# Patient Record
Sex: FEMALE | Race: WHITE | NOT HISPANIC OR LATINO | Employment: OTHER | URBAN - METROPOLITAN AREA
[De-identification: names, ages, dates, MRNs, and addresses within clinical notes are randomized per-mention and may not be internally consistent; named-entity substitution may affect disease eponyms.]

---

## 2017-03-07 DIAGNOSIS — IMO0002 UNCONTROLLED TYPE 2 DIABETES MELLITUS WITH COMPLICATION, WITH LONG-TERM CURRENT USE OF INSULIN: ICD-10-CM

## 2017-03-15 ENCOUNTER — OFFICE VISIT (OUTPATIENT)
Dept: ENDOCRINOLOGY | Age: 42
End: 2017-03-15

## 2017-03-15 VITALS
HEIGHT: 69 IN | DIASTOLIC BLOOD PRESSURE: 78 MMHG | SYSTOLIC BLOOD PRESSURE: 134 MMHG | BODY MASS INDEX: 33.18 KG/M2 | WEIGHT: 224 LBS

## 2017-03-15 DIAGNOSIS — R73.9 HYPERGLYCEMIA: ICD-10-CM

## 2017-03-15 DIAGNOSIS — IMO0002 UNCONTROLLED TYPE 2 DIABETES MELLITUS WITH COMPLICATION, WITH LONG-TERM CURRENT USE OF INSULIN: Primary | ICD-10-CM

## 2017-03-15 DIAGNOSIS — K86.9 PANCREAS DISORDER: ICD-10-CM

## 2017-03-15 DIAGNOSIS — K31.84 GASTROPARESIS: ICD-10-CM

## 2017-03-15 DIAGNOSIS — K86.1 CHRONIC PANCREATITIS, UNSPECIFIED PANCREATITIS TYPE (HCC): ICD-10-CM

## 2017-03-15 DIAGNOSIS — E16.2 HYPOGLYCEMIA: ICD-10-CM

## 2017-03-15 DIAGNOSIS — E78.2 MIXED HYPERLIPIDEMIA: ICD-10-CM

## 2017-03-15 PROCEDURE — 99214 OFFICE O/P EST MOD 30 MIN: CPT | Performed by: NURSE PRACTITIONER

## 2017-03-15 NOTE — PROGRESS NOTES
Danielle Dudley  presents to the office  for the follow-up appointment for Type 2 diabetes mellitus.     The diabete's condition location is throughout the system with the  clinical course has fluctuated, the severity is Moderate, and the modifying/allievating factors are insulin.  Medications and  Dosages were  reviewed with Danielle Dudley and suggested that compliance most of the time.    Associated symptoms of hyperglycemia have been excessive thirst and polyuria.  Associated symptoms of hypoglycemia have been confusion, dizziness, jitteriness and nausea and headache. Patient reports  hypoglycemia threshold for symptoms is 80 mg/dl .     The patient is currently on insulin.    Compliance with blood glucose monitoring: good.     Meal panning: The patient is using avoidance of concentrated sweets.    The patient is currently taking home blood tests - Blood glucose testin times daily, that are:  fasting- 1st thing in morning before eating or drinking  before each meal  before each meal and 1 or 2 hours after meal  bedtime  Novolog U100  per VGO 20    Last instructions given were:   Change Vgo 30 to Vgo20  Instructions for corrections 2 hours after meals-  140mg/dl - 1 click  160mg/dl - 2 clicks  180mg/dl - 3 clicks  200mg/dl - 4 clicks    Home blood glucose testing daily: 6  FB to 260 mg/dl  after breakfast 180 to 260 mg/dl  before lunch 180 to 260 mg/dl  after lunch 180 to 260 mg/dl  before dinner/supper 180 to 260 mg/dl   after dinner/supper 180 to 260 mg/dl    Last reported blood glucose readings are:   Home blood glucose testing daily: 4-5  FB to 93 mg/dl  after lunch 50 to 95 mg/dl  after dinner/supper 100 to 175 mg/dl  bedtime 90 to 160 mg/dl    Patterns reported per patient are that her B/S are constantly running high, but she is having extreme lows at least 2-3 times week for the last 2 weeks. Blood sugars in the 30mg/dl          The following portions of the patient's history were reviewed and  updated as appropriate: current medications, past family history, past medical history, past social history, past surgical history and problem list.      Current Outpatient Prescriptions:   •  atorvastatin (LIPITOR) 10 MG tablet, , Disp: , Rfl:   •  butalbital-acetaminophen  MG tablet tablet, Take  by mouth every 4 (four) hours as needed., Disp: , Rfl:   •  CREON 64204 UNITS capsule delayed-release particles capsule, , Disp: , Rfl:   •  cyclobenzaprine (FLEXERIL) 10 MG tablet, , Disp: , Rfl:   •  gabapentin (NEURONTIN) 400 MG capsule, , Disp: , Rfl:   •  glucose blood (FREESTYLE LITE) test strip, Check blood glucose 4-5 times daily, Disp: 60 each, Rfl: 5  •  Insulin Disposable Pump (V-GO 20) kit, Use 1 pod daily, Disp: 30 kit, Rfl: 11  •  Lancets (FREESTYLE) lancets, Check blood glucose 4-5 times daily, Disp: 120 each, Rfl: 5  •  LANTUS 100 UNIT/ML injection, , Disp: , Rfl:   •  lisinopril (PRINIVIL,ZESTRIL) 40 MG tablet, , Disp: , Rfl:   •  metFORMIN (GLUCOPHAGE) 1000 MG tablet, , Disp: , Rfl:   •  metoclopramide (REGLAN) 5 MG tablet, Take 5 mg by mouth 3 (Three) Times a Day., Disp: , Rfl:   •  NOVOLOG 100 UNIT/ML injection, INJECT 66 UNITS DAILY VIA V-GO DAILY, Disp: 20 mL, Rfl: 0  •  pantoprazole (PROTONIX) 40 MG EC tablet, , Disp: , Rfl:   •  promethazine (PHENERGAN) 25 MG tablet, , Disp: , Rfl:     There are no active problems to display for this patient.      Review of Systems   A comprehensive review of the 14 systems was negative except of listed below:  Constitutional: fatigue and weight gain 10* lb  in 2 months**  Gastrointestinal: positive for dyspepsia, dysphagia, nausea, reflux symptoms and vomiting  Neurological: positive for headaches, memory problems, tremors and weakness  Behavioral/Psych: positive for decreased appetite  Endocrine: diabetic symptoms including increased fatigue  temperature  cold intolerance  temperature heat intolerance  hyperglycemia  stomach pain      Objective:     Wt  "Readings from Last 3 Encounters:   03/15/17 224 lb (102 kg)   12/20/16 215 lb (97.5 kg)   11/09/16 216 lb (98 kg)     Temp Readings from Last 3 Encounters:   No data found for Temp     BP Readings from Last 3 Encounters:   03/15/17 134/78   12/20/16 132/86   11/09/16 142/90     Pulse Readings from Last 3 Encounters:   No data found for Pulse          Visit Vitals   • /78   • Ht 69\" (175.3 cm)   • Wt 224 lb (102 kg)   • BMI 33.08 kg/m2       General appearance:  alert, cooperative, delirious, fatigued, nourished\" \"appears stated age and cooperative\" \"NAD   Neck: no carotid bruit, supple, symmetrical, trachea midline and thyroid not enlarged, symmetric, no tenderness/mass/nodules   Thyroid:  no palpable nodule, no enlargement, no tenderness   Lung:  \"clear to auscultation bilaterally\" \"no abnormal breath sounds  \" effort was normal, no labored breath, no use of accessory muscles.   Heart: regular rate and rhythm, S1, S2 normal, no murmur, click, rub or gallop      Abdomen:  normal bowel sounds- 4 quads, soft non-tender, contour - rounded and contour - obese      Extremities: extremities normal, atraumatic, no cyanosis or edema extremities normal, atraumatic, no cyanosis or edema\" WNL - gait and station, strength and stability\"       Skin:   Pulses:  warm and dry, no hyperpigmentation, normal skin coloring, or suspicious lesions   2+ and symmetric   Neuro: Alert and oriented x3. Gait normal. Reflexes and motor strength normal and symmetric. Cranial nerves 2-12 and sensation grossly intact.      Psych: behavior - normal, judgement - normal, mood - normal, affect - normal                 Lab Review  Results for orders placed or performed in visit on 12/20/16   Comprehensive Metabolic Panel   Result Value Ref Range    Glucose 164 (H) 65 - 99 mg/dL    BUN 10 6 - 20 mg/dL    Creatinine 0.67 0.57 - 1.00 mg/dL    eGFR Non African Am 96 >60 mL/min/1.73    eGFR African Am 116 >60 mL/min/1.73    BUN/Creatinine Ratio 14.9 " 7.0 - 25.0    Sodium 140 136 - 145 mmol/L    Potassium 4.1 3.5 - 5.2 mmol/L    Chloride 100 98 - 107 mmol/L    Total CO2 22.9 22.0 - 29.0 mmol/L    Calcium 9.4 8.6 - 10.5 mg/dL    Total Protein 7.4 6.0 - 8.5 g/dL    Albumin 4.20 3.50 - 5.20 g/dL    Globulin 3.2 gm/dL    A/G Ratio 1.3 g/dL    Total Bilirubin 0.4 0.1 - 1.2 mg/dL    Alkaline Phosphatase 65 39 - 117 U/L    AST (SGOT) 13 1 - 32 U/L    ALT (SGPT) 9 1 - 33 U/L   Hemoglobin A1c   Result Value Ref Range    Hemoglobin A1C 7.08 (H) 4.80 - 5.60 %   Lipid Panel   Result Value Ref Range    Total Cholesterol 215 (H) 0 - 200 mg/dL    Triglycerides 113 0 - 150 mg/dL    HDL Cholesterol 36 (L) 40 - 60 mg/dL    VLDL Cholesterol 22.6 5 - 40 mg/dL    LDL Cholesterol  156 (H) 0 - 100 mg/dL   Vitamin D 25 Hydroxy   Result Value Ref Range    25 Hydroxy, Vitamin D 35.4 30.0 - 100.0 ng/mL   Microalbumin / Creatinine Urine Ratio   Result Value Ref Range    Creatinine, Urine 121.6 Not Estab. mg/dL    Microalbumin, Urine 53.2 Not Estab. ug/mL    Microalbumin/Creatinine Ratio Urine 43.8 (H) 0.0 - 30.0 mg/g creat           Assessment:   Danielle was seen today for follow-up.    Diagnoses and all orders for this visit:    Uncontrolled type 2 diabetes mellitus with complication, with long-term current use of insulin  -     Lipase  -     Amylase  -     CBC & Differential  -     Comprehensive Metabolic Panel  -     C-Peptide  -     Hemoglobin A1c  -     Insulin, Total  -     Microalbumin / Creatinine Urine Ratio  -     TSH  -     T4, Free  -     Thyroid Antibodies  -     T4  -     T3, Free  -     T3    Hypoglycemia  -     CBC & Differential  -     Comprehensive Metabolic Panel    Hyperglycemia  -     CBC & Differential  -     Comprehensive Metabolic Panel    Chronic pancreatitis, unspecified pancreatitis type  -     CBC & Differential  -     Comprehensive Metabolic Panel    Mixed hyperlipidemia  -     CBC & Differential  -     Comprehensive Metabolic Panel  -     Lipid  Panel    Gastroparesis  -     Lipase  -     Amylase  -     CBC & Differential  -     Comprehensive Metabolic Panel    Pancreas disorder  -     Lipase  -     Amylase        Plan:    Education:  interpretation of lab results, blood sugar goals, complications of diabetes mellitus, hypoglycemia prevention and treatment, exercise, illness management, self-monitoring of blood glucose skills, nutrition and carbohydrate counting    home blood tests -  Blood glucose testin-5 times daily, that are:  fasting- 1st thing in morning before eating or drinking  before each meal and 1 or 2 hours after meal  bedtime  anytime you feel symptoms of hyperglycemia or hypoglycemia (high or low blood sugars)     Plan: Samples of V-Go 30 we'll fill out paperwork for the Medtronic pump.  C-peptide is low has chronic pancreatitis with gastroparesis glycemia during night and periodically throughout the day unawareness.    Office visit 25 minutes with additional education given 13 minutes - SMBG with goals, medication changes with purposes and s/e profiles. Ipro needed, insulin pump therapy and gastroparesis and chronic pancreatitis.      Return in about 4 weeks (around 2017).      Dragon transcription disclaimer     Much of this encounter note is an electronic transcription/translation of spoken language to printed text. The electronic translation of spoken language may permit erroneous, or at times, nonsensical words or phrases to be inadvertently transcribed. Although I have reviewed the note for such errors, some may still exist.

## 2017-03-16 LAB
ALBUMIN SERPL-MCNC: 4.2 G/DL (ref 3.5–5.2)
ALBUMIN/GLOB SERPL: 1.2 G/DL
ALP SERPL-CCNC: 67 U/L (ref 39–117)
ALT SERPL-CCNC: 8 U/L (ref 1–33)
AMYLASE SERPL-CCNC: 41 U/L (ref 28–100)
AST SERPL-CCNC: 13 U/L (ref 1–32)
BASOPHILS # BLD AUTO: 0.04 10*3/MM3 (ref 0–0.2)
BASOPHILS NFR BLD AUTO: 0.3 % (ref 0–1.5)
BILIRUB SERPL-MCNC: 0.3 MG/DL (ref 0.1–1.2)
BUN SERPL-MCNC: 11 MG/DL (ref 6–20)
BUN/CREAT SERPL: 16.7 (ref 7–25)
C PEPTIDE SERPL-MCNC: 1.8 NG/ML (ref 1.1–4.4)
CALCIUM SERPL-MCNC: 9.6 MG/DL (ref 8.6–10.5)
CHLORIDE SERPL-SCNC: 99 MMOL/L (ref 98–107)
CHOLEST SERPL-MCNC: 232 MG/DL (ref 0–200)
CO2 SERPL-SCNC: 24 MMOL/L (ref 22–29)
CREAT SERPL-MCNC: 0.66 MG/DL (ref 0.57–1)
EOSINOPHIL # BLD AUTO: 0.2 10*3/MM3 (ref 0–0.7)
EOSINOPHIL NFR BLD AUTO: 1.6 % (ref 0.3–6.2)
ERYTHROCYTE [DISTWIDTH] IN BLOOD BY AUTOMATED COUNT: 13.2 % (ref 11.7–13)
GLOBULIN SER CALC-MCNC: 3.4 GM/DL
GLUCOSE SERPL-MCNC: 154 MG/DL (ref 65–99)
HBA1C MFR BLD: 8.3 % (ref 4.8–5.6)
HCT VFR BLD AUTO: 47.7 % (ref 35.6–45.5)
HDLC SERPL-MCNC: 34 MG/DL (ref 40–60)
HGB BLD-MCNC: 16.1 G/DL (ref 11.9–15.5)
IMM GRANULOCYTES # BLD: 0.02 10*3/MM3 (ref 0–0.03)
IMM GRANULOCYTES NFR BLD: 0.2 % (ref 0–0.5)
INSULIN SERPL-ACNC: 6.3 UIU/ML (ref 2.6–24.9)
LDLC SERPL CALC-MCNC: 135 MG/DL (ref 0–100)
LIPASE SERPL-CCNC: 16 U/L (ref 13–60)
LYMPHOCYTES # BLD AUTO: 4.14 10*3/MM3 (ref 0.9–4.8)
LYMPHOCYTES NFR BLD AUTO: 33.6 % (ref 19.6–45.3)
MCH RBC QN AUTO: 33.4 PG (ref 26.9–32)
MCHC RBC AUTO-ENTMCNC: 33.8 G/DL (ref 32.4–36.3)
MCV RBC AUTO: 99 FL (ref 80.5–98.2)
MONOCYTES # BLD AUTO: 0.51 10*3/MM3 (ref 0.2–1.2)
MONOCYTES NFR BLD AUTO: 4.1 % (ref 5–12)
NEUTROPHILS # BLD AUTO: 7.41 10*3/MM3 (ref 1.9–8.1)
NEUTROPHILS NFR BLD AUTO: 60.2 % (ref 42.7–76)
PLATELET # BLD AUTO: 268 10*3/MM3 (ref 140–500)
POTASSIUM SERPL-SCNC: 4.2 MMOL/L (ref 3.5–5.2)
PROT SERPL-MCNC: 7.6 G/DL (ref 6–8.5)
RBC # BLD AUTO: 4.82 10*6/MM3 (ref 3.9–5.2)
SODIUM SERPL-SCNC: 141 MMOL/L (ref 136–145)
T3 SERPL-MCNC: 145.8 NG/DL (ref 80–200)
T3FREE SERPL-MCNC: 2.9 PG/ML (ref 2–4.4)
T4 FREE SERPL-MCNC: 1.06 NG/DL (ref 0.93–1.7)
T4 SERPL-MCNC: 7.25 MCG/DL (ref 4.5–11.7)
THYROGLOB AB SERPL-ACNC: <1 IU/ML (ref 0–0.9)
THYROPEROXIDASE AB SERPL-ACNC: 16 IU/ML (ref 0–34)
TRIGL SERPL-MCNC: 313 MG/DL (ref 0–150)
TSH SERPL DL<=0.005 MIU/L-ACNC: 0.98 MIU/ML (ref 0.27–4.2)
VLDLC SERPL CALC-MCNC: 62.6 MG/DL (ref 5–40)
WBC # BLD AUTO: 12.32 10*3/MM3 (ref 4.5–10.7)

## 2017-03-30 DIAGNOSIS — IMO0002 UNCONTROLLED TYPE 2 DIABETES MELLITUS WITH COMPLICATION, WITH LONG-TERM CURRENT USE OF INSULIN: ICD-10-CM

## 2017-04-26 ENCOUNTER — OFFICE VISIT (OUTPATIENT)
Dept: ENDOCRINOLOGY | Age: 42
End: 2017-04-26

## 2017-04-26 VITALS
BODY MASS INDEX: 33.03 KG/M2 | HEIGHT: 69 IN | SYSTOLIC BLOOD PRESSURE: 132 MMHG | DIASTOLIC BLOOD PRESSURE: 86 MMHG | WEIGHT: 223 LBS

## 2017-04-26 DIAGNOSIS — E16.2 HYPOGLYCEMIA: ICD-10-CM

## 2017-04-26 DIAGNOSIS — K86.9 PANCREAS DISORDER: ICD-10-CM

## 2017-04-26 DIAGNOSIS — K31.84 GASTROPARESIS: ICD-10-CM

## 2017-04-26 DIAGNOSIS — IMO0002 UNCONTROLLED TYPE 2 DIABETES MELLITUS WITH COMPLICATION, WITH LONG-TERM CURRENT USE OF INSULIN: Primary | ICD-10-CM

## 2017-04-26 DIAGNOSIS — K86.1 CHRONIC PANCREATITIS, UNSPECIFIED PANCREATITIS TYPE (HCC): ICD-10-CM

## 2017-04-26 DIAGNOSIS — R73.9 HYPERGLYCEMIA: ICD-10-CM

## 2017-04-26 DIAGNOSIS — E78.2 MIXED HYPERLIPIDEMIA: ICD-10-CM

## 2017-04-26 PROCEDURE — 99214 OFFICE O/P EST MOD 30 MIN: CPT | Performed by: NURSE PRACTITIONER

## 2017-04-26 RX ORDER — ATORVASTATIN CALCIUM 20 MG/1
20 TABLET, FILM COATED ORAL DAILY
Qty: 30 TABLET | Refills: 4 | Status: SHIPPED | OUTPATIENT
Start: 2017-04-26 | End: 2017-10-31 | Stop reason: SDUPTHER

## 2017-04-26 RX ORDER — ICOSAPENT ETHYL 1000 MG/1
2 CAPSULE ORAL 2 TIMES DAILY WITH MEALS
Qty: 120 CAPSULE | Refills: 3 | Status: SHIPPED | OUTPATIENT
Start: 2017-04-26 | End: 2017-08-19 | Stop reason: SDUPTHER

## 2017-04-26 NOTE — PROGRESS NOTES
Danielle Dudley  presents to the office  for the follow-up appointment for Type 2 diabetes mellitus.     The diabete's condition location is throughout the system with the  clinical course has fluctuated, the severity is Mild, and the modifying/allievating factors are insulin.  Medications and  Dosages were  reviewed with Danielle Dudley and suggested that compliance most of the time.    Associated symptoms of hyperglycemia have been excessive thirst and polyuria.  Associated symptoms of hypoglycemia have been confusion, dizziness, jitteriness and nausea and headache. Patient reports  hypoglycemia threshold for symptoms is 80 mg/dl .     The patient is currently on insulin.    Compliance with blood glucose monitoring: good.     Meal panning: The patient is using avoidance of concentrated sweets.    The patient is currently taking home blood tests - Blood glucose testin times daily, that are: fasting- 1st thing in morning before eating or drinking, before each meal, before each meal and 1 or 2 hours after meal, and bedtime, anytime you feel symptoms of hyperglycemia or hypoglycemia (high or low blood sugars)  The Novolog U100 per VGO 20    Last instructions given were:   Plan: Samples of V-Go 30 we'll fill out paperwork for the Medtronic pump. C-peptide is low has chronic pancreatitis with gastroparesis glycemia during night and periodically throughout the day unawareness.    Home blood glucose testing daily: 8  FB to 135 mg/dl  after breakfast 160 to 180 mg/dl  before lunch 160 to 200 mg/dl  after lunch 160 to 200 mg/dl  before dinner/supper 220 to 260 mg/dl   after dinner/supper 220 to 260 mg/dl  bedtime 120 to 160 mg/dl    Last reported blood glucose readings are:   Home blood glucose testing daily: 6  FB to 260 mg/dl  after breakfast 180 to 260 mg/dl  before lunch 180 to 260 mg/dl  after lunch 180 to 260 mg/dl  before dinner/supper 180 to 260 mg/dl  after dinner/supper 180 to 260 mg/dl    Patterns  reported per patient are that she has been having some lows.          The following portions of the patient's history were reviewed and updated as appropriate: current medications, past family history, past medical history, past social history, past surgical history and problem list.      Current Outpatient Prescriptions:   •  butalbital-acetaminophen  MG tablet tablet, Take  by mouth every 4 (four) hours as needed., Disp: , Rfl:   •  CREON 05021 UNITS capsule delayed-release particles capsule, , Disp: , Rfl:   •  cyclobenzaprine (FLEXERIL) 10 MG tablet, , Disp: , Rfl:   •  gabapentin (NEURONTIN) 400 MG capsule, , Disp: , Rfl:   •  glucose blood (FREESTYLE LITE) test strip, Check blood glucose 4-5 times daily, Disp: 60 each, Rfl: 5  •  Insulin Disposable Pump (V-GO 20) kit, Use 1 pod daily, Disp: 30 kit, Rfl: 11  •  Lancets (FREESTYLE) lancets, Check blood glucose 4-5 times daily, Disp: 120 each, Rfl: 5  •  LANTUS 100 UNIT/ML injection, , Disp: , Rfl:   •  lisinopril (PRINIVIL,ZESTRIL) 40 MG tablet, , Disp: , Rfl:   •  metFORMIN (GLUCOPHAGE) 1000 MG tablet, , Disp: , Rfl:   •  metoclopramide (REGLAN) 5 MG tablet, Take 5 mg by mouth 3 (Three) Times a Day., Disp: , Rfl:   •  NOVOLOG 100 UNIT/ML injection, INJECT 66 UNITS DAILY VIA V-GO DAILY, Disp: 20 mL, Rfl: 0  •  pantoprazole (PROTONIX) 40 MG EC tablet, , Disp: , Rfl:   •  promethazine (PHENERGAN) 25 MG tablet, , Disp: , Rfl:   •  atorvastatin (LIPITOR) 20 MG tablet, Take 1 tablet by mouth Daily., Disp: 30 tablet, Rfl: 4  •  icosapent ethyl (VASCEPA) 1 G capsule capsule, Take 2 g by mouth 2 (Two) Times a Day With Meals., Disp: 120 capsule, Rfl: 3    There are no active problems to display for this patient.      Review of Systems   A comprehensive review of the 14 systems was negative except of listed below:  Constitutional: fatigue  Gastrointestinal: positive for abdominal pain, change in bowel habits, dyspepsia, dysphagia, nausea and reflux  "symptoms  Musculoskeletal:positive for muscle cramping  Neurological: positive for weakness and numbness and tingling in feet & hands  Behavioral/Psych: positive for sleep disturbance  Endocrine: diabetic symptoms including increased fatigue  hypoglycemia  hyperglycemia     Objective:     Wt Readings from Last 3 Encounters:   04/26/17 223 lb (101 kg)   03/15/17 224 lb (102 kg)   12/20/16 215 lb (97.5 kg)     Temp Readings from Last 3 Encounters:   No data found for Temp     BP Readings from Last 3 Encounters:   04/26/17 132/86   03/15/17 134/78   12/20/16 132/86     Pulse Readings from Last 3 Encounters:   No data found for Pulse        /86  Ht 69\" (175.3 cm)  Wt 223 lb (101 kg)  BMI 32.93 kg/m2    General appearance:  alert, cooperative, delirious, fatigued, nourished\" \"appears stated age and cooperative\" \"NAD   Neck: no carotid bruit, supple, symmetrical, trachea midline and thyroid not enlarged, symmetric, no tenderness/mass/nodules   Thyroid:  no palpable nodule, no enlargement, no tenderness   Lung:  \"clear to auscultation bilaterally\" \"no abnormal breath sounds  \" effort was normal, no labored breath, no use of accessory muscles.   Heart: regular rate and rhythm, S1, S2 normal, no murmur, click, rub or gallop      Abdomen:  normal bowel sounds- 4 quads, soft non-tender and contour - slt rounded      Extremities: extremities normal, atraumatic, no cyanosis or edema extremities normal, atraumatic, no cyanosis or edema\" WNL - gait and station, strength and stability\"       Skin:   Pulses:  warm and dry, no hyperpigmentation, normal skin coloring, or suspicious lesions   2+ and symmetric   Neuro: Alert and oriented x3. Gait normal. Reflexes and motor strength normal and symmetric. Cranial nerves 2-12 and sensation grossly intact.      Psych: behavior - normal, judgement - normal, mood - normal, affect - normal                 Lab Review  Results for orders placed or performed in visit on 03/15/17   Lipase "   Result Value Ref Range    Lipase 16 13 - 60 U/L   Amylase   Result Value Ref Range    Amylase 41 28 - 100 U/L   Comprehensive Metabolic Panel   Result Value Ref Range    Glucose 154 (H) 65 - 99 mg/dL    BUN 11 6 - 20 mg/dL    Creatinine 0.66 0.57 - 1.00 mg/dL    eGFR Non African Am 98 >60 mL/min/1.73    eGFR African Am 118 >60 mL/min/1.73    BUN/Creatinine Ratio 16.7 7.0 - 25.0    Sodium 141 136 - 145 mmol/L    Potassium 4.2 3.5 - 5.2 mmol/L    Chloride 99 98 - 107 mmol/L    Total CO2 24.0 22.0 - 29.0 mmol/L    Calcium 9.6 8.6 - 10.5 mg/dL    Total Protein 7.6 6.0 - 8.5 g/dL    Albumin 4.20 3.50 - 5.20 g/dL    Globulin 3.4 gm/dL    A/G Ratio 1.2 g/dL    Total Bilirubin 0.3 0.1 - 1.2 mg/dL    Alkaline Phosphatase 67 39 - 117 U/L    AST (SGOT) 13 1 - 32 U/L    ALT (SGPT) 8 1 - 33 U/L   C-Peptide   Result Value Ref Range    C-Peptide 1.8 1.1 - 4.4 ng/mL   Hemoglobin A1c   Result Value Ref Range    Hemoglobin A1C 8.30 (H) 4.80 - 5.60 %   Insulin, Total   Result Value Ref Range    Insulin 6.3 2.6 - 24.9 uIU/mL   Lipid Panel   Result Value Ref Range    Total Cholesterol 232 (H) 0 - 200 mg/dL    Triglycerides 313 (H) 0 - 150 mg/dL    HDL Cholesterol 34 (L) 40 - 60 mg/dL    VLDL Cholesterol 62.6 (H) 5 - 40 mg/dL    LDL Cholesterol  135 (H) 0 - 100 mg/dL   TSH   Result Value Ref Range    TSH 0.981 0.270 - 4.200 mIU/mL   T4, Free   Result Value Ref Range    Free T4 1.06 0.93 - 1.70 ng/dL   Thyroid Antibodies   Result Value Ref Range    Thyroid Peroxidase Antibody 16 0 - 34 IU/mL    Thyroglobulin Ab <1.0 0.0 - 0.9 IU/mL   T4   Result Value Ref Range    T4, Total 7.25 4.50 - 11.70 mcg/dL   T3, Free   Result Value Ref Range    T3, Free 2.9 2.0 - 4.4 pg/mL   T3   Result Value Ref Range    T3, Total 145.8 80.0 - 200.0 ng/dL   CBC & Differential   Result Value Ref Range    WBC 12.32 (H) 4.50 - 10.70 10*3/mm3    RBC 4.82 3.90 - 5.20 10*6/mm3    Hemoglobin 16.1 (H) 11.9 - 15.5 g/dL    Hematocrit 47.7 (H) 35.6 - 45.5 %    MCV 99.0  (H) 80.5 - 98.2 fL    MCH 33.4 (H) 26.9 - 32.0 pg    MCHC 33.8 32.4 - 36.3 g/dL    RDW 13.2 (H) 11.7 - 13.0 %    Platelets 268 140 - 500 10*3/mm3    Neutrophil Rel % 60.2 42.7 - 76.0 %    Lymphocyte Rel % 33.6 19.6 - 45.3 %    Monocyte Rel % 4.1 (L) 5.0 - 12.0 %    Eosinophil Rel % 1.6 0.3 - 6.2 %    Basophil Rel % 0.3 0.0 - 1.5 %    Neutrophils Absolute 7.41 1.90 - 8.10 10*3/mm3    Lymphocytes Absolute 4.14 0.90 - 4.80 10*3/mm3    Monocytes Absolute 0.51 0.20 - 1.20 10*3/mm3    Eosinophils Absolute 0.20 0.00 - 0.70 10*3/mm3    Basophils Absolute 0.04 0.00 - 0.20 10*3/mm3    Immature Granulocyte Rel % 0.2 0.0 - 0.5 %    Immature Grans Absolute 0.02 0.00 - 0.03 10*3/mm3           Assessment:   Danielle was seen today for follow-up.    Diagnoses and all orders for this visit:    Uncontrolled type 2 diabetes mellitus with complication, with long-term current use of insulin  -     NM Gastric Emptying; Future    Hypoglycemia  -     NM Gastric Emptying; Future    Hyperglycemia  -     NM Gastric Emptying; Future    Chronic pancreatitis, unspecified pancreatitis type  -     NM Gastric Emptying; Future    Gastroparesis  -     NM Gastric Emptying; Future    Pancreas disorder  -     NM Gastric Emptying; Future    Mixed hyperlipidemia  -     atorvastatin (LIPITOR) 20 MG tablet; Take 1 tablet by mouth Daily.  -     icosapent ethyl (VASCEPA) 1 G capsule capsule; Take 2 g by mouth 2 (Two) Times a Day With Meals.          Plan:    Education:  interpretation of lab results, blood sugar goals, complications of diabetes mellitus, hypoglycemia prevention and treatment, exercise, illness management, self-monitoring of blood glucose skills, nutrition and carbohydrate counting    home blood tests -  Blood glucose testin times daily, that are:  fasting- 1st thing in morning before eating or drinking  before each meal and 1 or 2 hours after meal  bedtime  anytime you feel symptoms of hyperglycemia or hypoglycemia (high or low blood  sugars)    Office visit 25 minutes with additional education given 13 minutes - SMBG with goals, medication changes with purposes and s/e profiles. The need for further testing and referral for gastroparesis and treatment - need to adjust the insulin pump      Return in about 6 weeks (around 6/7/2017).      Dragon transcription disclaimer     Much of this encounter note is an electronic transcription/translation of spoken language to printed text. The electronic translation of spoken language may permit erroneous, or at times, nonsensical words or phrases to be inadvertently transcribed. Although I have reviewed the note for such errors, some may still exist.

## 2017-04-27 DIAGNOSIS — IMO0002 UNCONTROLLED TYPE 2 DIABETES MELLITUS WITH COMPLICATION, WITH LONG-TERM CURRENT USE OF INSULIN: ICD-10-CM

## 2017-05-05 ENCOUNTER — HOSPITAL ENCOUNTER (OUTPATIENT)
Dept: NUCLEAR MEDICINE | Facility: HOSPITAL | Age: 42
Discharge: HOME | End: 2017-05-05

## 2017-05-05 DIAGNOSIS — K86.9 PANCREAS DISORDER: ICD-10-CM

## 2017-05-05 DIAGNOSIS — K86.1 CHRONIC PANCREATITIS, UNSPECIFIED PANCREATITIS TYPE (HCC): ICD-10-CM

## 2017-05-05 DIAGNOSIS — K31.84 GASTROPARESIS: ICD-10-CM

## 2017-05-05 DIAGNOSIS — IMO0002 UNCONTROLLED TYPE 2 DIABETES MELLITUS WITH COMPLICATION, WITH LONG-TERM CURRENT USE OF INSULIN: ICD-10-CM

## 2017-05-05 DIAGNOSIS — E16.2 HYPOGLYCEMIA: ICD-10-CM

## 2017-05-05 DIAGNOSIS — R73.9 HYPERGLYCEMIA: ICD-10-CM

## 2017-05-05 PROCEDURE — A9541 TC99M SULFUR COLLOID: HCPCS | Performed by: NURSE PRACTITIONER

## 2017-05-05 PROCEDURE — 0 TECHNETIUM SULFUR COLLOID: Performed by: NURSE PRACTITIONER

## 2017-05-05 PROCEDURE — 78264 GASTRIC EMPTYING IMG STUDY: CPT

## 2017-05-05 RX ADMIN — Medication 1 DOSE: at 06:57

## 2017-05-12 ENCOUNTER — TREATMENT (OUTPATIENT)
Dept: ENDOCRINOLOGY | Age: 42
End: 2017-05-12

## 2017-05-12 DIAGNOSIS — IMO0002 UNCONTROLLED TYPE 2 DIABETES MELLITUS WITH COMPLICATION, WITH LONG-TERM CURRENT USE OF INSULIN: Primary | ICD-10-CM

## 2017-05-12 DIAGNOSIS — E16.2 HYPOGLYCEMIA: ICD-10-CM

## 2017-05-12 DIAGNOSIS — R73.9 HYPERGLYCEMIA: ICD-10-CM

## 2017-05-12 DIAGNOSIS — K31.84 GASTROPARESIS: ICD-10-CM

## 2017-05-12 DIAGNOSIS — K86.1 CHRONIC PANCREATITIS, UNSPECIFIED PANCREATITIS TYPE (HCC): ICD-10-CM

## 2017-05-12 DIAGNOSIS — K86.9 PANCREAS DISORDER: ICD-10-CM

## 2017-05-12 DIAGNOSIS — Z79.4 LONG-TERM INSULIN USE (HCC): ICD-10-CM

## 2017-05-12 PROCEDURE — 95250 CONT GLUC MNTR PHYS/QHP EQP: CPT | Performed by: NURSE PRACTITIONER

## 2017-05-18 DIAGNOSIS — IMO0002 UNCONTROLLED TYPE 2 DIABETES MELLITUS WITH COMPLICATION, WITH LONG-TERM CURRENT USE OF INSULIN: ICD-10-CM

## 2017-05-23 ENCOUNTER — TREATMENT (OUTPATIENT)
Dept: ENDOCRINOLOGY | Age: 42
End: 2017-05-23

## 2017-05-23 DIAGNOSIS — E16.2 HYPOGLYCEMIA: ICD-10-CM

## 2017-05-23 DIAGNOSIS — R73.9 HYPERGLYCEMIA: ICD-10-CM

## 2017-05-23 DIAGNOSIS — Z79.4 LONG-TERM INSULIN USE (HCC): ICD-10-CM

## 2017-05-23 DIAGNOSIS — K31.84 GASTROPARESIS: ICD-10-CM

## 2017-05-23 DIAGNOSIS — K86.1 CHRONIC PANCREATITIS, UNSPECIFIED PANCREATITIS TYPE (HCC): ICD-10-CM

## 2017-05-23 DIAGNOSIS — K86.9 PANCREAS DISORDER: ICD-10-CM

## 2017-05-23 DIAGNOSIS — IMO0002 UNCONTROLLED TYPE 2 DIABETES MELLITUS WITH COMPLICATION, WITH LONG-TERM CURRENT USE OF INSULIN: Primary | ICD-10-CM

## 2017-05-23 PROCEDURE — 95251 CONT GLUC MNTR ANALYSIS I&R: CPT | Performed by: NURSE PRACTITIONER

## 2017-05-24 DIAGNOSIS — IMO0002 DIABETES MELLITUS TYPE 2, UNCONTROLLED, WITH COMPLICATIONS: ICD-10-CM

## 2017-05-24 RX ORDER — BLOOD-GLUCOSE METER
KIT MISCELLANEOUS
Refills: 0 | OUTPATIENT
Start: 2017-05-24

## 2017-06-07 ENCOUNTER — OFFICE VISIT (OUTPATIENT)
Dept: ENDOCRINOLOGY | Age: 42
End: 2017-06-07

## 2017-06-07 VITALS
WEIGHT: 223.8 LBS | SYSTOLIC BLOOD PRESSURE: 148 MMHG | DIASTOLIC BLOOD PRESSURE: 88 MMHG | BODY MASS INDEX: 33.15 KG/M2 | HEIGHT: 69 IN

## 2017-06-07 DIAGNOSIS — E78.2 MIXED HYPERLIPIDEMIA: ICD-10-CM

## 2017-06-07 DIAGNOSIS — IMO0002 UNCONTROLLED TYPE 2 DIABETES MELLITUS WITH COMPLICATION, WITH LONG-TERM CURRENT USE OF INSULIN: Primary | ICD-10-CM

## 2017-06-07 DIAGNOSIS — E67.3 HYPERVITAMINOSIS D: ICD-10-CM

## 2017-06-07 DIAGNOSIS — K86.1 CHRONIC PANCREATITIS, UNSPECIFIED PANCREATITIS TYPE (HCC): ICD-10-CM

## 2017-06-07 DIAGNOSIS — Z79.4 LONG-TERM INSULIN USE (HCC): ICD-10-CM

## 2017-06-07 DIAGNOSIS — E16.2 HYPOGLYCEMIA: ICD-10-CM

## 2017-06-07 DIAGNOSIS — R73.9 HYPERGLYCEMIA: ICD-10-CM

## 2017-06-07 PROCEDURE — 99214 OFFICE O/P EST MOD 30 MIN: CPT | Performed by: NURSE PRACTITIONER

## 2017-06-07 RX ORDER — SENNA AND DOCUSATE SODIUM 50; 8.6 MG/1; MG/1
1 TABLET, FILM COATED ORAL DAILY
COMMUNITY
End: 2017-12-06

## 2017-06-07 RX ORDER — DICYCLOMINE HYDROCHLORIDE 10 MG/1
10 CAPSULE ORAL
COMMUNITY
End: 2018-03-14 | Stop reason: SDUPTHER

## 2017-06-07 RX ORDER — LOSARTAN POTASSIUM 100 MG/1
100 TABLET ORAL DAILY
COMMUNITY
End: 2018-03-14 | Stop reason: SDUPTHER

## 2017-06-07 RX ORDER — POLYETHYLENE GLYCOL 3350 17 G/17G
17 POWDER, FOR SOLUTION ORAL DAILY
COMMUNITY
End: 2018-07-16 | Stop reason: ALTCHOICE

## 2017-06-07 NOTE — PROGRESS NOTES
Danielle Dudley  presents to the office  for the follow-up appointment for Type 2 diabetes mellitus.     The diabete's condition location is throughout the system with the  clinical course has fluctuated, the severity is Mild, and the modifying/allievating factors are insulin pump.  Medications and  Dosages were  reviewed with Danielle Dudley and suggested that compliance most of the time.    Associated symptoms of hyperglycemia have been excessive thirst and polyuria.  Associated symptoms of hypoglycemia have been confusion, dizziness, jitteriness and nausea nd headache. Patient reports  hypoglycemia threshold for symptoms is 80 mg/dl .     The patient is currently on oral medications .     Compliance with blood glucose monitoring: good.     Meal panning: The patient is using carbohydrate counting, but is not on a specified limit, being a pump user.    The patient is currently taking home blood tests - Blood glucose testin-8 times daily, that are:  fasting- 1st thing in morning before eating or drinking  before each meal  before each meal and 1 or 2 hours after meal  fasting and bedtime  bedtime  anytime you feel symptoms of hyperglycemia or hypoglycemia (high or low blood sugars)  Novolog U100  per insulin pump     Last instructions given were:  No change in therapy.     Home blood glucose testing daily: 4-8  insulin pump upload  -Yes -sensor and overlay report shows very good inputting carbohydrates balancing off blood glucose.  Logbook shows fasting blood glucose ranging from 138-260 mg/Annie lunch averaging 170 through 197 down to 1 18 mg/Annie didn't dinner time shows 141 through 1 61 mg/Annie.  Average glucose is 159+ or -64.  Checks 4.4 times daily with 60% B and above target and 0 below target.  Average carb's 77 g EN plus or -22.  Total daily in was 30.86 units plus or -3.5 units 63% BM basal 37% BM bolusing adherence report shows no overriding feeling cannula and rewinding correctly.    Last reported blood  glucose readings are:   Home blood glucose testing daily: 8  FB to 135 mg/dl  after breakfast 160 to 180 mg/dl  before lunch 160 to 200 mg/dl  after lunch 160 to 200 mg/dl  before dinner/supper 220 to 260 mg/dl  after dinner/supper 220 to 260 mg/dl  bedtime 120 to 160 mg/dl    Patterns reported per patient are none.         The following portions of the patient's history were reviewed and updated as appropriate: current medications, past family history, past medical history, past social history, past surgical history and problem list.      Current Outpatient Prescriptions:   •  atorvastatin (LIPITOR) 20 MG tablet, Take 1 tablet by mouth Daily., Disp: 30 tablet, Rfl: 4  •  butalbital-acetaminophen  MG tablet tablet, Take  by mouth every 4 (four) hours as needed., Disp: , Rfl:   •  CREON 29132 UNITS capsule delayed-release particles capsule, , Disp: , Rfl:   •  cyclobenzaprine (FLEXERIL) 10 MG tablet, , Disp: , Rfl:   •  dicyclomine (BENTYL) 10 MG capsule, Take 10 mg by mouth 4 (Four) Times a Day Before Meals & at Bedtime., Disp: , Rfl:   •  gabapentin (NEURONTIN) 400 MG capsule, , Disp: , Rfl:   •  glucagon (GLUCAGON EMERGENCY) 1 MG injection, Inject 1 mg under the skin 1 (One) Time As Needed for Low Blood Sugar for up to 1 dose., Disp: 1 kit, Rfl: 0  •  glucose blood (JALEEL CONTOUR NEXT TEST) test strip, Test blood glucose 8 times daily, Disp: 300 each, Rfl: 6  •  icosapent ethyl (VASCEPA) 1 G capsule capsule, Take 2 g by mouth 2 (Two) Times a Day With Meals., Disp: 120 capsule, Rfl: 3  •  insulin aspart (NOVOLOG) 100 UNIT/ML injection, Inject up to 200 units daily via medtronic 630G insulin pump SN: [id number] Started pump on 17 DX:e11.65, Disp: 60 mL, Rfl: 3  •  Insulin Disposable Pump (V-GO 20) kit, Use 1 pod daily, Disp: 30 kit, Rfl: 11  •  Lancets (FREESTYLE) lancets, Check blood glucose 4-5 times daily, Disp: 120 each, Rfl: 5  •  LANTUS 100 UNIT/ML injection, , Disp: , Rfl:   •  lisinopril  "(PRINIVIL,ZESTRIL) 40 MG tablet, , Disp: , Rfl:   •  losartan (COZAAR) 100 MG tablet, Take 100 mg by mouth Daily., Disp: , Rfl:   •  metFORMIN (GLUCOPHAGE) 1000 MG tablet, , Disp: , Rfl:   •  metoclopramide (REGLAN) 5 MG tablet, Take 5 mg by mouth 3 (Three) Times a Day., Disp: , Rfl:   •  pantoprazole (PROTONIX) 40 MG EC tablet, , Disp: , Rfl:   •  polyethylene glycol (MIRALAX) packet, Take 17 g by mouth Daily., Disp: , Rfl:   •  promethazine (PHENERGAN) 25 MG tablet, , Disp: , Rfl:   •  sennosides-docusate sodium (SENOKOT-S) 8.6-50 MG tablet, Take 1 tablet by mouth Daily., Disp: , Rfl:   •  Urine Glucose-Ketones Test strip, Use to test urine if blood glucose is over 400mg/dl, Disp: 100 each, Rfl: 6    Patient Active Problem List    Diagnosis   • Uncontrolled type 2 diabetes mellitus with complication, with long-term current use of insulin [E11.8, E11.65, Z79.4]   • Hypoglycemia [E16.2]   • Hyperglycemia [R73.9]   • Chronic pancreatitis [K86.1]   • Gastroparesis [K31.84]   • Pancreas disorder [K86.9]   • Long-term insulin use [Z79.4]       Review of Systems   A comprehensive review of the 14 systems was negative except of listed below:  Endocrine: hyperglycemia     Objective:     Wt Readings from Last 3 Encounters:   06/07/17 223 lb 12.8 oz (102 kg)   04/26/17 223 lb (101 kg)   03/15/17 224 lb (102 kg)     Temp Readings from Last 3 Encounters:   No data found for Temp     BP Readings from Last 3 Encounters:   06/07/17 148/88   04/26/17 132/86   03/15/17 134/78     Pulse Readings from Last 3 Encounters:   No data found for Pulse        /88  Ht 69\" (175.3 cm)  Wt 223 lb 12.8 oz (102 kg)  BMI 33.05 kg/m2    General appearance:  alert, cooperative, delirious, fatigued, nourished\" \"appears stated age and cooperative\" \"NAD   Neck: no carotid bruit, supple, symmetrical, trachea midline and thyroid not enlarged, symmetric, no tenderness/mass/nodules   Thyroid:  no palpable nodule, no enlargement, no tenderness " "  Lung:  \"clear to auscultation bilaterally\" \"no abnormal breath sounds  \" effort was normal, no labored breath, no use of accessory muscles.   Heart: regular rate and rhythm, S1, S2 normal, no murmur, click, rub or gallop      Abdomen:  normal bowel sounds- 4 quads, soft non-tender, contour - rounded and contour - obese      Extremities: extremities normal, atraumatic, no cyanosis or edema extremities normal, atraumatic, no cyanosis or edema\" WNL - gait and station, strength and stability\"       Skin:   Pulses:  warm and dry, no hyperpigmentation, normal skin coloring, or suspicious lesions   2+ and symmetric   Neuro: Alert and oriented x3. Gait normal. Reflexes and motor strength normal and symmetric. Cranial nerves 2-12 and sensation grossly intact.      Psych: behavior - normal, judgement - normal, mood - normal, affect - normal                 Lab Review  Results for orders placed or performed in visit on 03/15/17   Lipase   Result Value Ref Range    Lipase 16 13 - 60 U/L   Amylase   Result Value Ref Range    Amylase 41 28 - 100 U/L   Comprehensive Metabolic Panel   Result Value Ref Range    Glucose 154 (H) 65 - 99 mg/dL    BUN 11 6 - 20 mg/dL    Creatinine 0.66 0.57 - 1.00 mg/dL    eGFR Non African Am 98 >60 mL/min/1.73    eGFR African Am 118 >60 mL/min/1.73    BUN/Creatinine Ratio 16.7 7.0 - 25.0    Sodium 141 136 - 145 mmol/L    Potassium 4.2 3.5 - 5.2 mmol/L    Chloride 99 98 - 107 mmol/L    Total CO2 24.0 22.0 - 29.0 mmol/L    Calcium 9.6 8.6 - 10.5 mg/dL    Total Protein 7.6 6.0 - 8.5 g/dL    Albumin 4.20 3.50 - 5.20 g/dL    Globulin 3.4 gm/dL    A/G Ratio 1.2 g/dL    Total Bilirubin 0.3 0.1 - 1.2 mg/dL    Alkaline Phosphatase 67 39 - 117 U/L    AST (SGOT) 13 1 - 32 U/L    ALT (SGPT) 8 1 - 33 U/L   C-Peptide   Result Value Ref Range    C-Peptide 1.8 1.1 - 4.4 ng/mL   Hemoglobin A1c   Result Value Ref Range    Hemoglobin A1C 8.30 (H) 4.80 - 5.60 %   Insulin, Total   Result Value Ref Range    Insulin 6.3 " 2.6 - 24.9 uIU/mL   Lipid Panel   Result Value Ref Range    Total Cholesterol 232 (H) 0 - 200 mg/dL    Triglycerides 313 (H) 0 - 150 mg/dL    HDL Cholesterol 34 (L) 40 - 60 mg/dL    VLDL Cholesterol 62.6 (H) 5 - 40 mg/dL    LDL Cholesterol  135 (H) 0 - 100 mg/dL   TSH   Result Value Ref Range    TSH 0.981 0.270 - 4.200 mIU/mL   T4, Free   Result Value Ref Range    Free T4 1.06 0.93 - 1.70 ng/dL   Thyroid Antibodies   Result Value Ref Range    Thyroid Peroxidase Antibody 16 0 - 34 IU/mL    Thyroglobulin Ab <1.0 0.0 - 0.9 IU/mL   T4   Result Value Ref Range    T4, Total 7.25 4.50 - 11.70 mcg/dL   T3, Free   Result Value Ref Range    T3, Free 2.9 2.0 - 4.4 pg/mL   T3   Result Value Ref Range    T3, Total 145.8 80.0 - 200.0 ng/dL   CBC & Differential   Result Value Ref Range    WBC 12.32 (H) 4.50 - 10.70 10*3/mm3    RBC 4.82 3.90 - 5.20 10*6/mm3    Hemoglobin 16.1 (H) 11.9 - 15.5 g/dL    Hematocrit 47.7 (H) 35.6 - 45.5 %    MCV 99.0 (H) 80.5 - 98.2 fL    MCH 33.4 (H) 26.9 - 32.0 pg    MCHC 33.8 32.4 - 36.3 g/dL    RDW 13.2 (H) 11.7 - 13.0 %    Platelets 268 140 - 500 10*3/mm3    Neutrophil Rel % 60.2 42.7 - 76.0 %    Lymphocyte Rel % 33.6 19.6 - 45.3 %    Monocyte Rel % 4.1 (L) 5.0 - 12.0 %    Eosinophil Rel % 1.6 0.3 - 6.2 %    Basophil Rel % 0.3 0.0 - 1.5 %    Neutrophils Absolute 7.41 1.90 - 8.10 10*3/mm3    Lymphocytes Absolute 4.14 0.90 - 4.80 10*3/mm3    Monocytes Absolute 0.51 0.20 - 1.20 10*3/mm3    Eosinophils Absolute 0.20 0.00 - 0.70 10*3/mm3    Basophils Absolute 0.04 0.00 - 0.20 10*3/mm3    Immature Granulocyte Rel % 0.2 0.0 - 0.5 %    Immature Grans Absolute 0.02 0.00 - 0.03 10*3/mm3           Assessment:   Danielle was seen today for follow-up.    Diagnoses and all orders for this visit:    Uncontrolled type 2 diabetes mellitus with complication, with long-term current use of insulin  -     Comprehensive Metabolic Panel  -     C-Peptide  -     Hemoglobin A1c  -     Insulin, Total  -     Microalbumin /  Creatinine Urine Ratio  -     TSH  -     T4, Free  -     T4  -     T3, Free  -     T3  -     Vitamin D 25 Hydroxy    Hypoglycemia  -     Comprehensive Metabolic Panel    Hyperglycemia  -     Comprehensive Metabolic Panel    Chronic pancreatitis, unspecified pancreatitis type  -     Comprehensive Metabolic Panel    Long-term insulin use  -     Comprehensive Metabolic Panel    Mixed hyperlipidemia  -     Comprehensive Metabolic Panel  -     Lipid Panel    Hypervitaminosis D   -     Vitamin D 25 Hydroxy          Plan:    Education:  interpretation of lab results, blood sugar goals, complications of diabetes mellitus, hypoglycemia prevention and treatment, exercise, illness management, self-monitoring of blood glucose skills, nutrition and carbohydrate counting    home blood tests -  Blood glucose testin times daily, that are:  fasting- 1st thing in morning before eating or drinking  before each meal and 1 or 2 hours after meal  bedtime  anytime you feel symptoms of hyperglycemia or hypoglycemia (high or low blood sugars)    Insulin pump changes:  Basal changes 12 AM-0.9 units per hour, 5 PM-0.925 units per hour, 9 PM-0.825 units per hour  Carb ratio one unit for every 8 g  Insulin sensitivity 1 unit for every 35  Blood glucose targets 100 through 120 mg/Annie.  Active insulin time 3 hours  Maximum bolus 25 units  Dual wave square setting is on  Bolus stay standard need  Dual wave with the meals 20% now 80% over 30 minutes        Office visit 25 minutes with additional education given 13 minutes - SMBG with goals, medication changes with purposes and s/e profiles.       Return in about 3 months (around 2017). 3 months with Lilian-1 week prior for labs 6 months with Dr. Andrade-one week prior for labs        Dragon transcription disclaimer     Much of this encounter note is an electronic transcription/translation of spoken language to printed text. The electronic translation of spoken language may permit erroneous, or  at times, nonsensical words or phrases to be inadvertently transcribed. Although I have reviewed the note for such errors, some may still exist.

## 2017-06-08 LAB
ALBUMIN SERPL-MCNC: 3.8 G/DL (ref 3.5–5.2)
ALBUMIN/CREAT UR: 20.5 MG/G CREAT (ref 0–30)
ALBUMIN/GLOB SERPL: 1.1 G/DL
ALP SERPL-CCNC: 59 U/L (ref 39–117)
ALT SERPL-CCNC: 11 U/L (ref 1–33)
AST SERPL-CCNC: 13 U/L (ref 1–32)
BILIRUB SERPL-MCNC: 0.3 MG/DL (ref 0.1–1.2)
BUN SERPL-MCNC: 10 MG/DL (ref 6–20)
BUN/CREAT SERPL: 12.8 (ref 7–25)
C PEPTIDE SERPL-MCNC: 0.9 NG/ML (ref 1.1–4.4)
CALCIUM SERPL-MCNC: 9.4 MG/DL (ref 8.6–10.5)
CHLORIDE SERPL-SCNC: 102 MMOL/L (ref 98–107)
CHOLEST SERPL-MCNC: 205 MG/DL (ref 0–200)
CO2 SERPL-SCNC: 22.6 MMOL/L (ref 22–29)
CREAT SERPL-MCNC: 0.78 MG/DL (ref 0.57–1)
CREAT UR-MCNC: 129.7 MG/DL
GLOBULIN SER CALC-MCNC: 3.6 GM/DL
GLUCOSE SERPL-MCNC: 92 MG/DL (ref 65–99)
HBA1C MFR BLD: 6.85 % (ref 4.8–5.6)
HDLC SERPL-MCNC: 36 MG/DL (ref 40–60)
INSULIN SERPL-ACNC: 1.1 UIU/ML (ref 2.6–24.9)
LDLC SERPL CALC-MCNC: 144 MG/DL (ref 0–100)
MICROALBUMIN UR-MCNC: 26.6 UG/ML
POTASSIUM SERPL-SCNC: 3.7 MMOL/L (ref 3.5–5.2)
PROT SERPL-MCNC: 7.4 G/DL (ref 6–8.5)
SODIUM SERPL-SCNC: 143 MMOL/L (ref 136–145)
T3 SERPL-MCNC: 133.6 NG/DL (ref 80–200)
T3FREE SERPL-MCNC: 2.8 PG/ML (ref 2–4.4)
T4 FREE SERPL-MCNC: 1.08 NG/DL (ref 0.93–1.7)
T4 SERPL-MCNC: 7.72 MCG/DL (ref 4.5–11.7)
TRIGL SERPL-MCNC: 124 MG/DL (ref 0–150)
TSH SERPL DL<=0.005 MIU/L-ACNC: 1.32 MIU/ML (ref 0.27–4.2)
VLDLC SERPL CALC-MCNC: 24.8 MG/DL (ref 5–40)

## 2017-06-09 DIAGNOSIS — E55.9 VITAMIN D DEFICIENCY: Primary | ICD-10-CM

## 2017-06-09 LAB — 25(OH)D3+25(OH)D2 SERPL-MCNC: 24.8 NG/ML (ref 30–100)

## 2017-06-09 RX ORDER — ERGOCALCIFEROL 1.25 MG/1
50000 CAPSULE ORAL WEEKLY
Qty: 12 CAPSULE | Refills: 3 | Status: SHIPPED | OUTPATIENT
Start: 2017-06-09 | End: 2018-06-27 | Stop reason: SDUPTHER

## 2017-06-16 ENCOUNTER — TELEPHONE (OUTPATIENT)
Dept: ENDOCRINOLOGY | Age: 42
End: 2017-06-16

## 2017-06-16 NOTE — TELEPHONE ENCOUNTER
----- Message from RICKI Kraus sent at 6/9/2017 11:06 AM EDT -----  Janeth caldera patient vitamin D was low on 50,000 once a week was sent to her pharmacy.

## 2017-08-10 DIAGNOSIS — E55.9 VITAMIN D DEFICIENCY: ICD-10-CM

## 2017-08-10 DIAGNOSIS — IMO0002 UNCONTROLLED TYPE 2 DIABETES MELLITUS WITH COMPLICATION, WITH LONG-TERM CURRENT USE OF INSULIN: Primary | ICD-10-CM

## 2017-08-19 DIAGNOSIS — E78.2 MIXED HYPERLIPIDEMIA: ICD-10-CM

## 2017-08-21 RX ORDER — ICOSAPENT ETHYL 1000 MG/1
CAPSULE ORAL
Qty: 120 CAPSULE | Refills: 0 | Status: SHIPPED | OUTPATIENT
Start: 2017-08-21 | End: 2017-09-06 | Stop reason: SDUPTHER

## 2017-08-23 ENCOUNTER — LAB (OUTPATIENT)
Dept: ENDOCRINOLOGY | Age: 42
End: 2017-08-23

## 2017-08-23 DIAGNOSIS — E55.9 VITAMIN D DEFICIENCY: ICD-10-CM

## 2017-08-23 DIAGNOSIS — IMO0002 UNCONTROLLED TYPE 2 DIABETES MELLITUS WITH COMPLICATION, WITH LONG-TERM CURRENT USE OF INSULIN: ICD-10-CM

## 2017-08-24 LAB
25(OH)D3+25(OH)D2 SERPL-MCNC: 34.1 NG/ML (ref 30–100)
ALBUMIN SERPL-MCNC: 4 G/DL (ref 3.5–5.2)
ALBUMIN/GLOB SERPL: 1.4 G/DL
ALP SERPL-CCNC: 53 U/L (ref 39–117)
ALT SERPL-CCNC: 11 U/L (ref 1–33)
AST SERPL-CCNC: 11 U/L (ref 1–32)
BILIRUB SERPL-MCNC: 0.2 MG/DL (ref 0.1–1.2)
BUN SERPL-MCNC: 12 MG/DL (ref 6–20)
BUN/CREAT SERPL: 15.8 (ref 7–25)
C PEPTIDE SERPL-MCNC: 1.8 NG/ML (ref 1.1–4.4)
CALCIUM SERPL-MCNC: 8.9 MG/DL (ref 8.6–10.5)
CHLORIDE SERPL-SCNC: 100 MMOL/L (ref 98–107)
CHOLEST SERPL-MCNC: 177 MG/DL (ref 0–200)
CO2 SERPL-SCNC: 25.4 MMOL/L (ref 22–29)
CREAT SERPL-MCNC: 0.76 MG/DL (ref 0.57–1)
GLOBULIN SER CALC-MCNC: 2.8 GM/DL
GLUCOSE SERPL-MCNC: 145 MG/DL (ref 65–99)
HBA1C MFR BLD: 6.66 % (ref 4.8–5.6)
HDLC SERPL-MCNC: 30 MG/DL (ref 40–60)
LDLC SERPL CALC-MCNC: 108 MG/DL (ref 0–100)
MICROALBUMIN UR-MCNC: 46.9 UG/ML
POTASSIUM SERPL-SCNC: 4 MMOL/L (ref 3.5–5.2)
PROT SERPL-MCNC: 6.8 G/DL (ref 6–8.5)
SODIUM SERPL-SCNC: 139 MMOL/L (ref 136–145)
T4 SERPL-MCNC: 6.16 MCG/DL (ref 4.5–11.7)
TRIGL SERPL-MCNC: 196 MG/DL (ref 0–150)
TSH SERPL DL<=0.005 MIU/L-ACNC: 1.63 MIU/ML (ref 0.27–4.2)
URATE SERPL-MCNC: 4.4 MG/DL (ref 2.4–5.7)
VLDLC SERPL CALC-MCNC: 39.2 MG/DL (ref 5–40)

## 2017-09-06 ENCOUNTER — OFFICE VISIT (OUTPATIENT)
Dept: ENDOCRINOLOGY | Age: 42
End: 2017-09-06

## 2017-09-06 VITALS
WEIGHT: 230.2 LBS | SYSTOLIC BLOOD PRESSURE: 132 MMHG | DIASTOLIC BLOOD PRESSURE: 74 MMHG | BODY MASS INDEX: 34.1 KG/M2 | HEIGHT: 69 IN | RESPIRATION RATE: 16 BRPM

## 2017-09-06 DIAGNOSIS — I10 ESSENTIAL HYPERTENSION: ICD-10-CM

## 2017-09-06 DIAGNOSIS — IMO0002 UNCONTROLLED TYPE 2 DIABETES MELLITUS WITH COMPLICATION, WITH LONG-TERM CURRENT USE OF INSULIN: Primary | ICD-10-CM

## 2017-09-06 DIAGNOSIS — E78.2 MIXED HYPERLIPIDEMIA: ICD-10-CM

## 2017-09-06 DIAGNOSIS — E28.319 PREMATURE MENOPAUSE: ICD-10-CM

## 2017-09-06 DIAGNOSIS — B07.9 VIRAL WARTS, UNSPECIFIED TYPE: ICD-10-CM

## 2017-09-06 DIAGNOSIS — Z71.6 TOBACCO ABUSE COUNSELING: ICD-10-CM

## 2017-09-06 DIAGNOSIS — E78.5 DYSLIPIDEMIA: ICD-10-CM

## 2017-09-06 DIAGNOSIS — E55.9 VITAMIN D DEFICIENCY: ICD-10-CM

## 2017-09-06 DIAGNOSIS — K86.1 CHRONIC PANCREATITIS, UNSPECIFIED PANCREATITIS TYPE (HCC): ICD-10-CM

## 2017-09-06 DIAGNOSIS — Z79.4 LONG-TERM INSULIN USE (HCC): ICD-10-CM

## 2017-09-06 PROCEDURE — 99215 OFFICE O/P EST HI 40 MIN: CPT | Performed by: INTERNAL MEDICINE

## 2017-09-06 RX ORDER — ESTRADIOL 0.1 MG/G
2 CREAM VAGINAL DAILY
Qty: 42.5 G | Refills: 12 | Status: SHIPPED | OUTPATIENT
Start: 2017-09-06 | End: 2018-03-14

## 2017-09-06 RX ORDER — VARENICLINE TARTRATE 1 MG/1
1 TABLET, FILM COATED ORAL 2 TIMES DAILY
Qty: 60 TABLET | Refills: 5 | Status: SHIPPED | OUTPATIENT
Start: 2017-09-06 | End: 2018-02-20 | Stop reason: SDUPTHER

## 2017-09-06 RX ORDER — LINACLOTIDE 145 UG/1
145 CAPSULE, GELATIN COATED ORAL
Qty: 30 CAPSULE | Refills: 5 | Status: SHIPPED | OUTPATIENT
Start: 2017-09-06 | End: 2017-12-06 | Stop reason: ALTCHOICE

## 2017-09-06 RX ORDER — ICOSAPENT ETHYL 1000 MG/1
2 CAPSULE ORAL 2 TIMES DAILY WITH MEALS
Qty: 120 CAPSULE | Refills: 5 | Status: SHIPPED | OUTPATIENT
Start: 2017-09-06 | End: 2018-03-28 | Stop reason: SDUPTHER

## 2017-09-06 NOTE — PROGRESS NOTES
"Subjective   Danielle Dudley is a 42 y.o. female seen for follow up for DM2, lab review. She is using a Medtronic insulin pump. She is checking BG 5-6 times a day. She states that the last couple of weeks she hasn't felt good. She is not eating right and has had weight gain.     History of Present Illness is a 31-year-old female known patient with secondary diabetes hypertension and dyslipidemia as well as pancreatic insufficiency and vitamin D deficiency.  Over the course of last 6 months she has had no significant health problems for which to go to the emergency room or hospital.  She also complains of being very tired and having been virtually no sex drive and significant degree of dyspareunia.  She also complained of severe constipation and over-the-counter medications have had no help.    /74  Resp 16  Ht 69\" (175.3 cm)  Wt 230 lb 3.2 oz (104 kg)  BMI 33.99 kg/m2     Allergies   Allergen Reactions   • Codeine    • Imitrex [Sumatriptan]    • Levemir [Insulin Detemir]    • Morphine And Related        Current Outpatient Prescriptions:   •  atorvastatin (LIPITOR) 20 MG tablet, Take 1 tablet by mouth Daily., Disp: 30 tablet, Rfl: 4  •  butalbital-acetaminophen  MG tablet tablet, Take  by mouth every 4 (four) hours as needed., Disp: , Rfl:   •  CREON 32858 UNITS capsule delayed-release particles capsule, , Disp: , Rfl:   •  cyclobenzaprine (FLEXERIL) 10 MG tablet, , Disp: , Rfl:   •  dicyclomine (BENTYL) 10 MG capsule, Take 10 mg by mouth 4 (Four) Times a Day Before Meals & at Bedtime., Disp: , Rfl:   •  gabapentin (NEURONTIN) 400 MG capsule, , Disp: , Rfl:   •  glucagon (GLUCAGON EMERGENCY) 1 MG injection, Inject 1 mg under the skin 1 (One) Time As Needed for Low Blood Sugar for up to 1 dose., Disp: 1 kit, Rfl: 0  •  glucose blood (JALEEL CONTOUR NEXT TEST) test strip, Test blood glucose 8 times daily, Disp: 300 each, Rfl: 6  •  insulin aspart (NOVOLOG) 100 UNIT/ML injection, Inject up to 200 units " daily via medtronic 630G insulin pump SN: [id number] Started pump on 5/16/17 DX:e11.65, Disp: 60 mL, Rfl: 3  •  Insulin Disposable Pump (V-GO 20) kit, Use 1 pod daily, Disp: 30 kit, Rfl: 11  •  Lancets (FREESTYLE) lancets, Check blood glucose 4-5 times daily, Disp: 120 each, Rfl: 5  •  LANTUS 100 UNIT/ML injection, , Disp: , Rfl:   •  lisinopril (PRINIVIL,ZESTRIL) 40 MG tablet, , Disp: , Rfl:   •  losartan (COZAAR) 100 MG tablet, Take 100 mg by mouth Daily., Disp: , Rfl:   •  metFORMIN (GLUCOPHAGE) 1000 MG tablet, , Disp: , Rfl:   •  metoclopramide (REGLAN) 5 MG tablet, Take 5 mg by mouth 3 (Three) Times a Day., Disp: , Rfl:   •  pantoprazole (PROTONIX) 40 MG EC tablet, , Disp: , Rfl:   •  polyethylene glycol (MIRALAX) packet, Take 17 g by mouth Daily., Disp: , Rfl:   •  promethazine (PHENERGAN) 25 MG tablet, , Disp: , Rfl:   •  sennosides-docusate sodium (SENOKOT-S) 8.6-50 MG tablet, Take 1 tablet by mouth Daily., Disp: , Rfl:   •  Urine Glucose-Ketones Test strip, Use to test urine if blood glucose is over 400mg/dl, Disp: 100 each, Rfl: 6  •  VASCEPA 1 g capsule capsule, TAKE 2 CAPSULES BY MOUTH TWICE DAILY WITH MEALS, Disp: 120 capsule, Rfl: 0  •  vitamin D (ERGOCALCIFEROL) 81172 UNITS capsule capsule, Take 1 capsule by mouth 1 (One) Time Per Week., Disp: 12 capsule, Rfl: 3      The following portions of the patient's history were reviewed and updated as appropriate: allergies, current medications, past family history, past medical history, past social history, past surgical history and problem list.    Review of Systems   Constitutional: Positive for appetite change and unexpected weight change.   Eyes: Negative.    Respiratory: Negative.    Cardiovascular: Negative.    Gastrointestinal: Negative.    Endocrine: Negative.    Genitourinary: Negative.    Musculoskeletal: Negative.    Skin: Negative.    Allergic/Immunologic: Negative.    Neurological: Positive for headaches.   Hematological: Negative.     Psychiatric/Behavioral: Positive for sleep disturbance.       Objective   Physical Exam   Constitutional: She is oriented to person, place, and time. She appears well-developed and well-nourished. No distress.   HENT:   Head: Normocephalic and atraumatic.   Right Ear: External ear normal.   Left Ear: External ear normal.   Nose: Nose normal.   Mouth/Throat: Oropharynx is clear and moist. No oropharyngeal exudate.   Eyes: Conjunctivae and EOM are normal. Pupils are equal, round, and reactive to light. Right eye exhibits no discharge. Left eye exhibits no discharge. No scleral icterus.   Neck: Normal range of motion. Neck supple. No JVD present. No tracheal deviation present. No thyromegaly present.   Cardiovascular: Normal rate, regular rhythm, normal heart sounds and intact distal pulses.  Exam reveals no gallop and no friction rub.    No murmur heard.  Pulmonary/Chest: Effort normal and breath sounds normal. No stridor. No respiratory distress. She has no wheezes. She has no rales. She exhibits no tenderness.   Abdominal: Soft. Bowel sounds are normal. She exhibits no distension and no mass. There is no tenderness. There is no rebound and no guarding. No hernia.   Musculoskeletal: Normal range of motion. She exhibits no edema, tenderness or deformity.   Lymphadenopathy:     She has no cervical adenopathy.   Neurological: She is alert and oriented to person, place, and time. She has normal reflexes. She displays normal reflexes. No cranial nerve deficit. She exhibits normal muscle tone. Coordination normal.   Skin: Skin is warm and dry. No rash noted. She is not diaphoretic. No erythema. No pallor.   Psychiatric: She has a normal mood and affect. Her behavior is normal. Judgment and thought content normal.   Nursing note and vitals reviewed.    Results for orders placed or performed in visit on 08/23/17   Comprehensive Metabolic Panel   Result Value Ref Range    Glucose 145 (H) 65 - 99 mg/dL    BUN 12 6 - 20 mg/dL     Creatinine 0.76 0.57 - 1.00 mg/dL    eGFR Non African Am 83 >60 mL/min/1.73    eGFR African Am 100 >60 mL/min/1.73    BUN/Creatinine Ratio 15.8 7.0 - 25.0    Sodium 139 136 - 145 mmol/L    Potassium 4.0 3.5 - 5.2 mmol/L    Chloride 100 98 - 107 mmol/L    Total CO2 25.4 22.0 - 29.0 mmol/L    Calcium 8.9 8.6 - 10.5 mg/dL    Total Protein 6.8 6.0 - 8.5 g/dL    Albumin 4.00 3.50 - 5.20 g/dL    Globulin 2.8 gm/dL    A/G Ratio 1.4 g/dL    Total Bilirubin 0.2 0.1 - 1.2 mg/dL    Alkaline Phosphatase 53 39 - 117 U/L    AST (SGOT) 11 1 - 32 U/L    ALT (SGPT) 11 1 - 33 U/L   C-Peptide   Result Value Ref Range    C-Peptide 1.8 1.1 - 4.4 ng/mL   Hemoglobin A1c   Result Value Ref Range    Hemoglobin A1C 6.66 (H) 4.80 - 5.60 %   Lipid Panel   Result Value Ref Range    Total Cholesterol 177 0 - 200 mg/dL    Triglycerides 196 (H) 0 - 150 mg/dL    HDL Cholesterol 30 (L) 40 - 60 mg/dL    VLDL Cholesterol 39.2 5 - 40 mg/dL    LDL Cholesterol  108 (H) 0 - 100 mg/dL   Uric Acid   Result Value Ref Range    Uric Acid 4.4 2.4 - 5.7 mg/dL   Vitamin D 25 Hydroxy   Result Value Ref Range    25 Hydroxy, Vitamin D 34.1 30.0 - 100.0 ng/mL   T4 & TSH ([facility])   Result Value Ref Range    TSH 1.630 0.270 - 4.200 mIU/mL    T4, Total 6.16 4.50 - 11.70 mcg/dL   MicroAlbumin, Urine, Random   Result Value Ref Range    Microalbumin, Urine 46.9 Not Estab. ug/mL         Assessment/Plan   Diagnoses and all orders for this visit:    Uncontrolled type 2 diabetes mellitus with complication, with long-term current use of insulin  -     T4 & TSH ([facility]); Future  -     Uric Acid; Future  -     Vitamin D 25 Hydroxy; Future  -     MicroAlbumin, Urine, Random; Future  -     Luteinizing Hormone; Future  -     Lipid Panel; Future  -     Hemoglobin A1c; Future  -     Follicle Stimulating Hormone; Future  -     C-Peptide; Future  -     Comprehensive Metabolic Panel; Future    Chronic pancreatitis, unspecified pancreatitis type  -     T4 & TSH ([facility]); Future  -      Uric Acid; Future  -     Vitamin D 25 Hydroxy; Future  -     MicroAlbumin, Urine, Random; Future  -     Luteinizing Hormone; Future  -     Lipid Panel; Future  -     Hemoglobin A1c; Future  -     Follicle Stimulating Hormone; Future  -     C-Peptide; Future  -     Comprehensive Metabolic Panel; Future    Long-term insulin use  -     T4 & TSH ([facility]); Future  -     Uric Acid; Future  -     Vitamin D 25 Hydroxy; Future  -     MicroAlbumin, Urine, Random; Future  -     Luteinizing Hormone; Future  -     Lipid Panel; Future  -     Hemoglobin A1c; Future  -     Follicle Stimulating Hormone; Future  -     C-Peptide; Future  -     Comprehensive Metabolic Panel; Future    Dyslipidemia  -     T4 & TSH ([facility]); Future  -     Uric Acid; Future  -     Vitamin D 25 Hydroxy; Future  -     MicroAlbumin, Urine, Random; Future  -     Luteinizing Hormone; Future  -     Lipid Panel; Future  -     Hemoglobin A1c; Future  -     Follicle Stimulating Hormone; Future  -     C-Peptide; Future  -     Comprehensive Metabolic Panel; Future    Vitamin D deficiency  -     T4 & TSH ([facility]); Future  -     Uric Acid; Future  -     Vitamin D 25 Hydroxy; Future  -     MicroAlbumin, Urine, Random; Future  -     Luteinizing Hormone; Future  -     Lipid Panel; Future  -     Hemoglobin A1c; Future  -     Follicle Stimulating Hormone; Future  -     C-Peptide; Future  -     Comprehensive Metabolic Panel; Future    Essential hypertension  -     T4 & TSH ([facility]); Future  -     Uric Acid; Future  -     Vitamin D 25 Hydroxy; Future  -     MicroAlbumin, Urine, Random; Future  -     Luteinizing Hormone; Future  -     Lipid Panel; Future  -     Hemoglobin A1c; Future  -     Follicle Stimulating Hormone; Future  -     C-Peptide; Future  -     Comprehensive Metabolic Panel; Future    Tobacco abuse counseling    Premature menopause    Mixed hyperlipidemia  -     VASCEPA 1 g capsule capsule; Take 2 g by mouth 2 (Two) Times a Day With Meals.    Viral warts,  unspecified type  -     Ambulatory Referral to Dermatology    Other orders  -     LINZESS 145 MCG capsule capsule; Take 1 capsule by mouth Every Morning Before Breakfast.  -     CHANTIX 1 MG tablet; Take 1 tablet by mouth 2 (Two) Times a Day.  -     estradiol (ESTRACE VAGINAL) 0.1 MG/GM vaginal cream; Insert 2 g into the vagina Daily.               In summary I saw and examined this 42-year-old female for above-mentioned problems.  I reviewed her laboratory evaluation of 08/23/2017 and provided her with a hard copy of it.  She is clinically and metabolically stable and therefore we will go ahead and continue all her current prescriptions.  As far as her smoking is concerned we discussed the adverse effects and dangerous consequences of continued smoking and she is determined to quit and for this we are going to go ahead and start her on Chantix.  Also for her constipation I started her on Linzess 145 mg daily and finally for her dyspareunia all we started her on Estrace vaginal cream.  Also for their wart on the dorsum of her right hand we will refer her to dermatology.  This office visit including patient counseling which occupied greater than 50% of the time lasted in excess of 40 minutes.  She will see MsEliazar Lilian Jones in 3 months or sooner with laboratory evaluation prior to each office visit.

## 2017-09-19 DIAGNOSIS — E78.2 MIXED HYPERLIPIDEMIA: ICD-10-CM

## 2017-09-19 RX ORDER — ICOSAPENT ETHYL 1000 MG/1
CAPSULE ORAL
Qty: 120 CAPSULE | Refills: 0 | Status: SHIPPED | OUTPATIENT
Start: 2017-09-19 | End: 2017-10-29 | Stop reason: SDUPTHER

## 2017-10-29 DIAGNOSIS — E78.2 MIXED HYPERLIPIDEMIA: ICD-10-CM

## 2017-10-30 RX ORDER — ICOSAPENT ETHYL 1000 MG/1
CAPSULE ORAL
Qty: 120 CAPSULE | Refills: 0 | Status: SHIPPED | OUTPATIENT
Start: 2017-10-30 | End: 2017-12-06 | Stop reason: SDUPTHER

## 2017-10-31 DIAGNOSIS — E78.2 MIXED HYPERLIPIDEMIA: ICD-10-CM

## 2017-10-31 RX ORDER — ATORVASTATIN CALCIUM 20 MG/1
TABLET, FILM COATED ORAL
Qty: 30 TABLET | Refills: 0 | Status: SHIPPED | OUTPATIENT
Start: 2017-10-31 | End: 2018-03-14 | Stop reason: SDUPTHER

## 2017-11-08 DIAGNOSIS — IMO0002 UNCONTROLLED TYPE 2 DIABETES MELLITUS WITH COMPLICATION, WITH LONG-TERM CURRENT USE OF INSULIN: ICD-10-CM

## 2017-12-01 ENCOUNTER — RESULTS ENCOUNTER (OUTPATIENT)
Dept: ENDOCRINOLOGY | Age: 42
End: 2017-12-01

## 2017-12-01 DIAGNOSIS — IMO0002 UNCONTROLLED TYPE 2 DIABETES MELLITUS WITH COMPLICATION, WITH LONG-TERM CURRENT USE OF INSULIN: ICD-10-CM

## 2017-12-01 DIAGNOSIS — Z79.4 LONG-TERM INSULIN USE (HCC): ICD-10-CM

## 2017-12-01 DIAGNOSIS — I10 ESSENTIAL HYPERTENSION: ICD-10-CM

## 2017-12-01 DIAGNOSIS — E78.5 DYSLIPIDEMIA: ICD-10-CM

## 2017-12-01 DIAGNOSIS — K86.1 CHRONIC PANCREATITIS, UNSPECIFIED PANCREATITIS TYPE (HCC): ICD-10-CM

## 2017-12-01 DIAGNOSIS — E55.9 VITAMIN D DEFICIENCY: ICD-10-CM

## 2017-12-06 ENCOUNTER — OFFICE VISIT (OUTPATIENT)
Dept: INTERNAL MEDICINE | Facility: CLINIC | Age: 42
End: 2017-12-06

## 2017-12-06 ENCOUNTER — OFFICE VISIT (OUTPATIENT)
Dept: ENDOCRINOLOGY | Age: 42
End: 2017-12-06

## 2017-12-06 VITALS
BODY MASS INDEX: 34.57 KG/M2 | HEART RATE: 84 BPM | DIASTOLIC BLOOD PRESSURE: 76 MMHG | HEIGHT: 69 IN | SYSTOLIC BLOOD PRESSURE: 176 MMHG | WEIGHT: 233.4 LBS | OXYGEN SATURATION: 99 % | TEMPERATURE: 98.3 F

## 2017-12-06 VITALS
WEIGHT: 233 LBS | DIASTOLIC BLOOD PRESSURE: 78 MMHG | HEIGHT: 69 IN | SYSTOLIC BLOOD PRESSURE: 146 MMHG | BODY MASS INDEX: 34.51 KG/M2

## 2017-12-06 DIAGNOSIS — E55.9 VITAMIN D DEFICIENCY: ICD-10-CM

## 2017-12-06 DIAGNOSIS — R10.13 ABDOMINAL PAIN, CHRONIC, EPIGASTRIC: ICD-10-CM

## 2017-12-06 DIAGNOSIS — K86.1 CHRONIC PANCREATITIS, UNSPECIFIED PANCREATITIS TYPE (HCC): ICD-10-CM

## 2017-12-06 DIAGNOSIS — K86.1 CHRONIC PANCREATITIS, UNSPECIFIED PANCREATITIS TYPE (HCC): Primary | ICD-10-CM

## 2017-12-06 DIAGNOSIS — G89.29 ABDOMINAL PAIN, CHRONIC, EPIGASTRIC: ICD-10-CM

## 2017-12-06 DIAGNOSIS — IMO0002 UNCONTROLLED TYPE 2 DIABETES MELLITUS WITH COMPLICATION, WITH LONG-TERM CURRENT USE OF INSULIN: Primary | ICD-10-CM

## 2017-12-06 DIAGNOSIS — I10 ESSENTIAL HYPERTENSION: ICD-10-CM

## 2017-12-06 DIAGNOSIS — K42.9 PARAUMBILICAL HERNIA: ICD-10-CM

## 2017-12-06 DIAGNOSIS — E78.2 MIXED HYPERLIPIDEMIA: ICD-10-CM

## 2017-12-06 DIAGNOSIS — E16.2 HYPOGLYCEMIA: ICD-10-CM

## 2017-12-06 DIAGNOSIS — K86.9 PANCREAS DISORDER: ICD-10-CM

## 2017-12-06 DIAGNOSIS — K31.84 GASTROPARESIS: ICD-10-CM

## 2017-12-06 DIAGNOSIS — IMO0002 UNCONTROLLED TYPE 2 DIABETES MELLITUS WITH COMPLICATION, WITH LONG-TERM CURRENT USE OF INSULIN: ICD-10-CM

## 2017-12-06 DIAGNOSIS — E78.5 DYSLIPIDEMIA: ICD-10-CM

## 2017-12-06 DIAGNOSIS — Z79.4 LONG-TERM INSULIN USE (HCC): ICD-10-CM

## 2017-12-06 DIAGNOSIS — R73.9 HYPERGLYCEMIA: ICD-10-CM

## 2017-12-06 PROCEDURE — 99214 OFFICE O/P EST MOD 30 MIN: CPT | Performed by: FAMILY MEDICINE

## 2017-12-06 PROCEDURE — 99214 OFFICE O/P EST MOD 30 MIN: CPT | Performed by: NURSE PRACTITIONER
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
## 2017-12-06 NOTE — PROGRESS NOTES
Danielle Dudley  presents to the office  for the follow-up appointment for Type 2 diabetes mellitus.     The diabete's condition location is throughout the system with the  clinical course has fluctuated, the severity is Mild, and the modifying/allievating factors are insulin pump.  Medications and  Dosages were  reviewed with Danielle Dudley and suggested that compliance most of the time.    Associated symptoms of hyperglycemia have been excessive thirst and polyuria.  Associated symptoms of hypoglycemia have been confusion, dizziness, jitteriness and nausea dn headache. Patient reports  hypoglycemia threshold for symptoms is 80 mg/dl .     The patient is currently on insulin.    Compliance with blood glucose monitoring: good.     Meal panning: The patient is using carbohydrate counting, but is not on a specified limit, being a pump user.    The patient is currently taking home blood tests - Blood glucose testin-8 times daily, that are:  fasting- 1st thing in morning before eating or drinking  before each meal  before each meal and 1 or 2 hours after meal  fasting and bedtime  bedtime  anytime you feel symptoms of hyperglycemia or hypoglycemia (high or low blood sugars)  Novolog U100 per insulin pump    Last instructions given were:   In summary I saw and examined this 42-year-old female for above-mentioned problems.  I reviewed her laboratory evaluation of 2017 and provided her with a hard copy of it.  She is clinically and metabolically stable and therefore we will go ahead and continue all her current prescriptions.  As far as her smoking is concerned we discussed the adverse effects and dangerous consequences of continued smoking and she is determined to quit and for this we are going to go ahead and start her on Chantix.  Also for her constipation I started her on Linzess 145 mg daily and finally for her dyspareunia all we started her on Estrace vaginal cream.  Also for their wart on the dorsum of her  right hand we will refer her to dermatology.  This office visit including patient counseling which occupied greater than 50% of the time lasted in excess of 40 minutes.  She will see Ms. Lilian Jones in 3 months or sooner with laboratory evaluation prior to each office visit.    Home blood glucose testing daily: 4-8  insulin pump upload   -Yes- pump upload. Dated 11- through 12-5-2017    Last reported blood glucose readings are:   She is checking BG 5-6 times a day. She states that the last couple of weeks she hasn't felt good.     Patterns reported per patient are none.         The following portions of the patient's history were reviewed and updated as appropriate: current medications, past family history, past medical history, past social history, past surgical history and problem list.      Current Outpatient Prescriptions:   •  atorvastatin (LIPITOR) 20 MG tablet, TAKE 1 TABLET BY MOUTH DAILY, Disp: 30 tablet, Rfl: 0  •  butalbital-acetaminophen  MG tablet tablet, Take  by mouth every 4 (four) hours as needed., Disp: , Rfl:   •  CHANTIX 1 MG tablet, Take 1 tablet by mouth 2 (Two) Times a Day., Disp: 60 tablet, Rfl: 5  •  CREON 39930 UNITS capsule delayed-release particles capsule, , Disp: , Rfl:   •  cyclobenzaprine (FLEXERIL) 10 MG tablet, , Disp: , Rfl:   •  dicyclomine (BENTYL) 10 MG capsule, Take 10 mg by mouth 4 (Four) Times a Day Before Meals & at Bedtime., Disp: , Rfl:   •  estradiol (ESTRACE VAGINAL) 0.1 MG/GM vaginal cream, Insert 2 g into the vagina Daily., Disp: 42.5 g, Rfl: 12  •  gabapentin (NEURONTIN) 400 MG capsule, , Disp: , Rfl:   •  glucagon (GLUCAGON EMERGENCY) 1 MG injection, Inject 1 mg under the skin 1 (One) Time As Needed for Low Blood Sugar for up to 1 dose., Disp: 1 kit, Rfl: 0  •  glucose blood (JALEEL CONTOUR NEXT TEST) test strip, Test blood glucose 8 times daily, Disp: 300 each, Rfl: 6  •  Lancets (FREESTYLE) lancets, Check blood glucose 4-5 times daily, Disp: 120  "each, Rfl: 5  •  LANTUS 100 UNIT/ML injection, , Disp: , Rfl:   •  losartan (COZAAR) 100 MG tablet, Take 100 mg by mouth Daily., Disp: , Rfl:   •  metFORMIN (GLUCOPHAGE) 1000 MG tablet, , Disp: , Rfl:   •  NOVOLOG 100 UNIT/ML injection, INJECT UP  UNITS DAILY VIA INSULIN PUMP, Disp: 60 mL, Rfl: 0  •  pantoprazole (PROTONIX) 40 MG EC tablet, , Disp: , Rfl:   •  polyethylene glycol (MIRALAX) packet, Take 17 g by mouth Daily., Disp: , Rfl:   •  promethazine (PHENERGAN) 25 MG tablet, , Disp: , Rfl:   •  Urine Glucose-Ketones Test strip, Use to test urine if blood glucose is over 400mg/dl, Disp: 100 each, Rfl: 6  •  VASCEPA 1 g capsule capsule, Take 2 g by mouth 2 (Two) Times a Day With Meals., Disp: 120 capsule, Rfl: 5  •  vitamin D (ERGOCALCIFEROL) 94077 UNITS capsule capsule, Take 1 capsule by mouth 1 (One) Time Per Week., Disp: 12 capsule, Rfl: 3    Patient Active Problem List    Diagnosis   • Essential hypertension [I10]   • Dyslipidemia [E78.5]   • Vitamin D deficiency [E55.9]   • Premature menopause [E28.319]   • Tobacco abuse counseling [Z71.6]   • Uncontrolled type 2 diabetes mellitus with complication, with long-term current use of insulin [E11.8, E11.65, Z79.4]   • Hypoglycemia [E16.2]   • Hyperglycemia [R73.9]   • Chronic pancreatitis [K86.1]   • Gastroparesis [K31.84]   • Pancreas disorder [K86.9]   • Long-term insulin use [Z79.4]       Review of Systems   A comprehensive review of the 14 systems was negative except of listed below:  Endocrine: hyperglycemia     Objective:     Wt Readings from Last 3 Encounters:   12/06/17 106 kg (233 lb)   12/06/17 106 kg (233 lb 6.4 oz)   09/06/17 104 kg (230 lb 3.2 oz)     Temp Readings from Last 3 Encounters:   12/06/17 98.3 °F (36.8 °C) (Oral)     BP Readings from Last 3 Encounters:   12/06/17 146/78   12/06/17 176/76   09/06/17 132/74     Pulse Readings from Last 3 Encounters:   12/06/17 84        /78  Ht 175.3 cm (69.02\")  Wt 106 kg (233 lb)  BMI 34.39 " "kg/m2    General appearance:  alert, cooperative, delirious, fatigued, nourished\" \"appears stated age and cooperative\" \"NAD   Neck: no carotid bruit, supple, symmetrical, trachea midline and thyroid not enlarged, symmetric, no tenderness/mass/nodules   Thyroid:  no palpable nodule, no enlargement, no tenderness   Lung:  \"clear to auscultation bilaterally\" \"no abnormal breath sounds  \" effort was normal, no labored breath, no use of accessory muscles.   Heart: regular rate and rhythm, S1, S2 normal, no murmur, click, rub or gallop      Abdomen:  normal bowel sounds- 4 quads, soft non-tender, contour - rounded and contour - obese      Extremities: extremities normal, atraumatic, no cyanosis or edema extremities normal, atraumatic, no cyanosis or edema\" WNL - gait and station, strength and stability\"       Skin:   Pulses:  warm and dry, no hyperpigmentation, normal skin coloring, or suspicious lesions   2+ and symmetric   Neuro: Alert and oriented x3. Gait normal. Reflexes and motor strength normal and symmetric. Cranial nerves 2-12 and sensation grossly intact.      Psych: behavior - normal, judgement - normal, mood - normal, affect - normal                 Lab Review  Results for orders placed or performed in visit on 08/23/17   Comprehensive Metabolic Panel   Result Value Ref Range    Glucose 145 (H) 65 - 99 mg/dL    BUN 12 6 - 20 mg/dL    Creatinine 0.76 0.57 - 1.00 mg/dL    eGFR Non African Am 83 >60 mL/min/1.73    eGFR African Am 100 >60 mL/min/1.73    BUN/Creatinine Ratio 15.8 7.0 - 25.0    Sodium 139 136 - 145 mmol/L    Potassium 4.0 3.5 - 5.2 mmol/L    Chloride 100 98 - 107 mmol/L    Total CO2 25.4 22.0 - 29.0 mmol/L    Calcium 8.9 8.6 - 10.5 mg/dL    Total Protein 6.8 6.0 - 8.5 g/dL    Albumin 4.00 3.50 - 5.20 g/dL    Globulin 2.8 gm/dL    A/G Ratio 1.4 g/dL    Total Bilirubin 0.2 0.1 - 1.2 mg/dL    Alkaline Phosphatase 53 39 - 117 U/L    AST (SGOT) 11 1 - 32 U/L    ALT (SGPT) 11 1 - 33 U/L   C-Peptide "   Result Value Ref Range    C-Peptide 1.8 1.1 - 4.4 ng/mL   Hemoglobin A1c   Result Value Ref Range    Hemoglobin A1C 6.66 (H) 4.80 - 5.60 %   Lipid Panel   Result Value Ref Range    Total Cholesterol 177 0 - 200 mg/dL    Triglycerides 196 (H) 0 - 150 mg/dL    HDL Cholesterol 30 (L) 40 - 60 mg/dL    VLDL Cholesterol 39.2 5 - 40 mg/dL    LDL Cholesterol  108 (H) 0 - 100 mg/dL   Uric Acid   Result Value Ref Range    Uric Acid 4.4 2.4 - 5.7 mg/dL   Vitamin D 25 Hydroxy   Result Value Ref Range    25 Hydroxy, Vitamin D 34.1 30.0 - 100.0 ng/mL   T4 & TSH ([facility])   Result Value Ref Range    TSH 1.630 0.270 - 4.200 mIU/mL    T4, Total 6.16 4.50 - 11.70 mcg/dL   MicroAlbumin, Urine, Random   Result Value Ref Range    Microalbumin, Urine 46.9 Not Estab. ug/mL           Assessment:   Danielle was seen today for follow-up.    Diagnoses and all orders for this visit:    Uncontrolled type 2 diabetes mellitus with complication, with long-term current use of insulin  -     Comprehensive Metabolic Panel  -     C-Peptide  -     Hemoglobin A1c  -     Insulin, Total  -     Microalbumin / Creatinine Urine Ratio - Urine, Clean Catch  -     Uric Acid  -     Aldosterone  -     ACTH  -     Cortisol  -     DHEA  -     Progesterone  -     TSH  -     T4, Free  -     T3, Free  -     Thyroid Antibodies    Chronic pancreatitis, unspecified pancreatitis type  -     Comprehensive Metabolic Panel    Long-term insulin use  -     Comprehensive Metabolic Panel    Vitamin D deficiency  -     Comprehensive Metabolic Panel  -     Vitamin D 25 Hydroxy    Essential hypertension  -     Comprehensive Metabolic Panel    Mixed hyperlipidemia  -     Comprehensive Metabolic Panel  -     Lipid Panel    Hypoglycemia  -     Comprehensive Metabolic Panel    Hyperglycemia  -     Comprehensive Metabolic Panel    Gastroparesis  -     Comprehensive Metabolic Panel    Pancreas disorder  -     Comprehensive Metabolic Panel          Plan:   In Summary: I met with patient  who is stable and doing well, in the process of stopping smoking. She is down to 6 each day on the 15th Dec. She is putting all of them down. Patient reports abnormal  side affects for linzess, she discontinued the medication. She also reports that metformin is causing abdominal dyspepsia. We will at this time discontinue the metformin. No other changes will be done.    Insulin pump changes  12 AM 1.0 units per hour  5 AM 1.15 units per hour  6 PM 1.2 units per hour with a total daily dose of 27.15 units in    Carb ratio 12 AM 1 unit for every 7 g  11 AM 1 unit for 6 g  5 PM 1 unit for 8 g    Insulin sensitivity 1 unit for every 28 g  Blood glucose targets 110 through 129 g/Annie  Maximum bolus 25 units  Active insulin time 3 hours      Education:  interpretation of lab results, blood sugar goals, complications of diabetes mellitus, hypoglycemia prevention and treatment, exercise, illness management, self-monitoring of blood glucose skills, nutrition, carbohydrate counting and site rotation    home blood tests -  Blood glucose testin times daily, that are:  fasting- 1st thing in morning before eating or drinking  before each meal and 1 or 2 hours after meal  bedtime  anytime you feel symptoms of hyperglycemia or hypoglycemia (high or low blood sugars)    Office visit 30  minutes with additional education given 15 minutes - SMBG with goals, medication changes with purposes and s/e profiles.       Return in about 3 months (around 3/6/2018), or if symptoms worsen or fail to improve, for Recheck. 3 months With Lilian-1 week prior for labs 6 months with Dr. Andrade-one week prior for labs      Dragon transcription disclaimer     Much of this encounter note is an electronic transcription/translation of spoken language to printed text. The electronic translation of spoken language may permit erroneous, or at times, nonsensical words or phrases to be inadvertently transcribed. Although I have reviewed the note for such  errors, some may still exist.

## 2017-12-07 LAB
ALBUMIN/CREAT UR: 35.7 MG/G CREAT (ref 0–30)
CREAT UR-MCNC: 117.2 MG/DL
Lab: NORMAL
MICROALBUMIN UR-MCNC: 41.8 UG/ML

## 2017-12-08 DIAGNOSIS — IMO0002 UNCONTROLLED TYPE 2 DIABETES MELLITUS WITH COMPLICATION, WITH LONG-TERM CURRENT USE OF INSULIN: ICD-10-CM

## 2017-12-10 LAB
25(OH)D3+25(OH)D2 SERPL-MCNC: 42.7 NG/ML (ref 30–100)
ACTH PLAS-MCNC: 9.5 PG/ML (ref 7.2–63.3)
ALBUMIN SERPL-MCNC: 4.2 G/DL (ref 3.5–5.2)
ALBUMIN/GLOB SERPL: 1.2 G/DL
ALDOST SERPL-MCNC: 3.4 NG/DL (ref 0–30)
ALP SERPL-CCNC: 62 U/L (ref 39–117)
ALT SERPL-CCNC: 15 U/L (ref 1–33)
AST SERPL-CCNC: 13 U/L (ref 1–32)
BILIRUB SERPL-MCNC: 0.2 MG/DL (ref 0.1–1.2)
BUN SERPL-MCNC: 11 MG/DL (ref 6–20)
BUN/CREAT SERPL: 14.3 (ref 7–25)
C PEPTIDE SERPL-MCNC: 1.6 NG/ML (ref 1.1–4.4)
CALCIUM SERPL-MCNC: 9.4 MG/DL (ref 8.6–10.5)
CHLORIDE SERPL-SCNC: 98 MMOL/L (ref 98–107)
CHOLEST SERPL-MCNC: 177 MG/DL (ref 0–200)
CO2 SERPL-SCNC: 23.4 MMOL/L (ref 22–29)
CORTIS SERPL-MCNC: 3.4 UG/DL
CREAT SERPL-MCNC: 0.77 MG/DL (ref 0.57–1)
DHEA SERPL-MCNC: 74 NG/DL (ref 31–701)
GFR SERPLBLD CREATININE-BSD FMLA CKD-EPI: 81 ML/MIN/1.73
GFR SERPLBLD CREATININE-BSD FMLA CKD-EPI: 99 ML/MIN/1.73
GLOBULIN SER CALC-MCNC: 3.4 GM/DL
GLUCOSE SERPL-MCNC: 119 MG/DL (ref 65–99)
HBA1C MFR BLD: 7.28 % (ref 4.8–5.6)
HDLC SERPL-MCNC: 37 MG/DL (ref 40–60)
INSULIN SERPL-ACNC: 5.6 UIU/ML (ref 2.6–24.9)
INTERPRETATION: NORMAL
LDLC SERPL CALC-MCNC: 106 MG/DL (ref 0–100)
Lab: NORMAL
POTASSIUM SERPL-SCNC: 4 MMOL/L (ref 3.5–5.2)
PROGEST SERPL-MCNC: 1.2 NG/ML
PROT SERPL-MCNC: 7.6 G/DL (ref 6–8.5)
SODIUM SERPL-SCNC: 138 MMOL/L (ref 136–145)
T3FREE SERPL-MCNC: 3 PG/ML (ref 2–4.4)
T4 FREE SERPL-MCNC: 1.03 NG/DL (ref 0.93–1.7)
THYROGLOB AB SERPL-ACNC: <1 IU/ML (ref 0–0.9)
THYROPEROXIDASE AB SERPL-ACNC: 11 IU/ML (ref 0–34)
TRIGL SERPL-MCNC: 172 MG/DL (ref 0–150)
TSH SERPL DL<=0.005 MIU/L-ACNC: 1.49 MIU/ML (ref 0.27–4.2)
URATE SERPL-MCNC: 4.1 MG/DL (ref 2.4–5.7)
VLDLC SERPL CALC-MCNC: 34.4 MG/DL (ref 5–40)

## 2018-01-11 DIAGNOSIS — IMO0002 UNCONTROLLED TYPE 2 DIABETES MELLITUS WITH COMPLICATION, WITH LONG-TERM CURRENT USE OF INSULIN: ICD-10-CM

## 2018-02-17 DIAGNOSIS — IMO0002 UNCONTROLLED TYPE 2 DIABETES MELLITUS WITH COMPLICATION, WITH LONG-TERM CURRENT USE OF INSULIN: ICD-10-CM

## 2018-02-19 ENCOUNTER — PRIOR AUTHORIZATION (OUTPATIENT)
Dept: ENDOCRINOLOGY | Age: 43
End: 2018-02-19

## 2018-02-19 NOTE — TELEPHONE ENCOUNTER
PT'S PA FOR CHANTIX HAS BEEN SUBMITTED TO [employer] ON 2/19/18 AND WAS APPROVED ON 2/20/18. PHARMACY WAS NOTIFIED.

## 2018-02-20 RX ORDER — VARENICLINE TARTRATE 1 MG/1
1 TABLET, FILM COATED ORAL DAILY
Qty: 60 TABLET | Refills: 0 | Status: SHIPPED | OUTPATIENT
Start: 2018-02-20 | End: 2018-07-16 | Stop reason: ALTCHOICE

## 2018-03-06 DIAGNOSIS — IMO0002 UNCONTROLLED TYPE 2 DIABETES MELLITUS WITH COMPLICATION, WITH LONG-TERM CURRENT USE OF INSULIN: ICD-10-CM

## 2018-03-06 DIAGNOSIS — E55.9 VITAMIN D DEFICIENCY: Primary | ICD-10-CM

## 2018-03-06 DIAGNOSIS — E78.5 DYSLIPIDEMIA: ICD-10-CM

## 2018-03-07 ENCOUNTER — LAB (OUTPATIENT)
Dept: ENDOCRINOLOGY | Age: 43
End: 2018-03-07

## 2018-03-07 DIAGNOSIS — Z79.4 LONG-TERM INSULIN USE (HCC): ICD-10-CM

## 2018-03-07 DIAGNOSIS — K86.1 CHRONIC PANCREATITIS, UNSPECIFIED PANCREATITIS TYPE (HCC): ICD-10-CM

## 2018-03-07 DIAGNOSIS — E55.9 VITAMIN D DEFICIENCY: ICD-10-CM

## 2018-03-07 DIAGNOSIS — I10 ESSENTIAL HYPERTENSION: ICD-10-CM

## 2018-03-07 DIAGNOSIS — IMO0002 UNCONTROLLED TYPE 2 DIABETES MELLITUS WITH COMPLICATION, WITH LONG-TERM CURRENT USE OF INSULIN: ICD-10-CM

## 2018-03-07 DIAGNOSIS — E78.5 DYSLIPIDEMIA: ICD-10-CM

## 2018-03-08 LAB
25(OH)D3+25(OH)D2 SERPL-MCNC: 42.4 NG/ML (ref 30–100)
ALBUMIN SERPL-MCNC: 3.5 G/DL (ref 3.5–5.2)
ALBUMIN/GLOB SERPL: 1 G/DL
ALP SERPL-CCNC: 72 U/L (ref 39–117)
ALT SERPL-CCNC: 35 U/L (ref 1–33)
AST SERPL-CCNC: 21 U/L (ref 1–32)
BILIRUB SERPL-MCNC: 0.5 MG/DL (ref 0.1–1.2)
BUN SERPL-MCNC: 19 MG/DL (ref 6–20)
BUN/CREAT SERPL: 23.5 (ref 7–25)
C PEPTIDE SERPL-MCNC: 2.2 NG/ML (ref 1.1–4.4)
CALCIUM SERPL-MCNC: 9.5 MG/DL (ref 8.6–10.5)
CHLORIDE SERPL-SCNC: 95 MMOL/L (ref 98–107)
CHOLEST SERPL-MCNC: 220 MG/DL (ref 0–200)
CO2 SERPL-SCNC: 24.5 MMOL/L (ref 22–29)
CREAT SERPL-MCNC: 0.81 MG/DL (ref 0.57–1)
GFR SERPLBLD CREATININE-BSD FMLA CKD-EPI: 77 ML/MIN/1.73
GFR SERPLBLD CREATININE-BSD FMLA CKD-EPI: 93 ML/MIN/1.73
GLOBULIN SER CALC-MCNC: 3.4 GM/DL
GLUCOSE SERPL-MCNC: 333 MG/DL (ref 65–99)
HBA1C MFR BLD: 9.95 % (ref 4.8–5.6)
HDLC SERPL-MCNC: 42 MG/DL (ref 40–60)
INTERPRETATION: NORMAL
LDLC SERPL CALC-MCNC: 140 MG/DL (ref 0–100)
Lab: NORMAL
MICROALBUMIN UR-MCNC: 38.5 UG/ML
POTASSIUM SERPL-SCNC: 4.7 MMOL/L (ref 3.5–5.2)
PROT SERPL-MCNC: 6.9 G/DL (ref 6–8.5)
SODIUM SERPL-SCNC: 133 MMOL/L (ref 136–145)
TRIGL SERPL-MCNC: 190 MG/DL (ref 0–150)
VLDLC SERPL CALC-MCNC: 38 MG/DL (ref 5–40)

## 2018-03-14 ENCOUNTER — OFFICE VISIT (OUTPATIENT)
Dept: INTERNAL MEDICINE | Facility: CLINIC | Age: 43
End: 2018-03-14

## 2018-03-14 VITALS
OXYGEN SATURATION: 98 % | WEIGHT: 246 LBS | TEMPERATURE: 98 F | DIASTOLIC BLOOD PRESSURE: 106 MMHG | BODY MASS INDEX: 36.31 KG/M2 | HEART RATE: 121 BPM | SYSTOLIC BLOOD PRESSURE: 172 MMHG

## 2018-03-14 DIAGNOSIS — K42.9 UMBILICAL HERNIA WITHOUT OBSTRUCTION AND WITHOUT GANGRENE: ICD-10-CM

## 2018-03-14 DIAGNOSIS — K31.84 GASTROPARESIS: Primary | ICD-10-CM

## 2018-03-14 DIAGNOSIS — E78.2 MIXED HYPERLIPIDEMIA: ICD-10-CM

## 2018-03-14 DIAGNOSIS — IMO0002 UNCONTROLLED TYPE 2 DIABETES MELLITUS WITH COMPLICATION, WITH LONG-TERM CURRENT USE OF INSULIN: ICD-10-CM

## 2018-03-14 DIAGNOSIS — I10 ESSENTIAL HYPERTENSION: ICD-10-CM

## 2018-03-14 DIAGNOSIS — E78.5 DYSLIPIDEMIA: ICD-10-CM

## 2018-03-14 PROCEDURE — 99214 OFFICE O/P EST MOD 30 MIN: CPT | Performed by: FAMILY MEDICINE

## 2018-03-14 RX ORDER — LOSARTAN POTASSIUM 100 MG/1
100 TABLET ORAL DAILY
Qty: 90 TABLET | Refills: 5 | Status: SHIPPED | OUTPATIENT
Start: 2018-03-14 | End: 2019-04-05 | Stop reason: SDUPTHER

## 2018-03-14 RX ORDER — LUBIPROSTONE 24 UG/1
CAPSULE, GELATIN COATED ORAL
Refills: 3 | COMMUNITY
Start: 2018-02-28 | End: 2019-01-18 | Stop reason: SDUPTHER

## 2018-03-14 RX ORDER — PROMETHAZINE HYDROCHLORIDE 25 MG/1
25 TABLET ORAL EVERY 6 HOURS PRN
Qty: 90 TABLET | Refills: 5 | Status: SHIPPED | OUTPATIENT
Start: 2018-03-14 | End: 2019-01-21 | Stop reason: SDUPTHER

## 2018-03-14 RX ORDER — PANTOPRAZOLE SODIUM 40 MG/1
40 TABLET, DELAYED RELEASE ORAL DAILY
Qty: 90 TABLET | Refills: 5 | Status: SHIPPED | OUTPATIENT
Start: 2018-03-14 | End: 2019-05-04 | Stop reason: SDUPTHER

## 2018-03-14 RX ORDER — GABAPENTIN 400 MG/1
400 CAPSULE ORAL 3 TIMES DAILY
Qty: 90 CAPSULE | Refills: 5 | Status: SHIPPED | OUTPATIENT
Start: 2018-03-14 | End: 2018-09-18 | Stop reason: SDUPTHER

## 2018-03-14 RX ORDER — CETIRIZINE HYDROCHLORIDE 10 MG/1
10 TABLET ORAL DAILY
Refills: 3 | COMMUNITY
Start: 2018-02-04 | End: 2018-07-16 | Stop reason: ALTCHOICE

## 2018-03-14 RX ORDER — CYCLOBENZAPRINE HCL 10 MG
10 TABLET ORAL 3 TIMES DAILY PRN
Qty: 90 TABLET | Refills: 5 | Status: SHIPPED | OUTPATIENT
Start: 2018-03-14 | End: 2019-01-21 | Stop reason: SDUPTHER

## 2018-03-14 RX ORDER — ATORVASTATIN CALCIUM 20 MG/1
20 TABLET, FILM COATED ORAL DAILY
Qty: 30 TABLET | Refills: 5 | Status: SHIPPED | OUTPATIENT
Start: 2018-03-14 | End: 2018-03-28

## 2018-03-14 RX ORDER — DICYCLOMINE HYDROCHLORIDE 10 MG/1
10 CAPSULE ORAL
Qty: 120 CAPSULE | Refills: 5 | Status: SHIPPED | OUTPATIENT
Start: 2018-03-14 | End: 2018-10-24

## 2018-03-14 NOTE — PROGRESS NOTES
Subjective   Danielle Dudley is a 42 y.o. female.     Chief Complaint   Patient presents with   • Hypertension   • Hyperlipidemia   • Diabetes   • GI Problem   • Leg Pain         History of Present Illness   Patient is had myriad problems mostly with type 2 diabetes insulin requiring and gastroparesis.  She is going to the motility clinic and is to get a gastric stimulator next week.  Reviewed her medications and she's been off a lot of them recently including her antihypertensive.  Rinne refill her medications today.  Otherwise she has a follow-up with diabetes/endocrinology and has elevated A1c of 9.2.  She has a lot of diabetic neuropathic pain refill her gabapentin.      The following portions of the patient's history were reviewed and updated as appropriate: allergies, current medications, past social history and problem list.    Review of Systems   Constitutional: Positive for fatigue.   HENT: Negative.    Eyes: Positive for visual disturbance.   Respiratory: Negative.    Cardiovascular: Negative.    Gastrointestinal: Positive for abdominal distention, abdominal pain, constipation and nausea.   Endocrine: Negative.    Genitourinary: Negative.    Musculoskeletal: Positive for arthralgias, gait problem and myalgias.   Skin: Negative.    Neurological: Positive for dizziness, weakness and numbness.   Psychiatric/Behavioral: The patient is nervous/anxious.        Objective   Vitals:    03/14/18 1329   BP: (!) 172/106   Pulse: (!) 121   Temp: 98 °F (36.7 °C)   SpO2: 98%     Physical Exam   Constitutional: She is oriented to person, place, and time. She appears well-nourished.   HENT:   Head: Normocephalic.   Right Ear: External ear normal.   Left Ear: External ear normal.   Mouth/Throat: Abnormal dentition.   Eyes: Pupils are equal, round, and reactive to light.   Neck: Normal range of motion. No thyromegaly present.   Cardiovascular: Normal rate, regular rhythm and normal heart sounds.    Pulmonary/Chest: Effort  normal and breath sounds normal.   Abdominal: There is tenderness. A hernia is present.   Musculoskeletal: Normal range of motion.   Neurological: She is alert and oriented to person, place, and time. A sensory deficit is present. She exhibits abnormal muscle tone. Coordination abnormal. Gait normal.   Skin: Skin is warm and dry.   Psychiatric: Her speech is normal and behavior is normal. Judgment and thought content normal. Her mood appears anxious.   Nursing note and vitals reviewed.      Assessment/Plan   Problem List Items Addressed This Visit        Cardiovascular and Mediastinum    Essential hypertension    Relevant Medications    losartan (COZAAR) 100 MG tablet       Digestive    Gastroparesis - Primary    Relevant Medications    pantoprazole (PROTONIX) 40 MG EC tablet    dicyclomine (BENTYL) 10 MG capsule       Endocrine    Uncontrolled type 2 diabetes mellitus with complication, with long-term current use of insulin       Other    Dyslipidemia      Other Visit Diagnoses     Umbilical hernia without obstruction and without gangrene        Mixed hyperlipidemia        Relevant Medications    atorvastatin (LIPITOR) 20 MG tablet      Plan: Meds refilled and labs are reviewed.  We'll see her back in 3 months sooner if needed.

## 2018-03-15 ENCOUNTER — TELEPHONE (OUTPATIENT)
Dept: ENDOCRINOLOGY | Age: 43
End: 2018-03-15

## 2018-03-15 NOTE — TELEPHONE ENCOUNTER
----- Message from RICKI Kraus sent at 3/14/2018  2:02 PM EDT -----  She is to leave the pump on basal only she should be fine.  It would be better if she can leave it on during surgery to assist lunch she does not go through any CAT scans nuclear med [place] of that sort but if the surgery once to take her off leave the pump at home  ----- Message -----  From: Isabelle Shell MA  Sent: 3/14/2018  11:26 AM  To: RICKI Kraus    Patient states that she is having surgery Monday to put a stimulator in her stomach to help with gastroparesis. She will be NPO starting the night before. Her surgeon asked her to contact our office to find out what she needs to do with the insulin pump during this time. Please advise.

## 2018-03-24 DIAGNOSIS — IMO0002 UNCONTROLLED TYPE 2 DIABETES MELLITUS WITH COMPLICATION, WITH LONG-TERM CURRENT USE OF INSULIN: ICD-10-CM

## 2018-03-28 ENCOUNTER — OFFICE VISIT (OUTPATIENT)
Dept: ENDOCRINOLOGY | Age: 43
End: 2018-03-28

## 2018-03-28 VITALS
SYSTOLIC BLOOD PRESSURE: 120 MMHG | BODY MASS INDEX: 38.06 KG/M2 | WEIGHT: 257 LBS | HEIGHT: 69 IN | DIASTOLIC BLOOD PRESSURE: 78 MMHG

## 2018-03-28 DIAGNOSIS — K31.84 GASTROPARESIS: ICD-10-CM

## 2018-03-28 DIAGNOSIS — IMO0002 UNCONTROLLED TYPE 2 DIABETES MELLITUS WITH COMPLICATION, WITH LONG-TERM CURRENT USE OF INSULIN: Primary | ICD-10-CM

## 2018-03-28 DIAGNOSIS — E78.2 MIXED HYPERLIPIDEMIA: ICD-10-CM

## 2018-03-28 DIAGNOSIS — I10 ESSENTIAL HYPERTENSION: ICD-10-CM

## 2018-03-28 DIAGNOSIS — E55.9 VITAMIN D DEFICIENCY: ICD-10-CM

## 2018-03-28 DIAGNOSIS — K86.1 CHRONIC PANCREATITIS, UNSPECIFIED PANCREATITIS TYPE (HCC): ICD-10-CM

## 2018-03-28 DIAGNOSIS — Z79.4 LONG-TERM INSULIN USE (HCC): ICD-10-CM

## 2018-03-28 PROCEDURE — 99214 OFFICE O/P EST MOD 30 MIN: CPT | Performed by: NURSE PRACTITIONER

## 2018-03-28 RX ORDER — ICOSAPENT ETHYL 1000 MG/1
2 CAPSULE ORAL 2 TIMES DAILY WITH MEALS
Qty: 120 CAPSULE | Refills: 5 | Status: SHIPPED | OUTPATIENT
Start: 2018-03-28 | End: 2018-06-27 | Stop reason: SDUPTHER

## 2018-03-28 RX ORDER — ATORVASTATIN CALCIUM 40 MG/1
40 TABLET, FILM COATED ORAL DAILY
Qty: 30 TABLET | Refills: 4 | Status: SHIPPED | OUTPATIENT
Start: 2018-03-28 | End: 2018-06-27

## 2018-03-28 NOTE — PROGRESS NOTES
Danielle Dudley  presents to the office  for the follow-up appointment for Type 1 diabetes mellitus.     The diabete's condition location is throughout the system with the  clinical course has fluctuated, the severity is Mild, and the modifying/allievating factors are insulin pump.  Medications and  Dosages were  reviewed with Danielle Dudley and suggested that compliance most of the time.    The patient reports associated symptoms of hyperglycemia have been excessive thirst and polyuria and associated symptoms of hypoglycemia have been confusion, dizziness, jitteriness and nausea and headache, with their  hypoglycemia threshold for symptoms is 80 mg/dl .     The patient is currently on insulin.     Compliance with blood glucose monitoring: good.     Meal panning: The patient is using carbohydrate counting, but is not on a specified limit, being a pump user.    The patient is currently taking home blood tests - Blood glucose testin-8 times daily, that are: before each meal and 1 or 2 hours after meal, fasting and bedtime, anytime you feel symptoms of hyperglycemia or hypoglycemia (high or low blood sugars)  The Novolog U100  per insulin pump    Last instructions given were:  Insulin pump changes  12 AM 1.0 units per hour  5 AM 1.15 units per hour  6 PM 1.2 units per hour with a total daily dose of 27.15 units in     Carb ratio 12 AM 1 unit for every 7 g  11 AM 1 unit for 6 g  5 PM 1 unit for 8 g     Insulin sensitivity 1 unit for every 28 g  Blood glucose targets 110 through 129 g/Annie  Maximum bolus 25 units  Active insulin time 3 hours     Home blood glucose testing daily: 4-8  insulin pump upload  -Yes- dated 3.14.2018 through 3.    Last reported blood glucose readings are:  Home blood glucose testing daily: 4-8  insulin pump upload   -Yes- pump upload. Dated 2017 through 2017    Patterns reported per patient are none.         The following portions of the patient's history were reviewed and  updated as appropriate: current medications, past family history, past medical history, past social history, past surgical history and problem list.      Current Outpatient Prescriptions:   •  AMITIZA 24 MCG capsule, TK 1 C PO BID WITH FOOD, Disp: , Rfl: 3  •  butalbital-acetaminophen  MG tablet tablet, Take  by mouth every 4 (four) hours as needed., Disp: , Rfl:   •  cetirizine (zyrTEC) 10 MG tablet, Take 10 mg by mouth Daily., Disp: , Rfl: 3  •  CHANTIX 1 MG tablet, Take 1 tablet by mouth Daily., Disp: 60 tablet, Rfl: 0  •  CREON 50664 UNITS capsule delayed-release particles capsule, , Disp: , Rfl:   •  cyclobenzaprine (FLEXERIL) 10 MG tablet, Take 1 tablet by mouth 3 (Three) Times a Day As Needed for Muscle Spasms., Disp: 90 tablet, Rfl: 5  •  dicyclomine (BENTYL) 10 MG capsule, Take 1 capsule by mouth 4 (Four) Times a Day Before Meals & at Bedtime., Disp: 120 capsule, Rfl: 5  •  gabapentin (NEURONTIN) 400 MG capsule, Take 1 capsule by mouth 3 (Three) Times a Day., Disp: 90 capsule, Rfl: 5  •  glucagon (GLUCAGON EMERGENCY) 1 MG injection, Inject 1 mg under the skin 1 (One) Time As Needed for Low Blood Sugar for up to 1 dose., Disp: 1 kit, Rfl: 0  •  glucose blood (JALEEL CONTOUR NEXT TEST) test strip, Test blood glucose 8 times daily, Disp: 300 each, Rfl: 6  •  Lancets (FREESTYLE) lancets, Check blood glucose 4-5 times daily, Disp: 120 each, Rfl: 5  •  losartan (COZAAR) 100 MG tablet, Take 1 tablet by mouth Daily., Disp: 90 tablet, Rfl: 5  •  metFORMIN (GLUCOPHAGE) 1000 MG tablet, , Disp: , Rfl:   •  NOVOLOG 100 UNIT/ML injection, INJECT UP  UNITS DAILY VIA INSULIN PUMP, Disp: 60 mL, Rfl: 0  •  pantoprazole (PROTONIX) 40 MG EC tablet, Take 1 tablet by mouth Daily., Disp: 90 tablet, Rfl: 5  •  polyethylene glycol (MIRALAX) packet, Take 17 g by mouth Daily., Disp: , Rfl:   •  promethazine (PHENERGAN) 25 MG tablet, Take 1 tablet by mouth Every 6 (Six) Hours As Needed for Nausea or Vomiting., Disp: 90  tablet, Rfl: 5  •  TRANSDERM-SCOP, 1.5 MG, 1.5 MG/3DAYS patch, IBAN 1 PA EXT TO THE SKIN Q 3 DAYS, Disp: , Rfl: 1  •  Urine Glucose-Ketones Test strip, Use to test urine if blood glucose is over 400mg/dl, Disp: 100 each, Rfl: 6  •  VASCEPA 1 g capsule capsule, Take 2 g by mouth 2 (Two) Times a Day With Meals., Disp: 120 capsule, Rfl: 5  •  vitamin D (ERGOCALCIFEROL) 39988 UNITS capsule capsule, Take 1 capsule by mouth 1 (One) Time Per Week., Disp: 12 capsule, Rfl: 3  •  atorvastatin (LIPITOR) 40 MG tablet, Take 1 tablet by mouth Daily., Disp: 30 tablet, Rfl: 4    Patient Active Problem List    Diagnosis   • Mixed hyperlipidemia [E78.2]   • Essential hypertension [I10]   • Dyslipidemia [E78.5]   • Vitamin D deficiency [E55.9]   • Premature menopause [E28.319]   • Tobacco abuse counseling [Z71.6]   • Uncontrolled type 2 diabetes mellitus with complication, with long-term current use of insulin [E11.8, E11.65, Z79.4]   • Hypoglycemia [E16.2]   • Hyperglycemia [R73.9]   • Chronic pancreatitis [K86.1]   • Gastroparesis [K31.84]   • Pancreas disorder [K86.9]   • Long-term insulin use [Z79.4]       Review of Systems   A comprehensive review of the 14 systems was negative except of listed below:  Constitutional: fatigue and weight gain 24* lb  in 3 months**  Gastrointestinal: positive for change in bowel habits, constipation, dyspepsia and nausea  Genitourinary:positive for frequency and nocturia  Musculoskeletal:positive for muscle cramping  Neurological: positive for numbness and tingling - neurontin per PCP at this time   Endocrine: diabetic symptoms including increased fatigue and polyuria  hyperglycemia     Objective:     Wt Readings from Last 3 Encounters:   03/28/18 117 kg (257 lb)   03/14/18 112 kg (246 lb)   12/06/17 106 kg (233 lb)     Temp Readings from Last 3 Encounters:   03/14/18 98 °F (36.7 °C) (Tympanic)   12/06/17 98.3 °F (36.8 °C) (Oral)     BP Readings from Last 3 Encounters:   03/28/18 120/78   03/14/18  "(!) 172/106   12/06/17 146/78     Pulse Readings from Last 3 Encounters:   03/14/18 (!) 121   12/06/17 84        /78   Ht 175.3 cm (69.02\")   Wt 117 kg (257 lb)   BMI 37.93 kg/m²     General appearance:  alert, cooperative, delirious, fatigued, nourished\" \"appears stated age and cooperative\" \"NAD   Neck: no carotid bruit, supple, symmetrical, trachea midline and thyroid not enlarged, symmetric, no tenderness/mass/nodules   Thyroid:  no palpable nodule, no enlargement, no tenderness   Lung:  \"clear to auscultation bilaterally\" \"no abnormal breath sounds  \" effort was normal, no labored breath, no use of accessory muscles.   Heart: regular rate and rhythm, S1, S2 normal, no murmur, click, rub or gallop      Abdomen:  normal bowel sounds- 4 quads, soft non-tender, contour - rounded, contour - obese and abd bloated      Extremities: extremities normal, atraumatic, no cyanosis or edema extremities normal, atraumatic, no cyanosis or edema\" WNL - gait and station, strength and stability\"       Skin:   Pulses:  warm and dry, no hyperpigmentation, normal skin coloring, or suspicious lesions   2+ and symmetric   Neuro: Alert and oriented x3. Gait normal. Reflexes and motor strength normal and symmetric. Cranial nerves 2-12 and sensation grossly intact.      Psych: behavior - normal, judgement - normal, mood abnormal - controlled anxious, affect - normal                 Lab Review  Results for orders placed or performed in visit on 03/07/18   Comprehensive Metabolic Panel   Result Value Ref Range    Glucose 333 (H) 65 - 99 mg/dL    BUN 19 6 - 20 mg/dL    Creatinine 0.81 0.57 - 1.00 mg/dL    eGFR Non African Am 77 >60 mL/min/1.73    eGFR African Am 93 >60 mL/min/1.73    BUN/Creatinine Ratio 23.5 7.0 - 25.0    Sodium 133 (L) 136 - 145 mmol/L    Potassium 4.7 3.5 - 5.2 mmol/L    Chloride 95 (L) 98 - 107 mmol/L    Total CO2 24.5 22.0 - 29.0 mmol/L    Calcium 9.5 8.6 - 10.5 mg/dL    Total Protein 6.9 6.0 - 8.5 g/dL    " Albumin 3.50 3.50 - 5.20 g/dL    Globulin 3.4 gm/dL    A/G Ratio 1.0 g/dL    Total Bilirubin 0.5 0.1 - 1.2 mg/dL    Alkaline Phosphatase 72 39 - 117 U/L    AST (SGOT) 21 1 - 32 U/L    ALT (SGPT) 35 (H) 1 - 33 U/L   C-Peptide   Result Value Ref Range    C-Peptide 2.2 1.1 - 4.4 ng/mL   Hemoglobin A1c   Result Value Ref Range    Hemoglobin A1C 9.95 (H) 4.80 - 5.60 %   Vitamin D 25 Hydroxy   Result Value Ref Range    25 Hydroxy, Vitamin D 42.4 30.0 - 100.0 ng/ml   Lipid Panel   Result Value Ref Range    Total Cholesterol 220 (H) 0 - 200 mg/dL    Triglycerides 190 (H) 0 - 150 mg/dL    HDL Cholesterol 42 40 - 60 mg/dL    VLDL Cholesterol 38 5 - 40 mg/dL    LDL Cholesterol  140 (H) 0 - 100 mg/dL   MicroAlbumin, Urine, Random   Result Value Ref Range    Microalbumin, Urine 38.5 Not Estab. ug/mL   Cardiovascular Risk Assessment   Result Value Ref Range    Interpretation Note    Diabetes Patient Education   Result Value Ref Range    PDF Image Not applicable            Assessment:   Danielle was seen today for follow-up.    Diagnoses and all orders for this visit:    Uncontrolled type 2 diabetes mellitus with complication, with long-term current use of insulin  -     Comprehensive Metabolic Panel; Future  -     Hemoglobin A1c; Future  -     Insulin, Total; Future  -     Lipid Panel; Future  -     Uric Acid; Future  -     Vitamin D 25 Hydroxy; Future  -     Thyroid Antibodies; Future  -     TSH; Future  -     T4, Free; Future    Vitamin D deficiency  -     Comprehensive Metabolic Panel; Future  -     Vitamin D 25 Hydroxy; Future    Long-term insulin use  -     Comprehensive Metabolic Panel; Future    Essential hypertension  -     Comprehensive Metabolic Panel; Future    Mixed hyperlipidemia  -     VASCEPA 1 g capsule capsule; Take 2 g by mouth 2 (Two) Times a Day With Meals.  -     atorvastatin (LIPITOR) 40 MG tablet; Take 1 tablet by mouth Daily.  -     Comprehensive Metabolic Panel; Future  -     Lipid Panel;  Future    Gastroparesis  -     Comprehensive Metabolic Panel; Future    Chronic pancreatitis, unspecified pancreatitis type  -     Comprehensive Metabolic Panel; Future          Plan:   In summary:I met with the patient is metabolically stable.  Patient is one-week postop gastric stimulator placement.  She's had a temporary one month before that.  She's had several gastrointestinal symptoms.  She is having a bowel movement changes absorption changes-she reports that her blood glucose is elevated.  Reviewed labwork prior obtained to the visit.    Medication changes/ Insulin pump changes:  At this time we will increase her Lipitor to 40 mg once a day.    Insulin pump changes  Basals  12 AM-1.15 units per hour  12 PM-1.35 units per hour    Carb ratio one unit for every 5 g    Insulin sensitivity 1 unit for 26 with blood glucose targets 110 through 120.    Maximum bolus 25 units    Bolus wizard is on    home blood tests -  Blood glucose testin times daily, that are:  fasting- 1st thing in morning before eating or drinking  before each meal and 1 or 2 hours after meal  bedtime  anytime you feel symptoms of hyperglycemia or hypoglycemia (high or low blood sugars)        Education:  interpretation of lab results, blood sugar goals, complications of diabetes mellitus, hypoglycemia prevention and treatment, exercise, illness management, self-monitoring of blood glucose skills, nutrition, carbohydrate counting, site rotation, insulin adjustments, self-injection of insulin, use of sliding scale/correction formula and use of insulin: carb ratio        The total face to face time spent was  25 minutes  with additional education given: 14 minutes (greater than 50% of the total time) was spent with counseling and coordination of care on: SMBG with goals, Side effects profiles with medications, medication use and purposes, and insulin pump uploaded and changes    Return if symptoms worsen or fail to improve, for Recheck. Keep  f/u appt with Dr. Andrade - 2 week prior for labs.       Dragon transcription disclaimer     Much of this encounter note is an electronic transcription/translation of spoken language to printed text. The electronic translation of spoken language may permit erroneous, or at times, nonsensical words or phrases to be inadvertently transcribed. Although I have reviewed the note for such errors, some may still exist.

## 2018-04-27 ENCOUNTER — TELEPHONE (OUTPATIENT)
Dept: ENDOCRINOLOGY | Age: 43
End: 2018-04-27

## 2018-04-27 NOTE — TELEPHONE ENCOUNTER
Spoke with patient and advised her with BS over 400 to go to the ER. She understood.         ----- Message from Lilian Ray sent at 4/27/2018 11:59 AM EDT -----  Contact: PATIENT     MESSAGE FROM PATIENT     MESSAGE/ISSUE:    MRS CALDERON      HAS CALLED IN REGARDS TO A FEW MAJOR ISSUES.   PATIENT RECENTLY HAD  GASTROPARESIS  SURGERY SURGERY  ALMOST A MONTH AGO.   WHEN THEY  PUT IN HER SENOR DEVICE, THE SCREW WAS TIGHTEN ENOUGH.  THIS  NOW HAS CAUSED THE PATIENT TO DEVELOPED  MRSA  INFECTION ON THE DEVICE.     HER INCISION   ALSO GOT INFECTED AND IS NOW LEAKING OUT. SHE DID LET ME KNOW, SINCE THE INSIDON ,   HER BLOOD SUGAR . SHE CANT NOT GET IT BACK DOWN AND IS LOOKING FOR ADVISE ON THIS.     SHE IS NOT SURE IF SHE NEED TO GO TO THE HOSPITAL.  SHE DID PREVIOUSLY HAD HER SURGERY   DONE AT [facility] AND CAN GET IN THERE FASTER.          CALL BACK NUMBER  : [phone]

## 2018-05-08 DIAGNOSIS — IMO0002 UNCONTROLLED TYPE 2 DIABETES MELLITUS WITH COMPLICATION, WITH LONG-TERM CURRENT USE OF INSULIN: ICD-10-CM

## 2018-05-31 DIAGNOSIS — IMO0002 UNCONTROLLED TYPE 2 DIABETES MELLITUS WITH COMPLICATION, WITH LONG-TERM CURRENT USE OF INSULIN: ICD-10-CM

## 2018-05-31 DIAGNOSIS — E78.2 MIXED HYPERLIPIDEMIA: Primary | ICD-10-CM

## 2018-05-31 DIAGNOSIS — E78.5 DYSLIPIDEMIA: ICD-10-CM

## 2018-05-31 DIAGNOSIS — E16.2 HYPOGLYCEMIA: ICD-10-CM

## 2018-05-31 DIAGNOSIS — R73.9 HYPERGLYCEMIA: ICD-10-CM

## 2018-05-31 DIAGNOSIS — E55.9 VITAMIN D DEFICIENCY: ICD-10-CM

## 2018-06-13 ENCOUNTER — LAB (OUTPATIENT)
Dept: ENDOCRINOLOGY | Age: 43
End: 2018-06-13

## 2018-06-13 DIAGNOSIS — IMO0002 UNCONTROLLED TYPE 2 DIABETES MELLITUS WITH COMPLICATION, WITH LONG-TERM CURRENT USE OF INSULIN: ICD-10-CM

## 2018-06-13 DIAGNOSIS — E16.2 HYPOGLYCEMIA: ICD-10-CM

## 2018-06-13 DIAGNOSIS — E78.5 DYSLIPIDEMIA: ICD-10-CM

## 2018-06-13 DIAGNOSIS — R73.9 HYPERGLYCEMIA: ICD-10-CM

## 2018-06-13 DIAGNOSIS — E78.2 MIXED HYPERLIPIDEMIA: ICD-10-CM

## 2018-06-13 DIAGNOSIS — E55.9 VITAMIN D DEFICIENCY: ICD-10-CM

## 2018-06-14 LAB
25(OH)D3+25(OH)D2 SERPL-MCNC: 35.6 NG/ML (ref 30–100)
ALBUMIN SERPL-MCNC: 4.1 G/DL (ref 3.5–5.2)
ALBUMIN/GLOB SERPL: 1.2 G/DL
ALP SERPL-CCNC: 74 U/L (ref 39–117)
ALT SERPL-CCNC: 18 U/L (ref 1–33)
AST SERPL-CCNC: 10 U/L (ref 1–32)
BILIRUB SERPL-MCNC: 0.4 MG/DL (ref 0.1–1.2)
BUN SERPL-MCNC: 16 MG/DL (ref 6–20)
BUN/CREAT SERPL: 18.6 (ref 7–25)
C PEPTIDE SERPL-MCNC: 2.5 NG/ML (ref 1.1–4.4)
CALCIUM SERPL-MCNC: 9.5 MG/DL (ref 8.6–10.5)
CHLORIDE SERPL-SCNC: 91 MMOL/L (ref 98–107)
CHOLEST SERPL-MCNC: 217 MG/DL (ref 0–200)
CO2 SERPL-SCNC: 25.7 MMOL/L (ref 22–29)
CREAT SERPL-MCNC: 0.86 MG/DL (ref 0.57–1)
GFR SERPLBLD CREATININE-BSD FMLA CKD-EPI: 71 ML/MIN/1.73
GFR SERPLBLD CREATININE-BSD FMLA CKD-EPI: 86 ML/MIN/1.73
GLOBULIN SER CALC-MCNC: 3.3 GM/DL
GLUCOSE SERPL-MCNC: 362 MG/DL (ref 65–99)
HBA1C MFR BLD: 11.3 % (ref 4.8–5.6)
HDLC SERPL-MCNC: 51 MG/DL (ref 40–60)
INTERPRETATION: NORMAL
LDLC SERPL CALC-MCNC: 127 MG/DL (ref 0–100)
Lab: NORMAL
MICROALBUMIN UR-MCNC: 29.9 UG/ML
POTASSIUM SERPL-SCNC: 4.4 MMOL/L (ref 3.5–5.2)
PROT SERPL-MCNC: 7.4 G/DL (ref 6–8.5)
SODIUM SERPL-SCNC: 133 MMOL/L (ref 136–145)
T4 SERPL-MCNC: 5.68 MCG/DL (ref 4.5–11.7)
TRIGL SERPL-MCNC: 195 MG/DL (ref 0–150)
TSH SERPL DL<=0.005 MIU/L-ACNC: 2.63 MIU/ML (ref 0.27–4.2)
URATE SERPL-MCNC: 3.8 MG/DL (ref 2.4–5.7)
VLDLC SERPL CALC-MCNC: 39 MG/DL (ref 5–40)

## 2018-06-20 ENCOUNTER — OFFICE VISIT (OUTPATIENT)
Dept: INTERNAL MEDICINE | Facility: CLINIC | Age: 43
End: 2018-06-20

## 2018-06-20 VITALS
SYSTOLIC BLOOD PRESSURE: 140 MMHG | WEIGHT: 251.6 LBS | OXYGEN SATURATION: 97 % | BODY MASS INDEX: 37.26 KG/M2 | DIASTOLIC BLOOD PRESSURE: 96 MMHG | TEMPERATURE: 98.2 F | HEART RATE: 101 BPM | HEIGHT: 69 IN | RESPIRATION RATE: 16 BRPM

## 2018-06-20 DIAGNOSIS — K31.84 GASTROPARESIS: ICD-10-CM

## 2018-06-20 DIAGNOSIS — G44.221 CHRONIC TENSION-TYPE HEADACHE, INTRACTABLE: ICD-10-CM

## 2018-06-20 DIAGNOSIS — IMO0002 UNCONTROLLED TYPE 2 DIABETES MELLITUS WITH COMPLICATION, WITH LONG-TERM CURRENT USE OF INSULIN: ICD-10-CM

## 2018-06-20 DIAGNOSIS — I10 ESSENTIAL HYPERTENSION: Primary | ICD-10-CM

## 2018-06-20 DIAGNOSIS — F51.02 ADJUSTMENT INSOMNIA: ICD-10-CM

## 2018-06-20 PROCEDURE — 99214 OFFICE O/P EST MOD 30 MIN: CPT | Performed by: FAMILY MEDICINE

## 2018-06-20 RX ORDER — TRAZODONE HYDROCHLORIDE 50 MG/1
TABLET ORAL
Qty: 90 TABLET | Refills: 0 | Status: SHIPPED | OUTPATIENT
Start: 2018-06-20 | End: 2018-07-16 | Stop reason: ALTCHOICE

## 2018-06-20 RX ORDER — TRAZODONE HYDROCHLORIDE 50 MG/1
TABLET ORAL
Qty: 30 TABLET | Refills: 0 | Status: SHIPPED | OUTPATIENT
Start: 2018-06-20 | End: 2018-06-20 | Stop reason: SDUPTHER

## 2018-06-20 NOTE — PROGRESS NOTES
Subjective   Danielle Dudley is a 43 y.o. female.     Chief Complaint   Patient presents with   • Hypertension   • GI Problem   • Diabetes   • Headache   Insomnia      History of Present Illness   The patient is a pleasant lady who is had a lot of difficulty regarding insulin requiring diabetes on insulin pump and also gastroparesis.  She has not had placement of a gastric stimulator once with rehospitalization for infected gastric stimulator site and replacement of a gastric stimulator.  Feels like the epigastric sooner now slides around and she is to wear an abdominal binder pending for healing of the area and the site.    Otherwise reviewed current medical management in regards to hypertension diabetes insulin requiring chronic daily headache.  Discussed referral to neurology regarding her headaches.    She has great difficulty trying to sleep at night.  We discussed trying trazodone pros and cons of going to her    The following portions of the patient's history were reviewed and updated as appropriate: allergies, current medications, past social history and problem list.    Review of Systems   Constitutional: Positive for fatigue.   Eyes: Negative.    Respiratory: Negative.    Cardiovascular: Negative.    Gastrointestinal: Positive for abdominal pain.   Endocrine: Negative.    Musculoskeletal: Negative.    Skin: Negative.    Allergic/Immunologic: Negative.    Neurological: Positive for headaches.   Hematological: Negative.    Psychiatric/Behavioral: Negative.        Objective   Vitals:    06/20/18 1224   BP: 140/96   Pulse: 101   Resp: 16   Temp: 98.2 °F (36.8 °C)   SpO2: 97%     Physical Exam   Constitutional: She is oriented to person, place, and time. She appears well-developed.   HENT:   Head: Normocephalic.   Right Ear: External ear normal.   Left Ear: External ear normal.   Mouth/Throat: Oropharynx is clear and moist.   Eyes: Conjunctivae are normal. Pupils are equal, round, and reactive to light.   Neck:  Normal range of motion. Neck supple.   Cardiovascular: Normal rate, regular rhythm and normal heart sounds.    Pulmonary/Chest: Effort normal.   Abdominal: Bowel sounds are normal. There is tenderness.       Musculoskeletal: Normal range of motion.   Neurological: She is alert and oriented to person, place, and time.   Psychiatric: She has a normal mood and affect.   Nursing note and vitals reviewed.      Assessment/Plan   Problem List Items Addressed This Visit        Cardiovascular and Mediastinum    Essential hypertension - Primary       Digestive    Gastroparesis       Endocrine    Uncontrolled type 2 diabetes mellitus with complication, with long-term current use of insulin       Nervous and Auditory    Chronic tension-type headache, intractable    Relevant Orders    Ambulatory Referral to Neurology       Other    Adjustment insomnia      Trial of trazodone 50 mg 1 tablet to half tablet at bedtime when necessary sleep.  Referral to neurology concerning headaches recheck in about 3 or 4 months.

## 2018-06-22 ENCOUNTER — TELEPHONE (OUTPATIENT)
Dept: INTERNAL MEDICINE | Facility: CLINIC | Age: 43
End: 2018-06-22

## 2018-06-26 NOTE — TELEPHONE ENCOUNTER
I talked to her on the phone and sent to 10 tablets of Bell Giovanny 20 mg at bedtime when necessary.  She is to stop by and get a card for a free trial of 10 tablets.

## 2018-06-27 ENCOUNTER — OFFICE VISIT (OUTPATIENT)
Dept: ENDOCRINOLOGY | Age: 43
End: 2018-06-27

## 2018-06-27 VITALS
WEIGHT: 250.4 LBS | RESPIRATION RATE: 16 BRPM | BODY MASS INDEX: 36.98 KG/M2 | DIASTOLIC BLOOD PRESSURE: 94 MMHG | SYSTOLIC BLOOD PRESSURE: 134 MMHG

## 2018-06-27 DIAGNOSIS — Z79.4 LONG-TERM INSULIN USE (HCC): ICD-10-CM

## 2018-06-27 DIAGNOSIS — IMO0002 UNCONTROLLED TYPE 2 DIABETES MELLITUS WITH COMPLICATION, WITH LONG-TERM CURRENT USE OF INSULIN: Primary | ICD-10-CM

## 2018-06-27 DIAGNOSIS — E78.5 DYSLIPIDEMIA: ICD-10-CM

## 2018-06-27 DIAGNOSIS — I10 ESSENTIAL HYPERTENSION: ICD-10-CM

## 2018-06-27 DIAGNOSIS — K86.1 CHRONIC PANCREATITIS, UNSPECIFIED PANCREATITIS TYPE (HCC): ICD-10-CM

## 2018-06-27 DIAGNOSIS — E55.9 VITAMIN D DEFICIENCY: ICD-10-CM

## 2018-06-27 DIAGNOSIS — E78.2 MIXED HYPERLIPIDEMIA: ICD-10-CM

## 2018-06-27 DIAGNOSIS — E28.319 PREMATURE MENOPAUSE: ICD-10-CM

## 2018-06-27 PROCEDURE — 99215 OFFICE O/P EST HI 40 MIN: CPT | Performed by: INTERNAL MEDICINE

## 2018-06-27 RX ORDER — ERGOCALCIFEROL 1.25 MG/1
50000 CAPSULE ORAL WEEKLY
Qty: 13 CAPSULE | Refills: 3 | Status: SHIPPED | OUTPATIENT
Start: 2018-06-27 | End: 2018-10-10 | Stop reason: SDUPTHER

## 2018-06-27 RX ORDER — ICOSAPENT ETHYL 1000 MG/1
2 CAPSULE ORAL 2 TIMES DAILY WITH MEALS
Qty: 120 CAPSULE | Refills: 5 | Status: SHIPPED | OUTPATIENT
Start: 2018-06-27 | End: 2019-01-09 | Stop reason: SDUPTHER

## 2018-06-27 RX ORDER — PANCRELIPASE 24000; 76000; 120000 [USP'U]/1; [USP'U]/1; [USP'U]/1
24000 CAPSULE, DELAYED RELEASE PELLETS ORAL
Qty: 100 CAPSULE | Refills: 5 | Status: SHIPPED | OUTPATIENT
Start: 2018-06-27 | End: 2019-01-09 | Stop reason: SDUPTHER

## 2018-06-27 RX ORDER — FLUCONAZOLE 150 MG/1
150 TABLET ORAL DAILY
Qty: 5 TABLET | Refills: 5 | Status: SHIPPED | OUTPATIENT
Start: 2018-06-27 | End: 2018-07-16

## 2018-06-27 RX ORDER — ATORVASTATIN CALCIUM 80 MG/1
80 TABLET, FILM COATED ORAL DAILY
Qty: 30 TABLET | Refills: 11 | Status: SHIPPED | OUTPATIENT
Start: 2018-06-27 | End: 2019-01-09 | Stop reason: SDUPTHER

## 2018-06-27 NOTE — PROGRESS NOTES
Subjective   Danielle Dudley is a 43 y.o. female seen for follow up for DM2, lab review. Patient is currently using a [employer] insulin pump and changing pump sites every 2 days. She is checking BG 2 times a day but was checking BG 4-5 times a day. She states that she has had to lower checking her BG due to her insurance not paying for her test strips. She states that her BG has been elevated.     History of Present Illness   This is a 43-year-old female known patient with type II diabetes hypertension and dyslipidemia with chronic pancreatitis and morbid obesity with vitamin D deficiency.  Over the course of last 4 months she has had 2 abdominal surgery with the complication for infection and having to receive long-term antibiotics intravenously at home and as a result she also has had a vaginal yeast infection for which she needs therapy.  She also tells me that she has had some tooth infection and extending into her sinus and for that having had to take antibiotics as well.  However she has noted having high blood glucose most of the time.  /94   Resp 16   Wt 114 kg (250 lb 6.4 oz)   BMI 36.98 kg/m²      Allergies   Allergen Reactions   • Codeine    • Imitrex [Sumatriptan]    • Levemir [Insulin Detemir]    • Linzess [Linaclotide]      constipation   • Morphine And Related        Current Outpatient Prescriptions:   •  AMITIZA 24 MCG capsule, TK 1 C PO BID WITH FOOD, Disp: , Rfl: 3  •  atorvastatin (LIPITOR) 40 MG tablet, Take 1 tablet by mouth Daily., Disp: 30 tablet, Rfl: 4  •  butalbital-acetaminophen  MG tablet tablet, Take  by mouth every 4 (four) hours as needed., Disp: , Rfl:   •  cetirizine (zyrTEC) 10 MG tablet, Take 10 mg by mouth Daily., Disp: , Rfl: 3  •  CHANTIX 1 MG tablet, Take 1 tablet by mouth Daily., Disp: 60 tablet, Rfl: 0  •  CREON 02663 UNITS capsule delayed-release particles capsule, , Disp: , Rfl:   •  cyclobenzaprine (FLEXERIL) 10 MG tablet, Take 1 tablet by mouth 3 (Three)  Times a Day As Needed for Muscle Spasms., Disp: 90 tablet, Rfl: 5  •  dicyclomine (BENTYL) 10 MG capsule, Take 1 capsule by mouth 4 (Four) Times a Day Before Meals & at Bedtime., Disp: 120 capsule, Rfl: 5  •  gabapentin (NEURONTIN) 400 MG capsule, Take 1 capsule by mouth 3 (Three) Times a Day., Disp: 90 capsule, Rfl: 5  •  glucagon (GLUCAGON EMERGENCY) 1 MG injection, Inject 1 mg under the skin 1 (One) Time As Needed for Low Blood Sugar for up to 1 dose., Disp: 1 kit, Rfl: 0  •  glucose blood (JALEEL CONTOUR NEXT TEST) test strip, Test blood glucose 8 times daily, Disp: 300 each, Rfl: 6  •  Lancets (FREESTYLE) lancets, Check blood glucose 4-5 times daily, Disp: 120 each, Rfl: 5  •  losartan (COZAAR) 100 MG tablet, Take 1 tablet by mouth Daily., Disp: 90 tablet, Rfl: 5  •  metFORMIN (GLUCOPHAGE) 1000 MG tablet, , Disp: , Rfl:   •  NOVOLOG 100 UNIT/ML injection, INJECT UP  UNITS DAILY VIA INSULIN PUMP, Disp: 60 mL, Rfl: 0  •  pantoprazole (PROTONIX) 40 MG EC tablet, Take 1 tablet by mouth Daily., Disp: 90 tablet, Rfl: 5  •  polyethylene glycol (MIRALAX) packet, Take 17 g by mouth Daily., Disp: , Rfl:   •  promethazine (PHENERGAN) 25 MG tablet, Take 1 tablet by mouth Every 6 (Six) Hours As Needed for Nausea or Vomiting., Disp: 90 tablet, Rfl: 5  •  Suvorexant (BELSOMRA) 20 MG tablet, Take 20 mg by mouth At Night As Needed (sleep)., Disp: 10 tablet, Rfl: 0  •  TRANSDERM-SCOP, 1.5 MG, 1.5 MG/3DAYS patch, IBAN 1 PA EXT TO THE SKIN Q 3 DAYS, Disp: , Rfl: 1  •  traZODone (DESYREL) 50 MG tablet, TAKE 1/2 TO 1 TABLET BY MOUTH AT BEDTIME AS NEEDED FOR SLEEP, Disp: 90 tablet, Rfl: 0  •  Urine Glucose-Ketones Test strip, Use to test urine if blood glucose is over 400mg/dl, Disp: 100 each, Rfl: 6  •  VASCEPA 1 g capsule capsule, Take 2 g by mouth 2 (Two) Times a Day With Meals., Disp: 120 capsule, Rfl: 5  •  vitamin D (ERGOCALCIFEROL) 70125 UNITS capsule capsule, Take 1 capsule by mouth 1 (One) Time Per Week., Disp: 12  capsule, Rfl: 3      The following portions of the patient's history were reviewed and updated as appropriate: allergies, current medications, past family history, past medical history, past social history, past surgical history and problem list.    Review of Systems   Constitutional: Negative.    HENT: Negative.    Eyes: Negative.    Respiratory: Negative.    Cardiovascular: Negative.    Gastrointestinal: Negative.    Endocrine: Negative.    Genitourinary: Negative.    Musculoskeletal: Negative.    Skin: Negative.    Allergic/Immunologic: Negative.    Neurological: Negative.    Hematological: Negative.    Psychiatric/Behavioral: Negative.        Objective   Physical Exam   Constitutional: She is oriented to person, place, and time. She appears well-developed and well-nourished. No distress.   HENT:   Head: Normocephalic and atraumatic.   Right Ear: External ear normal.   Left Ear: External ear normal.   Nose: Nose normal.   Mouth/Throat: Oropharynx is clear and moist. No oropharyngeal exudate.   Poor dentition and evidence of gum disease.   Eyes: Conjunctivae and EOM are normal. Pupils are equal, round, and reactive to light. Right eye exhibits no discharge. Left eye exhibits no discharge. No scleral icterus.   Neck: Normal range of motion. Neck supple. No JVD present. No tracheal deviation present. No thyromegaly present.   Cardiovascular: Normal rate, regular rhythm, normal heart sounds and intact distal pulses.  Exam reveals no gallop and no friction rub.    No murmur heard.  Pulmonary/Chest: Effort normal and breath sounds normal. No stridor. No respiratory distress. She has no wheezes. She has no rales. She exhibits no tenderness.   Abdominal: Soft. Bowel sounds are normal. She exhibits no distension and no mass. There is no tenderness. There is no rebound and no guarding. No hernia.   Musculoskeletal: Normal range of motion. She exhibits no edema, tenderness or deformity.   Lymphadenopathy:     She has no  cervical adenopathy.   Neurological: She is alert and oriented to person, place, and time. She has normal reflexes. She displays normal reflexes. No cranial nerve deficit or sensory deficit. She exhibits normal muscle tone. Coordination normal.   Skin: Skin is warm and dry. No rash noted. She is not diaphoretic. No erythema. No pallor.   Psychiatric: She has a normal mood and affect. Her behavior is normal. Judgment and thought content normal.   Nursing note and vitals reviewed.       Results for orders placed or performed in visit on 06/13/18   Comprehensive Metabolic Panel   Result Value Ref Range    Glucose 362 (H) 65 - 99 mg/dL    BUN 16 6 - 20 mg/dL    Creatinine 0.86 0.57 - 1.00 mg/dL    eGFR Non African Am 71 >60 mL/min/1.73    eGFR African Am 86 >60 mL/min/1.73    BUN/Creatinine Ratio 18.6 7.0 - 25.0    Sodium 133 (L) 136 - 145 mmol/L    Potassium 4.4 3.5 - 5.2 mmol/L    Chloride 91 (L) 98 - 107 mmol/L    Total CO2 25.7 22.0 - 29.0 mmol/L    Calcium 9.5 8.6 - 10.5 mg/dL    Total Protein 7.4 6.0 - 8.5 g/dL    Albumin 4.10 3.50 - 5.20 g/dL    Globulin 3.3 gm/dL    A/G Ratio 1.2 g/dL    Total Bilirubin 0.4 0.1 - 1.2 mg/dL    Alkaline Phosphatase 74 39 - 117 U/L    AST (SGOT) 10 1 - 32 U/L    ALT (SGPT) 18 1 - 33 U/L   C-Peptide   Result Value Ref Range    C-Peptide 2.5 1.1 - 4.4 ng/mL   Hemoglobin A1c   Result Value Ref Range    Hemoglobin A1C 11.30 (H) 4.80 - 5.60 %   Lipid Panel   Result Value Ref Range    Total Cholesterol 217 (H) 0 - 200 mg/dL    Triglycerides 195 (H) 0 - 150 mg/dL    HDL Cholesterol 51 40 - 60 mg/dL    VLDL Cholesterol 39 5 - 40 mg/dL    LDL Cholesterol  127 (H) 0 - 100 mg/dL   Uric Acid   Result Value Ref Range    Uric Acid 3.8 2.4 - 5.7 mg/dL   Vitamin D 25 Hydroxy   Result Value Ref Range    25 Hydroxy, Vitamin D 35.6 30.0 - 100.0 ng/ml   T4 & TSH ([facility])   Result Value Ref Range    TSH 2.630 0.270 - 4.200 mIU/mL    T4, Total 5.68 4.50 - 11.70 mcg/dL   MicroAlbumin, Urine, Random    Result Value Ref Range    Microalbumin, Urine 29.9 Not Estab. ug/mL   Cardiovascular Risk Assessment   Result Value Ref Range    Interpretation Note    Diabetes Patient Education   Result Value Ref Range    PDF Image Not applicable          Assessment/Plan   Diagnoses and all orders for this visit:    Uncontrolled type 2 diabetes mellitus with complication, with long-term current use of insulin  -     atorvastatin (LIPITOR) 80 MG tablet; Take 1 tablet by mouth Daily.  -     fluconazole (DIFLUCAN) 150 MG tablet; Take 1 tablet by mouth Daily.  -     T3, Free; Future  -     T4 & TSH ([facility]); Future  -     T4, Free; Future  -     Uric Acid; Future  -     Vitamin D 25 Hydroxy; Future  -     Comprehensive Metabolic Panel; Future  -     C-Peptide; Future  -     Hemoglobin A1c; Future  -     Lipid Panel; Future  -     MicroAlbumin, Urine, Random - Urine, Clean Catch; Future  -     Luteinizing Hormone; Future  -     Follicle Stimulating Hormone; Future    Essential hypertension  -     atorvastatin (LIPITOR) 80 MG tablet; Take 1 tablet by mouth Daily.  -     fluconazole (DIFLUCAN) 150 MG tablet; Take 1 tablet by mouth Daily.  -     T3, Free; Future  -     T4 & TSH ([facility]); Future  -     T4, Free; Future  -     Uric Acid; Future  -     Vitamin D 25 Hydroxy; Future  -     Comprehensive Metabolic Panel; Future  -     C-Peptide; Future  -     Hemoglobin A1c; Future  -     Lipid Panel; Future  -     MicroAlbumin, Urine, Random - Urine, Clean Catch; Future  -     Luteinizing Hormone; Future  -     Follicle Stimulating Hormone; Future    Mixed hyperlipidemia  -     atorvastatin (LIPITOR) 80 MG tablet; Take 1 tablet by mouth Daily.  -     fluconazole (DIFLUCAN) 150 MG tablet; Take 1 tablet by mouth Daily.  -     T3, Free; Future  -     T4 & TSH ([facility]); Future  -     T4, Free; Future  -     Uric Acid; Future  -     Vitamin D 25 Hydroxy; Future  -     Comprehensive Metabolic Panel; Future  -     C-Peptide; Future  -      Hemoglobin A1c; Future  -     Lipid Panel; Future  -     MicroAlbumin, Urine, Random - Urine, Clean Catch; Future  -     Luteinizing Hormone; Future  -     Follicle Stimulating Hormone; Future    Vitamin D deficiency  -     atorvastatin (LIPITOR) 80 MG tablet; Take 1 tablet by mouth Daily.  -     fluconazole (DIFLUCAN) 150 MG tablet; Take 1 tablet by mouth Daily.  -     T3, Free; Future  -     T4 & TSH ([facility]); Future  -     T4, Free; Future  -     Uric Acid; Future  -     Vitamin D 25 Hydroxy; Future  -     Comprehensive Metabolic Panel; Future  -     C-Peptide; Future  -     Hemoglobin A1c; Future  -     Lipid Panel; Future  -     MicroAlbumin, Urine, Random - Urine, Clean Catch; Future  -     Luteinizing Hormone; Future  -     Follicle Stimulating Hormone; Future    Premature menopause  -     atorvastatin (LIPITOR) 80 MG tablet; Take 1 tablet by mouth Daily.  -     fluconazole (DIFLUCAN) 150 MG tablet; Take 1 tablet by mouth Daily.  -     T3, Free; Future  -     T4 & TSH ([facility]); Future  -     T4, Free; Future  -     Uric Acid; Future  -     Vitamin D 25 Hydroxy; Future  -     Comprehensive Metabolic Panel; Future  -     C-Peptide; Future  -     Hemoglobin A1c; Future  -     Lipid Panel; Future  -     MicroAlbumin, Urine, Random - Urine, Clean Catch; Future  -     Luteinizing Hormone; Future  -     Follicle Stimulating Hormone; Future    Chronic pancreatitis, unspecified pancreatitis type  -     atorvastatin (LIPITOR) 80 MG tablet; Take 1 tablet by mouth Daily.  -     fluconazole (DIFLUCAN) 150 MG tablet; Take 1 tablet by mouth Daily.  -     T3, Free; Future  -     T4 & TSH ([facility]); Future  -     T4, Free; Future  -     Uric Acid; Future  -     Vitamin D 25 Hydroxy; Future  -     Comprehensive Metabolic Panel; Future  -     C-Peptide; Future  -     Hemoglobin A1c; Future  -     Lipid Panel; Future  -     MicroAlbumin, Urine, Random - Urine, Clean Catch; Future  -     Luteinizing Hormone; Future  -      Follicle Stimulating Hormone; Future    Dyslipidemia  -     atorvastatin (LIPITOR) 80 MG tablet; Take 1 tablet by mouth Daily.  -     fluconazole (DIFLUCAN) 150 MG tablet; Take 1 tablet by mouth Daily.  -     T3, Free; Future  -     T4 & TSH ([facility]); Future  -     T4, Free; Future  -     Uric Acid; Future  -     Vitamin D 25 Hydroxy; Future  -     Comprehensive Metabolic Panel; Future  -     C-Peptide; Future  -     Hemoglobin A1c; Future  -     Lipid Panel; Future  -     MicroAlbumin, Urine, Random - Urine, Clean Catch; Future  -     Luteinizing Hormone; Future  -     Follicle Stimulating Hormone; Future    Long-term insulin use  -     atorvastatin (LIPITOR) 80 MG tablet; Take 1 tablet by mouth Daily.  -     fluconazole (DIFLUCAN) 150 MG tablet; Take 1 tablet by mouth Daily.  -     T3, Free; Future  -     T4 & TSH ([facility]); Future  -     T4, Free; Future  -     Uric Acid; Future  -     Vitamin D 25 Hydroxy; Future  -     Comprehensive Metabolic Panel; Future  -     C-Peptide; Future  -     Hemoglobin A1c; Future  -     Lipid Panel; Future  -     MicroAlbumin, Urine, Random - Urine, Clean Catch; Future  -     Luteinizing Hormone; Future  -     Follicle Stimulating Hormone; Future               In summary I saw and examined this 43-year-old female for above-mentioned problems.  I reviewed her laboratory evaluation of 06/13/2018 and provided her with a hard copy of it.  Her hemoglobin A1c being 11.6 I am going to ask Ms. Maine Dorman from [employer] to work with her for further adjustment of her pump settings.  I gave her Diflucan 150 mg daily to be taken for 5 days considering her uncontrolled diabetes contributing to that infection.  Her lipid level also was elevated and therefore I am increasing Lipitor to 80 mg daily.  We will continue all her current medications and she will see MsEliazar Lilian Jones in 4 months or sooner if needed with laboratory evaluation prior to that office visit.  This office visit lasting 45  minutes and 25 minutes of it was a spent on face-to-face patient counseling as well as arranging her care with her pump as well as obtaining a self blood glucose monitoring unit We will also be getting her a DEXCOM  unit for continuous glucose monitoring.

## 2018-06-28 ENCOUNTER — RESULTS ENCOUNTER (OUTPATIENT)
Dept: ENDOCRINOLOGY | Age: 43
End: 2018-06-28

## 2018-06-28 DIAGNOSIS — E78.2 MIXED HYPERLIPIDEMIA: ICD-10-CM

## 2018-06-28 DIAGNOSIS — I10 ESSENTIAL HYPERTENSION: ICD-10-CM

## 2018-06-28 DIAGNOSIS — IMO0002 UNCONTROLLED TYPE 2 DIABETES MELLITUS WITH COMPLICATION, WITH LONG-TERM CURRENT USE OF INSULIN: ICD-10-CM

## 2018-06-28 RX ORDER — PERPHENAZINE 16 MG/1
TABLET, FILM COATED ORAL
Qty: 800 EACH | Refills: 0 | Status: SHIPPED | OUTPATIENT
Start: 2018-06-28 | End: 2018-07-11 | Stop reason: SDUPTHER

## 2018-07-11 RX ORDER — PERPHENAZINE 16 MG/1
TABLET, FILM COATED ORAL
Qty: 800 EACH | Refills: 0 | Status: SHIPPED | OUTPATIENT
Start: 2018-07-11 | End: 2018-07-12 | Stop reason: SDUPTHER

## 2018-07-12 RX ORDER — PERPHENAZINE 16 MG/1
TABLET, FILM COATED ORAL
Qty: 800 EACH | Refills: 0 | Status: SHIPPED | OUTPATIENT
Start: 2018-07-12 | End: 2019-02-21 | Stop reason: SDUPTHER

## 2018-07-16 ENCOUNTER — OFFICE VISIT (OUTPATIENT)
Dept: NEUROLOGY | Facility: CLINIC | Age: 43
End: 2018-07-16

## 2018-07-16 VITALS
SYSTOLIC BLOOD PRESSURE: 140 MMHG | WEIGHT: 260 LBS | BODY MASS INDEX: 38.51 KG/M2 | HEIGHT: 69 IN | DIASTOLIC BLOOD PRESSURE: 80 MMHG

## 2018-07-16 DIAGNOSIS — G43.009 MIGRAINE WITHOUT AURA AND WITHOUT STATUS MIGRAINOSUS, NOT INTRACTABLE: ICD-10-CM

## 2018-07-16 DIAGNOSIS — G44.221 CHRONIC TENSION-TYPE HEADACHE, INTRACTABLE: Primary | ICD-10-CM

## 2018-07-16 PROCEDURE — 99204 OFFICE O/P NEW MOD 45 MIN: CPT | Performed by: PSYCHIATRY & NEUROLOGY

## 2018-07-16 RX ORDER — TOPIRAMATE 25 MG/1
25 TABLET ORAL NIGHTLY
Qty: 30 TABLET | Refills: 11 | Status: SHIPPED | OUTPATIENT
Start: 2018-07-16 | End: 2018-10-15 | Stop reason: SDUPTHER
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
## 2018-09-17 RX ORDER — TRAZODONE HYDROCHLORIDE 50 MG/1
TABLET ORAL
Qty: 90 TABLET | Refills: 0 | Status: SHIPPED | OUTPATIENT
Start: 2018-09-17 | End: 2018-09-18 | Stop reason: SDUPTHER

## 2018-09-18 RX ORDER — TRAZODONE HYDROCHLORIDE 50 MG/1
25-50 TABLET ORAL NIGHTLY PRN
Qty: 90 TABLET | Refills: 1 | Status: SHIPPED | OUTPATIENT
Start: 2018-09-18 | End: 2018-10-24

## 2018-09-19 RX ORDER — GABAPENTIN 400 MG/1
CAPSULE ORAL
Qty: 90 CAPSULE | Refills: 5 | Status: SHIPPED | OUTPATIENT
Start: 2018-09-19 | End: 2019-04-24

## 2018-09-26 ENCOUNTER — LAB (OUTPATIENT)
Dept: ENDOCRINOLOGY | Age: 43
End: 2018-09-26

## 2018-09-26 DIAGNOSIS — Z79.4 LONG-TERM INSULIN USE (HCC): ICD-10-CM

## 2018-09-26 DIAGNOSIS — I10 ESSENTIAL HYPERTENSION: ICD-10-CM

## 2018-09-26 DIAGNOSIS — E55.9 VITAMIN D DEFICIENCY: ICD-10-CM

## 2018-09-26 DIAGNOSIS — E78.5 DYSLIPIDEMIA: ICD-10-CM

## 2018-09-26 DIAGNOSIS — IMO0002 UNCONTROLLED TYPE 2 DIABETES MELLITUS WITH COMPLICATION, WITH LONG-TERM CURRENT USE OF INSULIN: ICD-10-CM

## 2018-09-26 DIAGNOSIS — K86.1 CHRONIC PANCREATITIS, UNSPECIFIED PANCREATITIS TYPE (HCC): ICD-10-CM

## 2018-09-26 DIAGNOSIS — E28.319 PREMATURE MENOPAUSE: ICD-10-CM

## 2018-09-26 DIAGNOSIS — E78.2 MIXED HYPERLIPIDEMIA: ICD-10-CM

## 2018-09-27 LAB
25(OH)D3+25(OH)D2 SERPL-MCNC: 28.7 NG/ML (ref 30–100)
ALBUMIN SERPL-MCNC: 3.9 G/DL (ref 3.5–5.2)
ALBUMIN/GLOB SERPL: 1.3 G/DL
ALP SERPL-CCNC: 85 U/L (ref 39–117)
ALT SERPL-CCNC: 23 U/L (ref 1–33)
AST SERPL-CCNC: 19 U/L (ref 1–32)
BILIRUB SERPL-MCNC: 0.5 MG/DL (ref 0.1–1.2)
BUN SERPL-MCNC: 15 MG/DL (ref 6–20)
BUN/CREAT SERPL: 16.9 (ref 7–25)
C PEPTIDE SERPL-MCNC: 2 NG/ML (ref 1.1–4.4)
CALCIUM SERPL-MCNC: 9.3 MG/DL (ref 8.6–10.5)
CHLORIDE SERPL-SCNC: 101 MMOL/L (ref 98–107)
CHOLEST SERPL-MCNC: 173 MG/DL (ref 0–200)
CO2 SERPL-SCNC: 22.2 MMOL/L (ref 22–29)
CREAT SERPL-MCNC: 0.89 MG/DL (ref 0.57–1)
FSH SERPL-ACNC: 3.2 MIU/ML
GLOBULIN SER CALC-MCNC: 3.1 GM/DL
GLUCOSE SERPL-MCNC: 282 MG/DL (ref 65–99)
HBA1C MFR BLD: 11.32 % (ref 4.8–5.6)
HDLC SERPL-MCNC: 35 MG/DL (ref 40–60)
INTERPRETATION: NORMAL
LDLC SERPL CALC-MCNC: 108 MG/DL (ref 0–100)
LH SERPL-ACNC: 6.1 MIU/ML
Lab: NORMAL
MICROALBUMIN UR-MCNC: 15.9 UG/ML
POTASSIUM SERPL-SCNC: 4.3 MMOL/L (ref 3.5–5.2)
PROT SERPL-MCNC: 7 G/DL (ref 6–8.5)
SODIUM SERPL-SCNC: 136 MMOL/L (ref 136–145)
T3FREE SERPL-MCNC: 3.3 PG/ML (ref 2–4.4)
T4 FREE SERPL-MCNC: 1.14 NG/DL (ref 0.93–1.7)
T4 SERPL-MCNC: 7.24 MCG/DL (ref 4.5–11.7)
TRIGL SERPL-MCNC: 150 MG/DL (ref 0–150)
TSH SERPL DL<=0.005 MIU/L-ACNC: 1.47 MIU/ML (ref 0.27–4.2)
URATE SERPL-MCNC: 4.9 MG/DL (ref 2.4–5.7)
VLDLC SERPL CALC-MCNC: 30 MG/DL (ref 5–40)

## 2018-10-10 ENCOUNTER — OFFICE VISIT (OUTPATIENT)
Dept: ENDOCRINOLOGY | Age: 43
End: 2018-10-10

## 2018-10-10 VITALS
DIASTOLIC BLOOD PRESSURE: 82 MMHG | WEIGHT: 262 LBS | HEIGHT: 69 IN | BODY MASS INDEX: 38.8 KG/M2 | SYSTOLIC BLOOD PRESSURE: 128 MMHG

## 2018-10-10 DIAGNOSIS — IMO0002 UNCONTROLLED TYPE 2 DIABETES MELLITUS WITH COMPLICATION, WITH LONG-TERM CURRENT USE OF INSULIN: Primary | ICD-10-CM

## 2018-10-10 DIAGNOSIS — Z79.4 LONG-TERM INSULIN USE (HCC): ICD-10-CM

## 2018-10-10 DIAGNOSIS — E78.2 MIXED HYPERLIPIDEMIA: ICD-10-CM

## 2018-10-10 DIAGNOSIS — Z71.6 ENCOUNTER FOR SMOKING CESSATION COUNSELING: ICD-10-CM

## 2018-10-10 DIAGNOSIS — K31.84 GASTROPARESIS: ICD-10-CM

## 2018-10-10 DIAGNOSIS — I10 ESSENTIAL HYPERTENSION: ICD-10-CM

## 2018-10-10 DIAGNOSIS — E55.9 VITAMIN D DEFICIENCY: ICD-10-CM

## 2018-10-10 DIAGNOSIS — K86.1 CHRONIC PANCREATITIS, UNSPECIFIED PANCREATITIS TYPE (HCC): ICD-10-CM

## 2018-10-10 PROCEDURE — 99214 OFFICE O/P EST MOD 30 MIN: CPT | Performed by: NURSE PRACTITIONER

## 2018-10-10 RX ORDER — VARENICLINE TARTRATE 1 MG/1
1 TABLET, FILM COATED ORAL DAILY
Qty: 30 TABLET | Refills: 1 | Status: SHIPPED | OUTPATIENT
Start: 2018-10-10 | End: 2018-10-24

## 2018-10-10 RX ORDER — ERGOCALCIFEROL 1.25 MG/1
50000 CAPSULE ORAL DAILY
Qty: 5 CAPSULE | Refills: 0 | Status: SHIPPED | OUTPATIENT
Start: 2018-10-10 | End: 2018-10-16 | Stop reason: SDUPTHER

## 2018-10-10 RX ORDER — VARENICLINE TARTRATE 1 MG/1
0.5 TABLET, FILM COATED ORAL DAILY
Qty: 14 TABLET | Refills: 0 | Status: SHIPPED | OUTPATIENT
Start: 2018-10-10 | End: 2019-01-09

## 2018-10-10 NOTE — PROGRESS NOTES
Danielle Dudley  presents to the office  for the follow-up appointment for Type 2 diabetes mellitus.     The diabete's condition location is throughout the system with the  clinical course has fluctuated, the severity is Mild, and the modifying/allievating factors are insulin pump.  Medications and  Dosages were  reviewed with Danielle Dudley and suggested that compliance most of the time.    The patient reports associated symptoms of hyperglycemia have been excessive thirst and polyuria and associated symptoms of hypoglycemia have been confusion, dizziness, jitteriness and nausea and headache, with their  hypoglycemia threshold for symptoms is 80 mg/dl .     The patient is currently on insulin.    Compliance with blood glucose monitoring: good.     Meal panning: The patient is using carbohydrate counting, but is not on a specified limit, being a pump user.    The patient is currently taking home blood tests - Blood glucose testin-8 times daily, that are:  before each meal and 1 or 2 hours after meal  fasting and bedtime  anytime you feel symptoms of hyperglycemia or hypoglycemia (high or low blood sugars)  Novolog U100 per insulin pump    Last instructions given were:  In summary I saw and examined this 43-year-old female for above-mentioned problems.  I reviewed her laboratory evaluation of 2018 and provided her with a hard copy of it.  Her hemoglobin A1c being 11.6 I am going to ask Ms. Maine Dorman from [employer] to work with her for further adjustment of her pump settings.  I gave her Diflucan 150 mg daily to be taken for 5 days considering her uncontrolled diabetes contributing to that infection.  Her lipid level also was elevated and therefore I am increasing Lipitor to 80 mg daily.  We will continue all her current medications and she will see Ms. Lilian Jones in 4 months or sooner if needed with laboratory evaluation prior to that office visit.  This office visit lasting 45 minutes and 25 minutes  of it was a spent on face-to-face patient counseling as well as arranging her care with her pump as well as obtaining a self blood glucose monitoring unit We will also be getting her a DEXCOM  unit for continuous glucose monitoring.    Home blood glucose testing daily: 4-8  insulin pump upload   -Yes see upload for 9- through 10-    Last reported blood glucose readings are: None.    Patterns reported per patient are none.         The following portions of the patient's history were reviewed and updated as appropriate: current medications, past family history, past medical history, past social history, past surgical history and problem list.      Current Outpatient Prescriptions:   •  AMITIZA 24 MCG capsule, TK 1 C PO BID WITH FOOD, Disp: , Rfl: 3  •  atorvastatin (LIPITOR) 80 MG tablet, Take 1 tablet by mouth Daily., Disp: 30 tablet, Rfl: 11  •  butalbital-acetaminophen  MG tablet tablet, Take  by mouth every 4 (four) hours as needed., Disp: , Rfl:   •  CONTOUR NEXT TEST test strip, Test 8 times daily DX: e11.65, Disp: 800 each, Rfl: 0  •  CREON 43557-97270 units capsule delayed-release particles capsule, Take 1 capsule by mouth 3 (Three) Times a Day With Meals., Disp: 100 capsule, Rfl: 5  •  cyclobenzaprine (FLEXERIL) 10 MG tablet, Take 1 tablet by mouth 3 (Three) Times a Day As Needed for Muscle Spasms., Disp: 90 tablet, Rfl: 5  •  dicyclomine (BENTYL) 10 MG capsule, Take 1 capsule by mouth 4 (Four) Times a Day Before Meals & at Bedtime., Disp: 120 capsule, Rfl: 5  •  gabapentin (NEURONTIN) 400 MG capsule, TAKE 1 CAPSULE BY MOUTH THREE TIMES DAILY, Disp: 90 capsule, Rfl: 5  •  glucagon (GLUCAGON EMERGENCY) 1 MG injection, Inject 1 mg under the skin 1 (One) Time As Needed for Low Blood Sugar for up to 1 dose., Disp: 1 kit, Rfl: 0  •  insulin aspart (NOVOLOG) 100 UNIT/ML injection, Up to 200 units through insulin pump daily, Disp: 60 mL, Rfl: 5  •  Lancets (FREESTYLE) lancets, Check blood glucose  4-5 times daily, Disp: 120 each, Rfl: 5  •  losartan (COZAAR) 100 MG tablet, Take 1 tablet by mouth Daily., Disp: 90 tablet, Rfl: 5  •  pantoprazole (PROTONIX) 40 MG EC tablet, Take 1 tablet by mouth Daily., Disp: 90 tablet, Rfl: 5  •  promethazine (PHENERGAN) 25 MG tablet, Take 1 tablet by mouth Every 6 (Six) Hours As Needed for Nausea or Vomiting., Disp: 90 tablet, Rfl: 5  •  Suvorexant (BELSOMRA) 20 MG tablet, Take 20 mg by mouth At Night As Needed (sleep)., Disp: 30 tablet, Rfl: 5  •  topiramate (TOPAMAX) 25 MG tablet, Take 1 tablet by mouth Every Night., Disp: 30 tablet, Rfl: 11  •  TRANSDERM-SCOP, 1.5 MG, 1.5 MG/3DAYS patch, IBAN 1 PA EXT TO THE SKIN Q 3 DAYS, Disp: , Rfl: 1  •  traZODone (DESYREL) 50 MG tablet, Take 0.5-1 tablets by mouth At Night As Needed for Sleep. for sleep, Disp: 90 tablet, Rfl: 1  •  Urine Glucose-Ketones Test strip, Use to test urine if blood glucose is over 400mg/dl, Disp: 100 each, Rfl: 6  •  VASCEPA 1 g capsule capsule, Take 2 g by mouth 2 (Two) Times a Day With Meals., Disp: 120 capsule, Rfl: 5  •  vitamin D (ERGOCALCIFEROL) 08971 units capsule capsule, Take 1 capsule by mouth Daily., Disp: 5 capsule, Rfl: 0  •  cholecalciferol (VITAMIN D3) 07677 units capsule, Take 1 capsule by mouth Daily., Disp: 30 capsule, Rfl: 4  •  varenicline (CHANTIX CONTINUING MONTH PAK) 1 MG tablet, Take 0.5 tablets by mouth Daily. 1 day for 14 days, Disp: 14 tablet, Rfl: 0  •  varenicline (CHANTIX CONTINUING MONTH PAK) 1 MG tablet, Take 1 tablet by mouth Daily., Disp: 30 tablet, Rfl: 1    Patient Active Problem List    Diagnosis   • Encounter for smoking cessation counseling [Z71.6]   • Migraine without aura and without status migrainosus, not intractable [G43.009]   • Adjustment insomnia [F51.02]   • Chronic tension-type headache, intractable [G44.221]   • Mixed hyperlipidemia [E78.2]   • Essential hypertension [I10]   • Dyslipidemia [E78.5]   • Vitamin D deficiency [E55.9]   • Premature menopause  "[E28.319]   • Tobacco abuse counseling [Z71.6]   • Uncontrolled type 2 diabetes mellitus with complication, with long-term current use of insulin (CMS/[facility]) [E11.8, E11.65, Z79.4]   • Hypoglycemia [E16.2]   • Hyperglycemia [R73.9]   • Chronic pancreatitis (CMS/[facility]) [K86.1]   • Gastroparesis [K31.84]   • Pancreas disorder [K86.9]   • Long-term insulin use (CMS/[facility]) [Z79.4]       Review of Systems   A comprehensive review of the 14 systems was negative except of listed below:  Gastrointestinal: positive for change in bowel habits, dyspepsia, reflux symptoms and severe gastroparesis, has stimulator- adjusted 2 weeks.   Endocrine: diabetic symptoms including increased fatigue and polyuria  temperature  cold intolerance  temperature heat intolerance  hyperglycemia     Objective:     Wt Readings from Last 3 Encounters:   10/10/18 119 kg (262 lb)   07/16/18 118 kg (260 lb)   06/27/18 114 kg (250 lb 6.4 oz)     Temp Readings from Last 3 Encounters:   06/20/18 98.2 °F (36.8 °C) (Oral)   03/14/18 98 °F (36.7 °C) (Tympanic)   12/06/17 98.3 °F (36.8 °C) (Oral)     BP Readings from Last 3 Encounters:   10/10/18 128/82   07/16/18 140/80   06/27/18 134/94     Pulse Readings from Last 3 Encounters:   06/20/18 101   03/14/18 (!) 121   12/06/17 84        /82   Ht 175.3 cm (69.02\")   Wt 119 kg (262 lb)   BMI 38.67 kg/m²     General appearance:  alert, cooperative, delirious, fatigued, nourished\" \"appears stated age and cooperative\" \"NAD   Neck: no carotid bruit, supple, symmetrical, trachea midline and thyroid not enlarged, symmetric, no tenderness/mass/nodules   Thyroid:  no palpable nodule, no enlargement, no tenderness   Lung:  \"clear to auscultation bilaterally\" \"no abnormal breath sounds  \" effort was normal, no labored breath, no use of accessory muscles.   Heart: regular rate and rhythm, S1, S2 normal, no murmur, click, rub or gallop      Abdomen:  normal bowel sounds- 4 quads, soft non-tender, contour - rounded and " "contour - obese      Extremities: extremities normal, atraumatic, no cyanosis or edema extremities normal, atraumatic, no cyanosis or edema\" WNL - gait and station, strength and stability\"       Skin:   Pulses:  warm and dry, no hyperpigmentation, normal skin coloring, or suspicious lesions   2+ and symmetric   Neuro: Alert and oriented x3. Gait normal. Reflexes and motor strength normal and symmetric. Cranial nerves 2-12 and sensation grossly intact.      Psych: behavior - normal, judgement - normal, mood - normal, affect - normal                 Lab Review  Results for orders placed or performed in visit on 09/26/18   T3, Free   Result Value Ref Range    T3, Free 3.3 2.0 - 4.4 pg/mL   T4 & TSH ([facility])   Result Value Ref Range    TSH 1.470 0.270 - 4.200 mIU/mL    T4, Total 7.24 4.50 - 11.70 mcg/dL   T4, Free   Result Value Ref Range    Free T4 1.14 0.93 - 1.70 ng/dL   Uric Acid   Result Value Ref Range    Uric Acid 4.9 2.4 - 5.7 mg/dL   Vitamin D 25 Hydroxy   Result Value Ref Range    25 Hydroxy, Vitamin D 28.7 (L) 30.0 - 100.0 ng/ml   Comprehensive Metabolic Panel   Result Value Ref Range    Glucose 282 (H) 65 - 99 mg/dL    BUN 15 6 - 20 mg/dL    Creatinine 0.89 0.57 - 1.00 mg/dL    eGFR Non African Am 69 >60 mL/min/1.73    eGFR African Am 83 >60 mL/min/1.73    BUN/Creatinine Ratio 16.9 7.0 - 25.0    Sodium 136 136 - 145 mmol/L    Potassium 4.3 3.5 - 5.2 mmol/L    Chloride 101 98 - 107 mmol/L    Total CO2 22.2 22.0 - 29.0 mmol/L    Calcium 9.3 8.6 - 10.5 mg/dL    Total Protein 7.0 6.0 - 8.5 g/dL    Albumin 3.90 3.50 - 5.20 g/dL    Globulin 3.1 gm/dL    A/G Ratio 1.3 g/dL    Total Bilirubin 0.5 0.1 - 1.2 mg/dL    Alkaline Phosphatase 85 39 - 117 U/L    AST (SGOT) 19 1 - 32 U/L    ALT (SGPT) 23 1 - 33 U/L   C-Peptide   Result Value Ref Range    C-Peptide 2.0 1.1 - 4.4 ng/mL   Hemoglobin A1c   Result Value Ref Range    Hemoglobin A1C 11.32 (H) 4.80 - 5.60 %   Lipid Panel   Result Value Ref Range    Total " Cholesterol 173 0 - 200 mg/dL    Triglycerides 150 0 - 150 mg/dL    HDL Cholesterol 35 (L) 40 - 60 mg/dL    VLDL Cholesterol 30 5 - 40 mg/dL    LDL Cholesterol  108 (H) 0 - 100 mg/dL   Luteinizing Hormone   Result Value Ref Range    LH 6.1 mIU/mL   Follicle Stimulating Hormone   Result Value Ref Range    FSH 3.2 mIU/mL   MicroAlbumin, Urine, Random   Result Value Ref Range    Microalbumin, Urine 15.9 Not Estab. ug/mL   Cardiovascular Risk Assessment   Result Value Ref Range    Interpretation Note    Diabetes Patient Education   Result Value Ref Range    PDF Image Not applicable            Assessment:   Danielle was seen today for follow-up.    Diagnoses and all orders for this visit:    Uncontrolled type 2 diabetes mellitus with complication, with long-term current use of insulin (CMS/HCC)  -     ACTH; Future  -     Cortisol; Future  -     DHEA; Future  -     DHEA-Sulfate; Future  -     Comprehensive Metabolic Panel; Future  -     Hemoglobin A1c; Future  -     Lipid Panel; Future  -     Microalbumin / Creatinine Urine Ratio - Urine, Clean Catch; Future  -     Uric Acid; Future  -     Vitamin D 25 Hydroxy; Future  -     TSH; Future  -     T4; Future  -     T3; Future    Vitamin D deficiency  -     vitamin D (ERGOCALCIFEROL) 17314 units capsule capsule; Take 1 capsule by mouth Daily.  -     cholecalciferol (VITAMIN D3) 36409 units capsule; Take 1 capsule by mouth Daily.  -     Comprehensive Metabolic Panel; Future  -     Vitamin D 25 Hydroxy; Future    Long-term insulin use (CMS/HCC)  -     Comprehensive Metabolic Panel; Future    Gastroparesis  -     Comprehensive Metabolic Panel; Future    Chronic pancreatitis, unspecified pancreatitis type (CMS/HCC)  -     Comprehensive Metabolic Panel; Future    Mixed hyperlipidemia  -     Comprehensive Metabolic Panel; Future  -     Lipid Panel; Future    Essential hypertension  -     Comprehensive Metabolic Panel; Future    Encounter for smoking cessation counseling  -      varenicline (CHANTIX CONTINUING MONTH NANDINI) 1 MG tablet; Take 0.5 tablets by mouth Daily. 1 day for 14 days  -     varenicline (CHANTIX CONTINUING MONTH NANDINI) 1 MG tablet; Take 1 tablet by mouth Daily.  -     Comprehensive Metabolic Panel; Future          Plan:   In summary:  1. T2DM with pancreas disorder- chronic pancreatitis, insulin pump management with upload/evaluation- uncontrolled  Insulin pump changes:  Basal changes:  12am- 1.7u/hr  5am - 2.05 u/hr  12pm - 2.0 u/hr  3pm - 2.3  U/hr    CHO ratio - 1u for 5g  Sensitivity: 1u for 18 mg/dl for glucose 110-120 mg/dl  Active insulin time 4 h:mm  Max bolus: 25      2. Gastroparesis- changes setting with pump to dual wave 10% now and 90% over 30minutes with  Bolus and meals.     3. Smoking Cessation - refilled chantix- counseling for 10minutes with anxiety.  chantix .5mg daily for 14 days then increase to 1mg daily for 8 weeks.    4. Vitamin D def - uncontrolled - rx changes:    Vitamin D 50,000 tab once daily for 5 days    Then change to vitamin D 10,000 daily    Additional instructions - we will order [facility] system  home blood tests -  Blood glucose testin times daily, that are:  fasting- 1st thing in morning before eating or drinking  before each meal and 1 or 2 hours after meal  bedtime  anytime you feel symptoms of hyperglycemia or hypoglycemia (high or low blood sugars)        Education:  interpretation of lab results, blood sugar goals, complications of diabetes mellitus, hypoglycemia prevention and treatment, exercise, illness management, self-monitoring of blood glucose skills, nutrition and carbohydrate counting        The total face to face time spent was  25 minutes  with additional education given: 14 minutes (greater than 50% of the total time) was spent with counseling and coordination of care on: SMBG with goals, Side effects profiles with medications, medication use and purposes    Return in about 3 months (around 1/10/2019), or if symptoms  worsen or fail to improve, for Recheck. Labs 2 weeks prior      Dragon transcription disclaimer     Much of this encounter note is an electronic transcription/translation of spoken language to printed text. The electronic translation of spoken language may permit erroneous, or at times, nonsensical words or phrases to be inadvertently transcribed. Although I have reviewed the note for such errors, some may still exist.

## 2018-10-10 NOTE — PATIENT INSTRUCTIONS
Vitamin D 50,000 tab once daily for 5 days    Then change to vitamin D 10,000 daily    chantix .5mg daily for 14 days then increase to 1mg daily for 8 weeks.

## 2018-10-15 ENCOUNTER — OFFICE VISIT (OUTPATIENT)
Dept: NEUROLOGY | Facility: CLINIC | Age: 43
End: 2018-10-15

## 2018-10-15 VITALS
OXYGEN SATURATION: 97 % | HEIGHT: 69 IN | HEART RATE: 92 BPM | SYSTOLIC BLOOD PRESSURE: 180 MMHG | DIASTOLIC BLOOD PRESSURE: 100 MMHG | BODY MASS INDEX: 38.21 KG/M2 | WEIGHT: 258 LBS

## 2018-10-15 DIAGNOSIS — G43.009 MIGRAINE WITHOUT AURA AND WITHOUT STATUS MIGRAINOSUS, NOT INTRACTABLE: Primary | ICD-10-CM

## 2018-10-15 PROBLEM — H46.9 OPTIC NEURITIS: Status: ACTIVE | Noted: 2018-10-15

## 2018-10-15 PROCEDURE — 99213 OFFICE O/P EST LOW 20 MIN: CPT | Performed by: PSYCHIATRY & NEUROLOGY

## 2018-10-15 RX ORDER — TOPIRAMATE 50 MG/1
50 TABLET, FILM COATED ORAL NIGHTLY
Qty: 30 TABLET | Refills: 11 | Status: SHIPPED | OUTPATIENT
Start: 2018-10-15 | End: 2019-02-11 | Stop reason: SDUPTHER

## 2018-10-15 NOTE — PROGRESS NOTES
Subjective:     Patient ID: Danielle Dudley is a 43 y.o. female.    History of Present Illness  Patient was seen back in the office for follow-up of headaches and possible MS.  Her headaches have not improved as expected.  She is on topiramate 25 mg at night without side effects.  She is averaging about 2 migraines per week.  In terms of her MS she is not having any new or progressive symptoms.  She was told that she cannot have an MRI because of her [employer] device.  This is for gastroparesis    The following portions of the patient's history were reviewed and updated as appropriate: allergies, current medications, past family history, past medical history, past social history, past surgical history and problem list.      Current Outpatient Prescriptions:   •  AMITIZA 24 MCG capsule, TK 1 C PO BID WITH FOOD, Disp: , Rfl: 3  •  atorvastatin (LIPITOR) 80 MG tablet, Take 1 tablet by mouth Daily., Disp: 30 tablet, Rfl: 11  •  butalbital-acetaminophen  MG tablet tablet, Take  by mouth every 4 (four) hours as needed., Disp: , Rfl:   •  cholecalciferol (VITAMIN D3) 59596 units capsule, Take 1 capsule by mouth Daily., Disp: 30 capsule, Rfl: 4  •  CONTOUR NEXT TEST test strip, Test 8 times daily DX: e11.65, Disp: 800 each, Rfl: 0  •  CREON 24697-80849 units capsule delayed-release particles capsule, Take 1 capsule by mouth 3 (Three) Times a Day With Meals., Disp: 100 capsule, Rfl: 5  •  cyclobenzaprine (FLEXERIL) 10 MG tablet, Take 1 tablet by mouth 3 (Three) Times a Day As Needed for Muscle Spasms., Disp: 90 tablet, Rfl: 5  •  dicyclomine (BENTYL) 10 MG capsule, Take 1 capsule by mouth 4 (Four) Times a Day Before Meals & at Bedtime., Disp: 120 capsule, Rfl: 5  •  gabapentin (NEURONTIN) 400 MG capsule, TAKE 1 CAPSULE BY MOUTH THREE TIMES DAILY, Disp: 90 capsule, Rfl: 5  •  glucagon (GLUCAGON EMERGENCY) 1 MG injection, Inject 1 mg under the skin 1 (One) Time As Needed for Low Blood Sugar for up to 1 dose., Disp: 1 kit,  Rfl: 0  •  insulin aspart (NOVOLOG) 100 UNIT/ML injection, Up to 200 units through insulin pump daily, Disp: 60 mL, Rfl: 5  •  Lancets (FREESTYLE) lancets, Check blood glucose 4-5 times daily, Disp: 120 each, Rfl: 5  •  losartan (COZAAR) 100 MG tablet, Take 1 tablet by mouth Daily., Disp: 90 tablet, Rfl: 5  •  pantoprazole (PROTONIX) 40 MG EC tablet, Take 1 tablet by mouth Daily., Disp: 90 tablet, Rfl: 5  •  promethazine (PHENERGAN) 25 MG tablet, Take 1 tablet by mouth Every 6 (Six) Hours As Needed for Nausea or Vomiting., Disp: 90 tablet, Rfl: 5  •  Suvorexant (BELSOMRA) 20 MG tablet, Take 20 mg by mouth At Night As Needed (sleep)., Disp: 30 tablet, Rfl: 5  •  topiramate (TOPAMAX) 50 MG tablet, Take 1 tablet by mouth Every Night., Disp: 30 tablet, Rfl: 11  •  TRANSDERM-SCOP, 1.5 MG, 1.5 MG/3DAYS patch, IBAN 1 PA EXT TO THE SKIN Q 3 DAYS, Disp: , Rfl: 1  •  traZODone (DESYREL) 50 MG tablet, Take 0.5-1 tablets by mouth At Night As Needed for Sleep. for sleep, Disp: 90 tablet, Rfl: 1  •  Urine Glucose-Ketones Test strip, Use to test urine if blood glucose is over 400mg/dl, Disp: 100 each, Rfl: 6  •  varenicline (CHANTIX CONTINUING MONTH NANDINI) 1 MG tablet, Take 0.5 tablets by mouth Daily. 1 day for 14 days, Disp: 14 tablet, Rfl: 0  •  varenicline (CHANTIX CONTINUING MONTH NANDINI) 1 MG tablet, Take 1 tablet by mouth Daily., Disp: 30 tablet, Rfl: 1  •  VASCEPA 1 g capsule capsule, Take 2 g by mouth 2 (Two) Times a Day With Meals., Disp: 120 capsule, Rfl: 5  •  vitamin D (ERGOCALCIFEROL) 64738 units capsule capsule, Take 1 capsule by mouth Daily., Disp: 5 capsule, Rfl: 0    Review of Systems   Constitutional: Negative.    Neurological: Positive for headaches. Negative for dizziness, tremors, seizures, syncope, facial asymmetry, speech difficulty, weakness, light-headedness and numbness.   Psychiatric/Behavioral: Negative.         Objective:    Neurologic Exam  Mental status examination was appropriate.  Funduscopy, visual  fields, eye movements and pupillary reflexes were normal.  No facial weakness was noted.  Gait was normal.  No pattern of focal weakness was noted.  Physical Exam    Assessment/Plan:     Danielle was seen today for headache.    Diagnoses and all orders for this visit:    Migraine without aura and without status migrainosus, not intractable    Other orders  -     topiramate (TOPAMAX) 50 MG tablet; Take 1 tablet by mouth Every Night.         Increase topiramate to 50 mg at night.  Call in 6 weeks for phone follow-up.  Follow-up in 3 month in the office.  We'll not pursue MS at this point. Thank you for allowing me to share in the care of this patient.  Derrek Thomas M.D.

## 2018-10-16 DIAGNOSIS — E55.9 VITAMIN D DEFICIENCY: ICD-10-CM

## 2018-10-17 RX ORDER — ERGOCALCIFEROL 1.25 MG/1
CAPSULE ORAL
Qty: 12 CAPSULE | Refills: 0 | Status: SHIPPED | OUTPATIENT
Start: 2018-10-17 | End: 2018-11-28 | Stop reason: DRUGHIGH

## 2018-10-24 ENCOUNTER — OFFICE VISIT (OUTPATIENT)
Dept: INTERNAL MEDICINE | Facility: CLINIC | Age: 43
End: 2018-10-24

## 2018-10-24 VITALS
BODY MASS INDEX: 38.4 KG/M2 | WEIGHT: 260 LBS | SYSTOLIC BLOOD PRESSURE: 146 MMHG | TEMPERATURE: 97.2 F | DIASTOLIC BLOOD PRESSURE: 90 MMHG | HEART RATE: 92 BPM | OXYGEN SATURATION: 96 %

## 2018-10-24 DIAGNOSIS — G44.221 CHRONIC TENSION-TYPE HEADACHE, INTRACTABLE: ICD-10-CM

## 2018-10-24 DIAGNOSIS — I10 ESSENTIAL HYPERTENSION: ICD-10-CM

## 2018-10-24 DIAGNOSIS — K42.9 UMBILICAL HERNIA WITHOUT OBSTRUCTION AND WITHOUT GANGRENE: Primary | ICD-10-CM

## 2018-10-24 DIAGNOSIS — K31.84 GASTROPARESIS: ICD-10-CM

## 2018-10-24 DIAGNOSIS — IMO0002 UNCONTROLLED TYPE 2 DIABETES MELLITUS WITH COMPLICATION, WITH LONG-TERM CURRENT USE OF INSULIN: ICD-10-CM

## 2018-10-24 DIAGNOSIS — E78.2 MIXED HYPERLIPIDEMIA: ICD-10-CM

## 2018-10-24 PROCEDURE — 99214 OFFICE O/P EST MOD 30 MIN: CPT | Performed by: FAMILY MEDICINE

## 2018-10-24 PROCEDURE — G0008 ADMIN INFLUENZA VIRUS VAC: HCPCS | Performed by: FAMILY MEDICINE

## 2018-10-24 PROCEDURE — 90674 CCIIV4 VAC NO PRSV 0.5 ML IM: CPT | Performed by: FAMILY MEDICINE

## 2018-10-24 NOTE — PROGRESS NOTES
Subjective   Danielle Dudley is a 43 y.o. female.     Chief Complaint   Patient presents with   • Diabetes   • GI Problem         History of Present Illness   Patient has myriad issues.  She is doing pretty well now with stimulator for gastroparesis.  She has no insulin pump as well.  Labs are followed by endocrinology.  She is in need of a flu shot.  We discussed abdominal pain near the umbilicus which appears to be positional and probably an umbilical hernia.  Is difficult for me to fill today.  Previously I felt like there is no umbilical hernia.  She is healed up from her previous implantation which is to the right of the umbilicus in the right lower quadrant.  This is an implanted stimulator.  Prior history of surgery in the area includes emergency  at [facility] in [facility].      The following portions of the patient's history were reviewed and updated as appropriate: allergies, current medications, past social history and problem list.    Review of Systems   Constitutional: Negative.    HENT: Negative.    Eyes: Negative.    Respiratory: Negative.    Cardiovascular: Negative.    Gastrointestinal: Positive for abdominal pain.   Endocrine: Negative.    Genitourinary: Negative.    Musculoskeletal: Negative.    Skin: Negative.    Allergic/Immunologic: Negative.    Neurological: Negative.    Hematological: Negative.    Psychiatric/Behavioral: Negative.        Objective   Vitals:    10/24/18 1245   BP: 146/90   Pulse: 92   Temp: 97.2 °F (36.2 °C)   SpO2: 96%     Physical Exam   Constitutional: She is oriented to person, place, and time. She appears well-developed.   HENT:   Head: Normocephalic.   Right Ear: External ear normal.   Left Ear: External ear normal.   Mouth/Throat: Oropharynx is clear and moist.   Eyes: Pupils are equal, round, and reactive to light.   Neck: Normal range of motion. Neck supple.   Cardiovascular: Normal rate, regular rhythm and normal heart sounds.    Pulmonary/Chest:  Effort normal and breath sounds normal.   Abdominal: Soft. Bowel sounds are normal. A hernia is present.       Musculoskeletal: Normal range of motion.   Neurological: She is alert and oriented to person, place, and time.   Skin: Skin is warm and dry.   Psychiatric: She has a normal mood and affect.   Vitals reviewed.      Assessment/Plan   Problem List Items Addressed This Visit        Cardiovascular and Mediastinum    Mixed hyperlipidemia    Essential hypertension       Digestive    Gastroparesis       Endocrine    Uncontrolled type 2 diabetes mellitus with complication, with long-term current use of insulin (CMS/[facility])       Nervous and Auditory    Chronic tension-type headache, intractable      Other Visit Diagnoses     Umbilical hernia without obstruction and without gangrene    -  Primary    Relevant Orders    Ambulatory Referral to General Surgery      referral  To general surgery for  Opinion for umbilical hernia

## 2018-11-27 ENCOUNTER — TELEPHONE (OUTPATIENT)
Dept: ENDOCRINOLOGY | Age: 43
End: 2018-11-27

## 2018-11-27 NOTE — TELEPHONE ENCOUNTER
----- Message from RICKI Kraus sent at 11/27/2018 10:36 AM EST -----  Contact: patient  She needs to speak with the company to ask why she did not get the G6 that is out of her hands that is not what I ordered  ----- Message -----  From: Isabelle Shell MA  Sent: 11/26/2018   4:03 PM  To: RICKI Kraus    Please advise.     ----- Message -----  From: Diana Huerta  Sent: 11/26/2018   3:48 PM  To: Isabelle Shell MA    Patient said she got the Dexcom G5 and not G6 and [place] wanted her to speak to you to make sure it is ok.     Pt. - [phone]

## 2018-11-28 ENCOUNTER — OFFICE VISIT (OUTPATIENT)
Dept: SURGERY | Facility: CLINIC | Age: 43
End: 2018-11-28

## 2018-11-28 VITALS — OXYGEN SATURATION: 97 % | WEIGHT: 261.4 LBS | HEART RATE: 90 BPM | BODY MASS INDEX: 38.72 KG/M2 | HEIGHT: 69 IN

## 2018-11-28 DIAGNOSIS — R10.33 UMBILICAL PAIN: Primary | ICD-10-CM

## 2018-11-28 PROCEDURE — 99203 OFFICE O/P NEW LOW 30 MIN: CPT | Performed by: SURGERY

## 2018-11-28 RX ORDER — CEPHALEXIN 250 MG/1
250 CAPSULE ORAL 4 TIMES DAILY
Qty: 20 CAPSULE | Refills: 0 | Status: SHIPPED | OUTPATIENT
Start: 2018-11-28 | End: 2018-12-03
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
## 2018-11-29 PROBLEM — R10.33 UMBILICAL PAIN: Status: ACTIVE | Noted: 2018-11-29

## 2018-12-17 RX ORDER — TRAZODONE HYDROCHLORIDE 50 MG/1
TABLET ORAL
Qty: 90 TABLET | Refills: 1 | Status: SHIPPED | OUTPATIENT
Start: 2018-12-17 | End: 2019-05-08

## 2019-01-09 ENCOUNTER — OFFICE VISIT (OUTPATIENT)
Dept: ENDOCRINOLOGY | Age: 44
End: 2019-01-09

## 2019-01-09 VITALS — BODY MASS INDEX: 39.4 KG/M2 | HEIGHT: 69 IN | WEIGHT: 266 LBS

## 2019-01-09 DIAGNOSIS — I10 ESSENTIAL HYPERTENSION: ICD-10-CM

## 2019-01-09 DIAGNOSIS — E28.319 PREMATURE MENOPAUSE: ICD-10-CM

## 2019-01-09 DIAGNOSIS — K86.1 CHRONIC PANCREATITIS, UNSPECIFIED PANCREATITIS TYPE (HCC): ICD-10-CM

## 2019-01-09 DIAGNOSIS — IMO0002 UNCONTROLLED TYPE 2 DIABETES MELLITUS WITH COMPLICATION, WITH LONG-TERM CURRENT USE OF INSULIN: Primary | ICD-10-CM

## 2019-01-09 DIAGNOSIS — E78.5 DYSLIPIDEMIA: ICD-10-CM

## 2019-01-09 DIAGNOSIS — K86.9 PANCREAS DISORDER: ICD-10-CM

## 2019-01-09 DIAGNOSIS — K31.84 GASTROPARESIS: ICD-10-CM

## 2019-01-09 DIAGNOSIS — E78.2 MIXED HYPERLIPIDEMIA: ICD-10-CM

## 2019-01-09 DIAGNOSIS — E55.9 VITAMIN D DEFICIENCY: ICD-10-CM

## 2019-01-09 DIAGNOSIS — Z79.4 LONG-TERM INSULIN USE (HCC): ICD-10-CM

## 2019-01-09 PROCEDURE — 95251 CONT GLUC MNTR ANALYSIS I&R: CPT | Performed by: NURSE PRACTITIONER

## 2019-01-09 PROCEDURE — 99214 OFFICE O/P EST MOD 30 MIN: CPT | Performed by: NURSE PRACTITIONER

## 2019-01-09 RX ORDER — ICOSAPENT ETHYL 1000 MG/1
2 CAPSULE ORAL 2 TIMES DAILY WITH MEALS
Qty: 120 CAPSULE | Refills: 5 | Status: SHIPPED | OUTPATIENT
Start: 2019-01-09 | End: 2019-01-21 | Stop reason: SDUPTHER

## 2019-01-09 RX ORDER — PANCRELIPASE 24000; 76000; 120000 [USP'U]/1; [USP'U]/1; [USP'U]/1
24000 CAPSULE, DELAYED RELEASE PELLETS ORAL
Qty: 100 CAPSULE | Refills: 5 | Status: SHIPPED | OUTPATIENT
Start: 2019-01-09 | End: 2019-01-21 | Stop reason: SDUPTHER

## 2019-01-09 RX ORDER — ATORVASTATIN CALCIUM 80 MG/1
80 TABLET, FILM COATED ORAL DAILY
Qty: 30 TABLET | Refills: 11 | Status: SHIPPED | OUTPATIENT
Start: 2019-01-09 | End: 2019-01-21 | Stop reason: SDUPTHER

## 2019-01-09 NOTE — PROGRESS NOTES
Danielle Dudley  presents to the office  for the follow-up appointment for Type 2 diabetes mellitus.     The diabete's condition location is throughout the system with the  clinical course has fluctuated, the severity is Moderate, and the modifying/allievating factors are insulin pump.  Medications and  Dosages were  reviewed with Danielle Dudley and suggested that compliance most of the time.    The patient reports associated symptoms of hyperglycemia have been excessive thirst and fatigue and associated symptoms of hypoglycemia have been confusion, dizziness, jitteriness and nausea and headache, with their  hypoglycemia threshold for symptoms is 80 mg/dl .     The patient is currently on insulin.    Compliance with blood glucose monitoring: good.     Meal panning: The patient is using carbohydrate counting, but is not on a specified limit, being a pump user.    The patient is currently taking home blood tests - Blood glucose testin-8 times daily, that are:  before each meal and 1 or 2 hours after meal  fasting and bedtime  anytime you feel symptoms of hyperglycemia or hypoglycemia (high or low blood sugars)  Novolog U100  per insulin pump    Last instructions given were:  1. T2DM with pancreas disorder- chronic pancreatitis, insulin pump management with upload/evaluation- uncontrolled  Insulin pump changes:  Basal changes:  12am- 1.7u/hr  5am - 2.05 u/hr  12pm - 2.0 u/hr  3pm - 2.3  U/hr     CHO ratio - 1u for 5g  Sensitivity: 1u for 18 mg/dl for glucose 110-120 mg/dl  Active insulin time 4 h:mm  Max bolus: 25        2. Gastroparesis- changes setting with pump to dual wave 10% now and 90% over 30minutes with  Bolus and meals.      3. Smoking Cessation - refilled chantix- counseling for 10minutes with anxiety.  chantix .5mg daily for 14 days then increase to 1mg daily for 8 weeks.     4. Vitamin D def - uncontrolled - rx changes:     Vitamin D 50,000 tab once daily for 5 days     Then change to vitamin D 10,000  daily    Home blood glucose testing daily: 4-8  insulin pump upload:   Continuous glucose monitor shows average blood glucose unit 64 mg/Annie with a standard deviation of 76 mg/Annie.  Minimal risk of hypoglycemia time in range 12% 88% hyperglycemia average days with CGM is 93%.  Calibrating 3.4 times daily.    Last reported blood glucose readings are:  Home blood glucose testing daily: 4-8  insulin pump upload   -Yes see upload for 9- through 10-    Patterns reported per patient are none.         The following portions of the patient's history were reviewed and updated as appropriate: current medications, past family history, past medical history, past social history, past surgical history and problem list.      Current Outpatient Medications:   •  AMITIZA 24 MCG capsule, TK 1 C PO BID WITH FOOD, Disp: , Rfl: 3  •  atorvastatin (LIPITOR) 80 MG tablet, Take 1 tablet by mouth Daily., Disp: 30 tablet, Rfl: 11  •  cholecalciferol (VITAMIN D3) 59751 units capsule, Take 10,000 Units by mouth Daily., Disp: , Rfl:   •  CONTOUR NEXT TEST test strip, Test 8 times daily DX: e11.65, Disp: 800 each, Rfl: 0  •  CREON 60798-92388 units capsule delayed-release particles capsule, Take 1 capsule by mouth 3 (Three) Times a Day With Meals., Disp: 100 capsule, Rfl: 5  •  cyclobenzaprine (FLEXERIL) 10 MG tablet, Take 1 tablet by mouth 3 (Three) Times a Day As Needed for Muscle Spasms., Disp: 90 tablet, Rfl: 5  •  gabapentin (NEURONTIN) 400 MG capsule, TAKE 1 CAPSULE BY MOUTH THREE TIMES DAILY, Disp: 90 capsule, Rfl: 5  •  glucagon (GLUCAGON EMERGENCY) 1 MG injection, Inject 1 mg under the skin 1 (One) Time As Needed for Low Blood Sugar for up to 1 dose., Disp: 1 kit, Rfl: 0  •  insulin aspart (NOVOLOG) 100 UNIT/ML injection, Up to 200 units through insulin pump daily, Disp: 60 mL, Rfl: 5  •  Lancets (FREESTYLE) lancets, Check blood glucose 4-5 times daily, Disp: 120 each, Rfl: 5  •  losartan (COZAAR) 100 MG tablet, Take 1  tablet by mouth Daily., Disp: 90 tablet, Rfl: 5  •  pantoprazole (PROTONIX) 40 MG EC tablet, Take 1 tablet by mouth Daily. (Patient taking differently: Take 40 mg by mouth 2 (Two) Times a Day.), Disp: 90 tablet, Rfl: 5  •  promethazine (PHENERGAN) 25 MG tablet, Take 1 tablet by mouth Every 6 (Six) Hours As Needed for Nausea or Vomiting., Disp: 90 tablet, Rfl: 5  •  Suvorexant (BELSOMRA) 20 MG tablet, Take 20 mg by mouth At Night As Needed (sleep)., Disp: 30 tablet, Rfl: 5  •  topiramate (TOPAMAX) 50 MG tablet, Take 1 tablet by mouth Every Night., Disp: 30 tablet, Rfl: 11  •  TRANSDERM-SCOP, 1.5 MG, 1.5 MG/3DAYS patch, Place 1 patch on the skin as directed by provider Every 3 (Three) Days., Disp: , Rfl:   •  traZODone (DESYREL) 50 MG tablet, TAKE 1/2 TO 1 TABLET BY MOUTH AT BEDTIME AS NEEDED FOR SLEEP, Disp: 90 tablet, Rfl: 1  •  VASCEPA 1 g capsule capsule, Take 2 g by mouth 2 (Two) Times a Day With Meals., Disp: 120 capsule, Rfl: 5    Patient Active Problem List    Diagnosis   • Umbilical pain [R10.33]   • Optic neuritis [H46.9]   • Encounter for smoking cessation counseling [Z71.6]   • Migraine without aura and without status migrainosus, not intractable [G43.009]   • Adjustment insomnia [F51.02]   • Chronic tension-type headache, intractable [G44.221]   • Mixed hyperlipidemia [E78.2]   • Essential hypertension [I10]   • Dyslipidemia [E78.5]   • Vitamin D deficiency [E55.9]   • Premature menopause [E28.319]   • Tobacco abuse counseling [Z71.6]   • Uncontrolled type 2 diabetes mellitus with complication, with long-term current use of insulin (CMS/HCC) [E11.8, E11.65, Z79.4]   • Hypoglycemia [E16.2]   • Hyperglycemia [R73.9]   • Chronic pancreatitis (CMS/HCC) [K86.1]   • Gastroparesis [K31.84]   • Pancreas disorder [K86.9]   • Long-term insulin use (CMS/HCC) [Z79.4]       Review of Systems   A comprehensive review of the 14 systems was negative except of listed below:  Endocrine: hyperglycemia     Objective:     Wt  "Readings from Last 3 Encounters:   01/09/19 121 kg (266 lb)   11/28/18 119 kg (261 lb 6.4 oz)   10/24/18 118 kg (260 lb)     Temp Readings from Last 3 Encounters:   10/24/18 97.2 °F (36.2 °C) (Tympanic)   06/20/18 98.2 °F (36.8 °C) (Oral)   03/14/18 98 °F (36.7 °C) (Tympanic)     BP Readings from Last 3 Encounters:   10/24/18 146/90   10/15/18 180/100   10/10/18 128/82     Pulse Readings from Last 3 Encounters:   11/28/18 90   10/24/18 92   10/15/18 92        Ht 175.3 cm (69.02\")   Wt 121 kg (266 lb)   BMI 39.26 kg/m²        Physical Exam   Constitutional: She is oriented to person, place, and time. She appears well-developed and well-nourished. No distress.   HENT:   Head: Normocephalic and atraumatic.   Eyes: EOM are normal. Pupils are equal, round, and reactive to light.   Neck: Normal range of motion. Neck supple. No thyromegaly present.   Cardiovascular: Normal rate, regular rhythm, normal heart sounds and intact distal pulses.   No murmur heard.  Pulmonary/Chest: Effort normal and breath sounds normal.   Abdominal: Soft. Bowel sounds are normal.   Musculoskeletal: Normal range of motion.   Neurological: She is alert and oriented to person, place, and time.   Skin: Skin is warm and dry. Capillary refill takes 2 to 3 seconds. She is not diaphoretic.   Psychiatric: She has a normal mood and affect. Her behavior is normal. Judgment and thought content normal.   Nursing note and vitals reviewed.          Lab Review  Results for orders placed or performed in visit on 09/26/18   T3, Free   Result Value Ref Range    T3, Free 3.3 2.0 - 4.4 pg/mL   T4 & TSH ([facility])   Result Value Ref Range    TSH 1.470 0.270 - 4.200 mIU/mL    T4, Total 7.24 4.50 - 11.70 mcg/dL   T4, Free   Result Value Ref Range    Free T4 1.14 0.93 - 1.70 ng/dL   Uric Acid   Result Value Ref Range    Uric Acid 4.9 2.4 - 5.7 mg/dL   Vitamin D 25 Hydroxy   Result Value Ref Range    25 Hydroxy, Vitamin D 28.7 (L) 30.0 - 100.0 ng/ml   Comprehensive " Metabolic Panel   Result Value Ref Range    Glucose 282 (H) 65 - 99 mg/dL    BUN 15 6 - 20 mg/dL    Creatinine 0.89 0.57 - 1.00 mg/dL    eGFR Non African Am 69 >60 mL/min/1.73    eGFR African Am 83 >60 mL/min/1.73    BUN/Creatinine Ratio 16.9 7.0 - 25.0    Sodium 136 136 - 145 mmol/L    Potassium 4.3 3.5 - 5.2 mmol/L    Chloride 101 98 - 107 mmol/L    Total CO2 22.2 22.0 - 29.0 mmol/L    Calcium 9.3 8.6 - 10.5 mg/dL    Total Protein 7.0 6.0 - 8.5 g/dL    Albumin 3.90 3.50 - 5.20 g/dL    Globulin 3.1 gm/dL    A/G Ratio 1.3 g/dL    Total Bilirubin 0.5 0.1 - 1.2 mg/dL    Alkaline Phosphatase 85 39 - 117 U/L    AST (SGOT) 19 1 - 32 U/L    ALT (SGPT) 23 1 - 33 U/L   C-Peptide   Result Value Ref Range    C-Peptide 2.0 1.1 - 4.4 ng/mL   Hemoglobin A1c   Result Value Ref Range    Hemoglobin A1C 11.32 (H) 4.80 - 5.60 %   Lipid Panel   Result Value Ref Range    Total Cholesterol 173 0 - 200 mg/dL    Triglycerides 150 0 - 150 mg/dL    HDL Cholesterol 35 (L) 40 - 60 mg/dL    VLDL Cholesterol 30 5 - 40 mg/dL    LDL Cholesterol  108 (H) 0 - 100 mg/dL   Luteinizing Hormone   Result Value Ref Range    LH 6.1 mIU/mL   Follicle Stimulating Hormone   Result Value Ref Range    FSH 3.2 mIU/mL   MicroAlbumin, Urine, Random   Result Value Ref Range    Microalbumin, Urine 15.9 Not Estab. ug/mL   Cardiovascular Risk Assessment   Result Value Ref Range    Interpretation Note    Diabetes Patient Education   Result Value Ref Range    PDF Image Not applicable            Assessment:   Danielle was seen today for follow-up.    Diagnoses and all orders for this visit:    Uncontrolled type 2 diabetes mellitus with complication, with long-term current use of insulin (CMS/[facility])  -     atorvastatin (LIPITOR) 80 MG tablet; Take 1 tablet by mouth Daily.  -     Comprehensive Metabolic Panel; Future  -     Hemoglobin A1c; Future  -     Lipid Panel; Future  -     Microalbumin / Creatinine Urine Ratio - Urine, Clean Catch; Future  -     Uric Acid; Future  -      Vitamin D 25 Hydroxy; Future  -     TSH; Future  -     T4; Future  -     T3; Future    Long-term insulin use (CMS/HCC)  -     atorvastatin (LIPITOR) 80 MG tablet; Take 1 tablet by mouth Daily.  -     Comprehensive Metabolic Panel; Future    Gastroparesis  -     CREON 52565-78744 units capsule delayed-release particles capsule; Take 1 capsule by mouth 3 (Three) Times a Day With Meals.  -     Comprehensive Metabolic Panel; Future    Vitamin D deficiency  -     atorvastatin (LIPITOR) 80 MG tablet; Take 1 tablet by mouth Daily.  -     Comprehensive Metabolic Panel; Future  -     Vitamin D 25 Hydroxy; Future    Chronic pancreatitis, unspecified pancreatitis type (CMS/HCC)  -     atorvastatin (LIPITOR) 80 MG tablet; Take 1 tablet by mouth Daily.  -     Comprehensive Metabolic Panel; Future    Mixed hyperlipidemia  -     atorvastatin (LIPITOR) 80 MG tablet; Take 1 tablet by mouth Daily.  -     VASCEPA 1 g capsule capsule; Take 2 g by mouth 2 (Two) Times a Day With Meals.  -     Comprehensive Metabolic Panel; Future  -     Lipid Panel; Future    Essential hypertension  -     atorvastatin (LIPITOR) 80 MG tablet; Take 1 tablet by mouth Daily.  -     Comprehensive Metabolic Panel; Future    Premature menopause  -     atorvastatin (LIPITOR) 80 MG tablet; Take 1 tablet by mouth Daily.  -     Comprehensive Metabolic Panel; Future    Dyslipidemia  -     atorvastatin (LIPITOR) 80 MG tablet; Take 1 tablet by mouth Daily.  -     Comprehensive Metabolic Panel; Future    Pancreas disorder  -     CREON 91063-24390 units capsule delayed-release particles capsule; Take 1 capsule by mouth 3 (Three) Times a Day With Meals.  -     Comprehensive Metabolic Panel; Future          Plan:   In summary/ Medication changes: I met with this patient is metabolically stable and doing well at this time.  Laboratory testing was reviewed dated 9-2018, discussed with questions and answers completed.  Discussed and formulate a treatment plan with patient,  patient verbally stated understood all instructions.         Uncontrolled type 2 diabetes mellitus with complication, with long-term current use of insulin- chronic uncontrolled. Insulin pump and sensor upload. Changes are:  Increased basals by 10%, active time changed to 3.5 hours maximum bolus 25 units  Carb ratio change 1 unit to every 4.5 g  Insulin sensitivity 1 unit for every 60 mg/Annie for glucose targets 110 320 mg/Annie.         Gastroparesis- chronic uncontrolled. No change with medication. Will f/u with gastro for stimulator to be adjusted.    .    Vitamin D deficiency - chronic, stable no medication changes at this time. Refills prescribed. Future labs ordered for upcoming appointment and assessment.        Mixed hyperlipidemia - chronic, stable no medication changes at this time. Refills prescribed. Future labs ordered for upcoming appointment and assessment.        Essential hypertension- chronic, stable no medication changes at this time. Refills prescribed. Future labs ordered for upcoming appointment and assessment.           Additional instructions:  home blood tests -  Blood glucose testin times daily, that are:  fasting- 1st thing in morning before eating or drinking  before each meal and 1 or 2 hours after meal  bedtime  anytime you feel symptoms of hyperglycemia or hypoglycemia (high or low blood sugars)        Education:  interpretation of lab results, blood sugar goals, complications of diabetes mellitus, hypoglycemia prevention and treatment, exercise, illness management, self-monitoring of blood glucose skills, nutrition and carbohydrate counting        The total face to face time spent was  25 minutes  with additional education given: 14 minutes (greater than 50% of the total time) was spent with counseling and coordination of care on: SMBG with goals, Side effects profiles with medications, medication use and purposes,    Return in about 3 months (around 2019), or if symptoms worsen  or fail to improve, for Recheck. 3 months with Lilian-2 weeks prior for labs 6 months with Dr. Andrade-2 weeks prior for labs        Dragon transcription disclaimer     Much of this encounter note is an electronic transcription/translation of spoken language to printed text. The electronic translation of spoken language may permit erroneous, or at times, nonsensical words or phrases to be inadvertently transcribed. Although I have reviewed the note for such errors, some may still exist.

## 2019-01-10 ENCOUNTER — RESULTS ENCOUNTER (OUTPATIENT)
Dept: ENDOCRINOLOGY | Age: 44
End: 2019-01-10

## 2019-01-10 DIAGNOSIS — K31.84 GASTROPARESIS: ICD-10-CM

## 2019-01-10 DIAGNOSIS — I10 ESSENTIAL HYPERTENSION: ICD-10-CM

## 2019-01-10 DIAGNOSIS — K86.1 CHRONIC PANCREATITIS, UNSPECIFIED PANCREATITIS TYPE (HCC): ICD-10-CM

## 2019-01-10 DIAGNOSIS — Z71.6 ENCOUNTER FOR SMOKING CESSATION COUNSELING: ICD-10-CM

## 2019-01-10 DIAGNOSIS — Z79.4 LONG-TERM INSULIN USE (HCC): ICD-10-CM

## 2019-01-10 DIAGNOSIS — E78.2 MIXED HYPERLIPIDEMIA: ICD-10-CM

## 2019-01-10 DIAGNOSIS — E55.9 VITAMIN D DEFICIENCY: ICD-10-CM

## 2019-01-10 DIAGNOSIS — IMO0002 UNCONTROLLED TYPE 2 DIABETES MELLITUS WITH COMPLICATION, WITH LONG-TERM CURRENT USE OF INSULIN: ICD-10-CM

## 2019-01-12 LAB
25(OH)D3+25(OH)D2 SERPL-MCNC: 46.2 NG/ML (ref 30–100)
ACTH PLAS-MCNC: 7.6 PG/ML (ref 7.2–63.3)
ALBUMIN SERPL-MCNC: 3.9 G/DL (ref 3.5–5.2)
ALBUMIN/CREAT UR: 13.9 MG/G CREAT (ref 0–30)
ALBUMIN/GLOB SERPL: 1.2 G/DL
ALP SERPL-CCNC: 77 U/L (ref 39–117)
ALT SERPL-CCNC: 19 U/L (ref 1–33)
AST SERPL-CCNC: 21 U/L (ref 1–32)
BILIRUB SERPL-MCNC: 0.3 MG/DL (ref 0.1–1.2)
BUN SERPL-MCNC: 13 MG/DL (ref 6–20)
BUN/CREAT SERPL: 16 (ref 7–25)
CALCIUM SERPL-MCNC: 9.2 MG/DL (ref 8.6–10.5)
CHLORIDE SERPL-SCNC: 102 MMOL/L (ref 98–107)
CHOLEST SERPL-MCNC: 167 MG/DL (ref 0–200)
CO2 SERPL-SCNC: 23.7 MMOL/L (ref 22–29)
CORTIS SERPL-MCNC: 6.8 UG/DL
CREAT SERPL-MCNC: 0.81 MG/DL (ref 0.57–1)
CREAT UR-MCNC: 104.6 MG/DL
DHEA SERPL-MCNC: 82 NG/DL (ref 31–701)
DHEA-S SERPL-MCNC: 29.2 UG/DL (ref 41.2–243.7)
GLOBULIN SER CALC-MCNC: 3.2 GM/DL
GLUCOSE SERPL-MCNC: 180 MG/DL (ref 65–99)
HBA1C MFR BLD: 9.8 % (ref 4.8–5.6)
HDLC SERPL-MCNC: 42 MG/DL (ref 40–60)
INTERPRETATION: NORMAL
LDLC SERPL CALC-MCNC: 106 MG/DL (ref 0–100)
Lab: NORMAL
MICROALBUMIN UR-MCNC: 14.5 UG/ML
POTASSIUM SERPL-SCNC: 4.3 MMOL/L (ref 3.5–5.2)
PROT SERPL-MCNC: 7.1 G/DL (ref 6–8.5)
SODIUM SERPL-SCNC: 139 MMOL/L (ref 136–145)
T3 SERPL-MCNC: 134.4 NG/DL (ref 80–200)
T4 SERPL-MCNC: 6.84 MCG/DL (ref 4.5–11.7)
TRIGL SERPL-MCNC: 93 MG/DL (ref 0–150)
TSH SERPL DL<=0.005 MIU/L-ACNC: 1.13 MIU/ML (ref 0.27–4.2)
URATE SERPL-MCNC: 4.3 MG/DL (ref 2.4–5.7)
VLDLC SERPL CALC-MCNC: 18.6 MG/DL (ref 5–40)

## 2019-01-18 RX ORDER — LUBIPROSTONE 24 UG/1
CAPSULE, GELATIN COATED ORAL
Qty: 60 CAPSULE | Refills: 5 | Status: SHIPPED | OUTPATIENT
Start: 2019-01-18 | End: 2019-01-21 | Stop reason: SDUPTHER

## 2019-01-21 DIAGNOSIS — E55.9 VITAMIN D DEFICIENCY: ICD-10-CM

## 2019-01-21 DIAGNOSIS — E78.2 MIXED HYPERLIPIDEMIA: ICD-10-CM

## 2019-01-21 DIAGNOSIS — K86.9 PANCREAS DISORDER: ICD-10-CM

## 2019-01-21 DIAGNOSIS — K31.84 GASTROPARESIS: ICD-10-CM

## 2019-01-21 DIAGNOSIS — K86.1 CHRONIC PANCREATITIS, UNSPECIFIED PANCREATITIS TYPE (HCC): ICD-10-CM

## 2019-01-21 DIAGNOSIS — E78.5 DYSLIPIDEMIA: ICD-10-CM

## 2019-01-21 DIAGNOSIS — IMO0002 UNCONTROLLED TYPE 2 DIABETES MELLITUS WITH COMPLICATION, WITH LONG-TERM CURRENT USE OF INSULIN: ICD-10-CM

## 2019-01-21 DIAGNOSIS — Z79.4 LONG-TERM INSULIN USE (HCC): ICD-10-CM

## 2019-01-21 DIAGNOSIS — E28.319 PREMATURE MENOPAUSE: ICD-10-CM

## 2019-01-21 DIAGNOSIS — I10 ESSENTIAL HYPERTENSION: ICD-10-CM

## 2019-01-21 RX ORDER — PANCRELIPASE 24000; 76000; 120000 [USP'U]/1; [USP'U]/1; [USP'U]/1
24000 CAPSULE, DELAYED RELEASE PELLETS ORAL
Qty: 100 CAPSULE | Refills: 5 | Status: SHIPPED | OUTPATIENT
Start: 2019-01-21 | End: 2019-04-19 | Stop reason: SDUPTHER

## 2019-01-21 RX ORDER — LUBIPROSTONE 24 UG/1
CAPSULE ORAL
Qty: 180 CAPSULE | Refills: 1 | Status: SHIPPED | OUTPATIENT
Start: 2019-01-21 | End: 2019-08-17 | Stop reason: SDUPTHER

## 2019-01-21 RX ORDER — ICOSAPENT ETHYL 1000 MG/1
2 CAPSULE ORAL 2 TIMES DAILY WITH MEALS
Qty: 120 CAPSULE | Refills: 5 | Status: SHIPPED | OUTPATIENT
Start: 2019-01-21 | End: 2019-04-24 | Stop reason: SDUPTHER

## 2019-01-21 RX ORDER — PROMETHAZINE HYDROCHLORIDE 25 MG/1
25 TABLET ORAL EVERY 6 HOURS PRN
Qty: 30 TABLET | Refills: 1 | Status: SHIPPED | OUTPATIENT
Start: 2019-01-21 | End: 2019-05-08 | Stop reason: SDUPTHER

## 2019-01-21 RX ORDER — ATORVASTATIN CALCIUM 80 MG/1
80 TABLET, FILM COATED ORAL DAILY
Qty: 30 TABLET | Refills: 11 | Status: SHIPPED | OUTPATIENT
Start: 2019-01-21 | End: 2019-04-24 | Stop reason: SDUPTHER

## 2019-01-21 RX ORDER — CYCLOBENZAPRINE HCL 10 MG
10 TABLET ORAL 3 TIMES DAILY PRN
Qty: 270 TABLET | Refills: 1 | Status: SHIPPED | OUTPATIENT
Start: 2019-01-21 | End: 2019-07-19 | Stop reason: SDUPTHER

## 2019-02-08 DIAGNOSIS — IMO0002 UNCONTROLLED TYPE 2 DIABETES MELLITUS WITH COMPLICATION, WITH LONG-TERM CURRENT USE OF INSULIN: ICD-10-CM

## 2019-02-11 ENCOUNTER — OFFICE VISIT (OUTPATIENT)
Dept: NEUROLOGY | Facility: CLINIC | Age: 44
End: 2019-02-11

## 2019-02-11 VITALS
HEIGHT: 69 IN | HEART RATE: 82 BPM | BODY MASS INDEX: 38.51 KG/M2 | WEIGHT: 260 LBS | DIASTOLIC BLOOD PRESSURE: 89 MMHG | SYSTOLIC BLOOD PRESSURE: 150 MMHG | OXYGEN SATURATION: 97 %

## 2019-02-11 DIAGNOSIS — M54.2 NECK PAIN: ICD-10-CM

## 2019-02-11 DIAGNOSIS — G43.009 MIGRAINE WITHOUT AURA AND WITHOUT STATUS MIGRAINOSUS, NOT INTRACTABLE: Primary | ICD-10-CM

## 2019-02-11 DIAGNOSIS — H46.9 OPTIC NEURITIS: ICD-10-CM

## 2019-02-11 PROCEDURE — 99213 OFFICE O/P EST LOW 20 MIN: CPT | Performed by: PSYCHIATRY & NEUROLOGY

## 2019-02-11 RX ORDER — TOPIRAMATE 100 MG/1
100 TABLET, FILM COATED ORAL NIGHTLY
Qty: 30 TABLET | Refills: 11 | Status: SHIPPED | OUTPATIENT
Start: 2019-02-11 | End: 2019-06-24 | Stop reason: SDUPTHER

## 2019-02-11 NOTE — PROGRESS NOTES
Subjective:     Patient ID: Danielle Dudley is a 43 y.o. female.    History of Present Illness    Patient was seen back in the office for follow-up of headaches, neck pain, and possible MS.  Her headaches improved at first but they are worse again she is now on Topamax 50 mg at night without side effects.  She has had no new symptoms that would correlate with MS.  She also has pain when she turns her neck to one side with numbness up the opposite side of her face.  The following portions of the patient's history were reviewed and updated as appropriate: allergies, current medications, past family history, past medical history, past social history, past surgical history and problem list.      Current Outpatient Medications:   •  atorvastatin (LIPITOR) 80 MG tablet, Take 1 tablet by mouth Daily., Disp: 30 tablet, Rfl: 11  •  cholecalciferol (VITAMIN D3) 03664 units capsule, Take 10,000 Units by mouth Daily., Disp: , Rfl:   •  CONTOUR NEXT TEST test strip, Test 8 times daily DX: e11.65, Disp: 800 each, Rfl: 0  •  CREON 42286-09336 units capsule delayed-release particles capsule, Take 1 capsule by mouth 3 (Three) Times a Day With Meals., Disp: 100 capsule, Rfl: 5  •  cyclobenzaprine (FLEXERIL) 10 MG tablet, Take 1 tablet by mouth 3 (Three) Times a Day As Needed for Muscle Spasms., Disp: 270 tablet, Rfl: 1  •  gabapentin (NEURONTIN) 400 MG capsule, TAKE 1 CAPSULE BY MOUTH THREE TIMES DAILY, Disp: 90 capsule, Rfl: 5  •  glucagon (GLUCAGON EMERGENCY) 1 MG injection, Inject 1 mg under the skin 1 (One) Time As Needed for Low Blood Sugar for up to 1 dose., Disp: 1 kit, Rfl: 0  •  insulin aspart (NOVOLOG) 100 UNIT/ML injection, Up to 200 units through insulin pump daily, Disp: 60 mL, Rfl: 5  •  Lancets (FREESTYLE) lancets, Check blood glucose 4-5 times daily, Disp: 120 each, Rfl: 5  •  losartan (COZAAR) 100 MG tablet, Take 1 tablet by mouth Daily., Disp: 90 tablet, Rfl: 5  •  lubiprostone (AMITIZA) 24 MCG capsule, TAKE 1  CAPSULE BY MOUTH TWICE DAILY WITH FOOD, Disp: 180 capsule, Rfl: 1  •  pantoprazole (PROTONIX) 40 MG EC tablet, Take 1 tablet by mouth Daily. (Patient taking differently: Take 40 mg by mouth 2 (Two) Times a Day.), Disp: 90 tablet, Rfl: 5  •  promethazine (PHENERGAN) 25 MG tablet, Take 1 tablet by mouth Every 6 (Six) Hours As Needed for Nausea or Vomiting., Disp: 30 tablet, Rfl: 1  •  Suvorexant (BELSOMRA) 20 MG tablet, Take 20 mg by mouth At Night As Needed (sleep)., Disp: 90 tablet, Rfl: 1  •  topiramate (TOPAMAX) 100 MG tablet, Take 1 tablet by mouth Every Night., Disp: 30 tablet, Rfl: 11  •  TRANSDERM-SCOP, 1.5 MG, 1.5 MG/3DAYS patch, Place 1 patch on the skin as directed by provider Every 3 (Three) Days., Disp: , Rfl:   •  traZODone (DESYREL) 50 MG tablet, TAKE 1/2 TO 1 TABLET BY MOUTH AT BEDTIME AS NEEDED FOR SLEEP, Disp: 90 tablet, Rfl: 1  •  VASCEPA 1 g capsule capsule, Take 2 g by mouth 2 (Two) Times a Day With Meals., Disp: 120 capsule, Rfl: 5    Review of Systems   Constitutional: Negative.    Neurological: Positive for dizziness, light-headedness and headaches. Negative for tremors, seizures, syncope, facial asymmetry, speech difficulty, weakness and numbness.   Psychiatric/Behavioral: Negative.         Objective:    Neurologic Exam  Mental status examination was appropriate.  Funduscopy, visual fields, eye movements and pupillary reflexes were normal.  No facial weakness was noted.  Gait was normal.  No pattern of focal weakness was noted.  Physical Exam    Assessment/Plan:     Danielle was seen today for migraine.    Diagnoses and all orders for this visit:    Migraine without aura and without status migrainosus, not intractable    Optic neuritis    Neck pain    Other orders  -     topiramate (TOPAMAX) 100 MG tablet; Take 1 tablet by mouth Every Night.         The neck symptoms are probably related to arthritis.  She has not improved as expected with increased dose of Topamax.  Have increased the dose once  again 100 mg at night.  Follow-up in the office in 6 months.   Thank you for allowing me to share in the care of this patient.  Derrek Thomas M.D.

## 2019-02-21 DIAGNOSIS — IMO0002 UNCONTROLLED TYPE 2 DIABETES MELLITUS WITH COMPLICATION, WITH LONG-TERM CURRENT USE OF INSULIN: ICD-10-CM

## 2019-02-21 RX ORDER — PERPHENAZINE 16 MG/1
TABLET, FILM COATED ORAL
Qty: 800 EACH | Refills: 0 | Status: SHIPPED | OUTPATIENT
Start: 2019-02-21 | End: 2020-11-18 | Stop reason: SDUPTHER

## 2019-02-22 ENCOUNTER — TELEPHONE (OUTPATIENT)
Dept: INTERNAL MEDICINE | Facility: CLINIC | Age: 44
End: 2019-02-22

## 2019-04-05 RX ORDER — LOSARTAN POTASSIUM 100 MG/1
TABLET ORAL
Qty: 180 TABLET | Refills: 1 | Status: SHIPPED | OUTPATIENT
Start: 2019-04-05 | End: 2019-04-09 | Stop reason: SDUPTHER

## 2019-04-09 ENCOUNTER — RESULTS ENCOUNTER (OUTPATIENT)
Dept: ENDOCRINOLOGY | Age: 44
End: 2019-04-09

## 2019-04-09 DIAGNOSIS — E78.2 MIXED HYPERLIPIDEMIA: ICD-10-CM

## 2019-04-09 DIAGNOSIS — K86.9 PANCREAS DISORDER: ICD-10-CM

## 2019-04-09 DIAGNOSIS — E55.9 VITAMIN D DEFICIENCY: ICD-10-CM

## 2019-04-09 DIAGNOSIS — I10 ESSENTIAL HYPERTENSION: ICD-10-CM

## 2019-04-09 DIAGNOSIS — E28.319 PREMATURE MENOPAUSE: ICD-10-CM

## 2019-04-09 DIAGNOSIS — IMO0002 UNCONTROLLED TYPE 2 DIABETES MELLITUS WITH COMPLICATION, WITH LONG-TERM CURRENT USE OF INSULIN: ICD-10-CM

## 2019-04-09 DIAGNOSIS — Z79.4 LONG-TERM INSULIN USE (HCC): ICD-10-CM

## 2019-04-09 DIAGNOSIS — E78.5 DYSLIPIDEMIA: ICD-10-CM

## 2019-04-09 DIAGNOSIS — K86.1 CHRONIC PANCREATITIS, UNSPECIFIED PANCREATITIS TYPE (HCC): ICD-10-CM

## 2019-04-09 DIAGNOSIS — K31.84 GASTROPARESIS: ICD-10-CM

## 2019-04-09 RX ORDER — LOSARTAN POTASSIUM 100 MG/1
TABLET ORAL
Qty: 180 TABLET | Refills: 1 | Status: SHIPPED | OUTPATIENT
Start: 2019-04-09 | End: 2019-04-19 | Stop reason: SDUPTHER

## 2019-04-10 ENCOUNTER — LAB (OUTPATIENT)
Dept: ENDOCRINOLOGY | Age: 44
End: 2019-04-10

## 2019-04-10 DIAGNOSIS — IMO0002 UNCONTROLLED TYPE 2 DIABETES MELLITUS WITH COMPLICATION, WITH LONG-TERM CURRENT USE OF INSULIN: ICD-10-CM

## 2019-04-11 LAB
25(OH)D3+25(OH)D2 SERPL-MCNC: 38.5 NG/ML (ref 30–100)
ALBUMIN SERPL-MCNC: 3.2 G/DL (ref 3.5–5.2)
ALBUMIN/CREAT UR: 11.3 MG/G CREAT (ref 0–30)
ALBUMIN/GLOB SERPL: 0.9 G/DL
ALP SERPL-CCNC: 68 U/L (ref 39–117)
ALT SERPL-CCNC: 11 U/L (ref 1–33)
AST SERPL-CCNC: 8 U/L (ref 1–32)
BILIRUB SERPL-MCNC: <0.2 MG/DL (ref 0.2–1.2)
BUN SERPL-MCNC: 13 MG/DL (ref 6–20)
BUN/CREAT SERPL: 17.1 (ref 7–25)
CALCIUM SERPL-MCNC: 9.5 MG/DL (ref 8.6–10.5)
CHLORIDE SERPL-SCNC: 104 MMOL/L (ref 98–107)
CHOLEST SERPL-MCNC: 143 MG/DL (ref 0–200)
CO2 SERPL-SCNC: 24.4 MMOL/L (ref 22–29)
CREAT SERPL-MCNC: 0.76 MG/DL (ref 0.57–1)
CREAT UR-MCNC: 163.4 MG/DL
GLOBULIN SER CALC-MCNC: 3.5 GM/DL
GLUCOSE SERPL-MCNC: 247 MG/DL (ref 65–99)
HBA1C MFR BLD: 9.2 % (ref 4.8–5.6)
HDLC SERPL-MCNC: 35 MG/DL (ref 40–60)
INTERPRETATION: NORMAL
LDLC SERPL CALC-MCNC: 84 MG/DL (ref 0–100)
Lab: NORMAL
MICROALBUMIN UR-MCNC: 18.4 UG/ML
POTASSIUM SERPL-SCNC: 4.1 MMOL/L (ref 3.5–5.2)
PROT SERPL-MCNC: 6.7 G/DL (ref 6–8.5)
SODIUM SERPL-SCNC: 140 MMOL/L (ref 136–145)
T3 SERPL-MCNC: 125 NG/DL (ref 80–200)
T4 SERPL-MCNC: 5.86 MCG/DL (ref 4.5–11.7)
TRIGL SERPL-MCNC: 119 MG/DL (ref 0–150)
TSH SERPL DL<=0.005 MIU/L-ACNC: 1.07 MIU/ML (ref 0.27–4.2)
URATE SERPL-MCNC: 3.6 MG/DL (ref 2.4–5.7)
VLDLC SERPL CALC-MCNC: 23.8 MG/DL (ref 5–40)

## 2019-04-19 DIAGNOSIS — K86.9 PANCREAS DISORDER: ICD-10-CM

## 2019-04-19 DIAGNOSIS — K31.84 GASTROPARESIS: ICD-10-CM

## 2019-04-19 RX ORDER — LOSARTAN POTASSIUM 100 MG/1
100 TABLET ORAL DAILY
Qty: 180 TABLET | Refills: 1 | Status: SHIPPED | OUTPATIENT
Start: 2019-04-19 | End: 2020-04-24

## 2019-04-22 RX ORDER — PANCRELIPASE 24000; 76000; 120000 [USP'U]/1; [USP'U]/1; [USP'U]/1
CAPSULE, DELAYED RELEASE PELLETS ORAL
Qty: 100 CAPSULE | Refills: 2 | Status: SHIPPED | OUTPATIENT
Start: 2019-04-22 | End: 2019-07-03 | Stop reason: SDUPTHER

## 2019-04-24 ENCOUNTER — OFFICE VISIT (OUTPATIENT)
Dept: ENDOCRINOLOGY | Age: 44
End: 2019-04-24

## 2019-04-24 VITALS
WEIGHT: 259 LBS | DIASTOLIC BLOOD PRESSURE: 78 MMHG | HEIGHT: 69 IN | SYSTOLIC BLOOD PRESSURE: 128 MMHG | BODY MASS INDEX: 38.36 KG/M2

## 2019-04-24 DIAGNOSIS — F32.0 CURRENT MILD EPISODE OF MAJOR DEPRESSIVE DISORDER WITHOUT PRIOR EPISODE (HCC): ICD-10-CM

## 2019-04-24 DIAGNOSIS — E55.9 VITAMIN D DEFICIENCY: ICD-10-CM

## 2019-04-24 DIAGNOSIS — E28.319 PREMATURE MENOPAUSE: ICD-10-CM

## 2019-04-24 DIAGNOSIS — E78.5 DYSLIPIDEMIA: ICD-10-CM

## 2019-04-24 DIAGNOSIS — IMO0002 UNCONTROLLED TYPE 2 DIABETES MELLITUS WITH COMPLICATION, WITH LONG-TERM CURRENT USE OF INSULIN: Primary | ICD-10-CM

## 2019-04-24 DIAGNOSIS — K86.1 CHRONIC PANCREATITIS, UNSPECIFIED PANCREATITIS TYPE (HCC): ICD-10-CM

## 2019-04-24 DIAGNOSIS — E78.2 MIXED HYPERLIPIDEMIA: ICD-10-CM

## 2019-04-24 DIAGNOSIS — I10 ESSENTIAL HYPERTENSION: ICD-10-CM

## 2019-04-24 DIAGNOSIS — E10.43 DIABETIC AUTONOMIC NEUROPATHY ASSOCIATED WITH TYPE 1 DIABETES MELLITUS (HCC): ICD-10-CM

## 2019-04-24 DIAGNOSIS — Z79.4 LONG-TERM INSULIN USE (HCC): ICD-10-CM

## 2019-04-24 PROCEDURE — 95251 CONT GLUC MNTR ANALYSIS I&R: CPT | Performed by: NURSE PRACTITIONER

## 2019-04-24 PROCEDURE — 99214 OFFICE O/P EST MOD 30 MIN: CPT | Performed by: NURSE PRACTITIONER

## 2019-04-24 RX ORDER — DULOXETIN HYDROCHLORIDE 30 MG/1
30 CAPSULE, DELAYED RELEASE ORAL DAILY
Qty: 14 CAPSULE | Refills: 0 | Status: SHIPPED | OUTPATIENT
Start: 2019-04-24 | End: 2019-05-08

## 2019-04-24 RX ORDER — ATORVASTATIN CALCIUM 80 MG/1
80 TABLET, FILM COATED ORAL DAILY
Qty: 30 TABLET | Refills: 4 | Status: SHIPPED | OUTPATIENT
Start: 2019-04-24 | End: 2019-07-03 | Stop reason: SDUPTHER

## 2019-04-24 RX ORDER — ICOSAPENT ETHYL 1000 MG/1
2 CAPSULE ORAL 2 TIMES DAILY WITH MEALS
Qty: 120 CAPSULE | Refills: 5 | Status: SHIPPED | OUTPATIENT
Start: 2019-04-24 | End: 2019-07-03 | Stop reason: SDUPTHER

## 2019-04-24 RX ORDER — PREGABALIN 150 MG/1
150 CAPSULE ORAL 2 TIMES DAILY
Qty: 60 CAPSULE | Refills: 2 | Status: SHIPPED | OUTPATIENT
Start: 2019-04-24 | End: 2019-07-24 | Stop reason: SDUPTHER

## 2019-04-24 RX ORDER — DULOXETIN HYDROCHLORIDE 60 MG/1
60 CAPSULE, DELAYED RELEASE ORAL DAILY
Qty: 30 CAPSULE | Refills: 2 | Status: SHIPPED | OUTPATIENT
Start: 2019-04-24 | End: 2019-07-03 | Stop reason: SDUPTHER

## 2019-04-24 NOTE — PATIENT INSTRUCTIONS
Stop the Neurontin    Start Lyrica 150 mg once twice daily    Start Cymbalta 30 mg once at bedtime for 2 weeks then to increase to 60 mg at bedtime as per maintenance dose.

## 2019-04-24 NOTE — PROGRESS NOTES
Danielle Dudley  presents to the office  for the follow-up appointment for Type 2 diabetes mellitus.     The diabete's condition location is throughout the system with the  clinical course has fluctuated, the severity is moderate, and the modifying/allievating factors are insulin pump.  Medications and  Dosages were  reviewed with Danielle Dudley and suggested that compliance most of the time.    The patient reports associated symptoms of hyperglycemia have been excessive thirst and fatigue and associated symptoms of hypoglycemia have been confusion, dizziness, headache, jitteriness and nuasea, with their  hypoglycemia threshold for symptoms is 80 mg/dl .     The patient is currently on oral medications .      Compliance with blood glucose monitoring: good.     Meal panning: The patient is using carbohydrate counting, but is not on a specified limit, being a pump user.    The patient is currently taking home blood tests - Blood glucose testin-8 times daily, that are:  before each meal and 1 or 2 hours after meal  fasting and bedtime  anytime you feel symptoms of hyperglycemia or hypoglycemia (high or low blood sugars)  Novolog U100  per insulin pump     Last instructions given were:   Uncontrolled type 2 diabetes mellitus with complication, with long-term current use of insulin- chronic uncontrolled. Insulin pump and sensor upload. Changes are:  Increased basals by 10%, active time changed to 3.5 hours maximum bolus 25 units  Carb ratio change 1 unit to every 4.5 g  Insulin sensitivity 1 unit for every 60 mg/Annie for glucose targets 110 320 mg/Annie       Gastroparesis- chronic uncontrolled. No change with medication. Will f/u with gastro for stimulator to be adjusted.   Vitamin D deficiency - chronic, stable no medication changes at this time. Refills prescribed. Future labs ordered for upcoming appointment and assessment.   Mixed hyperlipidemia - chronic, stable no medication changes at this time. Refills  prescribed. Future labs ordered for upcoming appointment and assessment.  Essential hypertension- chronic, stable no medication changes at this time. Refills prescribed. Future labs ordered for upcoming appointment and assessment.     Home blood glucose testing daily: 4-8  insulin pump upload   -Yes -glucose management indicators a 9.2%.  Average blood glucose 246 mg/Annie with standard deviation of 73.  Minimal hypoglycemic risk with time in range 16% with 84% man above range.  14 out of 14-day CGM use.  Calibrations average per day is 1.7.  Patterns noted is high throughout the day.  Also noted is a nighttime highs between 12:55 PM and 11 AM.  Best time of the day is target range of 29%.    Last reported blood glucose readings are:  Home blood glucose testing daily: 4-8  insulin pump upload:   Continuous glucose monitor shows average blood glucose unit 64 mg/Annie with a standard deviation of 76 mg/Annie.  Minimal risk of hypoglycemia time in range 12% 88% hyperglycemia average days with CGM is 93%.  Calibrating 3.4 times daily.    Patterns reported per patient are that she has been having pain in her legs.          The following portions of the patient's history were reviewed and updated as appropriate: current medications, past family history, past medical history, past social history, past surgical history and problem list.      Current Outpatient Medications:   •  atorvastatin (LIPITOR) 80 MG tablet, Take 1 tablet by mouth Daily., Disp: 30 tablet, Rfl: 4  •  cholecalciferol (VITAMIN D3) 84955 units capsule, Take 10,000 Units by mouth Daily., Disp: , Rfl:   •  CONTOUR NEXT TEST test strip, Test 8 times daily DX: e11.65, Disp: 800 each, Rfl: 0  •  CREON 63711-02657 units capsule delayed-release particles capsule, TAKE ONE CAPSULE BY MOUTH 3 TIMES A DAY WITH MEALS, Disp: 100 capsule, Rfl: 2  •  cyclobenzaprine (FLEXERIL) 10 MG tablet, Take 1 tablet by mouth 3 (Three) Times a Day As Needed for Muscle Spasms., Disp: 270  tablet, Rfl: 1  •  glucagon (GLUCAGON EMERGENCY) 1 MG injection, Inject 1 mg under the skin 1 (One) Time As Needed for Low Blood Sugar for up to 1 dose., Disp: 1 kit, Rfl: 0  •  insulin aspart (NOVOLOG) 100 UNIT/ML injection, Inject 200 units daily via minimed 630G insulin pump [id number] Start: 06/2017 DX:e11.65, Disp: 60 mL, Rfl: 5  •  Lancets (FREESTYLE) lancets, Check blood glucose 4-5 times daily, Disp: 120 each, Rfl: 5  •  losartan (COZAAR) 100 MG tablet, Take 1 tablet by mouth Daily., Disp: 180 tablet, Rfl: 1  •  lubiprostone (AMITIZA) 24 MCG capsule, TAKE 1 CAPSULE BY MOUTH TWICE DAILY WITH FOOD, Disp: 180 capsule, Rfl: 1  •  pantoprazole (PROTONIX) 40 MG EC tablet, Take 1 tablet by mouth Daily. (Patient taking differently: Take 40 mg by mouth 2 (Two) Times a Day.), Disp: 90 tablet, Rfl: 5  •  promethazine (PHENERGAN) 25 MG tablet, Take 1 tablet by mouth Every 6 (Six) Hours As Needed for Nausea or Vomiting., Disp: 30 tablet, Rfl: 1  •  Suvorexant (BELSOMRA) 20 MG tablet, Take 20 mg by mouth At Night As Needed (sleep)., Disp: 90 tablet, Rfl: 1  •  topiramate (TOPAMAX) 100 MG tablet, Take 1 tablet by mouth Every Night., Disp: 30 tablet, Rfl: 11  •  TRANSDERM-SCOP, 1.5 MG, 1.5 MG/3DAYS patch, Place 1 patch on the skin as directed by provider Every 3 (Three) Days., Disp: , Rfl:   •  traZODone (DESYREL) 50 MG tablet, TAKE 1/2 TO 1 TABLET BY MOUTH AT BEDTIME AS NEEDED FOR SLEEP, Disp: 90 tablet, Rfl: 1  •  VASCEPA 1 g capsule capsule, Take 2 g by mouth 2 (Two) Times a Day With Meals., Disp: 120 capsule, Rfl: 5  •  DULoxetine (CYMBALTA) 30 MG capsule, Take 1 capsule by mouth Daily., Disp: 14 capsule, Rfl: 0  •  DULoxetine (CYMBALTA) 60 MG capsule, Take 1 capsule by mouth Daily., Disp: 30 capsule, Rfl: 2  •  pregabalin (LYRICA) 150 MG capsule, Take 1 capsule by mouth 2 (Two) Times a Day., Disp: 60 capsule, Rfl: 2    Patient Active Problem List    Diagnosis   • Neck pain [M54.2]   • Umbilical pain [R10.33]   •  "Optic neuritis [H46.9]   • Encounter for smoking cessation counseling [Z71.6]   • Migraine without aura and without status migrainosus, not intractable [G43.009]   • Adjustment insomnia [F51.02]   • Chronic tension-type headache, intractable [G44.221]   • Mixed hyperlipidemia [E78.2]   • Essential hypertension [I10]   • Dyslipidemia [E78.5]   • Vitamin D deficiency [E55.9]   • Premature menopause [E28.319]   • Tobacco abuse counseling [Z71.6]   • Uncontrolled type 2 diabetes mellitus with complication, with long-term current use of insulin (CMS/[facility]) [E11.8, E11.65, Z79.4]   • Hypoglycemia [E16.2]   • Hyperglycemia [R73.9]   • Chronic pancreatitis (CMS/[facility]) [K86.1]   • Gastroparesis [K31.84]   • Pancreas disorder [K86.9]   • Long-term insulin use (CMS/[facility]) [Z79.4]       Review of Systems   A comprehensive review of the 14 systems was negative except of listed below:  Gastrointestinal: positive for abdominal pain, nausea, reflux symptoms and gastric stimulator is not working  Endocrine: hyperglycemia     Objective:     Wt Readings from Last 3 Encounters:   04/24/19 117 kg (259 lb)   02/11/19 118 kg (260 lb)   01/09/19 121 kg (266 lb)     Temp Readings from Last 3 Encounters:   10/24/18 97.2 °F (36.2 °C) (Tympanic)   06/20/18 98.2 °F (36.8 °C) (Oral)   03/14/18 98 °F (36.7 °C) (Tympanic)     BP Readings from Last 3 Encounters:   04/24/19 128/78   02/11/19 150/89   10/24/18 146/90     Pulse Readings from Last 3 Encounters:   02/11/19 82   11/28/18 90   10/24/18 92        /78   Ht 175.3 cm (69.02\")   Wt 117 kg (259 lb)   BMI 38.23 kg/m²        Physical Exam   Constitutional: She is oriented to person, place, and time. She appears well-developed and well-nourished. No distress.   HENT:   Head: Normocephalic and atraumatic.   Eyes: EOM are normal. Pupils are equal, round, and reactive to light.   Neck: Normal range of motion. Neck supple. No thyromegaly present.   Cardiovascular: Normal rate, regular rhythm, " normal heart sounds and intact distal pulses.   No murmur heard.  Pulmonary/Chest: Effort normal and breath sounds normal.   Abdominal: Soft. Bowel sounds are normal.   Musculoskeletal: Normal range of motion.   Neurological: She is alert and oriented to person, place, and time.   efe LE ext pain, constant - PS 1-10 with 10 worse it is 10. Moves legs constantly at night. Numbness and tingling with shooting pain   Skin: Skin is warm and dry. Capillary refill takes 2 to 3 seconds. She is not diaphoretic.   Psychiatric: She has a normal mood and affect. Her behavior is normal. Judgment and thought content normal.   Nursing note and vitals reviewed.          Lab Review  Results for orders placed or performed in visit on 04/09/19   Comprehensive Metabolic Panel   Result Value Ref Range    Glucose 247 (H) 65 - 99 mg/dL    BUN 13 6 - 20 mg/dL    Creatinine 0.76 0.57 - 1.00 mg/dL    eGFR Non African Am 82 >60 mL/min/1.73    eGFR African Am 100 >60 mL/min/1.73    BUN/Creatinine Ratio 17.1 7.0 - 25.0    Sodium 140 136 - 145 mmol/L    Potassium 4.1 3.5 - 5.2 mmol/L    Chloride 104 98 - 107 mmol/L    Total CO2 24.4 22.0 - 29.0 mmol/L    Calcium 9.5 8.6 - 10.5 mg/dL    Total Protein 6.7 6.0 - 8.5 g/dL    Albumin 3.20 (L) 3.50 - 5.20 g/dL    Globulin 3.5 gm/dL    A/G Ratio 0.9 g/dL    Total Bilirubin <0.2 (L) 0.2 - 1.2 mg/dL    Alkaline Phosphatase 68 39 - 117 U/L    AST (SGOT) 8 1 - 32 U/L    ALT (SGPT) 11 1 - 33 U/L   Hemoglobin A1c   Result Value Ref Range    Hemoglobin A1C 9.20 (H) 4.80 - 5.60 %   Lipid Panel   Result Value Ref Range    Total Cholesterol 143 0 - 200 mg/dL    Triglycerides 119 0 - 150 mg/dL    HDL Cholesterol 35 (L) 40 - 60 mg/dL    VLDL Cholesterol 23.8 5 - 40 mg/dL    LDL Cholesterol  84 0 - 100 mg/dL   Microalbumin / Creatinine Urine Ratio - Urine, Clean Catch   Result Value Ref Range    Creatinine, Urine 163.4 Not Estab. mg/dL    Microalbumin, Urine 18.4 Not Estab. ug/mL    Microalbumin/Creatinine Ratio  11.3 0.0 - 30.0 mg/g creat   Uric Acid   Result Value Ref Range    Uric Acid 3.6 2.4 - 5.7 mg/dL   Vitamin D 25 Hydroxy   Result Value Ref Range    25 Hydroxy, Vitamin D 38.5 30.0 - 100.0 ng/ml   TSH   Result Value Ref Range    TSH 1.070 0.270 - 4.200 mIU/mL   T4   Result Value Ref Range    T4, Total 5.86 4.50 - 11.70 mcg/dL   T3   Result Value Ref Range    T3, Total 125.0 80.0 - 200.0 ng/dl   Cardiovascular Risk Assessment   Result Value Ref Range    Interpretation Note    Diabetes Patient Education   Result Value Ref Range    PDF Image Not applicable            Assessment:   Danielle was seen today for follow-up.    Diagnoses and all orders for this visit:    Uncontrolled type 2 diabetes mellitus with complication, with long-term current use of insulin (CMS/[facility])  -     DULoxetine (CYMBALTA) 30 MG capsule; Take 1 capsule by mouth Daily.  -     atorvastatin (LIPITOR) 80 MG tablet; Take 1 tablet by mouth Daily.  -     insulin aspart (NOVOLOG) 100 UNIT/ML injection; Inject 200 units daily via minimed 630G insulin pump SN:[id number] Start: 06/2017 DX:e11.65    Diabetic autonomic neuropathy associated with type 1 diabetes mellitus (CMS/[facility])  -     pregabalin (LYRICA) 150 MG capsule; Take 1 capsule by mouth 2 (Two) Times a Day.  -     DULoxetine (CYMBALTA) 30 MG capsule; Take 1 capsule by mouth Daily.  -     DULoxetine (CYMBALTA) 60 MG capsule; Take 1 capsule by mouth Daily.    Current mild episode of major depressive disorder without prior episode (CMS/[facility])  -     DULoxetine (CYMBALTA) 30 MG capsule; Take 1 capsule by mouth Daily.  -     DULoxetine (CYMBALTA) 60 MG capsule; Take 1 capsule by mouth Daily.    Essential hypertension  -     atorvastatin (LIPITOR) 80 MG tablet; Take 1 tablet by mouth Daily.    Mixed hyperlipidemia  -     atorvastatin (LIPITOR) 80 MG tablet; Take 1 tablet by mouth Daily.  -     VASCEPA 1 g capsule capsule; Take 2 g by mouth 2 (Two) Times a Day With Meals.    Vitamin D deficiency  -      atorvastatin (LIPITOR) 80 MG tablet; Take 1 tablet by mouth Daily.    Premature menopause  -     atorvastatin (LIPITOR) 80 MG tablet; Take 1 tablet by mouth Daily.    Chronic pancreatitis, unspecified pancreatitis type (CMS/HCC)  -     atorvastatin (LIPITOR) 80 MG tablet; Take 1 tablet by mouth Daily.    Dyslipidemia  -     atorvastatin (LIPITOR) 80 MG tablet; Take 1 tablet by mouth Daily.    Long-term insulin use (CMS/HCC)  -     atorvastatin (LIPITOR) 80 MG tablet; Take 1 tablet by mouth Daily.          Plan:   In summary/ Medication changes: I met with this patient is metabolically stable and doing well at this time.  Laboratory testing was reviewed dated 4.9.2019, discussed with questions and answers completed.  Discussed and formulate a treatment plan with patient, patient verbally stated understood all instructions.    1. Diagnoses and all orders for this visit:    Uncontrolled type 2 diabetes mellitus with complication, with long-term current use of insulin- chronic, uncontrolled.  Medication changes were as follows  Insulin pump changes:   Basal changes  12 AM-2.3 units/h  3 AM-2.5 units/h  6 AM-2.  5 5 units/h  12 PM- 3.15 units/h  3 PM-2.3 units/h  8 PM-2.5 units per hour  Increase all basal dose by 50%.  Instructed patient to use dual wave bolusing due to gastroparesis and stimulator not working.  10% now 90% over 1 hour  Carb ratio changed 1 unit for every 4 g with sensitivity of 1 unit for every 60 mg/Annie for target is 110 320 mg/Annie.  Maximum bolus 25 units with active insulin as 3.3     Diabetic autonomic neuropathy associated with type 1 diabetes mellitus-chronic, uncontrolled. Patient report Neurontin is not work.  Medication changes were as follows   Stop the Neurontin    Start Lyrica 150 mg once twice daily    Current mild episode of major depressive disorder without prior episode - new problem to this provider.     Start Cymbalta 30 mg once at bedtime for 2 weeks then to increase to 60 mg at  bedtime as per maintenance dose.      Essential hypertension- chronic, stable no medication changes at this time. Refills prescribed. Future labs ordered for upcoming appointment and assessment.          Mixed hyperlipidemia- chronic, stable no medication changes at this time. Refills prescribed. Future labs ordered for upcoming appointment and assessment.      Vitamin D deficiency- chronic, stable no medication changes at this time. Refills prescribed. Future labs ordered for upcoming appointment and assessment.          Instructions:  home blood tests -  Blood glucose testin times daily, that are:  fasting- 1st thing in morning before eating or drinking  before each meal and 1 or 2 hours after meal  bedtime  anytime you feel symptoms of hyperglycemia or hypoglycemia (high or low blood sugars)        Education:  interpretation of lab results, blood sugar goals, complications of diabetes mellitus, hypoglycemia prevention and treatment, exercise, illness management, self-monitoring of blood glucose skills, nutrition and carbohydrate counting        The total face to face time spent was  25 minutes  with additional education given: 14 minutes (greater than 50% of the total time) was spent with counseling and coordination of care on: SMBG with goals, Side effects profiles with medications, medication use and purposes,    Return in about 3 months (around 2019), or if symptoms worsen or fail to improve, for Recheck. 3 months with Lilian-2 weeks prior for labs 6 months with Dr. Andrade-2 weeks prior for labs      Dragon transcription disclaimer     Much of this encounter note is an electronic transcription/translation of spoken language to printed text. The electronic translation of spoken language may permit erroneous, or at times, nonsensical words or phrases to be inadvertently transcribed. Although I have reviewed the note for such errors, some may still exist.   Biopsy Type: H and E

## 2019-05-06 RX ORDER — PANTOPRAZOLE SODIUM 40 MG/1
TABLET, DELAYED RELEASE ORAL
Qty: 180 TABLET | Refills: 1 | Status: SHIPPED | OUTPATIENT
Start: 2019-05-06 | End: 2020-05-01

## 2019-05-08 ENCOUNTER — OFFICE VISIT (OUTPATIENT)
Dept: INTERNAL MEDICINE | Facility: CLINIC | Age: 44
End: 2019-05-08

## 2019-05-08 VITALS
WEIGHT: 258 LBS | BODY MASS INDEX: 38.08 KG/M2 | TEMPERATURE: 98.6 F | SYSTOLIC BLOOD PRESSURE: 124 MMHG | OXYGEN SATURATION: 93 % | HEART RATE: 92 BPM | DIASTOLIC BLOOD PRESSURE: 86 MMHG

## 2019-05-08 DIAGNOSIS — R11.0 NAUSEA: ICD-10-CM

## 2019-05-08 DIAGNOSIS — K86.1 OTHER CHRONIC PANCREATITIS (HCC): Primary | ICD-10-CM

## 2019-05-08 DIAGNOSIS — E78.5 DYSLIPIDEMIA: ICD-10-CM

## 2019-05-08 DIAGNOSIS — E08.49 OTHER DIABETIC NEUROLOGICAL COMPLICATION ASSOCIATED WITH DIABETES MELLITUS DUE TO UNDERLYING CONDITION (HCC): ICD-10-CM

## 2019-05-08 DIAGNOSIS — IMO0002 UNCONTROLLED TYPE 2 DIABETES MELLITUS WITH COMPLICATION, WITH LONG-TERM CURRENT USE OF INSULIN: ICD-10-CM

## 2019-05-08 PROCEDURE — 99213 OFFICE O/P EST LOW 20 MIN: CPT | Performed by: FAMILY MEDICINE

## 2019-05-08 RX ORDER — PROMETHAZINE HYDROCHLORIDE 25 MG/1
25 TABLET ORAL EVERY 6 HOURS PRN
Qty: 30 TABLET | Refills: 2 | Status: SHIPPED | OUTPATIENT
Start: 2019-05-08 | End: 2019-07-18 | Stop reason: SDUPTHER

## 2019-05-08 NOTE — PROGRESS NOTES
Subjective   Danielle Dudley is a 43 y.o. female.     Chief Complaint   Patient presents with   • Diabetes   • GI Problem         History of Present Illness   Multiple issues with a history of gastroparesis with gastric stimulator.  She had bilateral neuropathy improved with Cymbalta and Lyrica now.  Labs from endocrinology reviewed with her.  She has an insulin pump.  Discussion about measles immunity was conducted.    Otherwise she is in need of some as needed Phenergan which is refill.  Amitiza is continued.      The following portions of the patient's history were reviewed and updated as appropriate: allergies, current medications, past social history and problem list.    Review of Systems   Constitutional: Positive for fatigue.   HENT: Negative.    Eyes: Negative.    Respiratory: Negative.    Cardiovascular: Negative.    Gastrointestinal: Positive for constipation and nausea.   Endocrine: Negative.    Genitourinary: Negative.    Musculoskeletal: Positive for arthralgias and myalgias.   Skin: Negative.    Allergic/Immunologic: Negative.    Neurological: Positive for numbness.   Hematological: Negative.    Psychiatric/Behavioral: Negative.        Objective   Vitals:    05/08/19 1247   BP: 124/86   Pulse: 92   Temp: 98.6 °F (37 °C)   SpO2: 93%     Physical Exam   Constitutional: She is oriented to person, place, and time. She appears well-developed and well-nourished.   HENT:   Head: Normocephalic and atraumatic.   Right Ear: Tympanic membrane and external ear normal.   Left Ear: Tympanic membrane and external ear normal.   Nose: Nose normal.   Mouth/Throat: Oropharynx is clear and moist.   Eyes: Conjunctivae and EOM are normal. Pupils are equal, round, and reactive to light.   Neck: Normal range of motion. Neck supple. No JVD present. No thyromegaly present.   Cardiovascular: Normal rate, regular rhythm, normal heart sounds and intact distal pulses.   Pulmonary/Chest: Effort normal and breath sounds normal.    Abdominal: Soft. Bowel sounds are normal.   Musculoskeletal: Normal range of motion.        Lymphadenopathy:     She has no cervical adenopathy.   Neurological: She is alert and oriented to person, place, and time. No cranial nerve deficit. Coordination normal.   Skin: Skin is warm and dry. No rash noted.   Psychiatric: She has a normal mood and affect. Her behavior is normal. Judgment and thought content normal.   Vitals reviewed.      Assessment/Plan   Problem List Items Addressed This Visit        Digestive    Chronic pancreatitis (CMS/[facility]) - Primary       Endocrine    Uncontrolled type 2 diabetes mellitus with complication, with long-term current use of insulin (CMS/[facility])       Other    Dyslipidemia      Other Visit Diagnoses     Other diabetic neurological complication associated with diabetes mellitus due to underlying condition (CMS/HCC)        Nausea          Refill Phenergan other medications continue recheck in 6 months.

## 2019-06-12 DIAGNOSIS — E55.9 VITAMIN D DEFICIENCY: ICD-10-CM

## 2019-06-12 DIAGNOSIS — E78.2 MIXED HYPERLIPIDEMIA: Primary | ICD-10-CM

## 2019-06-12 DIAGNOSIS — R73.9 HYPERGLYCEMIA: ICD-10-CM

## 2019-06-12 DIAGNOSIS — IMO0002 UNCONTROLLED TYPE 2 DIABETES MELLITUS WITH COMPLICATION, WITH LONG-TERM CURRENT USE OF INSULIN: ICD-10-CM

## 2019-06-12 DIAGNOSIS — E78.5 DYSLIPIDEMIA: ICD-10-CM

## 2019-06-12 DIAGNOSIS — E16.2 HYPOGLYCEMIA: ICD-10-CM

## 2019-06-24 RX ORDER — TOPIRAMATE 100 MG/1
TABLET, FILM COATED ORAL
Qty: 30 TABLET | Refills: 8 | Status: SHIPPED | OUTPATIENT
Start: 2019-06-24 | End: 2019-07-03 | Stop reason: SDUPTHER

## 2019-07-03 ENCOUNTER — OFFICE VISIT (OUTPATIENT)
Dept: ENDOCRINOLOGY | Age: 44
End: 2019-07-03

## 2019-07-03 VITALS
HEIGHT: 69 IN | WEIGHT: 270 LBS | BODY MASS INDEX: 39.99 KG/M2 | DIASTOLIC BLOOD PRESSURE: 82 MMHG | RESPIRATION RATE: 16 BRPM | SYSTOLIC BLOOD PRESSURE: 130 MMHG

## 2019-07-03 DIAGNOSIS — K86.1 CHRONIC PANCREATITIS, UNSPECIFIED PANCREATITIS TYPE (HCC): ICD-10-CM

## 2019-07-03 DIAGNOSIS — I10 ESSENTIAL HYPERTENSION: ICD-10-CM

## 2019-07-03 DIAGNOSIS — K86.9 PANCREAS DISORDER: ICD-10-CM

## 2019-07-03 DIAGNOSIS — E10.43 DIABETIC AUTONOMIC NEUROPATHY ASSOCIATED WITH TYPE 1 DIABETES MELLITUS (HCC): ICD-10-CM

## 2019-07-03 DIAGNOSIS — Z79.4 LONG-TERM INSULIN USE (HCC): ICD-10-CM

## 2019-07-03 DIAGNOSIS — IMO0002 UNCONTROLLED TYPE 2 DIABETES MELLITUS WITH COMPLICATION, WITH LONG-TERM CURRENT USE OF INSULIN: Primary | ICD-10-CM

## 2019-07-03 DIAGNOSIS — E28.319 PREMATURE MENOPAUSE: ICD-10-CM

## 2019-07-03 DIAGNOSIS — K31.84 GASTROPARESIS: ICD-10-CM

## 2019-07-03 DIAGNOSIS — E78.2 MIXED HYPERLIPIDEMIA: ICD-10-CM

## 2019-07-03 DIAGNOSIS — E55.9 VITAMIN D DEFICIENCY: ICD-10-CM

## 2019-07-03 DIAGNOSIS — K90.3: ICD-10-CM

## 2019-07-03 DIAGNOSIS — F32.0 CURRENT MILD EPISODE OF MAJOR DEPRESSIVE DISORDER WITHOUT PRIOR EPISODE (HCC): ICD-10-CM

## 2019-07-03 DIAGNOSIS — E78.5 DYSLIPIDEMIA: ICD-10-CM

## 2019-07-03 PROCEDURE — 99214 OFFICE O/P EST MOD 30 MIN: CPT | Performed by: INTERNAL MEDICINE

## 2019-07-03 RX ORDER — ATORVASTATIN CALCIUM 80 MG/1
80 TABLET, FILM COATED ORAL DAILY
Qty: 90 TABLET | Refills: 3 | Status: SHIPPED | OUTPATIENT
Start: 2019-07-03 | End: 2019-10-09 | Stop reason: SDUPTHER

## 2019-07-03 RX ORDER — TOPIRAMATE 100 MG/1
100 TABLET, FILM COATED ORAL
Qty: 90 TABLET | Refills: 3 | Status: SHIPPED | OUTPATIENT
Start: 2019-07-03 | End: 2019-10-09 | Stop reason: SDUPTHER

## 2019-07-03 RX ORDER — ICOSAPENT ETHYL 1000 MG/1
2 CAPSULE ORAL 2 TIMES DAILY WITH MEALS
Qty: 360 CAPSULE | Refills: 3 | Status: SHIPPED | OUTPATIENT
Start: 2019-07-03 | End: 2019-10-09 | Stop reason: SDUPTHER

## 2019-07-03 RX ORDER — VARENICLINE TARTRATE 1 MG/1
1 TABLET, FILM COATED ORAL 2 TIMES DAILY
Qty: 60 TABLET | Refills: 3 | Status: SHIPPED | OUTPATIENT
Start: 2019-07-03 | End: 2019-10-29 | Stop reason: SDUPTHER

## 2019-07-03 RX ORDER — VARENICLINE TARTRATE 0.5 MG/1
0.5 TABLET, FILM COATED ORAL 2 TIMES DAILY
Qty: 60 TABLET | Refills: 0 | Status: SHIPPED | OUTPATIENT
Start: 2019-07-03 | End: 2019-10-29 | Stop reason: SDUPTHER

## 2019-07-03 RX ORDER — DULOXETIN HYDROCHLORIDE 60 MG/1
60 CAPSULE, DELAYED RELEASE ORAL DAILY
Qty: 90 CAPSULE | Refills: 3 | Status: SHIPPED | OUTPATIENT
Start: 2019-07-03 | End: 2019-10-09 | Stop reason: SDUPTHER

## 2019-07-03 RX ORDER — PANCRELIPASE 24000; 76000; 120000 [USP'U]/1; [USP'U]/1; [USP'U]/1
CAPSULE, DELAYED RELEASE PELLETS ORAL
Qty: 100 CAPSULE | Refills: 11 | Status: SHIPPED | OUTPATIENT
Start: 2019-07-03 | End: 2019-10-09 | Stop reason: SDUPTHER

## 2019-07-03 NOTE — PROGRESS NOTES
"Subjective   Danielle Dudley is a 44 y.o. female seen for follow up for DM2, hyperlipidemia, HTN. No lab review. Patient is currently using a Medtronic insulin pump and Dexcom CGM. She is checking BG 2 times a day. She is changing pump sites every 2 days. She denies any problems or concerns.     History of Present Illness this is a 44-year-old female known patient with type 2 diabetes and post pancreatitis secondary diabetes, hypertension and dyslipidemia and vitamin D deficiency.  Currently she is on Medtronic pump and changes her pump site every 2 days because of the high volume of insulin.  She has had no significant health problem for the past 6 months for which to go to the emergency room or hospital.  She is saying that because of having increased stress and depression she has attempted smoking and would like to try Chantix again to stop smoking.    /82   Resp 16   Ht 175.3 cm (69\")   Wt 122 kg (270 lb)   BMI 39.87 kg/m²      Allergies   Allergen Reactions   • Imitrex [Sumatriptan] Shortness Of Breath   • Linzess [Linaclotide] GI Intolerance     constipation   • Codeine Hives   • Levemir [Insulin Detemir] Hives, Rash and Other (See Comments)     Bruising   • Morphine And Related Rash and Other (See Comments)     Alopecia       Current Outpatient Medications:   •  atorvastatin (LIPITOR) 80 MG tablet, Take 1 tablet by mouth Daily., Disp: 30 tablet, Rfl: 4  •  cholecalciferol (VITAMIN D3) 88892 units capsule, TAKE ONE CAPSULE BY MOUTH EVERY DAY, Disp: 90 capsule, Rfl: 0  •  CONTOUR NEXT TEST test strip, Test 8 times daily DX: e11.65, Disp: 800 each, Rfl: 0  •  CREON 28965-77160 units capsule delayed-release particles capsule, TAKE ONE CAPSULE BY MOUTH 3 TIMES A DAY WITH MEALS, Disp: 100 capsule, Rfl: 2  •  cyclobenzaprine (FLEXERIL) 10 MG tablet, Take 1 tablet by mouth 3 (Three) Times a Day As Needed for Muscle Spasms., Disp: 270 tablet, Rfl: 1  •  DULoxetine (CYMBALTA) 60 MG capsule, Take 1 capsule by " mouth Daily., Disp: 30 capsule, Rfl: 2  •  glucagon (GLUCAGON EMERGENCY) 1 MG injection, Inject 1 mg under the skin 1 (One) Time As Needed for Low Blood Sugar for up to 1 dose., Disp: 1 kit, Rfl: 0  •  insulin aspart (NOVOLOG) 100 UNIT/ML injection, Inject 200 units daily via minimed 630G insulin pump [id number] Start: 06/2017 DX:e11.65, Disp: 60 mL, Rfl: 5  •  Lancets (FREESTYLE) lancets, Check blood glucose 4-5 times daily, Disp: 120 each, Rfl: 5  •  losartan (COZAAR) 100 MG tablet, Take 1 tablet by mouth Daily., Disp: 180 tablet, Rfl: 1  •  lubiprostone (AMITIZA) 24 MCG capsule, TAKE 1 CAPSULE BY MOUTH TWICE DAILY WITH FOOD, Disp: 180 capsule, Rfl: 1  •  pantoprazole (PROTONIX) 40 MG EC tablet, TAKE 1 TABLET BY MOUTH EVERY DAY, Disp: 180 tablet, Rfl: 1  •  pregabalin (LYRICA) 150 MG capsule, Take 1 capsule by mouth 2 (Two) Times a Day., Disp: 60 capsule, Rfl: 2  •  promethazine (PHENERGAN) 25 MG tablet, Take 1 tablet by mouth Every 6 (Six) Hours As Needed for Nausea or Vomiting., Disp: 30 tablet, Rfl: 2  •  topiramate (TOPAMAX) 100 MG tablet, TAKE 1 TABLET BY MOUTH EVERY DAY AT NIGHT, Disp: 30 tablet, Rfl: 8  •  TRANSDERM-SCOP, 1.5 MG, 1.5 MG/3DAYS patch, Place 1 patch on the skin as directed by provider Every 3 (Three) Days., Disp: , Rfl:   •  VASCEPA 1 g capsule capsule, Take 2 g by mouth 2 (Two) Times a Day With Meals., Disp: 120 capsule, Rfl: 5      The following portions of the patient's history were reviewed and updated as appropriate: allergies, current medications, past family history, past medical history, past social history, past surgical history and problem list.    Review of Systems   Constitutional: Negative.    HENT: Negative.    Eyes: Negative.    Respiratory: Negative.    Cardiovascular: Negative.    Gastrointestinal: Negative.    Endocrine: Negative.    Genitourinary: Negative.    Musculoskeletal: Negative.    Skin: Negative.    Allergic/Immunologic: Negative.    Neurological: Negative.     Hematological: Negative.    Psychiatric/Behavioral: Negative.        Objective   Physical Exam   Constitutional: She is oriented to person, place, and time. She appears well-developed and well-nourished. No distress.   HENT:   Head: Normocephalic and atraumatic.   Right Ear: External ear normal.   Left Ear: External ear normal.   Nose: Nose normal.   Mouth/Throat: Oropharynx is clear and moist. No oropharyngeal exudate.   She now has dentures.   Eyes: Conjunctivae and EOM are normal. Pupils are equal, round, and reactive to light. Right eye exhibits no discharge. Left eye exhibits no discharge. No scleral icterus.   Neck: Normal range of motion. Neck supple. No JVD present. No tracheal deviation present. No thyromegaly present.   Cardiovascular: Normal rate, regular rhythm, normal heart sounds and intact distal pulses. Exam reveals no gallop and no friction rub.   No murmur heard.  Pulmonary/Chest: Effort normal and breath sounds normal. No stridor. No respiratory distress. She has no wheezes. She has no rales. She exhibits no tenderness.   Abdominal: Soft. Bowel sounds are normal. She exhibits no distension and no mass. There is no tenderness. There is no rebound and no guarding. No hernia.   Musculoskeletal: Normal range of motion. She exhibits no edema, tenderness or deformity.   Lymphadenopathy:     She has no cervical adenopathy.   Neurological: She is alert and oriented to person, place, and time. She has normal reflexes. She displays normal reflexes. No cranial nerve deficit or sensory deficit. She exhibits normal muscle tone. Coordination normal.   Skin: Skin is warm and dry. No rash noted. She is not diaphoretic. No erythema. No pallor.   Psychiatric: She has a normal mood and affect. Her behavior is normal. Judgment and thought content normal.   Nursing note and vitals reviewed.        Assessment/Plan   Diagnoses and all orders for this visit:    Uncontrolled type 2 diabetes mellitus with complication,  with long-term current use of insulin (CMS/[facility])  -     T4 & TSH ([facility])  -     Vitamin D 25 Hydroxy  -     Comprehensive Metabolic Panel  -     Follicle Stimulating Hormone  -     Hemoglobin A1c  -     Lipid Panel  -     Luteinizing Hormone  -     MicroAlbumin, Urine, Random - Urine, Clean Catch  -     NOVOLOG 100 UNIT/ML injection; Inject 200 units daily via minimed 630G insulin pump [id number] Start: 06/2017 DX:e11.65  -     atorvastatin (LIPITOR) 80 MG tablet; Take 1 tablet by mouth Daily.  -     T4 & TSH ([facility]); Future  -     Uric Acid; Future  -     Vitamin D 25 Hydroxy; Future  -     Comprehensive Metabolic Panel; Future  -     C-Peptide; Future  -     Hemoglobin A1c; Future  -     Lipid Panel; Future  -     MicroAlbumin, Urine, Random - Urine, Clean Catch; Future    Essential hypertension  -     T4 & TSH ([facility])  -     Vitamin D 25 Hydroxy  -     Comprehensive Metabolic Panel  -     Follicle Stimulating Hormone  -     Hemoglobin A1c  -     Lipid Panel  -     Luteinizing Hormone  -     MicroAlbumin, Urine, Random - Urine, Clean Catch  -     atorvastatin (LIPITOR) 80 MG tablet; Take 1 tablet by mouth Daily.  -     T4 & TSH ([facility]); Future  -     Uric Acid; Future  -     Vitamin D 25 Hydroxy; Future  -     Comprehensive Metabolic Panel; Future  -     C-Peptide; Future  -     Hemoglobin A1c; Future  -     Lipid Panel; Future  -     MicroAlbumin, Urine, Random - Urine, Clean Catch; Future    Mixed hyperlipidemia  -     T4 & TSH ([facility])  -     Vitamin D 25 Hydroxy  -     Comprehensive Metabolic Panel  -     Follicle Stimulating Hormone  -     Hemoglobin A1c  -     Lipid Panel  -     Luteinizing Hormone  -     MicroAlbumin, Urine, Random - Urine, Clean Catch  -     VASCEPA 1 g capsule capsule; Take 2 g by mouth 2 (Two) Times a Day With Meals.  -     atorvastatin (LIPITOR) 80 MG tablet; Take 1 tablet by mouth Daily.  -     T4 & TSH ([facility]); Future  -     Uric Acid; Future  -     Vitamin D 25 Hydroxy;  Future  -     Comprehensive Metabolic Panel; Future  -     C-Peptide; Future  -     Hemoglobin A1c; Future  -     Lipid Panel; Future  -     MicroAlbumin, Urine, Random - Urine, Clean Catch; Future    Vitamin D deficiency  -     T4 & TSH ([facility])  -     Vitamin D 25 Hydroxy  -     Comprehensive Metabolic Panel  -     Follicle Stimulating Hormone  -     Hemoglobin A1c  -     Lipid Panel  -     Luteinizing Hormone  -     MicroAlbumin, Urine, Random - Urine, Clean Catch  -     atorvastatin (LIPITOR) 80 MG tablet; Take 1 tablet by mouth Daily.  -     T4 & TSH ([facility]); Future  -     Uric Acid; Future  -     Vitamin D 25 Hydroxy; Future  -     Comprehensive Metabolic Panel; Future  -     C-Peptide; Future  -     Hemoglobin A1c; Future  -     Lipid Panel; Future  -     MicroAlbumin, Urine, Random - Urine, Clean Catch; Future    Premature menopause  -     T4 & TSH ([facility])  -     Vitamin D 25 Hydroxy  -     Comprehensive Metabolic Panel  -     Follicle Stimulating Hormone  -     Hemoglobin A1c  -     Lipid Panel  -     Luteinizing Hormone  -     MicroAlbumin, Urine, Random - Urine, Clean Catch  -     atorvastatin (LIPITOR) 80 MG tablet; Take 1 tablet by mouth Daily.  -     T4 & TSH ([facility]); Future  -     Uric Acid; Future  -     Vitamin D 25 Hydroxy; Future  -     Comprehensive Metabolic Panel; Future  -     C-Peptide; Future  -     Hemoglobin A1c; Future  -     Lipid Panel; Future  -     MicroAlbumin, Urine, Random - Urine, Clean Catch; Future    Pancreatic triacylglycerol lipase deficiency  -     T4 & TSH ([facility])  -     Vitamin D 25 Hydroxy  -     Comprehensive Metabolic Panel  -     Follicle Stimulating Hormone  -     Hemoglobin A1c  -     Lipid Panel  -     Luteinizing Hormone  -     MicroAlbumin, Urine, Random - Urine, Clean Catch  -     T4 & TSH ([facility]); Future  -     Uric Acid; Future  -     Vitamin D 25 Hydroxy; Future  -     Comprehensive Metabolic Panel; Future  -     C-Peptide; Future  -     Hemoglobin  A1c; Future  -     Lipid Panel; Future  -     MicroAlbumin, Urine, Random - Urine, Clean Catch; Future    Gastroparesis  -     CREON 84848-54586 units capsule delayed-release particles capsule; 1 capsule p.o. with each meal.  -     T4 & TSH ([facility]); Future  -     Uric Acid; Future  -     Vitamin D 25 Hydroxy; Future  -     Comprehensive Metabolic Panel; Future  -     C-Peptide; Future  -     Hemoglobin A1c; Future  -     Lipid Panel; Future  -     MicroAlbumin, Urine, Random - Urine, Clean Catch; Future    Pancreas disorder  -     CREON 60406-56455 units capsule delayed-release particles capsule; 1 capsule p.o. with each meal.  -     T4 & TSH ([facility]); Future  -     Uric Acid; Future  -     Vitamin D 25 Hydroxy; Future  -     Comprehensive Metabolic Panel; Future  -     C-Peptide; Future  -     Hemoglobin A1c; Future  -     Lipid Panel; Future  -     MicroAlbumin, Urine, Random - Urine, Clean Catch; Future    Chronic pancreatitis, unspecified pancreatitis type (CMS/HCC)  -     atorvastatin (LIPITOR) 80 MG tablet; Take 1 tablet by mouth Daily.  -     T4 & TSH ([facility]); Future  -     Uric Acid; Future  -     Vitamin D 25 Hydroxy; Future  -     Comprehensive Metabolic Panel; Future  -     C-Peptide; Future  -     Hemoglobin A1c; Future  -     Lipid Panel; Future  -     MicroAlbumin, Urine, Random - Urine, Clean Catch; Future    Dyslipidemia  -     atorvastatin (LIPITOR) 80 MG tablet; Take 1 tablet by mouth Daily.  -     T4 & TSH ([facility]); Future  -     Uric Acid; Future  -     Vitamin D 25 Hydroxy; Future  -     Comprehensive Metabolic Panel; Future  -     C-Peptide; Future  -     Hemoglobin A1c; Future  -     Lipid Panel; Future  -     MicroAlbumin, Urine, Random - Urine, Clean Catch; Future    Long-term insulin use (CMS/HCC)  -     atorvastatin (LIPITOR) 80 MG tablet; Take 1 tablet by mouth Daily.  -     T4 & TSH ([facility]); Future  -     Uric Acid; Future  -     Vitamin D 25 Hydroxy; Future  -     Comprehensive  Metabolic Panel; Future  -     C-Peptide; Future  -     Hemoglobin A1c; Future  -     Lipid Panel; Future  -     MicroAlbumin, Urine, Random - Urine, Clean Catch; Future    Diabetic autonomic neuropathy associated with type 1 diabetes mellitus (CMS/HCC)  -     DULoxetine (CYMBALTA) 60 MG capsule; Take 1 capsule by mouth Daily.  -     T4 & TSH ([facility]); Future  -     Uric Acid; Future  -     Vitamin D 25 Hydroxy; Future  -     Comprehensive Metabolic Panel; Future  -     C-Peptide; Future  -     Hemoglobin A1c; Future  -     Lipid Panel; Future  -     MicroAlbumin, Urine, Random - Urine, Clean Catch; Future    Current mild episode of major depressive disorder without prior episode (CMS/HCC)  -     DULoxetine (CYMBALTA) 60 MG capsule; Take 1 capsule by mouth Daily.  -     T4 & TSH ([facility]); Future  -     Uric Acid; Future  -     Vitamin D 25 Hydroxy; Future  -     Comprehensive Metabolic Panel; Future  -     C-Peptide; Future  -     Hemoglobin A1c; Future  -     Lipid Panel; Future  -     MicroAlbumin, Urine, Random - Urine, Clean Catch; Future    Other orders  -     topiramate (TOPAMAX) 100 MG tablet; Take 1 tablet by mouth every night at bedtime.  -     glucagon (GLUCAGON EMERGENCY) 1 MG injection; Inject 1 mg under the skin into the appropriate area as directed 1 (One) Time As Needed for Low Blood Sugar for up to 1 dose.  -     CHANTIX 0.5 MG tablet; Take 1 tablet by mouth 2 (Two) Times a Day.  -     CHANTIX CONTINUING MONTH NANDINI 1 MG tablet; Take 1 tablet by mouth 2 (Two) Times a Day.      In summary I saw and examined this 44-year-old female for above-mentioned problems.  I reviewed her laboratory evaluation of April 10, 2019 and provided her with a hard copy of it.  At this time however we will go ahead and order additional laboratory evaluation and once the results come back we will go ahead and call for any possible modification or new medications.  She will see Ms. Lilian Jones in 4 months or sooner if  needed with laboratory evaluation prior to each office visit.

## 2019-07-04 LAB
25(OH)D3+25(OH)D2 SERPL-MCNC: 52.2 NG/ML (ref 30–100)
ALBUMIN SERPL-MCNC: 4.4 G/DL (ref 3.5–5.2)
ALBUMIN/GLOB SERPL: 1.5 G/DL
ALP SERPL-CCNC: 80 U/L (ref 39–117)
ALT SERPL-CCNC: 10 U/L (ref 1–33)
AST SERPL-CCNC: 10 U/L (ref 1–32)
BILIRUB SERPL-MCNC: 0.3 MG/DL (ref 0.2–1.2)
BUN SERPL-MCNC: 18 MG/DL (ref 6–20)
BUN/CREAT SERPL: 20.7 (ref 7–25)
CALCIUM SERPL-MCNC: 9.1 MG/DL (ref 8.6–10.5)
CHLORIDE SERPL-SCNC: 103 MMOL/L (ref 98–107)
CHOLEST SERPL-MCNC: 182 MG/DL (ref 0–200)
CO2 SERPL-SCNC: 25.3 MMOL/L (ref 22–29)
CREAT SERPL-MCNC: 0.87 MG/DL (ref 0.57–1)
FSH SERPL-ACNC: 2.7 MIU/ML
GLOBULIN SER CALC-MCNC: 2.9 GM/DL
GLUCOSE SERPL-MCNC: 93 MG/DL (ref 65–99)
HBA1C MFR BLD: 9.3 % (ref 4.8–5.6)
HDLC SERPL-MCNC: 42 MG/DL (ref 40–60)
INTERPRETATION: NORMAL
LDLC SERPL CALC-MCNC: 109 MG/DL (ref 0–100)
LH SERPL-ACNC: 3.7 MIU/ML
Lab: NORMAL
MICROALBUMIN UR-MCNC: 6 UG/ML
POTASSIUM SERPL-SCNC: 4.3 MMOL/L (ref 3.5–5.2)
PROT SERPL-MCNC: 7.3 G/DL (ref 6–8.5)
SODIUM SERPL-SCNC: 139 MMOL/L (ref 136–145)
T4 SERPL-MCNC: 6.69 MCG/DL (ref 4.5–11.7)
TRIGL SERPL-MCNC: 154 MG/DL (ref 0–150)
TSH SERPL DL<=0.005 MIU/L-ACNC: 2.93 MIU/ML (ref 0.27–4.2)
VLDLC SERPL CALC-MCNC: 30.8 MG/DL

## 2019-07-08 ENCOUNTER — PRIOR AUTHORIZATION (OUTPATIENT)
Dept: ENDOCRINOLOGY | Age: 44
End: 2019-07-08

## 2019-07-12 ENCOUNTER — RESULTS ENCOUNTER (OUTPATIENT)
Dept: ENDOCRINOLOGY | Age: 44
End: 2019-07-12

## 2019-07-12 DIAGNOSIS — E78.2 MIXED HYPERLIPIDEMIA: ICD-10-CM

## 2019-07-12 DIAGNOSIS — E78.5 DYSLIPIDEMIA: ICD-10-CM

## 2019-07-12 DIAGNOSIS — R73.9 HYPERGLYCEMIA: ICD-10-CM

## 2019-07-12 DIAGNOSIS — IMO0002 UNCONTROLLED TYPE 2 DIABETES MELLITUS WITH COMPLICATION, WITH LONG-TERM CURRENT USE OF INSULIN: ICD-10-CM

## 2019-07-12 DIAGNOSIS — E55.9 VITAMIN D DEFICIENCY: ICD-10-CM

## 2019-07-12 DIAGNOSIS — E16.2 HYPOGLYCEMIA: ICD-10-CM

## 2019-07-18 RX ORDER — PROMETHAZINE HYDROCHLORIDE 25 MG/1
25 TABLET ORAL EVERY 6 HOURS PRN
Qty: 30 TABLET | Refills: 2 | Status: SHIPPED | OUTPATIENT
Start: 2019-07-18 | End: 2020-04-22 | Stop reason: SDUPTHER

## 2019-07-19 RX ORDER — CYCLOBENZAPRINE HCL 10 MG
10 TABLET ORAL 3 TIMES DAILY PRN
Qty: 270 TABLET | Refills: 0 | Status: SHIPPED | OUTPATIENT
Start: 2019-07-19 | End: 2020-04-22 | Stop reason: ALTCHOICE

## 2019-07-20 DIAGNOSIS — K31.84 GASTROPARESIS: ICD-10-CM

## 2019-07-20 DIAGNOSIS — K86.9 PANCREAS DISORDER: ICD-10-CM

## 2019-07-22 RX ORDER — PANCRELIPASE 24000; 76000; 120000 [USP'U]/1; [USP'U]/1; [USP'U]/1
CAPSULE, DELAYED RELEASE PELLETS ORAL
Qty: 100 CAPSULE | Refills: 2 | OUTPATIENT
Start: 2019-07-22

## 2019-07-24 ENCOUNTER — RESULTS ENCOUNTER (OUTPATIENT)
Dept: ENDOCRINOLOGY | Age: 44
End: 2019-07-24

## 2019-07-24 DIAGNOSIS — IMO0002 UNCONTROLLED TYPE 2 DIABETES MELLITUS WITH COMPLICATION, WITH LONG-TERM CURRENT USE OF INSULIN: ICD-10-CM

## 2019-07-24 DIAGNOSIS — E10.43 DIABETIC AUTONOMIC NEUROPATHY ASSOCIATED WITH TYPE 1 DIABETES MELLITUS (HCC): ICD-10-CM

## 2019-08-19 RX ORDER — LUBIPROSTONE 24 UG/1
CAPSULE ORAL
Qty: 180 CAPSULE | Refills: 1 | Status: SHIPPED | OUTPATIENT
Start: 2019-08-19 | End: 2020-10-15

## 2019-09-25 ENCOUNTER — LAB (OUTPATIENT)
Dept: ENDOCRINOLOGY | Age: 44
End: 2019-09-25

## 2019-09-25 DIAGNOSIS — E55.9 VITAMIN D DEFICIENCY: ICD-10-CM

## 2019-09-25 DIAGNOSIS — IMO0002 UNCONTROLLED TYPE 2 DIABETES MELLITUS WITH COMPLICATION, WITH LONG-TERM CURRENT USE OF INSULIN: ICD-10-CM

## 2019-09-25 DIAGNOSIS — E78.2 MIXED HYPERLIPIDEMIA: Primary | ICD-10-CM

## 2019-09-25 DIAGNOSIS — E78.2 MIXED HYPERLIPIDEMIA: ICD-10-CM

## 2019-09-26 LAB
25(OH)D3+25(OH)D2 SERPL-MCNC: 33.1 NG/ML (ref 30–100)
ALBUMIN SERPL-MCNC: 3.6 G/DL (ref 3.5–5.2)
ALBUMIN/GLOB SERPL: 1 G/DL
ALP SERPL-CCNC: 70 U/L (ref 39–117)
ALT SERPL-CCNC: 13 U/L (ref 1–33)
AST SERPL-CCNC: 11 U/L (ref 1–32)
BILIRUB SERPL-MCNC: 0.3 MG/DL (ref 0.2–1.2)
BUN SERPL-MCNC: 15 MG/DL (ref 6–20)
BUN/CREAT SERPL: 17.2 (ref 7–25)
C PEPTIDE SERPL-MCNC: 1.3 NG/ML (ref 1.1–4.4)
CALCIUM SERPL-MCNC: 8.9 MG/DL (ref 8.6–10.5)
CHLORIDE SERPL-SCNC: 103 MMOL/L (ref 98–107)
CHOLEST SERPL-MCNC: 145 MG/DL (ref 0–200)
CO2 SERPL-SCNC: 23.7 MMOL/L (ref 22–29)
CREAT SERPL-MCNC: 0.87 MG/DL (ref 0.57–1)
GLOBULIN SER CALC-MCNC: 3.5 GM/DL
GLUCOSE SERPL-MCNC: 217 MG/DL (ref 65–99)
HBA1C MFR BLD: 9.4 % (ref 4.8–5.6)
HDLC SERPL-MCNC: 35 MG/DL (ref 40–60)
INTERPRETATION: NORMAL
LDLC SERPL CALC-MCNC: 76 MG/DL (ref 0–100)
Lab: NORMAL
MICROALBUMIN UR-MCNC: 9.9 UG/ML
POTASSIUM SERPL-SCNC: 4 MMOL/L (ref 3.5–5.2)
PROT SERPL-MCNC: 7.1 G/DL (ref 6–8.5)
SODIUM SERPL-SCNC: 138 MMOL/L (ref 136–145)
TRIGL SERPL-MCNC: 171 MG/DL (ref 0–150)
VLDLC SERPL CALC-MCNC: 34.2 MG/DL

## 2019-10-03 ENCOUNTER — TELEPHONE (OUTPATIENT)
Dept: INTERNAL MEDICINE | Facility: CLINIC | Age: 44
End: 2019-10-03

## 2019-10-03 RX ORDER — LABETALOL 100 MG/1
100 TABLET, FILM COATED ORAL 2 TIMES DAILY
Qty: 60 TABLET | Refills: 2 | Status: SHIPPED | OUTPATIENT
Start: 2019-10-03 | End: 2019-12-30

## 2019-10-03 NOTE — TELEPHONE ENCOUNTER
I talked to home health about her accelerated blood pressure especially when she gets IVIG for diabetic gastroparesis.  It sounds like her blood pressure is high most of the time even though she is on losartan 100 mg daily.  Most recent BUN and creatinine were normal.  I sent labetalol 100 mg twice daily to improve her blood pressure readings.

## 2019-10-03 NOTE — TELEPHONE ENCOUNTER
The home health nurse called concerned about her BP being elevated, this morning it was 150/92  They come once a week to do a [employer] infusion and her BP has been an issue but today it was 150/92 with a heart rate of 90  The pt takes her Losartan at bedtime so they are wondering if she could take an additional one or does her med need to be changed?

## 2019-10-03 NOTE — TELEPHONE ENCOUNTER
Home health nurse called again, after receiving 1 liter of fluids her BP was up to 168/110  Please call her ASAP

## 2019-10-04 ENCOUNTER — TELEPHONE (OUTPATIENT)
Dept: ENDOCRINOLOGY | Age: 44
End: 2019-10-04

## 2019-10-04 RX ORDER — MUPIROCIN CALCIUM 20 MG/G
CREAM TOPICAL
Qty: 30 G | Refills: 2 | Status: SHIPPED | OUTPATIENT
Start: 2019-10-04 | End: 2020-10-03

## 2019-10-04 NOTE — TELEPHONE ENCOUNTER
pt changed her  pump site, and now its really hard and hurts , pt squeezed on it and said puss was coming out of it. Pt is concerned and dont know what to do ,     Pt ph [phone]

## 2019-10-04 NOTE — TELEPHONE ENCOUNTER
I sent her a prescription for Bactroban to be used 3 times a day in the area of inflammation and do not use any pump needle within at least 4 inches of that side.

## 2019-10-04 NOTE — TELEPHONE ENCOUNTER
SPOKE TO PATIENT. SHE EXPRESSED UNDERSTANDING. SHE STATES THAT SHE HAS AN APPOINTMENT NEXT WEEK AND IS SUPPOSE TO SEE ROSE.

## 2019-10-09 ENCOUNTER — OFFICE VISIT (OUTPATIENT)
Dept: ENDOCRINOLOGY | Age: 44
End: 2019-10-09

## 2019-10-09 VITALS
WEIGHT: 278 LBS | DIASTOLIC BLOOD PRESSURE: 88 MMHG | SYSTOLIC BLOOD PRESSURE: 156 MMHG | HEIGHT: 69 IN | BODY MASS INDEX: 41.18 KG/M2

## 2019-10-09 DIAGNOSIS — IMO0002 UNCONTROLLED TYPE 2 DIABETES MELLITUS WITH COMPLICATION, WITH LONG-TERM CURRENT USE OF INSULIN: ICD-10-CM

## 2019-10-09 DIAGNOSIS — E28.319 PREMATURE MENOPAUSE: ICD-10-CM

## 2019-10-09 DIAGNOSIS — E10.43 DIABETIC AUTONOMIC NEUROPATHY ASSOCIATED WITH TYPE 1 DIABETES MELLITUS (HCC): ICD-10-CM

## 2019-10-09 DIAGNOSIS — K31.84 GASTROPARESIS: ICD-10-CM

## 2019-10-09 DIAGNOSIS — E55.9 VITAMIN D DEFICIENCY: ICD-10-CM

## 2019-10-09 DIAGNOSIS — B99.9 INFECTION: ICD-10-CM

## 2019-10-09 DIAGNOSIS — E78.2 MIXED HYPERLIPIDEMIA: ICD-10-CM

## 2019-10-09 DIAGNOSIS — I10 ESSENTIAL HYPERTENSION: ICD-10-CM

## 2019-10-09 DIAGNOSIS — F32.0 CURRENT MILD EPISODE OF MAJOR DEPRESSIVE DISORDER WITHOUT PRIOR EPISODE (HCC): ICD-10-CM

## 2019-10-09 DIAGNOSIS — Z79.4 LONG-TERM INSULIN USE (HCC): ICD-10-CM

## 2019-10-09 DIAGNOSIS — K86.9 PANCREAS DISORDER: ICD-10-CM

## 2019-10-09 DIAGNOSIS — E78.5 DYSLIPIDEMIA: ICD-10-CM

## 2019-10-09 DIAGNOSIS — S31.109A WOUND OF ABDOMEN: Primary | ICD-10-CM

## 2019-10-09 DIAGNOSIS — K86.1 CHRONIC PANCREATITIS, UNSPECIFIED PANCREATITIS TYPE (HCC): ICD-10-CM

## 2019-10-09 PROCEDURE — 99214 OFFICE O/P EST MOD 30 MIN: CPT | Performed by: NURSE PRACTITIONER

## 2019-10-09 RX ORDER — DULOXETIN HYDROCHLORIDE 60 MG/1
60 CAPSULE, DELAYED RELEASE ORAL DAILY
Qty: 90 CAPSULE | Refills: 3 | Status: SHIPPED | OUTPATIENT
Start: 2019-10-09 | End: 2020-11-14

## 2019-10-09 RX ORDER — ICOSAPENT ETHYL 1000 MG/1
2 CAPSULE ORAL 2 TIMES DAILY WITH MEALS
Qty: 360 CAPSULE | Refills: 3 | Status: SHIPPED | OUTPATIENT
Start: 2019-10-09 | End: 2020-02-26 | Stop reason: SDUPTHER

## 2019-10-09 RX ORDER — TOPIRAMATE 100 MG/1
100 TABLET, FILM COATED ORAL
Qty: 90 TABLET | Refills: 1 | Status: SHIPPED | OUTPATIENT
Start: 2019-10-09 | End: 2019-11-13 | Stop reason: SDUPTHER

## 2019-10-09 RX ORDER — ATORVASTATIN CALCIUM 80 MG/1
80 TABLET, FILM COATED ORAL DAILY
Qty: 90 TABLET | Refills: 3 | Status: SHIPPED | OUTPATIENT
Start: 2019-10-09 | End: 2020-02-26 | Stop reason: SDUPTHER

## 2019-10-09 RX ORDER — PREGABALIN 150 MG/1
150 CAPSULE ORAL 2 TIMES DAILY
Qty: 60 CAPSULE | Refills: 2 | Status: SHIPPED | OUTPATIENT
Start: 2019-10-09 | End: 2020-02-07 | Stop reason: SDUPTHER

## 2019-10-09 RX ORDER — LEVOFLOXACIN 750 MG/1
750 TABLET ORAL DAILY
Qty: 10 TABLET | Refills: 0 | Status: SHIPPED | OUTPATIENT
Start: 2019-10-09 | End: 2019-10-19

## 2019-10-09 RX ORDER — PANCRELIPASE 24000; 76000; 120000 [USP'U]/1; [USP'U]/1; [USP'U]/1
CAPSULE, DELAYED RELEASE PELLETS ORAL
Qty: 100 CAPSULE | Refills: 11 | Status: SHIPPED | OUTPATIENT
Start: 2019-10-09 | End: 2020-02-26 | Stop reason: SDUPTHER

## 2019-10-09 NOTE — PROGRESS NOTES
Danielle Dudley  presents to the office  for the follow-up appointment for Type 2 diabetes mellitus.     The diabete's condition location is throughout the system with the  clinical course has fluctuated, the severity is Mild, and the modifying/allievating factors are insulin pump.  Medications and  Dosages were  reviewed with Danielle Dudley and suggested that compliance most of the time.       The patient is currently on insulin.     Compliance with blood glucose monitoring: good.     Meal panning: The patient is using carbohydrate counting, but is not on a specified limit, being a pump user.    The patient is currently taking home blood tests - Blood glucose testin times daily, that are:  before each meal and 1 or 2 hours after meal  fasting and bedtime  anytime you feel symptoms of hyperglycemia or hypoglycemia (high or low blood sugars)  Novolog U100 per insulin pump. Changing site every 2 days.    Last instructions given were:  In summary I saw and examined this 44-year-old female for above-mentioned problems.  I reviewed her laboratory evaluation of April 10, 2019 and provided her with a hard copy of it.  At this time however we will go ahead and order additional laboratory evaluation and once the results come back we will go ahead and call for any possible modification or new medications.  She will see Ms. Lilian Jones in 4 months or sooner if needed with laboratory evaluation prior to each office visit    Home blood glucose testing daily: 4  insulin pump upload  -Yes-insulin pump was uploaded dated 2019 for the report dates of  through  adherence is good the average blood glucose is 298+ or -93 average blood glucose checks 2.293% above target total daily dose 103.53+ or -13 with 58% basal and 42% bolusing the Dexcom shows a average of 276 mg/Annie with a 64 g/Annie standard deviation there is no risk of hypo-she was above range 93% of the time in range 7 days with CGM was  79%.    Last reported blood glucose readings are:   See upload    Patterns reported per patient are that she has an infected pump site.          The following portions of the patient's history were reviewed and updated as appropriate: current medications, past family history, past medical history, past social history, past surgical history and problem list.      Current Outpatient Medications:   •  atorvastatin (LIPITOR) 80 MG tablet, Take 1 tablet by mouth Daily., Disp: 90 tablet, Rfl: 3  •  CHANTIX 0.5 MG tablet, Take 1 tablet by mouth 2 (Two) Times a Day., Disp: 60 tablet, Rfl: 0  •  CHANTIX CONTINUING MONTH NANDINI 1 MG tablet, Take 1 tablet by mouth 2 (Two) Times a Day., Disp: 60 tablet, Rfl: 3  •  cholecalciferol (VITAMIN D3) 18262 units capsule, Take 1 capsule by mouth Daily., Disp: 90 capsule, Rfl: 1  •  CONTOUR NEXT TEST test strip, Test 8 times daily DX: e11.65, Disp: 800 each, Rfl: 0  •  CREON 65110-90250 units capsule delayed-release particles capsule, 1 capsule p.o. with each meal., Disp: 100 capsule, Rfl: 11  •  cyclobenzaprine (FLEXERIL) 10 MG tablet, TAKE 1 TABLET BY MOUTH 3 (THREE) TIMES A DAY AS NEEDED FOR MUSCLE SPASMS., Disp: 270 tablet, Rfl: 0  •  DULoxetine (CYMBALTA) 60 MG capsule, Take 1 capsule by mouth Daily., Disp: 90 capsule, Rfl: 3  •  glucagon (GLUCAGON EMERGENCY) 1 MG injection, Inject 1 mg under the skin into the appropriate area as directed 1 (One) Time As Needed for Low Blood Sugar for up to 1 dose., Disp: 1 kit, Rfl: 11  •  labetalol (NORMODYNE) 100 MG tablet, Take 1 tablet by mouth 2 (Two) Times a Day., Disp: 60 tablet, Rfl: 2  •  Lancets (FREESTYLE) lancets, Check blood glucose 4-5 times daily, Disp: 120 each, Rfl: 5  •  losartan (COZAAR) 100 MG tablet, Take 1 tablet by mouth Daily., Disp: 180 tablet, Rfl: 1  •  lubiprostone (AMITIZA) 24 MCG capsule, TAKE 1 CAPSULE BY MOUTH TWICE DAILY WITH FOOD, Disp: 180 capsule, Rfl: 1  •  mupirocin (BACTROBAN) 2 % cream, Apply to affected area 3  times daily, Disp: 30 g, Rfl: 2  •  NOVOLOG 100 UNIT/ML injection, Inject 200 units daily via minimed 630G insulin pump SN:[id number] Start: 06/2017 DX:e11.65, Disp: 60 mL, Rfl: 5  •  pantoprazole (PROTONIX) 40 MG EC tablet, TAKE 1 TABLET BY MOUTH EVERY DAY, Disp: 180 tablet, Rfl: 1  •  pregabalin (LYRICA) 150 MG capsule, Take 1 capsule by mouth 2 (Two) Times a Day., Disp: 60 capsule, Rfl: 2  •  promethazine (PHENERGAN) 25 MG tablet, TAKE 1 TABLET BY MOUTH EVERY 6 (SIX) HOURS AS NEEDED FOR NAUSEA OR VOMITING., Disp: 30 tablet, Rfl: 2  •  topiramate (TOPAMAX) 100 MG tablet, Take 1 tablet by mouth every night at bedtime., Disp: 90 tablet, Rfl: 1  •  TRANSDERM-SCOP, 1.5 MG, 1.5 MG/3DAYS patch, Place 1 patch on the skin as directed by provider Every 3 (Three) Days., Disp: , Rfl:   •  VASCEPA 1 g capsule capsule, Take 2 g by mouth 2 (Two) Times a Day With Meals., Disp: 360 capsule, Rfl: 3  •  insulin regular (humuLIN R) 500 UNIT/ML CONCENTRATED injection, Inject 10 unit marking on U-100 syringe under the skin into the appropriate area as directed Continuous. Per insulin pump, Disp: 3 vial, Rfl: 4  •  levoFLOXacin (LEVAQUIN) 750 MG tablet, Take 1 tablet by mouth Daily for 10 days., Disp: 10 tablet, Rfl: 0    Patient Active Problem List    Diagnosis   • Neck pain [M54.2]   • Umbilical pain [R10.33]   • Optic neuritis [H46.9]   • Encounter for smoking cessation counseling [Z71.6]   • Migraine without aura and without status migrainosus, not intractable [G43.009]   • Adjustment insomnia [F51.02]   • Chronic tension-type headache, intractable [G44.221]   • Mixed hyperlipidemia [E78.2]   • Essential hypertension [I10]   • Dyslipidemia [E78.5]   • Vitamin D deficiency [E55.9]   • Premature menopause [E28.319]   • Tobacco abuse counseling [Z71.6]   • Uncontrolled type 2 diabetes mellitus with complication, with long-term current use of insulin (CMS/[facility]) [E11.8, E11.65, Z79.4]   • Hypoglycemia [E16.2]   • Hyperglycemia [R73.9]  "  • Chronic pancreatitis (CMS/HCC) [K86.1]   • Gastroparesis [K31.84]   • Pancreas disorder [K86.9]   • Long-term insulin use (CMS/HCC) [Z79.4]       Review of Systems   A comprehensive review of the 14 systems was negative except of listed below:  Integument/breast: positive for skin color change, skin lesion(s) and abd lesion, \"draining\" painful rt lower quad, painful. duration 1 week.   Endocrine: hyperglycemia     Objective:     Wt Readings from Last 3 Encounters:   10/09/19 126 kg (278 lb)   07/03/19 122 kg (270 lb)   05/08/19 117 kg (258 lb)     Temp Readings from Last 3 Encounters:   05/08/19 98.6 °F (37 °C) (Tympanic)   10/24/18 97.2 °F (36.2 °C) (Tympanic)   06/20/18 98.2 °F (36.8 °C) (Oral)     BP Readings from Last 3 Encounters:   10/09/19 156/88   07/03/19 130/82   05/08/19 124/86     Pulse Readings from Last 3 Encounters:   05/08/19 92   02/11/19 82   11/28/18 90        /88   Ht 175.3 cm (69.02\")   Wt 126 kg (278 lb)   BMI 41.03 kg/m²        Physical Exam   Constitutional: She is oriented to person, place, and time. She appears well-developed and well-nourished. No distress.   HENT:   Head: Normocephalic and atraumatic.   Eyes: EOM are normal. Pupils are equal, round, and reactive to light.   Neck: Normal range of motion. Neck supple. No thyromegaly present.   Cardiovascular: Normal rate, regular rhythm, normal heart sounds and intact distal pulses.   No murmur heard.  Pulmonary/Chest: Effort normal and breath sounds normal.   Abdominal: Soft. Bowel sounds are normal.   Musculoskeletal: Normal range of motion.   Neurological: She is alert and oriented to person, place, and time.   Skin: Skin is warm and dry. Capillary refill takes 2 to 3 seconds. She is not diaphoretic.   Right lower quadrant approximately 8 cm from the umbilicus there is a 6 x 7 round lesion multi-shades of erythema center is a 3 x 3 cm deep erythemic with a 1 x 0.5 opening purulent drainage warm to touch and painful    Rt " upper arm picc in place.     Psychiatric: She has a normal mood and affect. Her behavior is normal. Judgment and thought content normal.   Nursing note and vitals reviewed.          Lab Review  Results for orders placed or performed in visit on 09/25/19   Comprehensive Metabolic Panel   Result Value Ref Range    Glucose 217 (H) 65 - 99 mg/dL    BUN 15 6 - 20 mg/dL    Creatinine 0.87 0.57 - 1.00 mg/dL    eGFR Non African Am 70 >60 mL/min/1.73    eGFR African Am 85 >60 mL/min/1.73    BUN/Creatinine Ratio 17.2 7.0 - 25.0    Sodium 138 136 - 145 mmol/L    Potassium 4.0 3.5 - 5.2 mmol/L    Chloride 103 98 - 107 mmol/L    Total CO2 23.7 22.0 - 29.0 mmol/L    Calcium 8.9 8.6 - 10.5 mg/dL    Total Protein 7.1 6.0 - 8.5 g/dL    Albumin 3.60 3.50 - 5.20 g/dL    Globulin 3.5 gm/dL    A/G Ratio 1.0 g/dL    Total Bilirubin 0.3 0.2 - 1.2 mg/dL    Alkaline Phosphatase 70 39 - 117 U/L    AST (SGOT) 11 1 - 32 U/L    ALT (SGPT) 13 1 - 33 U/L   C-Peptide   Result Value Ref Range    C-Peptide 1.3 1.1 - 4.4 ng/mL   Hemoglobin A1c   Result Value Ref Range    Hemoglobin A1C 9.40 (H) 4.80 - 5.60 %   MicroAlbumin, Urine, Random - Urine, Clean Catch   Result Value Ref Range    Microalbumin, Urine 9.9 Not Estab. ug/mL   Vitamin D 25 Hydroxy   Result Value Ref Range    25 Hydroxy, Vitamin D 33.1 30.0 - 100.0 ng/ml   Lipid Panel   Result Value Ref Range    Total Cholesterol 145 0 - 200 mg/dL    Triglycerides 171 (H) 0 - 150 mg/dL    HDL Cholesterol 35 (L) 40 - 60 mg/dL    VLDL Cholesterol 34.2 mg/dL    LDL Cholesterol  76 0 - 100 mg/dL   Cardiovascular Risk Assessment   Result Value Ref Range    Interpretation Note    Diabetes Patient Education   Result Value Ref Range    PDF Image Not applicable            Assessment:   Danielle was seen today for follow-up.    Diagnoses and all orders for this visit:    Wound of abdomen  -     Ambulatory Referral to Wound Clinic  -     levoFLOXacin (LEVAQUIN) 750 MG tablet; Take 1 tablet by mouth Daily for 10  days.    Diabetic autonomic neuropathy associated with type 1 diabetes mellitus (CMS/[facility])  -     DULoxetine (CYMBALTA) 60 MG capsule; Take 1 capsule by mouth Daily.  -     pregabalin (LYRICA) 150 MG capsule; Take 1 capsule by mouth 2 (Two) Times a Day.    Current mild episode of major depressive disorder without prior episode (CMS/[facility])  -     DULoxetine (CYMBALTA) 60 MG capsule; Take 1 capsule by mouth Daily.    Uncontrolled type 2 diabetes mellitus with complication, with long-term current use of insulin (CMS/[facility])  -     insulin regular (humuLIN R) 500 UNIT/ML CONCENTRATED injection; Inject 10 unit marking on U-100 syringe under the skin into the appropriate area as directed Continuous. Per insulin pump  -     atorvastatin (LIPITOR) 80 MG tablet; Take 1 tablet by mouth Daily.    Essential hypertension  -     atorvastatin (LIPITOR) 80 MG tablet; Take 1 tablet by mouth Daily.    Mixed hyperlipidemia  -     atorvastatin (LIPITOR) 80 MG tablet; Take 1 tablet by mouth Daily.  -     VASCEPA 1 g capsule capsule; Take 2 g by mouth 2 (Two) Times a Day With Meals.    Vitamin D deficiency  -     atorvastatin (LIPITOR) 80 MG tablet; Take 1 tablet by mouth Daily.    Premature menopause  -     atorvastatin (LIPITOR) 80 MG tablet; Take 1 tablet by mouth Daily.    Chronic pancreatitis, unspecified pancreatitis type (CMS/[facility])  -     atorvastatin (LIPITOR) 80 MG tablet; Take 1 tablet by mouth Daily.    Dyslipidemia  -     atorvastatin (LIPITOR) 80 MG tablet; Take 1 tablet by mouth Daily.    Long-term insulin use (CMS/[facility])  -     atorvastatin (LIPITOR) 80 MG tablet; Take 1 tablet by mouth Daily.    Gastroparesis  -     CREON 84144-00647 units capsule delayed-release particles capsule; 1 capsule p.o. with each meal.    Pancreas disorder  -     CREON 22543-58918 units capsule delayed-release particles capsule; 1 capsule p.o. with each meal.    Infection  -     levoFLOXacin (LEVAQUIN) 750 MG tablet; Take 1 tablet by mouth Daily for 10  days.    Other orders  -     cholecalciferol (VITAMIN D3) 36703 units capsule; Take 1 capsule by mouth Daily.  -     topiramate (TOPAMAX) 100 MG tablet; Take 1 tablet by mouth every night at bedtime.          Plan:   In summary/ Medication changes: I met with this patient is metabolically stable and doing well at this time.  Laboratory testing was reviewed dated 9.25-20119, discussed with questions and answers completed.  Discussed and formulate a treatment plan with patient, patient verbally stated understood all instructions.    1. Diagnoses and all orders for this visit:     Wound of abdomen/ Infection-new problem to this provider.  Prescription for Levaquin 750 mg once daily was sent for 10 days.  Also referral to the wound clinic.       Diabetic autonomic neuropathy associated with type 1 diabetes mellitus (CMS/HCC)- chronic, stable no medication changes at this time. Refills prescribed. Future labs ordered for upcoming appointment and assessment.     -    Uncontrolled type 2 diabetes mellitus with complication, with long-term current use of insulin (CMS/HCC)- chronic, uncontrolled.  Medication changes were as follows    A prescription for Humulin U 500 was sent to the pharmacy due to the increase changing every other day that increases infection risk also the high level of basal insulin use.  With the sensor on the pump upload we are going to increase her 15% across-the-board on basal  Basal will be  12 AM-2.6 units/h  3 AM-2.85 units/h  6 AM-3.25 units/h  12 PM-3.6 units/h  3 PM 2.6 units/h  8 PM 2.85 units/h    Carb ratio 1 unit for every 3 g  Insulin sensitivity 1 unit for every 40 mg/Annie for target ranges of 110 320  Maximum bolus 25 units with active insulin time of 3.3  -     insulin regular (humuLIN R) 500 UNIT/ML CONCENTRATED injection; Inject 10 unit marking on U-100 syringe under the skin into the appropriate area as directed Continuous. Per insulin pump  -     atorvastatin (LIPITOR) 80 MG tablet;  Take 1 tablet by mouth Daily.    Essential hypertension- chronic, stable no medication changes at this time. Refills prescribed. Future labs ordered for upcoming appointment and assessment.     -    Mixed hyperlipidemia- chronic, stable no medication changes at this time. Refills prescribed. Future labs ordered for upcoming appointment and assessment.        Vitamin D deficiency- chronic, stable no medication changes at this time. Refills prescribed. Future labs ordered for upcoming appointment and assessment.         Dyslipidemia- chronic, stable no medication changes at this time. Refills prescribed. Future labs ordered for upcoming appointment and assessment.         Gastroparesis- chronic, stable no medication changes at this time. Refills prescribed. Future labs ordered for upcoming appointment and assessment.         instructions:    home blood tests -  Blood glucose testing: 3 times daily, that are:  fasting- 1st thing in morning before eating or drinking  before each meal and 1 or 2 hours after meal  bedtime  anytime you feel symptoms of hyperglycemia or hypoglycemia (high or low blood sugars)        Education:  interpretation of lab results, blood sugar goals, complications of diabetes mellitus, hypoglycemia prevention and treatment, exercise, illness management, self-monitoring of blood glucose skills, nutrition, carbohydrate counting and site rotation        The total face to face time spent was  25 minutes  with additional education given: 14 minutes (greater than 50% of the total time) was spent with counseling and coordination of care on: SMBG with goals, Side effects profiles with medications, medication use and purposes,     Return in about 3 months (around 1/9/2020), or if symptoms worsen or fail to improve, for Recheck..3 months with Lilian-2 weeks prior for labs 6 months with Dr. Andrade-2 weeks prior for labs      Dragon transcription disclaimer     Much of this encounter note is an electronic  transcription/translation of spoken language to printed text. The electronic translation of spoken language may permit erroneous, or at times, nonsensical words or phrases to be inadvertently transcribed. Although I have reviewed the note for such errors, some may still exist.

## 2019-10-15 ENCOUNTER — TELEPHONE (OUTPATIENT)
Dept: ENDOCRINOLOGY | Age: 44
End: 2019-10-15

## 2019-10-15 NOTE — TELEPHONE ENCOUNTER
Have you received request for updated clinical notes     Please call forrest at [facility]

## 2019-10-18 ENCOUNTER — APPOINTMENT (OUTPATIENT)
Dept: WOUND CARE | Facility: HOSPITAL | Age: 44
End: 2019-10-18

## 2019-10-23 ENCOUNTER — TELEPHONE (OUTPATIENT)
Dept: ENDOCRINOLOGY | Age: 44
End: 2019-10-23

## 2019-10-23 NOTE — TELEPHONE ENCOUNTER
WAS IN THE HOSPITAL  A LOT OF ANTIBIOTICS  SHE BELIEVES SHE HAS A YEAST INFECTION    PATIENT SAYS HER PCP DOESN'T TAKE CARE OF THAT     [facility]      PLEASE CALL PATIENT

## 2019-10-25 ENCOUNTER — RESULTS ENCOUNTER (OUTPATIENT)
Dept: ENDOCRINOLOGY | Age: 44
End: 2019-10-25

## 2019-10-25 DIAGNOSIS — F32.0 CURRENT MILD EPISODE OF MAJOR DEPRESSIVE DISORDER WITHOUT PRIOR EPISODE (HCC): ICD-10-CM

## 2019-10-25 DIAGNOSIS — IMO0002 UNCONTROLLED TYPE 2 DIABETES MELLITUS WITH COMPLICATION, WITH LONG-TERM CURRENT USE OF INSULIN: ICD-10-CM

## 2019-10-25 DIAGNOSIS — K90.3: ICD-10-CM

## 2019-10-25 DIAGNOSIS — Z79.4 LONG-TERM INSULIN USE (HCC): ICD-10-CM

## 2019-10-25 DIAGNOSIS — K86.9 PANCREAS DISORDER: ICD-10-CM

## 2019-10-25 DIAGNOSIS — E78.2 MIXED HYPERLIPIDEMIA: ICD-10-CM

## 2019-10-25 DIAGNOSIS — E28.319 PREMATURE MENOPAUSE: ICD-10-CM

## 2019-10-25 DIAGNOSIS — E10.43 DIABETIC AUTONOMIC NEUROPATHY ASSOCIATED WITH TYPE 1 DIABETES MELLITUS (HCC): ICD-10-CM

## 2019-10-25 DIAGNOSIS — E78.5 DYSLIPIDEMIA: ICD-10-CM

## 2019-10-25 DIAGNOSIS — I10 ESSENTIAL HYPERTENSION: ICD-10-CM

## 2019-10-25 DIAGNOSIS — E55.9 VITAMIN D DEFICIENCY: ICD-10-CM

## 2019-10-25 DIAGNOSIS — K86.1 CHRONIC PANCREATITIS, UNSPECIFIED PANCREATITIS TYPE (HCC): ICD-10-CM

## 2019-10-25 DIAGNOSIS — K31.84 GASTROPARESIS: ICD-10-CM

## 2019-10-29 RX ORDER — VARENICLINE TARTRATE 1 MG/1
TABLET, FILM COATED ORAL
Qty: 168 TABLET | Refills: 1 | Status: SHIPPED | OUTPATIENT
Start: 2019-10-29 | End: 2020-04-20

## 2019-11-08 RX ORDER — APIXABAN 5 MG/1
5 TABLET, FILM COATED ORAL EVERY 12 HOURS SCHEDULED
Qty: 60 TABLET | Refills: 0 | Status: SHIPPED | OUTPATIENT
Start: 2019-11-08 | End: 2019-12-01 | Stop reason: SDUPTHER

## 2019-11-08 RX ORDER — APIXABAN 5 MG/1
TABLET, FILM COATED ORAL
Refills: 0 | COMMUNITY
Start: 2019-10-15 | End: 2019-11-08 | Stop reason: SDUPTHER

## 2019-11-13 ENCOUNTER — OFFICE VISIT (OUTPATIENT)
Dept: INTERNAL MEDICINE | Facility: CLINIC | Age: 44
End: 2019-11-13

## 2019-11-13 VITALS
SYSTOLIC BLOOD PRESSURE: 146 MMHG | OXYGEN SATURATION: 95 % | TEMPERATURE: 97.5 F | WEIGHT: 281 LBS | DIASTOLIC BLOOD PRESSURE: 78 MMHG | BODY MASS INDEX: 41.48 KG/M2 | HEART RATE: 96 BPM

## 2019-11-13 DIAGNOSIS — R51.9 CHRONIC DAILY HEADACHE: ICD-10-CM

## 2019-11-13 DIAGNOSIS — E66.01 MORBIDLY OBESE (HCC): ICD-10-CM

## 2019-11-13 DIAGNOSIS — E78.2 MIXED HYPERLIPIDEMIA: Primary | ICD-10-CM

## 2019-11-13 DIAGNOSIS — K86.1 OTHER CHRONIC PANCREATITIS (HCC): ICD-10-CM

## 2019-11-13 DIAGNOSIS — K31.84 GASTROPARESIS: ICD-10-CM

## 2019-11-13 DIAGNOSIS — I10 ESSENTIAL HYPERTENSION: ICD-10-CM

## 2019-11-13 DIAGNOSIS — L30.9 ECZEMA OF BOTH HANDS: ICD-10-CM

## 2019-11-13 DIAGNOSIS — IMO0002 UNCONTROLLED TYPE 2 DIABETES MELLITUS WITH COMPLICATION, WITH LONG-TERM CURRENT USE OF INSULIN: ICD-10-CM

## 2019-11-13 DIAGNOSIS — Z71.6 TOBACCO ABUSE COUNSELING: ICD-10-CM

## 2019-11-13 PROCEDURE — 99214 OFFICE O/P EST MOD 30 MIN: CPT | Performed by: FAMILY MEDICINE

## 2019-11-13 RX ORDER — TOPIRAMATE 100 MG/1
100 TABLET, FILM COATED ORAL 2 TIMES DAILY
Qty: 180 TABLET | Refills: 1 | Status: SHIPPED | OUTPATIENT
Start: 2019-11-13 | End: 2020-05-04

## 2019-11-13 NOTE — PROGRESS NOTES
Subjective   Danielle Dudley is a 44 y.o. female.     Chief Complaint   Patient presents with   • Rash   • Diabetes   • Hypertension   • Hyperlipidemia   Obesity chronic headaches      History of Present Illness   Patient is a complex patient with a history of severe gastroparesis treated at the motility disorder clinic with an implanted gastric stimulator and now getting IVIG for the same problem.  Treatment of diabetes type 2 is reviewed and she has had increasing weight with her current treatment she has had the addition of Cymbalta and also Chantix to prevent smoking.  She has been off cigarettes as well.    Treatment of hypertension hyperlipidemia reviewed and she has developed bilateral hand eczema since getting IVIG as well as DVT in the right upper arm her there is a PICC line.  She is currently on Eliquis for the DVT.      The following portions of the patient's history were reviewed and updated as appropriate: allergies, current medications, past social history and problem list.    Review of Systems   Constitutional: Positive for fatigue and unexpected weight change.   HENT: Negative.    Eyes: Negative.    Respiratory: Negative.    Cardiovascular: Negative.    Gastrointestinal: Negative.    Endocrine: Negative.    Genitourinary: Negative.    Musculoskeletal: Negative.    Skin: Positive for rash.   Allergic/Immunologic: Negative.    Neurological: Negative.    Hematological: Negative.    Psychiatric/Behavioral: Positive for decreased concentration. The patient is nervous/anxious.        Objective   Vitals:    11/13/19 0831   BP: 146/78   Pulse: 96   Temp: 97.5 °F (36.4 °C)   SpO2: 95%     Physical Exam   Constitutional: She is oriented to person, place, and time. She appears well-developed and well-nourished.   HENT:   Head: Normocephalic and atraumatic.   Right Ear: Tympanic membrane and external ear normal.   Left Ear: Tympanic membrane and external ear normal.   Nose: Nose normal.   Mouth/Throat:  Oropharynx is clear and moist.   Eyes: Conjunctivae and EOM are normal. Pupils are equal, round, and reactive to light.   Neck: Normal range of motion. Neck supple. No JVD present. No thyromegaly present.   Cardiovascular: Normal rate, regular rhythm, normal heart sounds and intact distal pulses.   Pulmonary/Chest: Effort normal and breath sounds normal.   Abdominal: Soft. Bowel sounds are normal.   Musculoskeletal: Normal range of motion.        Arms:       Hands:  Lymphadenopathy:     She has no cervical adenopathy.   Neurological: She is alert and oriented to person, place, and time. No cranial nerve deficit. Coordination normal.   Skin: Skin is warm and dry. No rash noted.   Psychiatric: She has a normal mood and affect. Her behavior is normal. Judgment and thought content normal.   Vitals reviewed.      Assessment/Plan   Problem List Items Addressed This Visit        Cardiovascular and Mediastinum    Mixed hyperlipidemia - Primary    Essential hypertension       Digestive    Morbidly obese (CMS/HCC)    Chronic pancreatitis (CMS/[facility])    Gastroparesis       Endocrine    Uncontrolled type 2 diabetes mellitus with complication, with long-term current use of insulin (CMS/[facility])       Musculoskeletal and Integument    Eczema of both hands    Relevant Medications    betamethasone valerate (VALISONE) 0.1 % ointment       Other    Tobacco abuse counseling      Other Visit Diagnoses     Chronic daily headache        Relevant Medications    topiramate (TOPAMAX) 100 MG tablet      Add betamethasone valerate 0.1% ointment to use sparingly for hand eczema.  Use up to twice daily as needed.  Continue IVIG and follow-up with motility clinic and also follow-up with endocrinology.  She has chronic daily headaches and topiramate may help her lose weight possibly.  Will increase topiramate to 100 twice daily with a discussion about side effects recheck in about 4 months.

## 2019-12-02 RX ORDER — APIXABAN 5 MG/1
TABLET, FILM COATED ORAL
Qty: 60 TABLET | Refills: 4 | Status: SHIPPED | OUTPATIENT
Start: 2019-12-02 | End: 2020-04-27

## 2019-12-30 RX ORDER — LABETALOL 100 MG/1
TABLET, FILM COATED ORAL
Qty: 180 TABLET | Refills: 0 | Status: SHIPPED | OUTPATIENT
Start: 2019-12-30 | End: 2020-02-26 | Stop reason: SDUPTHER

## 2020-01-16 ENCOUNTER — OFFICE VISIT (OUTPATIENT)
Dept: INTERNAL MEDICINE | Facility: CLINIC | Age: 45
End: 2020-01-16

## 2020-01-16 VITALS
HEART RATE: 88 BPM | RESPIRATION RATE: 16 BRPM | BODY MASS INDEX: 41.77 KG/M2 | SYSTOLIC BLOOD PRESSURE: 142 MMHG | DIASTOLIC BLOOD PRESSURE: 90 MMHG | HEIGHT: 69 IN | WEIGHT: 282 LBS | OXYGEN SATURATION: 99 % | TEMPERATURE: 98.6 F

## 2020-01-16 DIAGNOSIS — F41.9 ANXIETY: ICD-10-CM

## 2020-01-16 DIAGNOSIS — T50.8X5D ADVERSE EFFECT OF CONTRAST MEDIA, SUBSEQUENT ENCOUNTER: ICD-10-CM

## 2020-01-16 DIAGNOSIS — M75.102 TEAR OF LEFT ROTATOR CUFF, UNSPECIFIED TEAR EXTENT, UNSPECIFIED WHETHER TRAUMATIC: ICD-10-CM

## 2020-01-16 DIAGNOSIS — Z09 HOSPITAL DISCHARGE FOLLOW-UP: Primary | ICD-10-CM

## 2020-01-16 PROCEDURE — 99214 OFFICE O/P EST MOD 30 MIN: CPT | Performed by: NURSE PRACTITIONER

## 2020-01-16 RX ORDER — EPINEPHRINE 0.3 MG/.3ML
INJECTION SUBCUTANEOUS
COMMUNITY
Start: 2020-01-08 | End: 2021-08-11 | Stop reason: SDUPTHER

## 2020-01-16 RX ORDER — BUSPIRONE HYDROCHLORIDE 5 MG/1
5 TABLET ORAL 3 TIMES DAILY
Qty: 90 TABLET | Refills: 1 | Status: SHIPPED | OUTPATIENT
Start: 2020-01-16 | End: 2020-03-26

## 2020-01-16 NOTE — PROGRESS NOTES
"Subjective   Danielle Dudley is a 44 y.o. female.   CC: Hospital follow-up, torn left rotator cuff, anaphylaxis following contrast dye    Patient presents for hospital follow-up.  This is a 44-year-old female patient of Dr. fernandez.  She initially presented to [facility] on 1/4/2019 for evaluation of left shoulder pain.  She ended up having a CT with contrast, resulting in an anaphylactic reaction to the contrast dye requiring intubation.  She gradually improved during her stay in the ICU and returned home a little over 1 week ago.  She presents today for follow-up.  She reports that she was supposed to follow-up with PT and OT but has not heard from them outpatient.  She has a torn left rotator cuff for which she has an appointment later this month with an orthopedic doctor with [facility].  She was instructed to follow-up with an allergist for extensive allergy testing but needs a referral today.  She is having a lot of anxiety and reports \"I continue to relive the experience of having to be intubated.  It was the most frightening experience of my life and I am having a lot of anxiety after the fact.\"  She reports that talking about the incident compounds the anxiety about it.  She denies development of any other new issues today.       The following portions of the patient's history were reviewed and updated as appropriate: allergies, current medications, past family history, past medical history, past social history, past surgical history and problem list.    Review of Systems   Constitutional: Negative for activity change, chills, fatigue, fever, unexpected weight gain and unexpected weight loss.   HENT: Negative for congestion, hearing loss, postnasal drip, sinus pressure, sneezing, sore throat, swollen glands and tinnitus.    Eyes: Negative for photophobia, pain and visual disturbance.   Respiratory: Negative for cough, chest tightness, shortness of breath and wheezing.    Cardiovascular: Negative for chest " "pain, palpitations and leg swelling.   Gastrointestinal: Negative for abdominal distention, abdominal pain, constipation, diarrhea, nausea and vomiting.   Endocrine: Negative for polydipsia, polyphagia and polyuria.   Genitourinary: Negative for dysuria, frequency, hematuria and urgency.   Musculoskeletal: Positive for arthralgias.   Neurological: Negative for dizziness, weakness, numbness and headache.   Psychiatric/Behavioral: Positive for dysphoric mood and stress. Negative for suicidal ideas. The patient is nervous/anxious.    All other systems reviewed and are negative.      Objective    /90   Pulse 88   Temp 98.6 °F (37 °C) (Oral)   Resp 16   Ht 175.3 cm (69.02\")   Wt 128 kg (282 lb)   SpO2 99%   BMI 41.62 kg/m²     Physical Exam   Constitutional: She is oriented to person, place, and time. She appears well-developed and well-nourished. No distress.   HENT:   Head: Normocephalic and atraumatic.   Eyes: Pupils are equal, round, and reactive to light.   Neck: Normal range of motion. Neck supple.   Cardiovascular: Normal rate, regular rhythm, normal heart sounds and intact distal pulses. Exam reveals no gallop and no friction rub.   No murmur heard.  Pulmonary/Chest: Effort normal and breath sounds normal. No stridor. No respiratory distress. She has no wheezes. She has no rales. She exhibits no tenderness.   Lungs are CTA bilaterally   Abdominal: Soft. Bowel sounds are normal.   Musculoskeletal:        Left shoulder: She exhibits decreased range of motion, pain and spasm.   Neurological: She is alert and oriented to person, place, and time.   Skin: Skin is warm and dry. Capillary refill takes less than 2 seconds. She is not diaphoretic.   Psychiatric: She has a normal mood and affect. Her behavior is normal. Judgment and thought content normal.   Nursing note and vitals reviewed.    Current outpatient and discharge medications have been reconciled for the patient.  Reviewed by: Ellen Desai, " "APRN      Assessment/Plan   Danielle was seen today for follow-up.    Diagnoses and all orders for this visit:    Hospital discharge follow-up  -     Ambulatory Referral to Physical Therapy Evaluate and treat    Anxiety  -     busPIRone (BUSPAR) 5 MG tablet; Take 1 tablet by mouth 3 (Three) Times a Day.    Tear of left rotator cuff, unspecified tear extent, unspecified whether traumatic  -     Ambulatory Referral to Physical Therapy Evaluate and treat    Adverse effect of contrast media, subsequent encounter  -     Ambulatory Referral to Allergy      -Hospital follow-up: Reviewed discharge summary from [facility].  She was discharged home a little over 1 week ago.  She is still having extensive pain from the left shoulder and is to follow-up with orthopedic surgery later this month.  We will get PT on board.    -She is having a lot of anxiety after the fact, and reports that she \"keeps reliving\" the anaphylaxis.  We will try buspirone 5 mg 3 times daily for anxiety.  We discussed a psychology referral but she would like to hold off at this time, stating that she will let me know if she would like this referral in the coming weeks.  We will get her to an allergist for extensive allergy testing following the reaction to contrast dye.  She has been instructed to avoid all shellfish.    -Follow-up PRN and we will see patient back in April 2020 with PCP, Dr. fernandez for her previously scheduled appointment.         "

## 2020-01-29 ENCOUNTER — TREATMENT (OUTPATIENT)
Dept: PHYSICAL THERAPY | Facility: CLINIC | Age: 45
End: 2020-01-29

## 2020-01-29 DIAGNOSIS — M54.12 RADICULOPATHY, CERVICAL: ICD-10-CM

## 2020-01-29 DIAGNOSIS — M25.512 LEFT SHOULDER PAIN, UNSPECIFIED CHRONICITY: Primary | ICD-10-CM

## 2020-01-29 DIAGNOSIS — M75.02 ADHESIVE CAPSULITIS OF LEFT SHOULDER: ICD-10-CM

## 2020-01-29 PROCEDURE — 97161 PT EVAL LOW COMPLEX 20 MIN: CPT | Performed by: PHYSICAL THERAPIST

## 2020-01-29 PROCEDURE — 97110 THERAPEUTIC EXERCISES: CPT | Performed by: PHYSICAL THERAPIST

## 2020-01-29 NOTE — PROGRESS NOTES
"Physical Therapy Initial Evaluation and Plan of Care    Patient: Danielle Dudley   : 1975  Diagnosis/ICD-10 Code:  Left shoulder pain, unspecified chronicity [M25.512]  Referring practitioner: RICKI Champagne    Subjective Evaluation    History of Present Illness  Onset date: 8 months' duration.  Mechanism of injury: 8 months' duration of (L) shoulder pain of unknown provocation.  Patient thinks this may be related to when she moved 15 months ago (\"maybe pulled something at that time.\")     Presented to [facility] on 2020 for (L) shoulder pain.  Underwent CT with contrast of neck (per patient report) and suffered an anaphylactic reaction to the contrast dye.  Ended up intubated and was in hospital for 5 days.      Saw an orthopedic MD today for the first time and was told he does not believe she has a rotator cuff tear but instead tendonitis and possibly frozen shoulder (patient is diabetic).  Patient denies any problems with (L) shoulder prior to this episode.  Nerve study for (L) UE has been ordered but not yet scheduled. Patient to RTMD in 6 weeks.    \"I think the muscles in my arm have disintegrated from not using it.\" Patient reports difficulty with sleeping, showering, and grooming/washing hair.    PMH is extensive; patient has a (R) PICC line and a glucose monitor in (R) lateral upper arm. Patient suffered a blood clot (R) UE in Oct 2019 due to PICC line.    Patient reports numbness of (L) arm and 4th and 5th digits with movements of (L) UE. \"Fingers feel heavy.\"       Patient Occupation: Not working / on disability Quality of life: fair    Pain  Current pain ratin  At best pain ratin  At worst pain rating: 10  Location: generalized (L) shoulder, anterior shoulder, (L) upper trap, lateral cervical region  Quality: sharp and throbbing  Relieving factors: medications (has tried heat, ice, patches, Aleve; cannot take Tylenol products)  Aggravating factors: lifting, movement, overhead " "activity, outstretched reach, repetitive movement and sleeping    Hand dominance: right    Treatments  Current treatment: physical therapy  Patient Goals  Patient goals for therapy: decreased pain, increased motion, increased strength and independence with ADLs/IADLs  Patient goal: \"be able to use my arm\"           Objective       Tenderness     Additional Tenderness Details  Generalized TTP (L) anterior, superior, and lateral shoulder    Neurological Testing     Sensation     Shoulder   Left Shoulder   Intact: light touch    Right Shoulder   Intact: light touch    Additional Neurological Details  Intact/equal (B) UE dermatomes    Active Range of Motion   Cervical/Thoracic Spine   Cervical    Flexion: 45 (tingling down entirety of (L) UE) degrees   Extension: 46 ((L) lateral cervical, UT, superior shoulder pain) degrees with pain  Left lateral flexion: 48 degrees   Right lateral flexion: 55 (tingling entirety of (L) UE) degrees   Left rotation: 62 (pulling (L) lateral cervical, upper trap with pain) degrees with pain  Right rotation: 66 (tingling (L) UE) degrees   Left Shoulder   Flexion: 78 (painful, tight) degrees with pain  Extension: 32 (\"fingers throbbing\") degrees   Abduction: 79 (painful, tight) degrees with pain  External rotation 90°: 81 degrees with pain  Internal rotation 90°: 51 degrees     Right Shoulder   Flexion: 155 degrees   Extension: 60 degrees   Abduction: 170 degrees   External rotation 90°: 87 degrees  Internal rotation 90°: 56 degrees     Additional Active Range of Motion Details  Greatest pain (L) shoulder with abduction > external rotation    Strength/Myotome Testing     Left Shoulder     Planes of Motion   Flexion: 3+   Extension: 4-   Abduction: 3+   External rotation at 0°: 3+   Internal rotation at 0°: 4-     Right Shoulder     Planes of Motion   Flexion: 4+   Extension: 5   Abduction: 4+   External rotation at 0°: 4+   Internal rotation at 0°: 4+     Left Wrist/Hand      (2nd hand " position)     Trial 1: 15 lbs    Trial 2: 30 lbs    Trial 3: 9 lbs    Average: 18 lbs    Right Wrist/Hand      (2nd hand position)     Trial 1: 48 lbs    Trial 2: 51 lbs    Trial 3: 50 lbs    Average: 49.67 lbs         Assessment & Plan     Assessment  Impairments: abnormal coordination, abnormal muscle firing, abnormal or restricted ROM, activity intolerance, impaired physical strength, lacks appropriate home exercise program and pain with function  Assessment details: Patient is a 44 y.o female who reports insidious onset of (L) shoulder symptoms approx 8 months ago. She also reports numbness and tingling of (L) UE including digits 4 and 5.  CT scan was performed at [facility] on 01/04/20 (per patient report, of cervical spine; no access to records this date) with contrast medium to which patient suffered an anaphylactic reaction and was intubated.  Her orthopedic MD believes she may have tendonitis, frozen shoulder, and cervical radiculopathy.  She rates symptoms 1-10/10, worse with movement of (L) UE or head, overhead reach, and sleeping.  She exhibits TTP, decreased and painful cervical and (L) shoulder AROM, decreased strength including functional , and poor positional tolerance.  Her Quick DASH score is 54.55% indicating significant perceived functional limitation.  She will benefit from skilled PT services to reduce symptoms, improve ROM and strength, and assist patient in return to optimal functional level.  Prognosis: good  Functional Limitations: carrying objects, lifting, sleeping, pulling, pushing, uncomfortable because of pain, reaching behind back, reaching overhead and unable to perform repetitive tasks  Goals  Plan Goals: STGs: to be met in 3 weeks  1. Patient will be independent with initial HEP  2. Patient will report improved (L) shoulder and UE symptoms 0-5/10 for improved comfort with ADLs  3. Patient will consistently report no radicular symptoms beyond elbow for evidence of  symptom centralization  4. Patient will demonstrate improved cervical AROM (B) lateral flexion and rotation >/= 10 deg each plane and (L) shoulder flexion and abduction >/= 120 deg for improved functional ROM  5. Patient will exhibit min/mod (+) TTP over 50% more localized area as evidence of decreasing inflammation    LTGs: to be met in 6 weeks  1. Patient will be independent with progressed strength and stabilization HEP  2. Patient will report improved (L) UE symptoms 0-3/10 for improved comfort with ADLs  3. Patient will report consistently sleeping without waking more than once nightly due to (L) UE symptoms for improved restorative healing  4. Patient will have improved (L) UE strength >/= 4+/5 all planes and  strength >/= 30# for improved function  5. Patient will have improved Quick DASH score </= 30% for subjective evidence of functional improvement    Plan  Therapy options: will be seen for skilled physical therapy services  Planned modality interventions: cryotherapy, ultrasound, thermotherapy (hydrocollator packs) and electrical stimulation/Russian stimulation  Planned therapy interventions: ADL retraining, fine motor coordination training, flexibility, functional ROM exercises, home exercise program, joint mobilization, manual therapy, motor coordination training, neuromuscular re-education, soft tissue mobilization, strengthening, stretching and therapeutic activities  Duration in visits: 12  Treatment plan discussed with: patient        Manual Therapy:         mins  82004;  Therapeutic Exercise:    24     mins  11283;     Neuromuscular Tono:        mins  56240;    Therapeutic Activity:          mins  36121;     Gait Training:           mins  48344;     Ultrasound:          mins  30811;    Electrical Stimulation:         mins  08362 ( );  Dry Needling          mins self-pay    Timed Treatment:   24   mins   Total Treatment:     50   mins    PT SIGNATURE: Aby Davis PT, DPT   DATE TREATMENT  INITIATED: 1/29/2020    Medicare Initial Certification  Certification Period: 4/28/2020  I certify that the therapy services are furnished while this patient is under my care.  The services outlined above are required by this patient, and will be reviewed every 90 days.     PHYSICIAN: Ellen Desai APRN      DATE:     Please sign and return via fax to [phone].. Thank you, [facility].

## 2020-01-30 ENCOUNTER — TRANSCRIBE ORDERS (OUTPATIENT)
Dept: PHYSICAL THERAPY | Facility: CLINIC | Age: 45
End: 2020-01-30

## 2020-01-30 DIAGNOSIS — M25.512 LEFT SHOULDER PAIN, UNSPECIFIED CHRONICITY: Primary | ICD-10-CM

## 2020-01-30 DIAGNOSIS — M54.2 PAIN, NECK: ICD-10-CM

## 2020-02-03 ENCOUNTER — TREATMENT (OUTPATIENT)
Dept: PHYSICAL THERAPY | Facility: CLINIC | Age: 45
End: 2020-02-03

## 2020-02-03 DIAGNOSIS — M54.2 NECK PAIN: ICD-10-CM

## 2020-02-03 DIAGNOSIS — M54.12 RADICULOPATHY, CERVICAL: ICD-10-CM

## 2020-02-03 DIAGNOSIS — M75.02 ADHESIVE CAPSULITIS OF LEFT SHOULDER: ICD-10-CM

## 2020-02-03 DIAGNOSIS — M25.512 LEFT SHOULDER PAIN, UNSPECIFIED CHRONICITY: Primary | ICD-10-CM

## 2020-02-03 PROCEDURE — 97110 THERAPEUTIC EXERCISES: CPT | Performed by: PHYSICAL THERAPIST

## 2020-02-03 NOTE — PROGRESS NOTES
"Physical Therapy Daily Progress Note    Visit # 2    Danielle Dudley reports: \"The morning after I was here for therapy I was really sore.  I got the compounding cream the doctor ordered which helps a lot with the pain but also when it wears off it almost hurts worse. I still can only lay on my right side when I'm sleeping which bothers the left side of my neck. I've also noticed that I can't push up from chairs with my left arm like I used to because it hurts and the strength is just not there.  Also my underarm is a little swollen, I noticed this morning.\"     Subjective     Objective   See Exercise, Manual, and Modality Logs for complete treatment.   *Increased repetitions of stretching activities  *Initiated scap retracts and chin tucks    Assessment & Plan     Assessment  Assessment details: Patient tolerance to exercise is very limited this date as all activities, despite cueing to decrease intensity of effort and ROM arcs, provoke cervical and/or (L) upper extremity symptoms.  She requires frequent rest breaks and increased time to perform all tasks.  She does responds positively to MH application but this may be beneficial prior to exercise at next visit.        Progress per Plan of Care - consider MH application prior to exercise; attempt retro shoulder rolls.         Manual Therapy:         mins  40592;  Therapeutic Exercise:    26     mins  89996;     Neuromuscular Tono:        mins  79603;    Therapeutic Activity:          mins  32743;     Gait Training:           mins  89242;     Ultrasound:          mins  10073;    Electrical Stimulation:         mins  38144 ( );  Dry Needling          mins self-pay    Timed Treatment:   26   mins   Total Treatment:     40   mins    Aby Davis PT, DPT  Physical Therapist  [place] License # [id number]    "

## 2020-02-05 ENCOUNTER — TREATMENT (OUTPATIENT)
Dept: PHYSICAL THERAPY | Facility: CLINIC | Age: 45
End: 2020-02-05

## 2020-02-05 DIAGNOSIS — M54.12 RADICULOPATHY, CERVICAL: ICD-10-CM

## 2020-02-05 DIAGNOSIS — M75.02 ADHESIVE CAPSULITIS OF LEFT SHOULDER: ICD-10-CM

## 2020-02-05 DIAGNOSIS — M25.512 LEFT SHOULDER PAIN, UNSPECIFIED CHRONICITY: Primary | ICD-10-CM

## 2020-02-05 DIAGNOSIS — M54.2 NECK PAIN: ICD-10-CM

## 2020-02-05 PROCEDURE — 97110 THERAPEUTIC EXERCISES: CPT | Performed by: PHYSICAL THERAPIST

## 2020-02-05 NOTE — PROGRESS NOTES
"Physical Therapy Daily Progress Note    Visit # 3    Danielle Dudley reports: \"My shoulder is doing a little better.  I found myself being able to reach across with my left hand to my right shoulder without the pain I was having before.  I haven't been able to do that in a long time. I remembered that I did fall on [address] down 2 steps, landing on my (R) hand.  My (L) shoulder was already hurting but it did hurt worse after that.\"    Subjective     Objective   See Exercise, Manual, and Modality Logs for complete treatment.   *Initiated retro shoulder rolls and seated cervical rotation    Assessment & Plan     Assessment  Assessment details: Patient verbalizes slightly decreased symptoms (L) shoulder, specifically with horizontal adduction, and responds positively to MH application prior to exercise.  Tolerance to exercise remains quite limited by pain and scapular retractions remain primary provoking activity with cervical tension and slight headache during performance.  Patient also reports mild headache with (L) cervical rotation despite cueing to modify ROM arc to perform painfree.         Progress per Plan of Care as s/s allow. Consider supine shoulder AAROM with wand.         Manual Therapy:         mins  17191;  Therapeutic Exercise:    28     mins  45270;     Neuromuscular Tono:        mins  70302;    Therapeutic Activity:          mins  24799;     Gait Training:           mins  65335;     Ultrasound:          mins  96380;    Electrical Stimulation:         mins  07863 ( );  Dry Needling          mins self-pay    Timed Treatment:   28   mins   Total Treatment:     47   mins    Aby Davis PT, DPT  Physical Therapist  [place] License # [id number]    "

## 2020-02-07 DIAGNOSIS — E10.43 DIABETIC AUTONOMIC NEUROPATHY ASSOCIATED WITH TYPE 1 DIABETES MELLITUS (HCC): ICD-10-CM

## 2020-02-07 RX ORDER — PREGABALIN 150 MG/1
150 CAPSULE ORAL 2 TIMES DAILY
Qty: 60 CAPSULE | Refills: 2 | Status: SHIPPED | OUTPATIENT
Start: 2020-02-07 | End: 2020-05-20

## 2020-02-07 NOTE — TELEPHONE ENCOUNTER
Patient needs a script of lyrica she believes its a 150 mg  She has been out since Friday     Send to [facility]  Phone [phone]    2 x daily  30 day supply

## 2020-02-14 ENCOUNTER — TREATMENT (OUTPATIENT)
Dept: PHYSICAL THERAPY | Facility: CLINIC | Age: 45
End: 2020-02-14

## 2020-02-14 DIAGNOSIS — M75.02 ADHESIVE CAPSULITIS OF LEFT SHOULDER: ICD-10-CM

## 2020-02-14 DIAGNOSIS — M25.512 LEFT SHOULDER PAIN, UNSPECIFIED CHRONICITY: Primary | ICD-10-CM

## 2020-02-14 PROCEDURE — 97110 THERAPEUTIC EXERCISES: CPT | Performed by: PHYSICAL THERAPIST

## 2020-02-14 NOTE — PROGRESS NOTES
Physical Therapy Daily Progress Note      Visit # 4      Subjective Evaluation    History of Present Illness    Subjective comment: Tripped on fell into a wall with her left arm yesterday after getting the PICC line removed.  Now has pain in the entire arm.  Her  tried using the compound prescribed for pain but it did not help. Pain  Current pain ratin           Objective   See Exercise, Manual, and Modality Logs for complete treatment.       Assessment & Plan     Assessment  Assessment details: She is certainly more stiff and sore after her fall into the wall.  She fell with her wrist in extension and elbow straight.  She struggled with exercises but was able to complete all the exercises. Kept the MH on her during 1/2 of her exercises which eased some of the discomfort.            43         Manual Therapy:         mins  54727;  Therapeutic Exercise:    28     mins  46480;     Neuromuscular Tono:        mins  47793;    Therapeutic Activity:          mins  35745;     Gait Training:           mins  41379;     Ultrasound:          mins  02545;    Work Hardening                 mins 96879  Iontophoresis                  mins 20646    Timed Treatment:   28   mins   Total Treatment:     43   mins    Jessica Curry, PT  Physical Therapist

## 2020-02-19 ENCOUNTER — TREATMENT (OUTPATIENT)
Dept: PHYSICAL THERAPY | Facility: CLINIC | Age: 45
End: 2020-02-19

## 2020-02-19 DIAGNOSIS — M25.512 LEFT SHOULDER PAIN, UNSPECIFIED CHRONICITY: Primary | ICD-10-CM

## 2020-02-19 DIAGNOSIS — M75.02 ADHESIVE CAPSULITIS OF LEFT SHOULDER: ICD-10-CM

## 2020-02-19 PROCEDURE — 97110 THERAPEUTIC EXERCISES: CPT | Performed by: PHYSICAL THERAPIST

## 2020-02-19 NOTE — PROGRESS NOTES
Physical Therapy Daily Progress Note      Visit # 5      Subjective Evaluation    History of Present Illness    Subjective comment: Patient reports that since her fall it has really stirred up the pain on her right side. She states that the pain radiates from her hand all the way up her arm into her shoulder, neck and ear.  Has been using heat on a regular basis and Alieve on intermittent basis.    She states that normally she feels better following therapy.     Objective   See Exercise, Manual, and Modality Logs for complete treatment.       Assessment & Plan     Assessment  Assessment details: Patient tolerated treatment fairly well with no significant increase in pain. She kept the heat on her shoulder for most of her exercises. Observed AAROM to 150 degrees during wall walks.                       Manual Therapy:    0     mins  94075;  Therapeutic Exercise:    35     mins  22654;     Neuromuscular Tono:    0    mins  18946;    Therapeutic Activity:     0     mins  92804;     Gait Trainin     mins  32623;     Ultrasound:     0     mins  18182;    Work Hardening           0      mins 03029  Iontophoresis               0   mins 48673    Timed Treatment:   35   mins   Total Treatment:     45   mins    Divya Vera, PT  Physical Therapist

## 2020-02-20 LAB
25(OH)D3+25(OH)D2 SERPL-MCNC: 31.5 NG/ML (ref 30–100)
ALBUMIN SERPL-MCNC: 3.8 G/DL (ref 3.5–5.2)
ALBUMIN/GLOB SERPL: 1.2 G/DL
ALP SERPL-CCNC: 78 U/L (ref 39–117)
ALT SERPL-CCNC: 17 U/L (ref 1–33)
AST SERPL-CCNC: 12 U/L (ref 1–32)
BILIRUB SERPL-MCNC: 0.3 MG/DL (ref 0.2–1.2)
BUN SERPL-MCNC: 21 MG/DL (ref 6–20)
BUN/CREAT SERPL: 23.9 (ref 7–25)
C PEPTIDE SERPL-MCNC: 1 NG/ML (ref 1.1–4.4)
CALCIUM SERPL-MCNC: 9 MG/DL (ref 8.6–10.5)
CHLORIDE SERPL-SCNC: 101 MMOL/L (ref 98–107)
CHOLEST SERPL-MCNC: 165 MG/DL (ref 0–200)
CO2 SERPL-SCNC: 22.9 MMOL/L (ref 22–29)
CREAT SERPL-MCNC: 0.88 MG/DL (ref 0.57–1)
GLOBULIN SER CALC-MCNC: 3.3 GM/DL
GLUCOSE SERPL-MCNC: 304 MG/DL (ref 65–99)
HBA1C MFR BLD: 10.5 % (ref 4.8–5.6)
HDLC SERPL-MCNC: 38 MG/DL (ref 40–60)
INTERPRETATION: NORMAL
LDLC SERPL CALC-MCNC: 93 MG/DL (ref 0–100)
Lab: NORMAL
POTASSIUM SERPL-SCNC: 3.9 MMOL/L (ref 3.5–5.2)
PROT SERPL-MCNC: 7.1 G/DL (ref 6–8.5)
SODIUM SERPL-SCNC: 137 MMOL/L (ref 136–145)
T4 SERPL-MCNC: 6.45 MCG/DL (ref 4.5–11.7)
TRIGL SERPL-MCNC: 171 MG/DL (ref 0–150)
TSH SERPL DL<=0.005 MIU/L-ACNC: 1.96 UIU/ML (ref 0.27–4.2)
UNABLE TO VOID: NORMAL
URATE SERPL-MCNC: 4.3 MG/DL (ref 2.4–5.7)
VLDLC SERPL CALC-MCNC: 34.2 MG/DL

## 2020-02-21 ENCOUNTER — TREATMENT (OUTPATIENT)
Dept: PHYSICAL THERAPY | Facility: CLINIC | Age: 45
End: 2020-02-21

## 2020-02-21 DIAGNOSIS — M75.02 ADHESIVE CAPSULITIS OF LEFT SHOULDER: ICD-10-CM

## 2020-02-21 DIAGNOSIS — M25.512 LEFT SHOULDER PAIN, UNSPECIFIED CHRONICITY: Primary | ICD-10-CM

## 2020-02-21 PROCEDURE — 97110 THERAPEUTIC EXERCISES: CPT | Performed by: PHYSICAL THERAPIST

## 2020-02-21 NOTE — PROGRESS NOTES
*Physical Therapy Daily Progress Note/30 Day reassessment      Visit # 6      Subjective Evaluation    History of Present Illness    Subjective comment: Shoulder is not feling good this morning. The upper trap stretch causes pain down her entire left arm and makes her a bit woozy.  Was advised to not laterally flex her head/cervical spine as much. Pain  Current pain ratin           Objective   See Exercise, Manual, and Modality Logs for complete treatment.       Assessment & Plan     Assessment  Assessment details: Danielle Dudley has been seen for 6 physical therapy sessions for left shoulder and UE pain.  Treatment has included therapeutic exercise, neuro-muscular retraining , patient education with home exercise program  and . Progress to physical therapy goals is good. She is demonstrating improved mobility in all planes but continues to have pain. AROM flexion 150, ER 80, extension 40, IR 60 and abduction 90.  She is not demonstrating improved Cervical ROM due to pain.  She is scheduled to have a port placed on Monday and is not sure when she will be able to participate in PT.  She does plan on returning to PT and she will benefit from continued skilled physical therapy to address remaining impairments and functional limitations.     Goals  Plan Goals: STGs: to be met in 3 weeks  1. Patient will be independent with initial HEP.  MET  2. Patient will report improved (L) shoulder and UE symptoms 0-5/10 for improved comfort with ADLs.  NOT MET  She is continuing to have pain in the shoulder that radiates down her left arm to her hand.  3. Patient will consistently report no radicular symptoms beyond elbow for evidence of symptom centralization.  NOT MET  She is continuing to radicular symptoms to her left hand.   4. Patient will demonstrate improved cervical AROM (B) lateral flexion and rotation >/= 10 deg each plane and (L) shoulder flexion and abduction >/= 120 deg for improved functional ROM.  PARTIALLY  MET  5. Patient will exhibit min/mod (+) TTP over 50% more localized area as evidence of decreasing inflammation. NOT MET    LTGs: to be met in 6 weeks  1. Patient will be independent with progressed strength and stabilization HEP  NOT MET  2. Patient will report improved (L) UE symptoms 0-3/10 for improved comfort with ADLs  NOT MET  3. Patient will report consistently sleeping without waking more than once nightly due to (L) UE symptoms for improved restorative healing  NOT MET  4. Patient will have improved (L) UE strength >/= 4+/5 all planes and  strength >/= 30# for improved function  NOT MET  5. Patient will have improved Quick DASH score </= 30% for subjective evidence of functional improvement  NOT MET    Plan  Therapy options: will be seen for skilled physical therapy services                     Manual Therapy:         mins  39268;  Therapeutic Exercise:    35     mins  54922;     Neuromuscular Tono:        mins  28639;    Therapeutic Activity:          mins  61510;     Gait Training:           mins  91100;     Ultrasound:          mins  43646;    Work Hardening                 mins 91887  Iontophoresis                  mins 94109    Timed Treatment:   35   mins   Total Treatment:     45   mins    Jessica Curry, PT  Physical Therapist

## 2020-02-26 ENCOUNTER — OFFICE VISIT (OUTPATIENT)
Dept: ENDOCRINOLOGY | Age: 45
End: 2020-02-26

## 2020-02-26 VITALS
DIASTOLIC BLOOD PRESSURE: 76 MMHG | RESPIRATION RATE: 16 BRPM | WEIGHT: 281 LBS | BODY MASS INDEX: 41.62 KG/M2 | HEIGHT: 69 IN | SYSTOLIC BLOOD PRESSURE: 138 MMHG

## 2020-02-26 DIAGNOSIS — I10 ESSENTIAL HYPERTENSION: ICD-10-CM

## 2020-02-26 DIAGNOSIS — K86.1 CHRONIC PANCREATITIS, UNSPECIFIED PANCREATITIS TYPE (HCC): ICD-10-CM

## 2020-02-26 DIAGNOSIS — IMO0002 UNCONTROLLED TYPE 2 DIABETES MELLITUS WITH COMPLICATION, WITH LONG-TERM CURRENT USE OF INSULIN: Primary | ICD-10-CM

## 2020-02-26 DIAGNOSIS — E55.9 VITAMIN D DEFICIENCY: ICD-10-CM

## 2020-02-26 DIAGNOSIS — E78.2 MIXED HYPERLIPIDEMIA: ICD-10-CM

## 2020-02-26 DIAGNOSIS — E28.319 PREMATURE MENOPAUSE: ICD-10-CM

## 2020-02-26 DIAGNOSIS — K86.9 PANCREAS DISORDER: ICD-10-CM

## 2020-02-26 DIAGNOSIS — E78.5 DYSLIPIDEMIA: ICD-10-CM

## 2020-02-26 DIAGNOSIS — K31.84 GASTROPARESIS: ICD-10-CM

## 2020-02-26 DIAGNOSIS — Z79.4 LONG-TERM INSULIN USE (HCC): ICD-10-CM

## 2020-02-26 PROCEDURE — 99214 OFFICE O/P EST MOD 30 MIN: CPT | Performed by: INTERNAL MEDICINE

## 2020-02-26 RX ORDER — LABETALOL 100 MG/1
100 TABLET, FILM COATED ORAL 2 TIMES DAILY
Qty: 180 TABLET | Refills: 3 | Status: SHIPPED | OUTPATIENT
Start: 2020-02-26 | End: 2020-05-07

## 2020-02-26 RX ORDER — ATORVASTATIN CALCIUM 80 MG/1
80 TABLET, FILM COATED ORAL DAILY
Qty: 90 TABLET | Refills: 3 | Status: SHIPPED | OUTPATIENT
Start: 2020-02-26 | End: 2020-05-07 | Stop reason: SDUPTHER

## 2020-02-26 RX ORDER — PANCRELIPASE 24000; 76000; 120000 [USP'U]/1; [USP'U]/1; [USP'U]/1
CAPSULE, DELAYED RELEASE PELLETS ORAL
Qty: 100 CAPSULE | Refills: 11 | Status: SHIPPED | OUTPATIENT
Start: 2020-02-26 | End: 2020-05-07 | Stop reason: SDUPTHER

## 2020-02-26 RX ORDER — ICOSAPENT ETHYL 1000 MG/1
2 CAPSULE ORAL 2 TIMES DAILY WITH MEALS
Qty: 360 CAPSULE | Refills: 3 | Status: SHIPPED | OUTPATIENT
Start: 2020-02-26 | End: 2020-08-12 | Stop reason: SDUPTHER

## 2020-02-26 NOTE — PROGRESS NOTES
"Subjective   Danielle Dudley is a 44 y.o. female seen for follow up for DM2, hyperlipidemia, lab review. Patient is currently using a [employer] insulin pump and changing pump sites every 2 days. She is checking BG 4 times a day. She states that she had a port put in on Monday. She is currently not using her Dexcom.     History of Present Illness this is a 44-year-old female known patient with type 2 diabetes and hypertension and dyslipidemia as well as vitamin D deficiency and obesity.  She is using Medtronic insulin pump and uses Dexcom as a continuous blood glucose monitoring tool.  About a month ago she was undergoing an MRI of her left shoulder and even though in the past she had never had any reaction to contrast this time she had a respiratory arrest and after being resuscitated she is been 2 days in the intensive care unit during which she received large doses of glucocorticoids and IV steroids.  But even before that she said she average her blood glucose in low 200 range.    /76   Resp 16   Ht 175.3 cm (69\")   Wt 127 kg (281 lb)   BMI 41.50 kg/m²      Allergies   Allergen Reactions   • Imitrex [Sumatriptan] Shortness Of Breath   • Contrast Dye Other (See Comments)     Contrast dye 3    Stopped breathing   • Linzess [Linaclotide] GI Intolerance     constipation   • Codeine Hives   • Levemir [Insulin Detemir] Hives, Rash and Other (See Comments)     Bruising   • Morphine And Related Rash and Other (See Comments)     Alopecia       Current Outpatient Medications:   •  atorvastatin (LIPITOR) 80 MG tablet, Take 1 tablet by mouth Daily., Disp: 90 tablet, Rfl: 3  •  betamethasone valerate (VALISONE) 0.1 % ointment, Apply  topically to the appropriate area as directed 2 (Two) Times a Day. To hands as needed, Disp: 45 g, Rfl: 1  •  busPIRone (BUSPAR) 5 MG tablet, Take 1 tablet by mouth 3 (Three) Times a Day., Disp: 90 tablet, Rfl: 1  •  CHANTIX CONTINUING MONTH NANDINI 1 MG tablet, TAKE 1 TABLET BY MOUTH TWICE A " DAY, Disp: 168 tablet, Rfl: 1  •  cholecalciferol (VITAMIN D3) 68138 units capsule, Take 1 capsule by mouth Daily., Disp: 90 capsule, Rfl: 1  •  CONTOUR NEXT TEST test strip, Test 8 times daily DX: e11.65, Disp: 800 each, Rfl: 0  •  CREON 01196-27017 units capsule delayed-release particles capsule, 1 capsule p.o. with each meal., Disp: 100 capsule, Rfl: 11  •  cyclobenzaprine (FLEXERIL) 10 MG tablet, TAKE 1 TABLET BY MOUTH 3 (THREE) TIMES A DAY AS NEEDED FOR MUSCLE SPASMS., Disp: 270 tablet, Rfl: 0  •  DULoxetine (CYMBALTA) 60 MG capsule, Take 1 capsule by mouth Daily., Disp: 90 capsule, Rfl: 3  •  ELIQUIS 5 MG tablet tablet, TAKE 1 TABLET BY MOUTH EVERY 12 HOURS, Disp: 60 tablet, Rfl: 4  •  EPINEPHrine (EPIPEN) 0.3 MG/0.3ML solution auto-injector injection, , Disp: , Rfl:   •  glucagon (GLUCAGON EMERGENCY) 1 MG injection, Inject 1 mg under the skin into the appropriate area as directed 1 (One) Time As Needed for Low Blood Sugar for up to 1 dose., Disp: 1 kit, Rfl: 11  •  insulin regular (humuLIN R) 500 UNIT/ML CONCENTRATED injection, Inject 10 unit marking on U-100 syringe under the skin into the appropriate area as directed Continuous. Per insulin pump, Disp: 3 vial, Rfl: 4  •  labetalol (NORMODYNE) 100 MG tablet, TAKE 1 TABLET BY MOUTH TWICE A DAY, Disp: 180 tablet, Rfl: 0  •  Lancets (FREESTYLE) lancets, Check blood glucose 4-5 times daily, Disp: 120 each, Rfl: 5  •  losartan (COZAAR) 100 MG tablet, Take 1 tablet by mouth Daily., Disp: 180 tablet, Rfl: 1  •  lubiprostone (AMITIZA) 24 MCG capsule, TAKE 1 CAPSULE BY MOUTH TWICE DAILY WITH FOOD, Disp: 180 capsule, Rfl: 1  •  mupirocin (BACTROBAN) 2 % cream, Apply to affected area 3 times daily, Disp: 30 g, Rfl: 2  •  NOVOLOG 100 UNIT/ML injection, Inject 200 units daily via minimed 630G insulin pump [id number] Start: 06/2017 DX:e11.65, Disp: 60 mL, Rfl: 5  •  pantoprazole (PROTONIX) 40 MG EC tablet, TAKE 1 TABLET BY MOUTH EVERY DAY, Disp: 180 tablet, Rfl: 1  •   pregabalin (LYRICA) 150 MG capsule, Take 1 capsule by mouth 2 (Two) Times a Day., Disp: 60 capsule, Rfl: 2  •  promethazine (PHENERGAN) 25 MG tablet, TAKE 1 TABLET BY MOUTH EVERY 6 (SIX) HOURS AS NEEDED FOR NAUSEA OR VOMITING., Disp: 30 tablet, Rfl: 2  •  topiramate (TOPAMAX) 100 MG tablet, Take 1 tablet by mouth 2 (Two) Times a Day. For headache, Disp: 180 tablet, Rfl: 1  •  TRANSDERM-SCOP, 1.5 MG, 1.5 MG/3DAYS patch, Place 1 patch on the skin as directed by provider Every 3 (Three) Days., Disp: , Rfl:   •  VASCEPA 1 g capsule capsule, Take 2 g by mouth 2 (Two) Times a Day With Meals., Disp: 360 capsule, Rfl: 3      The following portions of the patient's history were reviewed and updated as appropriate: allergies, current medications, past family history, past medical history, past social history, past surgical history and problem list.    Review of Systems   Constitutional: Negative.    HENT: Negative.    Eyes: Negative.    Respiratory: Negative.    Cardiovascular: Negative.    Gastrointestinal: Negative.    Endocrine: Negative.    Genitourinary: Negative.    Musculoskeletal: Negative.    Skin: Negative.    Allergic/Immunologic: Negative.    Neurological: Negative.    Hematological: Negative.    Psychiatric/Behavioral: Negative.    The above-mentioned review of systems were reviewed, corroborated and accepted.    Objective   Physical Exam   Constitutional: She is oriented to person, place, and time. She appears well-developed and well-nourished. No distress.   HENT:   Head: Normocephalic and atraumatic.   Right Ear: External ear normal.   Left Ear: External ear normal.   Nose: Nose normal.   Mouth/Throat: Oropharynx is clear and moist. No oropharyngeal exudate.   She now has dentures.   Eyes: Pupils are equal, round, and reactive to light. Conjunctivae and EOM are normal. Right eye exhibits no discharge. Left eye exhibits no discharge. No scleral icterus.   Neck: Normal range of motion. Neck supple. No JVD  present. No tracheal deviation present. No thyromegaly present.   Cardiovascular: Normal rate, regular rhythm, normal heart sounds and intact distal pulses. Exam reveals no gallop and no friction rub.   No murmur heard.  Pulmonary/Chest: Effort normal and breath sounds normal. No stridor. No respiratory distress. She has no wheezes. She has no rales. She exhibits no tenderness.   Abdominal: Soft. Bowel sounds are normal. She exhibits no distension and no mass. There is no tenderness. There is no rebound and no guarding. No hernia.   Musculoskeletal: Normal range of motion. She exhibits no edema, tenderness or deformity.   Lymphadenopathy:     She has no cervical adenopathy.   Neurological: She is alert and oriented to person, place, and time. She has normal reflexes. She displays normal reflexes. No cranial nerve deficit or sensory deficit. She exhibits normal muscle tone. Coordination normal.   Skin: Skin is warm and dry. No rash noted. She is not diaphoretic. No erythema. No pallor.   Psychiatric: She has a normal mood and affect. Her behavior is normal. Judgment and thought content normal.   Nursing note and vitals reviewed.  No significant change since 7/3/2019 office visit.    Results for orders placed or performed in visit on 10/25/19   T4 & TSH ([facility])   Result Value Ref Range    TSH 1.960 0.270 - 4.200 uIU/mL    T4, Total 6.45 4.50 - 11.70 mcg/dL   Uric Acid   Result Value Ref Range    Uric Acid 4.3 2.4 - 5.7 mg/dL   Vitamin D 25 Hydroxy   Result Value Ref Range    25 Hydroxy, Vitamin D 31.5 30.0 - 100.0 ng/ml   Comprehensive Metabolic Panel   Result Value Ref Range    Glucose 304 (H) 65 - 99 mg/dL    BUN 21 (H) 6 - 20 mg/dL    Creatinine 0.88 0.57 - 1.00 mg/dL    eGFR Non African Am 69 >60 mL/min/1.73    eGFR African Am 84 >60 mL/min/1.73    BUN/Creatinine Ratio 23.9 7.0 - 25.0    Sodium 137 136 - 145 mmol/L    Potassium 3.9 3.5 - 5.2 mmol/L    Chloride 101 98 - 107 mmol/L    Total CO2 22.9 22.0 - 29.0  mmol/L    Calcium 9.0 8.6 - 10.5 mg/dL    Total Protein 7.1 6.0 - 8.5 g/dL    Albumin 3.80 3.50 - 5.20 g/dL    Globulin 3.3 gm/dL    A/G Ratio 1.2 g/dL    Total Bilirubin 0.3 0.2 - 1.2 mg/dL    Alkaline Phosphatase 78 39 - 117 U/L    AST (SGOT) 12 1 - 32 U/L    ALT (SGPT) 17 1 - 33 U/L   C-Peptide   Result Value Ref Range    C-Peptide 1.0 (L) 1.1 - 4.4 ng/mL   Hemoglobin A1c   Result Value Ref Range    Hemoglobin A1C 10.50 (H) 4.80 - 5.60 %   Lipid Panel   Result Value Ref Range    Total Cholesterol 165 0 - 200 mg/dL    Triglycerides 171 (H) 0 - 150 mg/dL    HDL Cholesterol 38 (L) 40 - 60 mg/dL    VLDL Cholesterol 34.2 mg/dL    LDL Cholesterol  93 0 - 100 mg/dL   Unable To Void   Result Value Ref Range    Unable to Void Comment    Cardiovascular Risk Assessment   Result Value Ref Range    Interpretation Note    Diabetes Patient Education   Result Value Ref Range    PDF Image Not applicable          Assessment/Plan   Danielle was seen today for diabetes.    Diagnoses and all orders for this visit:    Uncontrolled type 2 diabetes mellitus with complication, with long-term current use of insulin (CMS/[facility])  -     atorvastatin (LIPITOR) 80 MG tablet; Take 1 tablet by mouth Daily.  -     Discontinue: insulin regular (humuLIN R) 500 UNIT/ML CONCENTRATED injection; Inject 10 unit marking on U-100 syringe under the skin into the appropriate area as directed Continuous. Per insulin pump  -     T4 & TSH ([facility]); Future  -     Uric Acid; Future  -     Vitamin D 25 Hydroxy; Future  -     Comprehensive Metabolic Panel; Future  -     C-Peptide; Future  -     Hemoglobin A1c; Future  -     Lipid Panel; Future  -     MicroAlbumin, Urine, Random - Urine, Clean Catch; Future  -     NOVOLOG 100 UNIT/ML injection; Inject 200 units daily via minimed 630G insulin pump [id number] Start: 06/2017 DX:e11.65    Essential hypertension  -     atorvastatin (LIPITOR) 80 MG tablet; Take 1 tablet by mouth Daily.  -     T4 & TSH ([facility]); Future  -      Uric Acid; Future  -     Vitamin D 25 Hydroxy; Future  -     Comprehensive Metabolic Panel; Future  -     C-Peptide; Future  -     Hemoglobin A1c; Future  -     Lipid Panel; Future  -     MicroAlbumin, Urine, Random - Urine, Clean Catch; Future    Mixed hyperlipidemia  -     atorvastatin (LIPITOR) 80 MG tablet; Take 1 tablet by mouth Daily.  -     VASCEPA 1 g capsule capsule; Take 2 g by mouth 2 (Two) Times a Day With Meals.  -     T4 & TSH ([facility]); Future  -     Uric Acid; Future  -     Vitamin D 25 Hydroxy; Future  -     Comprehensive Metabolic Panel; Future  -     C-Peptide; Future  -     Hemoglobin A1c; Future  -     Lipid Panel; Future  -     MicroAlbumin, Urine, Random - Urine, Clean Catch; Future    Vitamin D deficiency  -     atorvastatin (LIPITOR) 80 MG tablet; Take 1 tablet by mouth Daily.  -     T4 & TSH ([facility]); Future  -     Uric Acid; Future  -     Vitamin D 25 Hydroxy; Future  -     Comprehensive Metabolic Panel; Future  -     C-Peptide; Future  -     Hemoglobin A1c; Future  -     Lipid Panel; Future  -     MicroAlbumin, Urine, Random - Urine, Clean Catch; Future    Premature menopause  -     atorvastatin (LIPITOR) 80 MG tablet; Take 1 tablet by mouth Daily.  -     T4 & TSH ([facility]); Future  -     Uric Acid; Future  -     Vitamin D 25 Hydroxy; Future  -     Comprehensive Metabolic Panel; Future  -     C-Peptide; Future  -     Hemoglobin A1c; Future  -     Lipid Panel; Future  -     MicroAlbumin, Urine, Random - Urine, Clean Catch; Future    Dyslipidemia  -     atorvastatin (LIPITOR) 80 MG tablet; Take 1 tablet by mouth Daily.  -     T4 & TSH ([facility]); Future  -     Uric Acid; Future  -     Vitamin D 25 Hydroxy; Future  -     Comprehensive Metabolic Panel; Future  -     C-Peptide; Future  -     Hemoglobin A1c; Future  -     Lipid Panel; Future  -     MicroAlbumin, Urine, Random - Urine, Clean Catch; Future    Long-term insulin use (CMS/HCC)  -     atorvastatin (LIPITOR) 80 MG tablet; Take 1  tablet by mouth Daily.  -     T4 & TSH ([facility]); Future  -     Uric Acid; Future  -     Vitamin D 25 Hydroxy; Future  -     Comprehensive Metabolic Panel; Future  -     C-Peptide; Future  -     Hemoglobin A1c; Future  -     Lipid Panel; Future  -     MicroAlbumin, Urine, Random - Urine, Clean Catch; Future    Chronic pancreatitis, unspecified pancreatitis type (CMS/HCC)  -     atorvastatin (LIPITOR) 80 MG tablet; Take 1 tablet by mouth Daily.  -     T4 & TSH ([facility]); Future  -     Uric Acid; Future  -     Vitamin D 25 Hydroxy; Future  -     Comprehensive Metabolic Panel; Future  -     C-Peptide; Future  -     Hemoglobin A1c; Future  -     Lipid Panel; Future  -     MicroAlbumin, Urine, Random - Urine, Clean Catch; Future    Gastroparesis  -     CREON 78679-66554 units capsule delayed-release particles capsule; 1 capsule p.o. with each meal.  -     T4 & TSH ([facility]); Future  -     Uric Acid; Future  -     Vitamin D 25 Hydroxy; Future  -     Comprehensive Metabolic Panel; Future  -     C-Peptide; Future  -     Hemoglobin A1c; Future  -     Lipid Panel; Future  -     MicroAlbumin, Urine, Random - Urine, Clean Catch; Future    Pancreas disorder  -     CREON 13470-50736 units capsule delayed-release particles capsule; 1 capsule p.o. with each meal.  -     T4 & TSH ([facility]); Future  -     Uric Acid; Future  -     Vitamin D 25 Hydroxy; Future  -     Comprehensive Metabolic Panel; Future  -     C-Peptide; Future  -     Hemoglobin A1c; Future  -     Lipid Panel; Future  -     MicroAlbumin, Urine, Random - Urine, Clean Catch; Future    Other orders  -     labetalol (NORMODYNE) 100 MG tablet; Take 1 tablet by mouth 2 (Two) Times a Day.  -     glucagon (GLUCAGON EMERGENCY) 1 MG injection; Inject 1 mg under the skin into the appropriate area as directed 1 (One) Time As Needed for Low Blood Sugar for up to 1 dose.      In summary I saw and examined this 44-year-old female for above-mentioned problems.  I reviewed her  laboratory evaluations of 2/19/2020 and provided her with a hard copy of it.  Aside from her hemoglobin A1c of 10.50 she is otherwise clinically and metabolically stable.  I reached out to Ms. Luz Zuleta from [employer] to go over her pump parameters and help in correcting them for a better glycemic management.  She will see Ms. Janeth Felix in 4 months or sooner if needed but laboratory evaluation prior to each office visit.

## 2020-03-26 DIAGNOSIS — F41.9 ANXIETY: ICD-10-CM

## 2020-03-26 RX ORDER — BUSPIRONE HYDROCHLORIDE 5 MG/1
TABLET ORAL
Qty: 90 TABLET | Refills: 1 | Status: SHIPPED | OUTPATIENT
Start: 2020-03-26 | End: 2020-04-22 | Stop reason: SDUPTHER

## 2020-04-02 ENCOUNTER — DOCUMENTATION (OUTPATIENT)
Dept: PHYSICAL THERAPY | Facility: CLINIC | Age: 45
End: 2020-04-02

## 2020-04-20 RX ORDER — VARENICLINE TARTRATE 1 MG/1
TABLET, FILM COATED ORAL
Qty: 168 TABLET | Refills: 1 | Status: SHIPPED | OUTPATIENT
Start: 2020-04-20 | End: 2020-11-11 | Stop reason: SDUPTHER

## 2020-04-22 ENCOUNTER — TELEPHONE (OUTPATIENT)
Dept: INTERNAL MEDICINE | Facility: CLINIC | Age: 45
End: 2020-04-22

## 2020-04-22 ENCOUNTER — OFFICE VISIT (OUTPATIENT)
Dept: INTERNAL MEDICINE | Facility: CLINIC | Age: 45
End: 2020-04-22

## 2020-04-22 DIAGNOSIS — F41.9 ANXIETY: ICD-10-CM

## 2020-04-22 PROCEDURE — 99214 OFFICE O/P EST MOD 30 MIN: CPT | Performed by: FAMILY MEDICINE

## 2020-04-22 RX ORDER — BUSPIRONE HYDROCHLORIDE 5 MG/1
5 TABLET ORAL 3 TIMES DAILY
Qty: 90 TABLET | Refills: 2 | Status: SHIPPED | OUTPATIENT
Start: 2020-04-22 | End: 2020-07-15 | Stop reason: SDUPTHER

## 2020-04-22 RX ORDER — TIZANIDINE HYDROCHLORIDE 4 MG/1
4 CAPSULE, GELATIN COATED ORAL 3 TIMES DAILY
Qty: 90 CAPSULE | Refills: 2 | Status: SHIPPED | OUTPATIENT
Start: 2020-04-22 | End: 2020-04-23 | Stop reason: CLARIF

## 2020-04-22 RX ORDER — PROMETHAZINE HYDROCHLORIDE 25 MG/1
25 TABLET ORAL EVERY 6 HOURS PRN
Qty: 30 TABLET | Refills: 2 | Status: SHIPPED | OUTPATIENT
Start: 2020-04-22 | End: 2020-11-19

## 2020-04-22 NOTE — TELEPHONE ENCOUNTER
PATIENT IS CALLING TO REQUEST THE RX FOR TiZANidine (ZANAFLEX) 4 MG capsule NEEDS TO BE PRESCRIBED AS A TABLET INSTEAD OF A CAPSULE. INS WILL NOT COVER CAPSULE.    [facility] [id number] CONFIRMED    PATIENT CALL BACK [phone]

## 2020-04-22 NOTE — PROGRESS NOTES
Subjective   Danielle Dudley is a 44 y.o. female.     Chief Complaint   Patient presents with   • Hypertension   • Diabetes   • Hyperlipidemia   • Pain   • GI Problem         History of Present Illness   You have chosen to receive care through a telehealth visit.  Do you consent to use a video/audio connection for your medical care today? Yes    This was an audio and video enabled telemedicine encounter.  We were able to do this with compliant Zoom video.    Three surgeries for port and subsequent IVIG treatment.  The patient has severe gastroparesis and is had a placement of a port for IVIG treatment as well as continuing the gastric stimulator which does work somewhat.  Treatment of hypertension hyperlipidemia history of migraine chronic pancreatitis type 2 diabetes insulin requiring are reviewed.  She is also on Eliquis 5 mg twice daily for anticoagulation.    Chronic migraine headache is treated with topiramate 100 mg twice daily to attempt to control headaches as well as weight loss.  She is on antispasm medicine tizanidine 4 mg every 8 as needed.  I have refilled Phenergan in the past every 6 hours as needed for nausea.  I am also filling pantoprazole 40 mg daily for reflux and gastritis.    BuSpar 5 mg 3 times daily as for anxiety.  The following portions of the patient's history were reviewed and updated as appropriate: allergies, current medications, past social history and problem list.    Review of Systems   Constitutional: Positive for fatigue.   HENT: Negative.    Respiratory: Negative.    Cardiovascular: Negative.    Gastrointestinal: Positive for abdominal distention, abdominal pain and nausea.   Genitourinary: Negative.    Musculoskeletal: Positive for arthralgias.   Skin: Negative.    Neurological: Positive for numbness.   Psychiatric/Behavioral: The patient is nervous/anxious.        Objective   There were no vitals filed for this visit.  Physical Exam   Constitutional: She appears well-developed  and well-nourished.   Patient appears alert and pleasant.  Her speech is fluent makes sense.  She seems in no distress at the moment.   HENT:   Head: Normocephalic and atraumatic.   Right Ear: External ear normal.   Left Ear: External ear normal.   Eyes: Pupils are equal, round, and reactive to light.   Neurological: She is alert.   Psychiatric: She has a normal mood and affect. Her speech is normal.       Assessment/Plan   Problem List Items Addressed This Visit     None      Visit Diagnoses     Anxiety        Relevant Medications    busPIRone (BUSPAR) 5 MG tablet

## 2020-04-23 DIAGNOSIS — IMO0002 UNCONTROLLED TYPE 2 DIABETES MELLITUS WITH COMPLICATION, WITH LONG-TERM CURRENT USE OF INSULIN: ICD-10-CM

## 2020-04-23 RX ORDER — TIZANIDINE 4 MG/1
4 TABLET ORAL EVERY 8 HOURS PRN
Qty: 90 TABLET | Refills: 3 | Status: SHIPPED | OUTPATIENT
Start: 2020-04-23 | End: 2020-07-20

## 2020-04-24 RX ORDER — LOSARTAN POTASSIUM 100 MG/1
TABLET ORAL
Qty: 90 TABLET | Refills: 3 | Status: SHIPPED | OUTPATIENT
Start: 2020-04-24 | End: 2021-08-11

## 2020-04-27 RX ORDER — APIXABAN 5 MG/1
TABLET, FILM COATED ORAL
Qty: 60 TABLET | Refills: 4 | Status: SHIPPED | OUTPATIENT
Start: 2020-04-27 | End: 2020-10-16

## 2020-05-01 RX ORDER — PANTOPRAZOLE SODIUM 40 MG/1
TABLET, DELAYED RELEASE ORAL
Qty: 90 TABLET | Refills: 1 | Status: SHIPPED | OUTPATIENT
Start: 2020-05-01 | End: 2020-11-04

## 2020-05-04 RX ORDER — TOPIRAMATE 100 MG/1
100 TABLET, FILM COATED ORAL 2 TIMES DAILY
Qty: 180 TABLET | Refills: 1 | Status: SHIPPED | OUTPATIENT
Start: 2020-05-04 | End: 2020-08-12 | Stop reason: SDUPTHER

## 2020-05-07 ENCOUNTER — OFFICE VISIT (OUTPATIENT)
Dept: ENDOCRINOLOGY | Age: 45
End: 2020-05-07

## 2020-05-07 DIAGNOSIS — K86.9 PANCREAS DISORDER: ICD-10-CM

## 2020-05-07 DIAGNOSIS — E28.319 PREMATURE MENOPAUSE: ICD-10-CM

## 2020-05-07 DIAGNOSIS — K86.1 OTHER CHRONIC PANCREATITIS (HCC): ICD-10-CM

## 2020-05-07 DIAGNOSIS — E78.5 DYSLIPIDEMIA: ICD-10-CM

## 2020-05-07 DIAGNOSIS — IMO0002 UNCONTROLLED TYPE 2 DIABETES MELLITUS WITH COMPLICATION, WITH LONG-TERM CURRENT USE OF INSULIN: Primary | ICD-10-CM

## 2020-05-07 DIAGNOSIS — E66.01 MORBIDLY OBESE (HCC): ICD-10-CM

## 2020-05-07 DIAGNOSIS — K86.1 CHRONIC PANCREATITIS, UNSPECIFIED PANCREATITIS TYPE (HCC): ICD-10-CM

## 2020-05-07 DIAGNOSIS — E78.2 MIXED HYPERLIPIDEMIA: ICD-10-CM

## 2020-05-07 DIAGNOSIS — I10 ESSENTIAL HYPERTENSION: ICD-10-CM

## 2020-05-07 DIAGNOSIS — K31.84 GASTROPARESIS: ICD-10-CM

## 2020-05-07 DIAGNOSIS — E55.9 VITAMIN D DEFICIENCY: ICD-10-CM

## 2020-05-07 DIAGNOSIS — Z79.4 LONG-TERM INSULIN USE (HCC): ICD-10-CM

## 2020-05-07 PROCEDURE — 99214 OFFICE O/P EST MOD 30 MIN: CPT | Performed by: INTERNAL MEDICINE

## 2020-05-07 RX ORDER — PANCRELIPASE 24000; 76000; 120000 [USP'U]/1; [USP'U]/1; [USP'U]/1
CAPSULE, DELAYED RELEASE PELLETS ORAL
Qty: 100 CAPSULE | Refills: 11 | Status: SHIPPED | OUTPATIENT
Start: 2020-05-07 | End: 2020-08-12 | Stop reason: SDUPTHER

## 2020-05-07 RX ORDER — EMPAGLIFLOZIN 25 MG/1
1 TABLET, FILM COATED ORAL DAILY
Qty: 90 TABLET | Refills: 3 | Status: SHIPPED | OUTPATIENT
Start: 2020-05-07 | End: 2020-07-22 | Stop reason: SDDI

## 2020-05-07 RX ORDER — ATORVASTATIN CALCIUM 80 MG/1
80 TABLET, FILM COATED ORAL DAILY
Qty: 90 TABLET | Refills: 3 | Status: SHIPPED | OUTPATIENT
Start: 2020-05-07 | End: 2020-05-11

## 2020-05-07 RX ORDER — METOPROLOL SUCCINATE 100 MG/1
100 TABLET, EXTENDED RELEASE ORAL DAILY
Qty: 90 TABLET | Refills: 3 | Status: SHIPPED | OUTPATIENT
Start: 2020-05-07 | End: 2021-08-11

## 2020-05-07 NOTE — PROGRESS NOTES
Subjective   Danielle Dudley is a 44 y.o. female seen for follow up for DM2, hyperlipidemia, HTN, No lab review. Patient is currently using a [employer] insulin pump and changing pump sites every 2 days due to using a sure T. She denies any problems or concerns. She is checking BG 3 times a day.    History of Present Illness this is a 44-year-old female known patient with post pancreatitis type 2 diabetes as well as hypertension and dyslipidemia and vitamin D deficiency.  Over the past 6 weeks she has had 3 surgeries all of which are related to placing a port for her IVIG therapies and getting infected and replacing the old one with a new one.  She has no specific complaint with the exception of having rapid heart rate meaning the resting heart rate is greater than 90 which makes her conscious of having palpitation.  Allergies   Allergen Reactions   • Imitrex [Sumatriptan] Shortness Of Breath   • Contrast Dye Other (See Comments)     Contrast dye 3    Stopped breathing   • Linzess [Linaclotide] GI Intolerance     constipation   • Codeine Hives   • Levemir [Insulin Detemir] Hives, Rash and Other (See Comments)     Bruising   • Morphine And Related Rash and Other (See Comments)     Alopecia       Current Outpatient Medications:   •  atorvastatin (LIPITOR) 80 MG tablet, Take 1 tablet by mouth Daily., Disp: 90 tablet, Rfl: 3  •  betamethasone valerate (VALISONE) 0.1 % ointment, APPLY TOPICALLY TO THE APPROPRIATE AREA AS DIRECTED 2 (TWO) TIMES A DAY. TO HANDS AS NEEDED, Disp: 45 g, Rfl: 1  •  busPIRone (BUSPAR) 5 MG tablet, Take 1 tablet by mouth 3 (Three) Times a Day., Disp: 90 tablet, Rfl: 2  •  CHANTIX CONTINUING MONTH NANDINI 1 MG tablet, TAKE 1 TABLET BY MOUTH TWICE A DAY, Disp: 168 tablet, Rfl: 1  •  cholecalciferol (VITAMIN D3) 60547 units capsule, Take 1 capsule by mouth Daily., Disp: 90 capsule, Rfl: 1  •  CONTOUR NEXT TEST test strip, Test 8 times daily DX: e11.65, Disp: 800 each, Rfl: 0  •  CREON 17481-39346 units  capsule delayed-release particles capsule, 1 capsule p.o. with each meal., Disp: 100 capsule, Rfl: 11  •  DULoxetine (CYMBALTA) 60 MG capsule, Take 1 capsule by mouth Daily., Disp: 90 capsule, Rfl: 3  •  ELIQUIS 5 MG tablet tablet, TAKE 1 TABLET BY MOUTH EVERY 12 HOURS, Disp: 60 tablet, Rfl: 4  •  EPINEPHrine (EPIPEN) 0.3 MG/0.3ML solution auto-injector injection, , Disp: , Rfl:   •  glucagon (GLUCAGON EMERGENCY) 1 MG injection, Inject 1 mg under the skin into the appropriate area as directed 1 (One) Time As Needed for Low Blood Sugar for up to 1 dose., Disp: 1 kit, Rfl: 11  •  labetalol (NORMODYNE) 100 MG tablet, Take 1 tablet by mouth 2 (Two) Times a Day., Disp: 180 tablet, Rfl: 3  •  Lancets (FREESTYLE) lancets, Check blood glucose 4-5 times daily, Disp: 120 each, Rfl: 5  •  losartan (COZAAR) 100 MG tablet, TAKE 1 TABLET BY MOUTH EVERY DAY, Disp: 90 tablet, Rfl: 3  •  lubiprostone (AMITIZA) 24 MCG capsule, TAKE 1 CAPSULE BY MOUTH TWICE DAILY WITH FOOD, Disp: 180 capsule, Rfl: 1  •  mupirocin (BACTROBAN) 2 % cream, Apply to affected area 3 times daily, Disp: 30 g, Rfl: 2  •  NOVOLOG 100 UNIT/ML injection, Inject 200 units daily via minimed 630G insulin pump SN:[id number] Start: 06/2017 DX:e11.65, Disp: 60 mL, Rfl: 11  •  pantoprazole (PROTONIX) 40 MG EC tablet, TAKE 1 TABLET BY MOUTH EVERY DAY, Disp: 90 tablet, Rfl: 1  •  pregabalin (LYRICA) 150 MG capsule, Take 1 capsule by mouth 2 (Two) Times a Day., Disp: 60 capsule, Rfl: 2  •  promethazine (PHENERGAN) 25 MG tablet, Take 1 tablet by mouth Every 6 (Six) Hours As Needed for Nausea or Vomiting., Disp: 30 tablet, Rfl: 2  •  tiZANidine (ZANAFLEX) 4 MG tablet, Take 1 tablet by mouth Every 8 (Eight) Hours As Needed for Muscle Spasms., Disp: 90 tablet, Rfl: 3  •  topiramate (TOPAMAX) 100 MG tablet, TAKE 1 TABLET BY MOUTH 2 (TWO) TIMES A DAY. FOR HEADACHE, Disp: 180 tablet, Rfl: 1  •  TRANSDERM-SCOP, 1.5 MG, 1.5 MG/3DAYS patch, Place 1 patch on the skin as directed by  provider Every 3 (Three) Days., Disp: , Rfl:   •  VASCEPA 1 g capsule capsule, Take 2 g by mouth 2 (Two) Times a Day With Meals., Disp: 360 capsule, Rfl: 3  The above list of medications were reviewed and reconciled as well as accepted.  The following portions of the patient's history were reviewed and updated as appropriate: allergies, current medications, past family history, past medical history, past social history, past surgical history and problem list.    Review of Systems   Constitutional: Negative.    HENT: Negative.    Eyes: Negative.    Respiratory: Negative.    Cardiovascular: Negative.    Gastrointestinal: Negative.    Endocrine: Negative.    Genitourinary: Negative.    Musculoskeletal: Negative.    Skin: Negative.    Allergic/Immunologic: Negative.    Neurological: Negative.    Hematological: Negative.    Psychiatric/Behavioral: Negative.    The above review of system was reviewed, corroborated and accepted.  Objective   Physical Exam  Although this started as a telephone encounter but since she was in the building and came upstairs for her laboratory evaluation I saw her and discussed with her her new medication being Jardiance to take 10 mg every morning for 3 weeks and then move up to 25 mg daily.  She did not seem to be in any acute or chronic distress.  She was alert and oriented and articulate with being very coherent.    Assessment/Plan   Danielle was seen today for diabetes.    Diagnoses and all orders for this visit:    Uncontrolled type 2 diabetes mellitus with complication, with long-term current use of insulin (CMS/[facility])  -     atorvastatin (Lipitor) 80 MG tablet; Take 1 tablet by mouth Daily.  -     NOVOLOG 100 UNIT/ML injection; Inject 200 units daily via minimed 630G insulin pump [id number] Start: 06/2017 DX:e11.65  -     T4 & TSH ([facility])  -     Uric Acid  -     Vitamin D 25 Hydroxy  -     Comprehensive Metabolic Panel  -     C-Peptide  -     Hemoglobin A1c  -     MicroAlbumin, Urine,  Random - Urine, Clean Catch  -     NMR LipoProfile  -     Luteinizing Hormone  -     Follicle Stimulating Hormone  -     T4 & TSH ([facility]); Future  -     Uric Acid; Future  -     Vitamin D 25 Hydroxy; Future  -     Comprehensive Metabolic Panel; Future  -     C-Peptide; Future  -     Follicle Stimulating Hormone; Future  -     Hemoglobin A1c; Future  -     Luteinizing Hormone; Future  -     MicroAlbumin, Urine, Random - Urine, Clean Catch; Future  -     NMR LipoProfile; Future  -     NMR LipoProfile  -     MicroAlbumin, Urine, Random - Urine, Clean Catch  -     Luteinizing Hormone  -     Hemoglobin A1c  -     Follicle Stimulating Hormone  -     C-Peptide  -     Comprehensive Metabolic Panel  -     Vitamin D 25 Hydroxy  -     Uric Acid  -     T4 & TSH ([facility])    Essential hypertension  -     atorvastatin (Lipitor) 80 MG tablet; Take 1 tablet by mouth Daily.  -     T4 & TSH ([facility])  -     Uric Acid  -     Vitamin D 25 Hydroxy  -     Comprehensive Metabolic Panel  -     C-Peptide  -     Hemoglobin A1c  -     MicroAlbumin, Urine, Random - Urine, Clean Catch  -     NMR LipoProfile  -     Luteinizing Hormone  -     Follicle Stimulating Hormone  -     T4 & TSH ([facility]); Future  -     Uric Acid; Future  -     Vitamin D 25 Hydroxy; Future  -     Comprehensive Metabolic Panel; Future  -     C-Peptide; Future  -     Follicle Stimulating Hormone; Future  -     Hemoglobin A1c; Future  -     Luteinizing Hormone; Future  -     MicroAlbumin, Urine, Random - Urine, Clean Catch; Future  -     NMR LipoProfile; Future  -     NMR LipoProfile  -     MicroAlbumin, Urine, Random - Urine, Clean Catch  -     Luteinizing Hormone  -     Hemoglobin A1c  -     Follicle Stimulating Hormone  -     C-Peptide  -     Comprehensive Metabolic Panel  -     Vitamin D 25 Hydroxy  -     Uric Acid  -     T4 & TSH ([facility])    Mixed hyperlipidemia  -     atorvastatin (Lipitor) 80 MG tablet; Take 1 tablet by mouth Daily.  -     T4 & TSH ([facility])  -      Uric Acid  -     Vitamin D 25 Hydroxy  -     Comprehensive Metabolic Panel  -     C-Peptide  -     Hemoglobin A1c  -     MicroAlbumin, Urine, Random - Urine, Clean Catch  -     NMR LipoProfile  -     Luteinizing Hormone  -     Follicle Stimulating Hormone  -     T4 & TSH ([facility]); Future  -     Uric Acid; Future  -     Vitamin D 25 Hydroxy; Future  -     Comprehensive Metabolic Panel; Future  -     C-Peptide; Future  -     Follicle Stimulating Hormone; Future  -     Hemoglobin A1c; Future  -     Luteinizing Hormone; Future  -     MicroAlbumin, Urine, Random - Urine, Clean Catch; Future  -     NMR LipoProfile; Future  -     NMR LipoProfile  -     MicroAlbumin, Urine, Random - Urine, Clean Catch  -     Luteinizing Hormone  -     Hemoglobin A1c  -     Follicle Stimulating Hormone  -     C-Peptide  -     Comprehensive Metabolic Panel  -     Vitamin D 25 Hydroxy  -     Uric Acid  -     T4 & TSH ([facility])    Vitamin D deficiency  -     atorvastatin (Lipitor) 80 MG tablet; Take 1 tablet by mouth Daily.  -     T4 & TSH ([facility])  -     Uric Acid  -     Vitamin D 25 Hydroxy  -     Comprehensive Metabolic Panel  -     C-Peptide  -     Hemoglobin A1c  -     MicroAlbumin, Urine, Random - Urine, Clean Catch  -     NMR LipoProfile  -     Luteinizing Hormone  -     Follicle Stimulating Hormone  -     T4 & TSH ([facility]); Future  -     Uric Acid; Future  -     Vitamin D 25 Hydroxy; Future  -     Comprehensive Metabolic Panel; Future  -     C-Peptide; Future  -     Follicle Stimulating Hormone; Future  -     Hemoglobin A1c; Future  -     Luteinizing Hormone; Future  -     MicroAlbumin, Urine, Random - Urine, Clean Catch; Future  -     NMR LipoProfile; Future  -     NMR LipoProfile  -     MicroAlbumin, Urine, Random - Urine, Clean Catch  -     Luteinizing Hormone  -     Hemoglobin A1c  -     Follicle Stimulating Hormone  -     C-Peptide  -     Comprehensive Metabolic Panel  -     Vitamin D 25 Hydroxy  -     Uric Acid  -     T4 & TSH  ([facility])    Other chronic pancreatitis (CMS/HCC)  -     T4 & TSH ([facility])  -     Uric Acid  -     Vitamin D 25 Hydroxy  -     Comprehensive Metabolic Panel  -     C-Peptide  -     Hemoglobin A1c  -     MicroAlbumin, Urine, Random - Urine, Clean Catch  -     NMR LipoProfile  -     Luteinizing Hormone  -     Follicle Stimulating Hormone  -     T4 & TSH ([facility]); Future  -     Uric Acid; Future  -     Vitamin D 25 Hydroxy; Future  -     Comprehensive Metabolic Panel; Future  -     C-Peptide; Future  -     Follicle Stimulating Hormone; Future  -     Hemoglobin A1c; Future  -     Luteinizing Hormone; Future  -     MicroAlbumin, Urine, Random - Urine, Clean Catch; Future  -     NMR LipoProfile; Future  -     NMR LipoProfile  -     MicroAlbumin, Urine, Random - Urine, Clean Catch  -     Luteinizing Hormone  -     Hemoglobin A1c  -     Follicle Stimulating Hormone  -     C-Peptide  -     Comprehensive Metabolic Panel  -     Vitamin D 25 Hydroxy  -     Uric Acid  -     T4 & TSH ([facility])    Morbidly obese (CMS/HCC)  -     T4 & TSH ([facility])  -     Uric Acid  -     Vitamin D 25 Hydroxy  -     Comprehensive Metabolic Panel  -     C-Peptide  -     Hemoglobin A1c  -     MicroAlbumin, Urine, Random - Urine, Clean Catch  -     NMR LipoProfile  -     Luteinizing Hormone  -     Follicle Stimulating Hormone  -     T4 & TSH ([facility]); Future  -     Uric Acid; Future  -     Vitamin D 25 Hydroxy; Future  -     Comprehensive Metabolic Panel; Future  -     C-Peptide; Future  -     Follicle Stimulating Hormone; Future  -     Hemoglobin A1c; Future  -     Luteinizing Hormone; Future  -     MicroAlbumin, Urine, Random - Urine, Clean Catch; Future  -     NMR LipoProfile; Future  -     NMR LipoProfile  -     MicroAlbumin, Urine, Random - Urine, Clean Catch  -     Luteinizing Hormone  -     Hemoglobin A1c  -     Follicle Stimulating Hormone  -     C-Peptide  -     Comprehensive Metabolic Panel  -     Vitamin D 25 Hydroxy  -     Uric Acid  -      T4 & TSH ([facility])    Pancreas disorder  -     CREON 89931-15975 units capsule delayed-release particles capsule; 1 capsule p.o. with each meal.  -     T4 & TSH ([facility])  -     Uric Acid  -     Vitamin D 25 Hydroxy  -     Comprehensive Metabolic Panel  -     C-Peptide  -     Hemoglobin A1c  -     MicroAlbumin, Urine, Random - Urine, Clean Catch  -     NMR LipoProfile  -     Luteinizing Hormone  -     Follicle Stimulating Hormone  -     T4 & TSH ([facility]); Future  -     Uric Acid; Future  -     Vitamin D 25 Hydroxy; Future  -     Comprehensive Metabolic Panel; Future  -     C-Peptide; Future  -     Follicle Stimulating Hormone; Future  -     Hemoglobin A1c; Future  -     Luteinizing Hormone; Future  -     MicroAlbumin, Urine, Random - Urine, Clean Catch; Future  -     NMR LipoProfile; Future  -     NMR LipoProfile  -     MicroAlbumin, Urine, Random - Urine, Clean Catch  -     Luteinizing Hormone  -     Hemoglobin A1c  -     Follicle Stimulating Hormone  -     C-Peptide  -     Comprehensive Metabolic Panel  -     Vitamin D 25 Hydroxy  -     Uric Acid  -     T4 & TSH ([facility])    Premature menopause  -     atorvastatin (Lipitor) 80 MG tablet; Take 1 tablet by mouth Daily.  -     T4 & TSH ([facility])  -     Uric Acid  -     Vitamin D 25 Hydroxy  -     Comprehensive Metabolic Panel  -     C-Peptide  -     Hemoglobin A1c  -     MicroAlbumin, Urine, Random - Urine, Clean Catch  -     NMR LipoProfile  -     Luteinizing Hormone  -     Follicle Stimulating Hormone  -     T4 & TSH ([facility]); Future  -     Uric Acid; Future  -     Vitamin D 25 Hydroxy; Future  -     Comprehensive Metabolic Panel; Future  -     C-Peptide; Future  -     Follicle Stimulating Hormone; Future  -     Hemoglobin A1c; Future  -     Luteinizing Hormone; Future  -     MicroAlbumin, Urine, Random - Urine, Clean Catch; Future  -     NMR LipoProfile; Future  -     NMR LipoProfile  -     MicroAlbumin, Urine, Random - Urine, Clean Catch  -     Luteinizing  Hormone  -     Hemoglobin A1c  -     Follicle Stimulating Hormone  -     C-Peptide  -     Comprehensive Metabolic Panel  -     Vitamin D 25 Hydroxy  -     Uric Acid  -     T4 & TSH ([facility])    Chronic pancreatitis, unspecified pancreatitis type (CMS/HCC)  -     atorvastatin (Lipitor) 80 MG tablet; Take 1 tablet by mouth Daily.  -     T4 & TSH ([facility])  -     Uric Acid  -     Vitamin D 25 Hydroxy  -     Comprehensive Metabolic Panel  -     C-Peptide  -     Hemoglobin A1c  -     MicroAlbumin, Urine, Random - Urine, Clean Catch  -     NMR LipoProfile  -     Luteinizing Hormone  -     Follicle Stimulating Hormone  -     T4 & TSH ([facility]); Future  -     Uric Acid; Future  -     Vitamin D 25 Hydroxy; Future  -     Comprehensive Metabolic Panel; Future  -     C-Peptide; Future  -     Follicle Stimulating Hormone; Future  -     Hemoglobin A1c; Future  -     Luteinizing Hormone; Future  -     MicroAlbumin, Urine, Random - Urine, Clean Catch; Future  -     NMR LipoProfile; Future  -     NMR LipoProfile  -     MicroAlbumin, Urine, Random - Urine, Clean Catch  -     Luteinizing Hormone  -     Hemoglobin A1c  -     Follicle Stimulating Hormone  -     C-Peptide  -     Comprehensive Metabolic Panel  -     Vitamin D 25 Hydroxy  -     Uric Acid  -     T4 & TSH ([facility])    Dyslipidemia  -     atorvastatin (Lipitor) 80 MG tablet; Take 1 tablet by mouth Daily.  -     T4 & TSH ([facility])  -     Uric Acid  -     Vitamin D 25 Hydroxy  -     Comprehensive Metabolic Panel  -     C-Peptide  -     Hemoglobin A1c  -     MicroAlbumin, Urine, Random - Urine, Clean Catch  -     NMR LipoProfile  -     Luteinizing Hormone  -     Follicle Stimulating Hormone  -     T4 & TSH ([facility]); Future  -     Uric Acid; Future  -     Vitamin D 25 Hydroxy; Future  -     Comprehensive Metabolic Panel; Future  -     C-Peptide; Future  -     Follicle Stimulating Hormone; Future  -     Hemoglobin A1c; Future  -     Luteinizing Hormone; Future  -      MicroAlbumin, Urine, Random - Urine, Clean Catch; Future  -     NMR LipoProfile; Future  -     NMR LipoProfile  -     MicroAlbumin, Urine, Random - Urine, Clean Catch  -     Luteinizing Hormone  -     Hemoglobin A1c  -     Follicle Stimulating Hormone  -     C-Peptide  -     Comprehensive Metabolic Panel  -     Vitamin D 25 Hydroxy  -     Uric Acid  -     T4 & TSH ([facility])    Long-term insulin use (CMS/HCC)  -     atorvastatin (Lipitor) 80 MG tablet; Take 1 tablet by mouth Daily.  -     T4 & TSH ([facility])  -     Uric Acid  -     Vitamin D 25 Hydroxy  -     Comprehensive Metabolic Panel  -     C-Peptide  -     Hemoglobin A1c  -     MicroAlbumin, Urine, Random - Urine, Clean Catch  -     NMR LipoProfile  -     Luteinizing Hormone  -     Follicle Stimulating Hormone  -     T4 & TSH ([facility]); Future  -     Uric Acid; Future  -     Vitamin D 25 Hydroxy; Future  -     Comprehensive Metabolic Panel; Future  -     C-Peptide; Future  -     Follicle Stimulating Hormone; Future  -     Hemoglobin A1c; Future  -     Luteinizing Hormone; Future  -     MicroAlbumin, Urine, Random - Urine, Clean Catch; Future  -     NMR LipoProfile; Future  -     NMR LipoProfile  -     MicroAlbumin, Urine, Random - Urine, Clean Catch  -     Luteinizing Hormone  -     Hemoglobin A1c  -     Follicle Stimulating Hormone  -     C-Peptide  -     Comprehensive Metabolic Panel  -     Vitamin D 25 Hydroxy  -     Uric Acid  -     T4 & TSH ([facility])    Gastroparesis  -     CREON 03398-42221 units capsule delayed-release particles capsule; 1 capsule p.o. with each meal.  -     T4 & TSH ([facility])  -     Uric Acid  -     Vitamin D 25 Hydroxy  -     Comprehensive Metabolic Panel  -     C-Peptide  -     Hemoglobin A1c  -     MicroAlbumin, Urine, Random - Urine, Clean Catch  -     NMR LipoProfile  -     Luteinizing Hormone  -     Follicle Stimulating Hormone  -     T4 & TSH ([facility]); Future  -     Uric Acid; Future  -     Vitamin D 25 Hydroxy; Future  -      Comprehensive Metabolic Panel; Future  -     C-Peptide; Future  -     Follicle Stimulating Hormone; Future  -     Hemoglobin A1c; Future  -     Luteinizing Hormone; Future  -     MicroAlbumin, Urine, Random - Urine, Clean Catch; Future  -     NMR LipoProfile; Future  -     NMR LipoProfile  -     MicroAlbumin, Urine, Random - Urine, Clean Catch  -     Luteinizing Hormone  -     Hemoglobin A1c  -     Follicle Stimulating Hormone  -     C-Peptide  -     Comprehensive Metabolic Panel  -     Vitamin D 25 Hydroxy  -     Uric Acid  -     T4 & TSH ([facility])    Other orders  -     JARDIANCE 25 MG tablet; Take 25 mg by mouth Daily.  -     metoprolol succinate XL (Toprol XL) 100 MG 24 hr tablet; Take 1 tablet by mouth Daily.      In summary I saw this 44-year-old female for above-mentioned problems.  I reviewed her laboratory evaluations of 9/24/2019 and at this point will go ahead and order additional laboratory examination and once the results come back we will go ahead and call for any possible modifications.  I also reviewed her insulin pump download.  She will start on Jardiance 10 mg every morning for 3 weeks and after that move up to 25 mg every morning.  I cautioned her to make sure to have plenty of water and cleans her genital area following the use of toilet or sexual intercourse.  Also she said even though her blood pressure is well controlled she always is having tachycardia of heart rate greater than 90.  Therefore I stopped her Normodyne and started her on Toprol- mg daily.  I will see her in 6 months or sooner if needed with laboratory evaluation prior to each office visit.

## 2020-05-08 LAB
25(OH)D3+25(OH)D2 SERPL-MCNC: 27 NG/ML (ref 30–100)
ALBUMIN SERPL-MCNC: 3.8 G/DL (ref 3.5–5.2)
ALBUMIN/GLOB SERPL: 1 G/DL
ALP SERPL-CCNC: 67 U/L (ref 39–117)
ALT SERPL-CCNC: 15 U/L (ref 1–33)
AST SERPL-CCNC: 14 U/L (ref 1–32)
BILIRUB SERPL-MCNC: 0.3 MG/DL (ref 0.2–1.2)
BUN SERPL-MCNC: 14 MG/DL (ref 6–20)
BUN/CREAT SERPL: 16.5 (ref 7–25)
C PEPTIDE SERPL-MCNC: 0.7 NG/ML (ref 1.1–4.4)
CALCIUM SERPL-MCNC: 9.1 MG/DL (ref 8.6–10.5)
CHLORIDE SERPL-SCNC: 104 MMOL/L (ref 98–107)
CHOLEST SERPL-MCNC: 175 MG/DL (ref 100–199)
CO2 SERPL-SCNC: 22.5 MMOL/L (ref 22–29)
CREAT SERPL-MCNC: 0.85 MG/DL (ref 0.57–1)
FSH SERPL-ACNC: 6.1 MIU/ML
GLOBULIN SER CALC-MCNC: 3.7 GM/DL
GLUCOSE SERPL-MCNC: 198 MG/DL (ref 65–99)
HBA1C MFR BLD: 9.64 % (ref 4.8–5.6)
HDL SERPL-SCNC: 23.1 UMOL/L
HDLC SERPL-MCNC: 36 MG/DL
LDL SERPL QN: 20.7 NM
LDL SERPL-SCNC: 1273 NMOL/L
LDL SMALL SERPL-SCNC: 721 NMOL/L
LDLC SERPL CALC-MCNC: 108 MG/DL (ref 0–99)
LH SERPL-ACNC: 5 MIU/ML
MICROALBUMIN UR-MCNC: 9.7 UG/ML
POTASSIUM SERPL-SCNC: 3.9 MMOL/L (ref 3.5–5.2)
PROT SERPL-MCNC: 7.5 G/DL (ref 6–8.5)
SODIUM SERPL-SCNC: 139 MMOL/L (ref 136–145)
T4 SERPL-MCNC: 5.26 MCG/DL (ref 4.5–11.7)
TRIGL SERPL-MCNC: 157 MG/DL (ref 0–149)
TSH SERPL DL<=0.005 MIU/L-ACNC: 1.18 UIU/ML (ref 0.27–4.2)
URATE SERPL-MCNC: 5.2 MG/DL (ref 2.4–5.7)

## 2020-05-11 RX ORDER — ERGOCALCIFEROL 1.25 MG/1
50000 CAPSULE ORAL 2 TIMES WEEKLY
Qty: 26 CAPSULE | Refills: 3 | Status: SHIPPED | OUTPATIENT
Start: 2020-05-11 | End: 2020-08-12 | Stop reason: SDUPTHER

## 2020-05-11 RX ORDER — EZETIMIBE 10 MG/1
10 TABLET ORAL DAILY
Qty: 30 TABLET | Refills: 11 | Status: SHIPPED | OUTPATIENT
Start: 2020-05-11 | End: 2021-04-27

## 2020-05-11 RX ORDER — ROSUVASTATIN CALCIUM 40 MG/1
40 TABLET, COATED ORAL DAILY
Qty: 90 TABLET | Refills: 3 | Status: SHIPPED | OUTPATIENT
Start: 2020-05-11 | End: 2020-08-12 | Stop reason: SDUPTHER

## 2020-05-20 DIAGNOSIS — E10.43 DIABETIC AUTONOMIC NEUROPATHY ASSOCIATED WITH TYPE 1 DIABETES MELLITUS (HCC): ICD-10-CM

## 2020-05-20 RX ORDER — PREGABALIN 150 MG/1
CAPSULE ORAL
Qty: 60 CAPSULE | Refills: 2 | Status: SHIPPED | OUTPATIENT
Start: 2020-05-20 | End: 2020-08-19

## 2020-05-20 NOTE — TELEPHONE ENCOUNTER
Last ov appt 5/7/20  No future appts scheduled       lencho ran 5/20/20  Last filled 4/11/20 for 30 day supply

## 2020-06-12 ENCOUNTER — RESULTS ENCOUNTER (OUTPATIENT)
Dept: ENDOCRINOLOGY | Age: 45
End: 2020-06-12

## 2020-06-12 DIAGNOSIS — K31.84 GASTROPARESIS: ICD-10-CM

## 2020-06-12 DIAGNOSIS — E55.9 VITAMIN D DEFICIENCY: ICD-10-CM

## 2020-06-12 DIAGNOSIS — E78.2 MIXED HYPERLIPIDEMIA: ICD-10-CM

## 2020-06-12 DIAGNOSIS — E78.5 DYSLIPIDEMIA: ICD-10-CM

## 2020-06-12 DIAGNOSIS — K86.9 PANCREAS DISORDER: ICD-10-CM

## 2020-06-12 DIAGNOSIS — E28.319 PREMATURE MENOPAUSE: ICD-10-CM

## 2020-06-12 DIAGNOSIS — K86.1 CHRONIC PANCREATITIS, UNSPECIFIED PANCREATITIS TYPE (HCC): ICD-10-CM

## 2020-06-12 DIAGNOSIS — IMO0002 UNCONTROLLED TYPE 2 DIABETES MELLITUS WITH COMPLICATION, WITH LONG-TERM CURRENT USE OF INSULIN: ICD-10-CM

## 2020-06-12 DIAGNOSIS — I10 ESSENTIAL HYPERTENSION: ICD-10-CM

## 2020-06-12 DIAGNOSIS — Z79.4 LONG-TERM INSULIN USE (HCC): ICD-10-CM

## 2020-06-17 ENCOUNTER — E-VISIT (OUTPATIENT)
Dept: INTERNAL MEDICINE | Facility: CLINIC | Age: 45
End: 2020-06-17

## 2020-06-17 ENCOUNTER — TELEPHONE (OUTPATIENT)
Dept: INTERNAL MEDICINE | Facility: CLINIC | Age: 45
End: 2020-06-17

## 2020-06-17 DIAGNOSIS — J01.10 ACUTE FRONTAL SINUSITIS, RECURRENCE NOT SPECIFIED: Primary | ICD-10-CM

## 2020-06-17 PROCEDURE — 99422 OL DIG E/M SVC 11-20 MIN: CPT | Performed by: FAMILY MEDICINE

## 2020-06-17 RX ORDER — AMOXICILLIN AND CLAVULANATE POTASSIUM 875; 125 MG/1; MG/1
1 TABLET, FILM COATED ORAL 2 TIMES DAILY
Qty: 20 TABLET | Refills: 0 | Status: SHIPPED | OUTPATIENT
Start: 2020-06-17 | End: 2020-07-22

## 2020-06-17 NOTE — TELEPHONE ENCOUNTER
Patient called to see if she can get some medication sinus. She advised that she has a lot of drainage and congestion and a little cough. She stated its been the last couple days. She just wants to see if she can get something for it.     Please advise patient at [phone].     [facility] #[id number] - [place], [place] - [address] AT [facility] - [phone]  - [phone] FX.

## 2020-06-18 NOTE — PROGRESS NOTES
Subjective   Danielle Dudley is a 45 y.o. female.  Chief complaint is persistent sinus drainage and pressure.    History of Present Illness   Patient has had several weeks of sinus drainage and pressure per questionnaire.  Sounds like she has evidence of chronic sinusitis.  No apparent signs of COVID-19 on questionnaire.  The following portions of the patient's history were reviewed and updated as appropriate: allergies, current medications, past family history, past medical history, past social history, past surgical history and problem list.    Review of Systems  Sinus pressure and congestion.  No fever or chills  No chest pain  No dyspnea  Abdominal history of gastroparesis with nausea    Musculoskeletal negative neurologic negative  Objective   Physical Exam  As per patient questionnaire sinus pressure postnasal drainage.    Assessment/Plan   Diagnoses and all orders for this visit:    Acute frontal sinusitis, recurrence not specified    Other orders  -     amoxicillin-clavulanate (Augmentin) 875-125 MG per tablet; Take 1 tablet by mouth 2 (Two) Times a Day.

## 2020-07-15 DIAGNOSIS — F41.9 ANXIETY: ICD-10-CM

## 2020-07-15 RX ORDER — BUSPIRONE HYDROCHLORIDE 5 MG/1
5 TABLET ORAL 3 TIMES DAILY
Qty: 270 TABLET | Refills: 0 | Status: SHIPPED | OUTPATIENT
Start: 2020-07-15 | End: 2020-07-22 | Stop reason: SDUPTHER

## 2020-07-20 RX ORDER — TIZANIDINE 4 MG/1
4 TABLET ORAL EVERY 8 HOURS PRN
Qty: 270 TABLET | Refills: 1 | Status: SHIPPED | OUTPATIENT
Start: 2020-07-20 | End: 2020-07-22

## 2020-07-22 ENCOUNTER — OFFICE VISIT (OUTPATIENT)
Dept: INTERNAL MEDICINE | Facility: CLINIC | Age: 45
End: 2020-07-22

## 2020-07-22 VITALS
WEIGHT: 280 LBS | TEMPERATURE: 97.5 F | HEART RATE: 65 BPM | SYSTOLIC BLOOD PRESSURE: 127 MMHG | BODY MASS INDEX: 41.47 KG/M2 | RESPIRATION RATE: 16 BRPM | HEIGHT: 69 IN | OXYGEN SATURATION: 97 % | DIASTOLIC BLOOD PRESSURE: 82 MMHG

## 2020-07-22 DIAGNOSIS — I10 ESSENTIAL HYPERTENSION: Primary | ICD-10-CM

## 2020-07-22 DIAGNOSIS — M62.838 MUSCLE SPASM: ICD-10-CM

## 2020-07-22 DIAGNOSIS — IMO0002 UNCONTROLLED TYPE 2 DIABETES MELLITUS WITH COMPLICATION, WITH LONG-TERM CURRENT USE OF INSULIN: ICD-10-CM

## 2020-07-22 DIAGNOSIS — E78.2 MIXED HYPERLIPIDEMIA: ICD-10-CM

## 2020-07-22 DIAGNOSIS — K86.1 OTHER CHRONIC PANCREATITIS (HCC): ICD-10-CM

## 2020-07-22 DIAGNOSIS — F41.9 ANXIETY: ICD-10-CM

## 2020-07-22 DIAGNOSIS — M54.12 CERVICAL RADICULOPATHY: ICD-10-CM

## 2020-07-22 PROCEDURE — 99214 OFFICE O/P EST MOD 30 MIN: CPT | Performed by: FAMILY MEDICINE

## 2020-07-22 RX ORDER — METAXALONE 800 MG/1
800 TABLET ORAL 3 TIMES DAILY PRN
Qty: 90 TABLET | Refills: 3 | Status: SHIPPED | OUTPATIENT
Start: 2020-07-22 | End: 2020-11-19

## 2020-07-22 RX ORDER — BUSPIRONE HYDROCHLORIDE 5 MG/1
5 TABLET ORAL 3 TIMES DAILY
Qty: 270 TABLET | Refills: 0 | Status: SHIPPED | OUTPATIENT
Start: 2020-07-22 | End: 2021-04-27

## 2020-07-22 NOTE — PROGRESS NOTES
Subjective   Danielle Dudley is a 45 y.o. female.     Chief Complaint   Patient presents with   • Follow-up     Pt presents here today for a 3 month follow up.   • Hyperlipidemia   • Anxiety         History of Present Illness   Pleasant lady with myriad issues.  She has type 1 diabetes and gastroparesis seeing motility clinic [place] and has had various changes in her gastric stimulator.  She also has chronic pancreatitis and does take Creon capsules.  She is on Eliquis for coagulopathy.  Otherwise on multiple meds for hyperlipidemia.  She has chronic cervical radiculopathy with a trial of ties Ashleyemil Blancasn which did not work.  We are switching to Skelaxin generic 800 mg 3 times daily.    Treatment of anxiety includes buspirone 5 mg 3 times daily daily.    She has follow-up with endocrinology and also gastric motility clinic.      The following portions of the patient's history were reviewed and updated as appropriate: allergies, current medications, past social history and problem list.    Review of Systems   Constitutional: Positive for fatigue.   HENT: Negative.    Eyes: Negative.    Respiratory: Negative.    Cardiovascular: Negative.    Gastrointestinal: Positive for abdominal pain and nausea.   Endocrine: Negative.    Genitourinary: Negative.    Musculoskeletal: Positive for arthralgias, myalgias, neck pain and neck stiffness.   Skin: Negative.    Allergic/Immunologic: Negative.    Neurological: Negative.    Hematological: Negative.    Psychiatric/Behavioral: Positive for sleep disturbance.       Objective   Vitals:    07/22/20 0809   BP: 127/82   Pulse: 65   Resp: 16   Temp: 97.5 °F (36.4 °C)   SpO2: 97%     Physical Exam   Constitutional: She is oriented to person, place, and time. She appears well-developed and well-nourished.   HENT:   Head: Normocephalic and atraumatic.   Right Ear: Tympanic membrane and external ear normal.   Left Ear: Tympanic membrane and external ear normal.   Nose: Nose normal.    Mouth/Throat: Oropharynx is clear and moist.   Eyes: Pupils are equal, round, and reactive to light. Conjunctivae and EOM are normal.   Neck: Neck supple. No JVD present. Spinous process tenderness and muscular tenderness present. Decreased range of motion present. No thyromegaly present.   Cardiovascular: Normal rate, regular rhythm, normal heart sounds and intact distal pulses.   Pulmonary/Chest: Effort normal and breath sounds normal.   Abdominal: Soft. Bowel sounds are normal.   Musculoskeletal:        Left shoulder: She exhibits decreased range of motion and tenderness.   Lymphadenopathy:     She has no cervical adenopathy.   Neurological: She is alert and oriented to person, place, and time. No cranial nerve deficit. Coordination normal.   Skin: Skin is warm and dry. No rash noted.   Psychiatric: She has a normal mood and affect. Her behavior is normal. Judgment and thought content normal.   Vitals reviewed.      Assessment/Plan   Problem List Items Addressed This Visit        Cardiovascular and Mediastinum    Mixed hyperlipidemia    Essential hypertension - Primary       Digestive    Chronic pancreatitis (CMS/HCC)       Endocrine    Uncontrolled type 2 diabetes mellitus with complication, with long-term current use of insulin (CMS/HCC)       Nervous and Auditory    Cervical radiculopathy      Other Visit Diagnoses     Muscle spasm          Plan: Discontinue tizanidine    Start Skelaxin 800 3 times daily as needed for cervical radiculopathy.    Follow-up with motility clinic.    Follow-up with endocrinology.    Continue BuSpar 5 mg 3 times daily.  She is also on Cymbalta 60 mg daily.    Recheck in 4 months.

## 2020-08-12 ENCOUNTER — OFFICE VISIT (OUTPATIENT)
Dept: ENDOCRINOLOGY | Age: 45
End: 2020-08-12

## 2020-08-12 VITALS
HEIGHT: 69 IN | DIASTOLIC BLOOD PRESSURE: 82 MMHG | RESPIRATION RATE: 16 BRPM | BODY MASS INDEX: 41.18 KG/M2 | SYSTOLIC BLOOD PRESSURE: 126 MMHG | WEIGHT: 278 LBS

## 2020-08-12 DIAGNOSIS — E66.01 MORBIDLY OBESE (HCC): ICD-10-CM

## 2020-08-12 DIAGNOSIS — Z79.4 LONG-TERM INSULIN USE (HCC): ICD-10-CM

## 2020-08-12 DIAGNOSIS — E55.9 VITAMIN D DEFICIENCY: ICD-10-CM

## 2020-08-12 DIAGNOSIS — K86.9 PANCREAS DISORDER: ICD-10-CM

## 2020-08-12 DIAGNOSIS — E78.2 MIXED HYPERLIPIDEMIA: ICD-10-CM

## 2020-08-12 DIAGNOSIS — IMO0002 UNCONTROLLED TYPE 2 DIABETES MELLITUS WITH COMPLICATION, WITH LONG-TERM CURRENT USE OF INSULIN: Primary | ICD-10-CM

## 2020-08-12 DIAGNOSIS — K31.84 GASTROPARESIS: ICD-10-CM

## 2020-08-12 DIAGNOSIS — E78.5 DYSLIPIDEMIA: ICD-10-CM

## 2020-08-12 DIAGNOSIS — I10 ESSENTIAL HYPERTENSION: ICD-10-CM

## 2020-08-12 PROCEDURE — 99214 OFFICE O/P EST MOD 30 MIN: CPT | Performed by: INTERNAL MEDICINE

## 2020-08-12 RX ORDER — TOPIRAMATE 100 MG/1
100 TABLET, FILM COATED ORAL 2 TIMES DAILY
Qty: 180 TABLET | Refills: 3 | Status: SHIPPED | OUTPATIENT
Start: 2020-08-12 | End: 2021-12-22

## 2020-08-12 RX ORDER — ERGOCALCIFEROL 1.25 MG/1
50000 CAPSULE ORAL 2 TIMES WEEKLY
Qty: 26 CAPSULE | Refills: 3 | Status: SHIPPED | OUTPATIENT
Start: 2020-08-13 | End: 2021-03-10

## 2020-08-12 RX ORDER — ROSUVASTATIN CALCIUM 40 MG/1
40 TABLET, COATED ORAL DAILY
Qty: 90 TABLET | Refills: 3 | Status: SHIPPED | OUTPATIENT
Start: 2020-08-12 | End: 2021-09-17

## 2020-08-12 RX ORDER — ICOSAPENT ETHYL 1000 MG/1
2 CAPSULE ORAL 2 TIMES DAILY WITH MEALS
Qty: 360 CAPSULE | Refills: 3 | Status: SHIPPED | OUTPATIENT
Start: 2020-08-12 | End: 2021-03-10

## 2020-08-12 RX ORDER — PANCRELIPASE 24000; 76000; 120000 [USP'U]/1; [USP'U]/1; [USP'U]/1
CAPSULE, DELAYED RELEASE PELLETS ORAL
Qty: 300 CAPSULE | Refills: 3 | Status: SHIPPED | OUTPATIENT
Start: 2020-08-12 | End: 2022-12-28

## 2020-08-12 NOTE — PROGRESS NOTES
"Subjective   Danielle Dudley is a 45 y.o. female seen for follow up for Hyperlipidemia; Vitamin D Deficiency; Diabetes; and no lab review  She states that she will have to have another surgery. She is checking BG 8 times a day. She is currently using a [employer] insulin pump and changing pump sites every 2 days due to using the Sure T needle.     History of Present Illness this is a 45-year-old female known patient with type 2 diabetes hypertension and dyslipidemia as well as vitamin D deficiency and morbid obesity.  Hospital.  Is currently on and checks her blood sugar daily times daily.  She has had no significant health problem for which to go to the emergency room or hospital however she will be having changing of the battery of her gastric stimulator pretty soon.  She also said that she is not taking Jardiance because the pharmacist told her that it can cause pancreatitis.  We looked it up and there was no signs of being however I do not want her to take Jardiance.    The following portions of the patient's history were reviewed and updated as appropriate: allergies, current medications, past family history, past medical history, past social history, past surgical history and problem list.    /82   Resp 16   Ht 175.3 cm (69\")   Wt 126 kg (278 lb)   BMI 41.05 kg/m²      Allergies   Allergen Reactions   • Imitrex [Sumatriptan] Shortness Of Breath   • Contrast Dye Other (See Comments)     Contrast dye 3    Stopped breathing   • Linzess [Linaclotide] GI Intolerance     constipation   • Codeine Hives   • Levemir [Insulin Detemir] Hives, Rash and Other (See Comments)     Bruising   • Morphine And Related Rash and Other (See Comments)     Alopecia          Current Outpatient Medications:   •  betamethasone valerate (VALISONE) 0.1 % ointment, APPLY TOPICALLY TO THE APPROPRIATE AREA AS DIRECTED 2 (TWO) TIMES A DAY. TO HANDS AS NEEDED, Disp: 45 g, Rfl: 1  •  busPIRone (BUSPAR) 5 MG tablet, Take 1 tablet by mouth " 3 (Three) Times a Day., Disp: 270 tablet, Rfl: 0  •  CHANTIX CONTINUING MONTH NANDINI 1 MG tablet, TAKE 1 TABLET BY MOUTH TWICE A DAY, Disp: 168 tablet, Rfl: 1  •  CONTOUR NEXT TEST test strip, Test 8 times daily DX: e11.65, Disp: 800 each, Rfl: 0  •  CREON 32305-91546 units capsule delayed-release particles capsule, 1 capsule p.o. with each meal., Disp: 100 capsule, Rfl: 11  •  DULoxetine (CYMBALTA) 60 MG capsule, Take 1 capsule by mouth Daily., Disp: 90 capsule, Rfl: 3  •  ELIQUIS 5 MG tablet tablet, TAKE 1 TABLET BY MOUTH EVERY 12 HOURS, Disp: 60 tablet, Rfl: 4  •  EPINEPHrine (EPIPEN) 0.3 MG/0.3ML solution auto-injector injection, , Disp: , Rfl:   •  ergocalciferol (Drisdol) 1.25 MG (06018 UT) capsule, Take 1 capsule by mouth 2 (Two) Times a Week., Disp: 26 capsule, Rfl: 3  •  ezetimibe (Zetia) 10 MG tablet, Take 1 tablet by mouth Daily., Disp: 30 tablet, Rfl: 11  •  glucagon (GLUCAGON EMERGENCY) 1 MG injection, Inject 1 mg under the skin into the appropriate area as directed 1 (One) Time As Needed for Low Blood Sugar for up to 1 dose., Disp: 1 kit, Rfl: 11  •  Lancets (FREESTYLE) lancets, Check blood glucose 4-5 times daily, Disp: 120 each, Rfl: 5  •  losartan (COZAAR) 100 MG tablet, TAKE 1 TABLET BY MOUTH EVERY DAY, Disp: 90 tablet, Rfl: 3  •  lubiprostone (AMITIZA) 24 MCG capsule, TAKE 1 CAPSULE BY MOUTH TWICE DAILY WITH FOOD, Disp: 180 capsule, Rfl: 1  •  metaxalone (Skelaxin) 800 MG tablet, Take 1 tablet by mouth 3 (Three) Times a Day As Needed for Muscle Spasms., Disp: 90 tablet, Rfl: 3  •  metoprolol succinate XL (Toprol XL) 100 MG 24 hr tablet, Take 1 tablet by mouth Daily., Disp: 90 tablet, Rfl: 3  •  mupirocin (BACTROBAN) 2 % cream, Apply to affected area 3 times daily, Disp: 30 g, Rfl: 2  •  NOVOLOG 100 UNIT/ML injection, Inject 200 units daily via minimed 630G insulin pump SN:[id number] Start: 06/2017 DX:e11.65, Disp: 180 mL, Rfl: 3  •  pantoprazole (PROTONIX) 40 MG EC tablet, TAKE 1 TABLET BY MOUTH  EVERY DAY, Disp: 90 tablet, Rfl: 1  •  pregabalin (LYRICA) 150 MG capsule, TAKE 1 CAPSULE BY MOUTH TWICE A DAY, Disp: 60 capsule, Rfl: 2  •  promethazine (PHENERGAN) 25 MG tablet, Take 1 tablet by mouth Every 6 (Six) Hours As Needed for Nausea or Vomiting., Disp: 30 tablet, Rfl: 2  •  rosuvastatin (CRESTOR) 40 MG tablet, Take 1 tablet by mouth Daily., Disp: 90 tablet, Rfl: 3  •  topiramate (TOPAMAX) 100 MG tablet, TAKE 1 TABLET BY MOUTH 2 (TWO) TIMES A DAY. FOR HEADACHE, Disp: 180 tablet, Rfl: 1  •  TRANSDERM-SCOP, 1.5 MG, 1.5 MG/3DAYS patch, Place 1 patch on the skin as directed by provider Every 3 (Three) Days., Disp: , Rfl:   •  VASCEPA 1 g capsule capsule, Take 2 g by mouth 2 (Two) Times a Day With Meals., Disp: 360 capsule, Rfl: 3     Review of Systems   Constitutional: Negative.    HENT: Negative.    Eyes: Negative.    Respiratory: Negative.    Cardiovascular: Negative.    Gastrointestinal: Negative.    Endocrine: Negative.    Genitourinary: Negative.    Musculoskeletal: Negative.    Skin: Negative.    Allergic/Immunologic: Negative.    Neurological: Negative.    Hematological: Negative.    Psychiatric/Behavioral: Negative.    The above review of systems were reviewed, corroborated and accepted.    Objective   Physical Exam   Constitutional: She is oriented to person, place, and time. She appears well-developed and well-nourished. No distress.   HENT:   Head: Normocephalic and atraumatic.   Right Ear: External ear normal.   Left Ear: External ear normal.   Nose: Nose normal.   Mouth/Throat: Oropharynx is clear and moist. No oropharyngeal exudate.   She now has dentures.   Eyes: Pupils are equal, round, and reactive to light. Conjunctivae and EOM are normal. Right eye exhibits no discharge. Left eye exhibits no discharge. No scleral icterus.   Neck: Normal range of motion. Neck supple. No JVD present. No tracheal deviation present. No thyromegaly present.   Cardiovascular: Normal rate, regular rhythm, normal  heart sounds and intact distal pulses. Exam reveals no gallop and no friction rub.   No murmur heard.  Pulmonary/Chest: Effort normal and breath sounds normal. No stridor. No respiratory distress. She has no wheezes. She has no rales. She exhibits no tenderness.   Abdominal: Soft. Bowel sounds are normal. She exhibits no distension and no mass. There is no tenderness. There is no rebound and no guarding. No hernia.   Musculoskeletal: Normal range of motion. She exhibits no edema, tenderness or deformity.   Lymphadenopathy:     She has no cervical adenopathy.   Neurological: She is alert and oriented to person, place, and time. She has normal reflexes. She displays normal reflexes. No cranial nerve deficit or sensory deficit. She exhibits normal muscle tone. Coordination normal.   Skin: Skin is warm and dry. No rash noted. She is not diaphoretic. No erythema. No pallor.   Psychiatric: She has a normal mood and affect. Her behavior is normal. Judgment and thought content normal.   Nursing note and vitals reviewed.    No significant change since 2/26/2020 office visit.       Assessment/Plan   Danielle was seen today for hyperlipidemia, vitamin d deficiency, diabetes and no lab review.    Diagnoses and all orders for this visit:    Uncontrolled type 2 diabetes mellitus with complication, with long-term current use of insulin (CMS/[facility])  -     T4 & TSH ([facility])  -     Uric Acid  -     Vitamin D 25 Hydroxy  -     Comprehensive Metabolic Panel  -     C-Peptide  -     Hemoglobin A1c  -     MicroAlbumin, Urine, Random - Urine, Clean Catch  -     NMR LipoProfile  -     NOVOLOG 100 UNIT/ML injection; Inject 200 units daily via minimed 630G insulin pump SN:[id number] Start: 06/2017 DX:e11.65  -     T4 & TSH ([facility]); Future  -     Uric Acid; Future  -     Vitamin D 25 Hydroxy; Future  -     Comprehensive Metabolic Panel; Future  -     C-Peptide; Future  -     Hemoglobin A1c; Future  -     MicroAlbumin, Urine, Random - Urine,  Clean Catch; Future  -     NMR LipoProfile; Future    Essential hypertension  -     T4 & TSH ([facility])  -     Uric Acid  -     Vitamin D 25 Hydroxy  -     Comprehensive Metabolic Panel  -     C-Peptide  -     Hemoglobin A1c  -     MicroAlbumin, Urine, Random - Urine, Clean Catch  -     NMR LipoProfile  -     T4 & TSH ([facility]); Future  -     Uric Acid; Future  -     Vitamin D 25 Hydroxy; Future  -     Comprehensive Metabolic Panel; Future  -     C-Peptide; Future  -     Hemoglobin A1c; Future  -     MicroAlbumin, Urine, Random - Urine, Clean Catch; Future  -     NMR LipoProfile; Future    Mixed hyperlipidemia  -     T4 & TSH ([facility])  -     Uric Acid  -     Vitamin D 25 Hydroxy  -     Comprehensive Metabolic Panel  -     C-Peptide  -     Hemoglobin A1c  -     MicroAlbumin, Urine, Random - Urine, Clean Catch  -     NMR LipoProfile  -     VASCEPA 1 g capsule capsule; Take 2 g by mouth 2 (Two) Times a Day With Meals.  -     T4 & TSH ([facility]); Future  -     Uric Acid; Future  -     Vitamin D 25 Hydroxy; Future  -     Comprehensive Metabolic Panel; Future  -     C-Peptide; Future  -     Hemoglobin A1c; Future  -     MicroAlbumin, Urine, Random - Urine, Clean Catch; Future  -     NMR LipoProfile; Future    Vitamin D deficiency  -     T4 & TSH ([facility])  -     Uric Acid  -     Vitamin D 25 Hydroxy  -     Comprehensive Metabolic Panel  -     C-Peptide  -     Hemoglobin A1c  -     MicroAlbumin, Urine, Random - Urine, Clean Catch  -     NMR LipoProfile  -     T4 & TSH ([facility]); Future  -     Uric Acid; Future  -     Vitamin D 25 Hydroxy; Future  -     Comprehensive Metabolic Panel; Future  -     C-Peptide; Future  -     Hemoglobin A1c; Future  -     MicroAlbumin, Urine, Random - Urine, Clean Catch; Future  -     NMR LipoProfile; Future    Morbidly obese (CMS/HCC)  -     T4 & TSH ([facility])  -     Uric Acid  -     Vitamin D 25 Hydroxy  -     Comprehensive Metabolic Panel  -     C-Peptide  -     Hemoglobin A1c  -      MicroAlbumin, Urine, Random - Urine, Clean Catch  -     NMR LipoProfile  -     T4 & TSH ([facility]); Future  -     Uric Acid; Future  -     Vitamin D 25 Hydroxy; Future  -     Comprehensive Metabolic Panel; Future  -     C-Peptide; Future  -     Hemoglobin A1c; Future  -     MicroAlbumin, Urine, Random - Urine, Clean Catch; Future  -     NMR LipoProfile; Future    Dyslipidemia  -     T4 & TSH ([facility])  -     Uric Acid  -     Vitamin D 25 Hydroxy  -     Comprehensive Metabolic Panel  -     C-Peptide  -     Hemoglobin A1c  -     MicroAlbumin, Urine, Random - Urine, Clean Catch  -     NMR LipoProfile  -     T4 & TSH ([facility]); Future  -     Uric Acid; Future  -     Vitamin D 25 Hydroxy; Future  -     Comprehensive Metabolic Panel; Future  -     C-Peptide; Future  -     Hemoglobin A1c; Future  -     MicroAlbumin, Urine, Random - Urine, Clean Catch; Future  -     NMR LipoProfile; Future    Long-term insulin use (CMS/HCC)  -     T4 & TSH ([facility])  -     Uric Acid  -     Vitamin D 25 Hydroxy  -     Comprehensive Metabolic Panel  -     C-Peptide  -     Hemoglobin A1c  -     MicroAlbumin, Urine, Random - Urine, Clean Catch  -     NMR LipoProfile  -     T4 & TSH ([facility]); Future  -     Uric Acid; Future  -     Vitamin D 25 Hydroxy; Future  -     Comprehensive Metabolic Panel; Future  -     C-Peptide; Future  -     Hemoglobin A1c; Future  -     MicroAlbumin, Urine, Random - Urine, Clean Catch; Future  -     NMR LipoProfile; Future    Gastroparesis  -     CREON 10210-44414 units capsule delayed-release particles capsule; 1 capsule p.o. with each meal.  -     T4 & TSH ([facility]); Future  -     Uric Acid; Future  -     Vitamin D 25 Hydroxy; Future  -     Comprehensive Metabolic Panel; Future  -     C-Peptide; Future  -     Hemoglobin A1c; Future  -     MicroAlbumin, Urine, Random - Urine, Clean Catch; Future  -     NMR LipoProfile; Future    Pancreas disorder  -     CREON 65706-33454 units capsule delayed-release particles  capsule; 1 capsule p.o. with each meal.  -     T4 & TSH ([facility]); Future  -     Uric Acid; Future  -     Vitamin D 25 Hydroxy; Future  -     Comprehensive Metabolic Panel; Future  -     C-Peptide; Future  -     Hemoglobin A1c; Future  -     MicroAlbumin, Urine, Random - Urine, Clean Catch; Future  -     NMR LipoProfile; Future    Other orders  -     ergocalciferol (Drisdol) 1.25 MG (50310 UT) capsule; Take 1 capsule by mouth 2 (Two) Times a Week.  -     rosuvastatin (CRESTOR) 40 MG tablet; Take 1 tablet by mouth Daily.  -     topiramate (TOPAMAX) 100 MG tablet; Take 1 tablet by mouth 2 (Two) Times a Day. For headache        In summary I saw and examined this 45-year-old female for above-mentioned problems.  I reviewed her pump download and at this time we will go ahead and order additional laboratory evaluation and once the results come back we will go ahead and call for any possible modification or new medications.  She will see Ms. Janeth Felix in 4 months or sooner if needed but laboratory evaluation prior to each office visit.

## 2020-08-13 LAB
25(OH)D3+25(OH)D2 SERPL-MCNC: 35.1 NG/ML (ref 30–100)
ALBUMIN SERPL-MCNC: 4 G/DL (ref 3.5–5.2)
ALBUMIN/GLOB SERPL: 1.2 G/DL
ALP SERPL-CCNC: 65 U/L (ref 39–117)
ALT SERPL-CCNC: 18 U/L (ref 1–33)
AST SERPL-CCNC: 15 U/L (ref 1–32)
BILIRUB SERPL-MCNC: 0.3 MG/DL (ref 0–1.2)
BUN SERPL-MCNC: 16 MG/DL (ref 6–20)
BUN/CREAT SERPL: 16.5 (ref 7–25)
C PEPTIDE SERPL-MCNC: 1.6 NG/ML (ref 1.1–4.4)
CALCIUM SERPL-MCNC: 9.1 MG/DL (ref 8.6–10.5)
CHLORIDE SERPL-SCNC: 103 MMOL/L (ref 98–107)
CHOLEST SERPL-MCNC: 121 MG/DL (ref 100–199)
CO2 SERPL-SCNC: 20.9 MMOL/L (ref 22–29)
CREAT SERPL-MCNC: 0.97 MG/DL (ref 0.57–1)
GLOBULIN SER CALC-MCNC: 3.3 GM/DL
GLUCOSE SERPL-MCNC: 257 MG/DL (ref 65–99)
HBA1C MFR BLD: 8.6 % (ref 4.8–5.6)
HDL SERPL-SCNC: 26.2 UMOL/L
HDLC SERPL-MCNC: 39 MG/DL
LDL SERPL QN: 20.4 NM
LDL SERPL-SCNC: 1020 NMOL/L
LDL SMALL SERPL-SCNC: 614 NMOL/L
LDLC SERPL CALC-MCNC: 53 MG/DL (ref 0–99)
POTASSIUM SERPL-SCNC: 4.1 MMOL/L (ref 3.5–5.2)
PROT SERPL-MCNC: 7.3 G/DL (ref 6–8.5)
SODIUM SERPL-SCNC: 135 MMOL/L (ref 136–145)
T4 SERPL-MCNC: 6.12 MCG/DL (ref 4.5–11.7)
TRIGL SERPL-MCNC: 144 MG/DL (ref 0–149)
TSH SERPL DL<=0.005 MIU/L-ACNC: 1.68 UIU/ML (ref 0.27–4.2)
UNABLE TO VOID: NORMAL
URATE SERPL-MCNC: 4.6 MG/DL (ref 2.4–5.7)

## 2020-08-19 DIAGNOSIS — E10.43 DIABETIC AUTONOMIC NEUROPATHY ASSOCIATED WITH TYPE 1 DIABETES MELLITUS (HCC): ICD-10-CM

## 2020-08-19 RX ORDER — PREGABALIN 150 MG/1
CAPSULE ORAL
Qty: 60 CAPSULE | Refills: 5 | Status: SHIPPED | OUTPATIENT
Start: 2020-08-19 | End: 2021-02-25

## 2020-10-09 RX ORDER — VARENICLINE TARTRATE 1 MG/1
TABLET, FILM COATED ORAL
Qty: 168 TABLET | Refills: 1 | OUTPATIENT
Start: 2020-10-09

## 2020-10-15 RX ORDER — LUBIPROSTONE 24 UG/1
CAPSULE ORAL
Qty: 180 CAPSULE | Refills: 1 | Status: SHIPPED | OUTPATIENT
Start: 2020-10-15

## 2020-10-16 RX ORDER — APIXABAN 5 MG/1
TABLET, FILM COATED ORAL
Qty: 60 TABLET | Refills: 4 | Status: SHIPPED | OUTPATIENT
Start: 2020-10-16 | End: 2021-03-30

## 2020-11-04 RX ORDER — PANTOPRAZOLE SODIUM 40 MG/1
TABLET, DELAYED RELEASE ORAL
Qty: 90 TABLET | Refills: 1 | Status: SHIPPED | OUTPATIENT
Start: 2020-11-04 | End: 2021-04-30

## 2020-11-10 RX ORDER — VARENICLINE TARTRATE 1 MG/1
TABLET, FILM COATED ORAL
Qty: 168 TABLET | Refills: 1 | OUTPATIENT
Start: 2020-11-10

## 2020-11-11 RX ORDER — VARENICLINE TARTRATE 1 MG/1
1 TABLET, FILM COATED ORAL 2 TIMES DAILY
Qty: 168 TABLET | Refills: 1 | Status: SHIPPED | OUTPATIENT
Start: 2020-11-11 | End: 2021-08-11

## 2020-11-13 DIAGNOSIS — F32.0 CURRENT MILD EPISODE OF MAJOR DEPRESSIVE DISORDER WITHOUT PRIOR EPISODE (HCC): ICD-10-CM

## 2020-11-13 DIAGNOSIS — E10.43 DIABETIC AUTONOMIC NEUROPATHY ASSOCIATED WITH TYPE 1 DIABETES MELLITUS (HCC): ICD-10-CM

## 2020-11-14 RX ORDER — DULOXETIN HYDROCHLORIDE 60 MG/1
CAPSULE, DELAYED RELEASE ORAL
Qty: 90 CAPSULE | Refills: 3 | Status: SHIPPED | OUTPATIENT
Start: 2020-11-14 | End: 2021-12-17 | Stop reason: SDUPTHER

## 2020-11-18 RX ORDER — PERPHENAZINE 16 MG/1
TABLET, FILM COATED ORAL
Qty: 800 EACH | Refills: 0 | Status: SHIPPED | OUTPATIENT
Start: 2020-11-18 | End: 2020-11-20 | Stop reason: SDUPTHER

## 2020-11-19 RX ORDER — TIZANIDINE 4 MG/1
4 TABLET ORAL EVERY 8 HOURS PRN
Qty: 270 TABLET | Refills: 1 | Status: SHIPPED | OUTPATIENT
Start: 2020-11-19 | End: 2021-06-19

## 2020-11-19 RX ORDER — PROMETHAZINE HYDROCHLORIDE 25 MG/1
25 TABLET ORAL EVERY 6 HOURS PRN
Qty: 30 TABLET | Refills: 2 | Status: SHIPPED | OUTPATIENT
Start: 2020-11-19 | End: 2021-03-30

## 2020-11-20 RX ORDER — PERPHENAZINE 16 MG/1
TABLET, FILM COATED ORAL
Qty: 300 EACH | Refills: 0 | Status: SHIPPED | OUTPATIENT
Start: 2020-11-20 | End: 2020-12-28

## 2020-11-25 ENCOUNTER — OFFICE VISIT (OUTPATIENT)
Dept: INTERNAL MEDICINE | Facility: CLINIC | Age: 45
End: 2020-11-25

## 2020-11-25 DIAGNOSIS — E78.5 DYSLIPIDEMIA: ICD-10-CM

## 2020-11-25 DIAGNOSIS — K86.1 OTHER CHRONIC PANCREATITIS (HCC): ICD-10-CM

## 2020-11-25 DIAGNOSIS — IMO0002 UNCONTROLLED TYPE 2 DIABETES MELLITUS WITH COMPLICATION, WITH LONG-TERM CURRENT USE OF INSULIN: ICD-10-CM

## 2020-11-25 DIAGNOSIS — I10 ESSENTIAL HYPERTENSION: Primary | ICD-10-CM

## 2020-11-25 DIAGNOSIS — Z79.4 LONG-TERM INSULIN USE (HCC): ICD-10-CM

## 2020-11-25 PROCEDURE — 99443 PR PHYS/QHP TELEPHONE EVALUATION 21-30 MIN: CPT | Performed by: FAMILY MEDICINE

## 2020-11-25 NOTE — PROGRESS NOTES
Subjective   Danielle Dudley is a 45 y.o. female.     Chief Complaint   Patient presents with   • Diabetes   • Hypertension   • Hyperlipidemia         History of Present Illness   This visit has been rescheduled as a phone visit to comply with patient safety concerns in accordance with CDC recommendations. Total time of discussion was 22 minutes.  You have chosen to receive care through a telephone visit. Do you consent to use a telephone visit for your medical care today? Yes    She has recently had gastric stimulator replaced but had readmission for infected port which has been removed and she is still on antibiotics from [facility].    Patient on follow-up visit with follow-up for active management of uncontrolled diabetes on insulin pump as well as gastroparesis now with a new gastric stimulator placed in September she has an infected infected port which is been removed and she is still on antibiotics from [facility].  She will follow-up for hospital follow-up in the next 2 to 3 weeks.    Her endocrinologist is not working here anymore and she will need to continue on the insulin pump and also continue active management of hyperlipidemia hypertension.        The following portions of the patient's history were reviewed and updated as appropriate: allergies, current medications, past social history and problem list.    Review of Systems   Constitutional: Positive for fatigue.   HENT: Negative.    Eyes: Negative.    Respiratory: Negative.    Cardiovascular: Negative.    Gastrointestinal: Positive for nausea.   Endocrine: Negative.    Genitourinary: Negative.    Musculoskeletal: Negative.    Skin: Negative.    Allergic/Immunologic: Negative.    Neurological: Negative.    Hematological: Negative.    Psychiatric/Behavioral: Negative.        Objective   There were no vitals filed for this visit.  Physical Exam she is lucid and alert on the phone no distress.  We discussed control of her  blood sugars post hospitalization.    Assessment/Plan   Problems Addressed this Visit        Cardiovascular and Mediastinum    Essential hypertension - Primary       Digestive    Chronic pancreatitis (CMS/[facility])       Endocrine    Uncontrolled type 2 diabetes mellitus with complication, with long-term current use of insulin (CMS/[facility])       Other    Dyslipidemia    Long-term insulin use (CMS/[facility])      Diagnoses       Codes Comments    Essential hypertension    -  Primary ICD-10-CM: I10  ICD-9-CM: 401.9     Other chronic pancreatitis (CMS/[facility])     ICD-10-CM: K86.1  ICD-9-CM: 577.1     Uncontrolled type 2 diabetes mellitus with complication, with long-term current use of insulin (CMS/[facility])     ICD-10-CM: E11.8, E11.65, Z79.4  ICD-9-CM: 250.82, V58.67     Long-term insulin use (CMS/[facility])     ICD-10-CM: Z79.4  ICD-9-CM: V58.67     Dyslipidemia     ICD-10-CM: E78.5  ICD-9-CM: 272.4       Plan: Medications reviewed which are extensive.  She is to follow-up post hospitalization for review of medications when she gets off antibiotics.  She will need to get back on her insulin pump with at least 300 units insulin daily.

## 2020-12-04 ENCOUNTER — RESULTS ENCOUNTER (OUTPATIENT)
Dept: ENDOCRINOLOGY | Age: 45
End: 2020-12-04

## 2020-12-04 DIAGNOSIS — E78.5 DYSLIPIDEMIA: ICD-10-CM

## 2020-12-04 DIAGNOSIS — IMO0002 UNCONTROLLED TYPE 2 DIABETES MELLITUS WITH COMPLICATION, WITH LONG-TERM CURRENT USE OF INSULIN: ICD-10-CM

## 2020-12-04 DIAGNOSIS — K86.9 PANCREAS DISORDER: ICD-10-CM

## 2020-12-04 DIAGNOSIS — E55.9 VITAMIN D DEFICIENCY: ICD-10-CM

## 2020-12-04 DIAGNOSIS — E66.01 MORBIDLY OBESE (HCC): ICD-10-CM

## 2020-12-04 DIAGNOSIS — Z79.4 LONG-TERM INSULIN USE (HCC): ICD-10-CM

## 2020-12-04 DIAGNOSIS — E78.2 MIXED HYPERLIPIDEMIA: ICD-10-CM

## 2020-12-04 DIAGNOSIS — K31.84 GASTROPARESIS: ICD-10-CM

## 2020-12-04 DIAGNOSIS — I10 ESSENTIAL HYPERTENSION: ICD-10-CM

## 2020-12-23 ENCOUNTER — OFFICE VISIT (OUTPATIENT)
Dept: ENDOCRINOLOGY | Age: 45
End: 2020-12-23

## 2020-12-23 VITALS
SYSTOLIC BLOOD PRESSURE: 132 MMHG | WEIGHT: 271.2 LBS | BODY MASS INDEX: 40.17 KG/M2 | HEIGHT: 69 IN | RESPIRATION RATE: 16 BRPM | DIASTOLIC BLOOD PRESSURE: 84 MMHG

## 2020-12-23 DIAGNOSIS — IMO0002 UNCONTROLLED TYPE 2 DIABETES MELLITUS WITH COMPLICATION, WITH LONG-TERM CURRENT USE OF INSULIN: Primary | ICD-10-CM

## 2020-12-23 DIAGNOSIS — E78.5 DYSLIPIDEMIA: ICD-10-CM

## 2020-12-23 DIAGNOSIS — E55.9 VITAMIN D DEFICIENCY: ICD-10-CM

## 2020-12-23 PROCEDURE — 99214 OFFICE O/P EST MOD 30 MIN: CPT | Performed by: NURSE PRACTITIONER

## 2020-12-23 NOTE — PROGRESS NOTES
45 y.o.female    Patient Care Team:  Jairo Walker Jr., MD as PCP - General (Family Medicine)  Santino Andrade MD as Consulting Physician (Endocrinology)    Chief Complaint:  dm2 f/u     Subjective   Was in hospital last month to have port removed. She is still following with wound care. Has had multiple problems with ports and infections ongoing.  She has a port for her weekly IVIG.  Has stomach stimulator for gastroparesis   H/o pancreatitis  H/o yeast infections     HPI    Danielle Dudley 45 y.o. presents with Type  2 dm as a F/u patient.     Type 2 dm - Diagnosed in 2010  Today in clinic pt reports being on novolog with pump  FBG - 200s  Post meals - 200-300  Checks BG - 3x/day  Insulin pump - medtronic 630g, uses dual wave with boluses r/t gastroparesis   Sensor - n/a, used dexcom in the past   Dm retinopathy - denies ,Last eye exam - 2-3 months ago  Dm nephropathy - denies   Dm neuropathy - moderate pain in feet and legs, on lyrica   CAD - denies  CVA - denies  Episodes of hypoglycemia - very rare, not able to give history of when it happens  Pt is physically active. weight is trending down.   Pt tries to follow DM diet for most part.   On ARB    Mixed hyperlipidemia   zetia 10mg daily   Rosuvastatin 40mg daily  Not on vascepa any longer    Reviewed primary care physician's/consulting physician documentation and lab results :     Interval History      The following portions of the patient's history were reviewed and updated as appropriate: allergies, current medications, past family history, past medical history, past social history, past surgical history and problem list.    Past Medical History:   Diagnosis Date   • Anxiety and depression    • Gastroparesis    • Headache, tension-type    • High blood pressure    • High cholesterol    • History of MRSA infection    • Migraine    • Pancreatitis, chronic (CMS/HCC)    • Type 2 diabetes mellitus (CMS/HCC)    • Vitamin D deficiency      Family History   Problem  Relation Age of Onset   • Diabetes Mother    • Heart disease Father    • Diabetes Father    • Cancer Father    • Kidney disease Father    • Heart disease Brother    • Stroke Paternal Grandmother    • Heart disease Paternal Grandfather      Social History     Socioeconomic History   • Marital status: Unknown     Spouse name: Not on file   • Number of children: Not on file   • Years of education: Not on file   • Highest education level: Not on file   Tobacco Use   • Smoking status: Former Smoker     Packs/day: 0.50     Years: 24.00     Pack years: 12.00     Quit date: 11/16/2017     Years since quitting: 3.1   • Smokeless tobacco: Never Used   Substance and Sexual Activity   • Alcohol use: No   • Drug use: No     Allergies   Allergen Reactions   • Imitrex [Sumatriptan] Shortness Of Breath   • Contrast Dye Other (See Comments)     Contrast dye 3    Stopped breathing   • Linzess [Linaclotide] GI Intolerance     constipation   • Codeine Hives   • Levemir [Insulin Detemir] Hives, Rash and Other (See Comments)     Bruising   • Morphine And Related Rash and Other (See Comments)     Alopecia       Current Outpatient Medications:   •  betamethasone valerate (VALISONE) 0.1 % ointment, APPLY TOPICALLY TO THE APPROPRIATE AREA AS DIRECTED 2 (TWO) TIMES A DAY. TO HANDS AS NEEDED, Disp: 45 g, Rfl: 1  •  busPIRone (BUSPAR) 5 MG tablet, Take 1 tablet by mouth 3 (Three) Times a Day., Disp: 270 tablet, Rfl: 0  •  Chantix Continuing Month Chandra 1 MG tablet, Take 1 tablet by mouth 2 (Two) Times a Day., Disp: 168 tablet, Rfl: 1  •  Contour Next Test test strip, Test 3 times daily DX: e11.65, Disp: 300 each, Rfl: 0  •  CREON 17801-34377 units capsule delayed-release particles capsule, 1 capsule p.o. with each meal., Disp: 300 capsule, Rfl: 3  •  DULoxetine (CYMBALTA) 60 MG capsule, TAKE 1 CAPSULE BY MOUTH EVERY DAY, Disp: 90 capsule, Rfl: 3  •  Eliquis 5 MG tablet tablet, TAKE 1 TABLET BY MOUTH EVERY 12 HOURS, Disp: 60 tablet, Rfl: 4  •   EPINEPHrine (EPIPEN) 0.3 MG/0.3ML solution auto-injector injection, , Disp: , Rfl:   •  ezetimibe (Zetia) 10 MG tablet, Take 1 tablet by mouth Daily., Disp: 30 tablet, Rfl: 11  •  glucagon (GLUCAGON EMERGENCY) 1 MG injection, Inject 1 mg under the skin into the appropriate area as directed 1 (One) Time As Needed for Low Blood Sugar for up to 1 dose., Disp: 1 kit, Rfl: 11  •  Lancets (FREESTYLE) lancets, Check blood glucose 4-5 times daily, Disp: 120 each, Rfl: 5  •  lubiprostone (Amitiza) 24 MCG capsule, TAKE 1 CAPSULE BY MOUTH TWICE DAILY WITH FOOD, Disp: 180 capsule, Rfl: 1  •  metoprolol succinate XL (Toprol XL) 100 MG 24 hr tablet, Take 1 tablet by mouth Daily., Disp: 90 tablet, Rfl: 3  •  NOVOLOG 100 UNIT/ML injection, Inject 200 units daily via minimed 630G insulin pump SN:[id number] Start: 06/2017 DX:e11.65, Disp: 180 mL, Rfl: 3  •  pantoprazole (PROTONIX) 40 MG EC tablet, TAKE 1 TABLET BY MOUTH EVERY DAY, Disp: 90 tablet, Rfl: 1  •  pregabalin (LYRICA) 150 MG capsule, TAKE 1 CAPSULE BY MOUTH TWICE A DAY, Disp: 60 capsule, Rfl: 5  •  promethazine (PHENERGAN) 25 MG tablet, TAKE 1 TABLET BY MOUTH EVERY 6 (SIX) HOURS AS NEEDED FOR NAUSEA OR VOMITING., Disp: 30 tablet, Rfl: 2  •  rosuvastatin (CRESTOR) 40 MG tablet, Take 1 tablet by mouth Daily., Disp: 90 tablet, Rfl: 3  •  tiZANidine (ZANAFLEX) 4 MG tablet, TAKE 1 TABLET BY MOUTH EVERY 8 (EIGHT) HOURS AS NEEDED FOR MUSCLE SPASMS., Disp: 270 tablet, Rfl: 1  •  topiramate (TOPAMAX) 100 MG tablet, Take 1 tablet by mouth 2 (Two) Times a Day. For headache, Disp: 180 tablet, Rfl: 3  •  TRANSDERM-SCOP, 1.5 MG, 1.5 MG/3DAYS patch, Place 1 patch on the skin as directed by provider Every 3 (Three) Days., Disp: , Rfl:   •  ergocalciferol (Drisdol) 1.25 MG (73374 UT) capsule, Take 1 capsule by mouth 2 (Two) Times a Week., Disp: 26 capsule, Rfl: 3  •  losartan (COZAAR) 100 MG tablet, TAKE 1 TABLET BY MOUTH EVERY DAY, Disp: 90 tablet, Rfl: 3  •  VASCEPA 1 g capsule capsule,  "Take 2 g by mouth 2 (Two) Times a Day With Meals., Disp: 360 capsule, Rfl: 3        Review of Systems   Constitutional: Negative for activity change, appetite change and fatigue.   HENT: Negative for sore throat, trouble swallowing and voice change.    Respiratory: Negative for cough, choking and shortness of breath.    Cardiovascular: Negative for chest pain, palpitations and leg swelling.   Gastrointestinal: Negative for abdominal pain, constipation, diarrhea, nausea and vomiting.   Endocrine: Negative for cold intolerance, heat intolerance, polydipsia, polyphagia and polyuria.   Genitourinary: Negative for decreased urine volume, dysuria, flank pain and urgency.   Musculoskeletal: Negative for arthralgias, gait problem and myalgias.   Skin: Negative for color change, rash and wound.   Neurological: Negative for dizziness, weakness, light-headedness, numbness and headaches.   Hematological: Does not bruise/bleed easily.       Objective       Vitals:    12/23/20 0828   BP: 132/84   Resp: 16   Weight: 123 kg (271 lb 3.2 oz)   Height: 175.3 cm (69\")     Body mass index is 40.05 kg/m².      Physical Exam  Vitals signs reviewed.   Constitutional:       General: She is not in acute distress.  HENT:      Head: Normocephalic and atraumatic.   Neck:      Musculoskeletal: Normal range of motion and neck supple.   Cardiovascular:      Rate and Rhythm: Normal rate and regular rhythm.   Pulmonary:      Effort: Pulmonary effort is normal. No respiratory distress.   Musculoskeletal: Normal range of motion.         General: No signs of injury.   Skin:     General: Skin is warm and dry.   Neurological:      Mental Status: She is alert and oriented to person, place, and time. Mental status is at baseline.   Psychiatric:         Mood and Affect: Mood normal.         Behavior: Behavior normal.         Thought Content: Thought content normal.         Judgment: Judgment normal.       Results Review:        Office Visit on 08/12/2020 "   Component Date Value Ref Range Status   • TSH 08/12/2020 1.680  0.270 - 4.200 uIU/mL Final   • T4, Total 08/12/2020 6.12  4.50 - 11.70 mcg/dL Final    Results may be falsely increased if patient taking Biotin.   • Uric Acid 08/12/2020 4.6  2.4 - 5.7 mg/dL Final   • 25 Hydroxy, Vitamin D 08/12/2020 35.1  30.0 - 100.0 ng/ml Final    Comment: Results may be falsely increased if patient taking Biotin.  Reference Range for Total Vitamin D 25(OH)  Deficiency <20.0 ng/mL  Insufficiency 21-29 ng/mL  Sufficiency  ng/mL  Toxicity >100 ng/ml     • Glucose 08/12/2020 257* 65 - 99 mg/dL Final   • BUN 08/12/2020 16  6 - 20 mg/dL Final   • Creatinine 08/12/2020 0.97  0.57 - 1.00 mg/dL Final   • eGFR Non  Am 08/12/2020 62  >60 mL/min/1.73 Final   • eGFR African Am 08/12/2020 75  >60 mL/min/1.73 Final   • BUN/Creatinine Ratio 08/12/2020 16.5  7.0 - 25.0 Final   • Sodium 08/12/2020 135* 136 - 145 mmol/L Final   • Potassium 08/12/2020 4.1  3.5 - 5.2 mmol/L Final   • Chloride 08/12/2020 103  98 - 107 mmol/L Final   • Total CO2 08/12/2020 20.9* 22.0 - 29.0 mmol/L Final   • Calcium 08/12/2020 9.1  8.6 - 10.5 mg/dL Final   • Total Protein 08/12/2020 7.3  6.0 - 8.5 g/dL Final   • Albumin 08/12/2020 4.00  3.50 - 5.20 g/dL Final   • Globulin 08/12/2020 3.3  gm/dL Final   • A/G Ratio 08/12/2020 1.2  g/dL Final   • Total Bilirubin 08/12/2020 0.3  0.0 - 1.2 mg/dL Final   • Alkaline Phosphatase 08/12/2020 65  39 - 117 U/L Final   • AST (SGOT) 08/12/2020 15  1 - 32 U/L Final   • ALT (SGPT) 08/12/2020 18  1 - 33 U/L Final   • C-Peptide 08/12/2020 1.6  1.1 - 4.4 ng/mL Final    C-Peptide reference interval is for fasting patients.   • Hemoglobin A1C 08/12/2020 8.60* 4.80 - 5.60 % Final    Comment: Hemoglobin A1C Ranges:  Increased Risk for Diabetes  5.7% to 6.4%  Diabetes                     >= 6.5%  Diabetic Goal                < 7.0%     • LDL-P 08/12/2020 1,020* <1,000 nmol/L Final    Comment:                           Low                    < 1000                            Moderate         1000 - 1299                            Borderline-High  1300 - 1599                            High             1600 - 2000                            Very High             > 2000     • LDL-C 08/12/2020 53  0 - 99 mg/dL Final    Comment:                           Optimal               <  100                            Above optimal     100 -  129                            Borderline        130 -  159                            High              160 -  189                            Very high             >  189  LDL-C is inaccurate if patient is non-fasting.     • HDL-C 08/12/2020 39* >39 mg/dL Final   • Triglycerides 08/12/2020 144  0 - 149 mg/dL Final   • Total Cholesterol 08/12/2020 121  100 - 199 mg/dL Final   • HDL-P (Total) 08/12/2020 26.2* >=30.5 umol/L Final   • Small LDL-P 08/12/2020 614* <=527 nmol/L Final   • LDL Size 08/12/2020 20.4* >20.5 nm Final    Comment:  ----------------------------------------------------------                   ** INTERPRETATIVE INFORMATION**                   PARTICLE CONCENTRATION AND SIZE                      <--Lower CVD Risk   Higher CVD Risk-->    LDL AND HDL PARTICLES   Percentile in Reference Population    HDL-P (total)        High     75th    50th    25th   Low                         >34.9    34.9    30.5    26.7   <26.7    Small LDL-P          Low      25th    50th    75th   High                         <117     117     527     839    >839    LDL Size   <-Large (Pattern A)->    <-Small (Pattern B)->                      23.0    20.6           20.5      19.0   ----------------------------------------------------------  Small LDL-P and LDL Size are associated with CVD risk, but not after  LDL-P is taken into account.     • Unable to Void 08/12/2020 Comment   Final    Comment: The patient was not able to render a urine sample and has been  instructed to return for a urine collection at their  "earliest  convenience.  The urine testing that you have requested has  been deleted from this report.  When the patient returns and  provides a urine specimen, the urine testing will be performed  and separately reported.       Lab Results   Component Value Date    HGBA1C 8.60 (H) 2020    HGBA1C 9.64 (H) 2020    HGBA1C 10.50 (H) 2020     Lab Results   Component Value Date    MICROALBUR 9.7 2020    CREATININE 0.97 2020     Imaging Results (Most Recent)     None                Assessment and Plan:    Diagnoses and all orders for this visit:    1. Uncontrolled type 2 diabetes mellitus with complication, with long-term current use of insulin (CMS/[facility]) (Primary)  -     Hemoglobin A1c  -     Comprehensive Metabolic Panel  -     Lipid Panel  -     Comprehensive Metabolic Panel  -     Hemoglobin A1c; Future  -     Ambulatory Referral to Podiatry    2. Vitamin D deficiency  -     Vitamin D 25 Hydroxy    3. Dyslipidemia  -     Lipid Panel  -     Comprehensive Metabolic Panel    high risk of diabetic complications r/t hyperglycemia with her h/o chronic infections and gastroparesis   Give dexcom sample and try to get approved with insurance   Low hypoglycemia risk, has glucagon at home, no     Pump settings  Carb ratio 3.0  Sensitivity 12a-12a 14--> 10  BASAL:  12a-3a 2.8  3a-6a 3.0  6a-12p 3.5  12p-3p 3.75  3p-8p 2.8  8p-12a 3.0  New basal rates:  12a-8a 3.0  8a-12a 3.75  Patient to update me with blood sugars in 2-3 months     Will get eye exam sent over  Podiatry referral- neuropathy/ diabetic exam     Reviewed Lab results with the patient.     Lilian Holloway, APRN  20    EMR Dragon / transcription disclaimer:     \"Dictated utilizing Dragon dictation\".  "

## 2020-12-24 LAB
25(OH)D3+25(OH)D2 SERPL-MCNC: 31.6 NG/ML (ref 30–100)
ALBUMIN SERPL-MCNC: 3.6 G/DL (ref 3.5–5.2)
ALBUMIN/GLOB SERPL: 0.8 G/DL
ALP SERPL-CCNC: 65 U/L (ref 39–117)
ALT SERPL-CCNC: 20 U/L (ref 1–33)
AST SERPL-CCNC: 18 U/L (ref 1–32)
BILIRUB SERPL-MCNC: 0.2 MG/DL (ref 0–1.2)
BUN SERPL-MCNC: 19 MG/DL (ref 6–20)
BUN/CREAT SERPL: 19.8 (ref 7–25)
CALCIUM SERPL-MCNC: 9.2 MG/DL (ref 8.6–10.5)
CHLORIDE SERPL-SCNC: 102 MMOL/L (ref 98–107)
CHOLEST SERPL-MCNC: 114 MG/DL (ref 0–200)
CO2 SERPL-SCNC: 22.9 MMOL/L (ref 22–29)
CREAT SERPL-MCNC: 0.96 MG/DL (ref 0.57–1)
GLOBULIN SER CALC-MCNC: 4.3 GM/DL
GLUCOSE SERPL-MCNC: 259 MG/DL (ref 65–99)
HBA1C MFR BLD: 10.6 % (ref 4.8–5.6)
HDLC SERPL-MCNC: 37 MG/DL (ref 40–60)
INTERPRETATION: NORMAL
LDLC SERPL CALC-MCNC: 50 MG/DL (ref 0–100)
Lab: NORMAL
POTASSIUM SERPL-SCNC: 4.3 MMOL/L (ref 3.5–5.2)
PROT SERPL-MCNC: 7.9 G/DL (ref 6–8.5)
SODIUM SERPL-SCNC: 132 MMOL/L (ref 136–145)
TRIGL SERPL-MCNC: 162 MG/DL (ref 0–150)
VLDLC SERPL CALC-MCNC: 27 MG/DL (ref 5–40)

## 2020-12-28 ENCOUNTER — TELEPHONE (OUTPATIENT)
Dept: ENDOCRINOLOGY | Age: 45
End: 2020-12-28

## 2020-12-28 RX ORDER — PERPHENAZINE 16 MG/1
TABLET, FILM COATED ORAL
Qty: 300 EACH | Refills: 1 | Status: SHIPPED | OUTPATIENT
Start: 2020-12-28 | End: 2021-06-21 | Stop reason: SDUPTHER

## 2020-12-28 NOTE — TELEPHONE ENCOUNTER
Patient called in stated that [facility] needs paper filled out for diabetic supplies which she is now out of. Needs it filled out ASAP please so she can rec them.  Thanks

## 2021-01-13 ENCOUNTER — TELEPHONE (OUTPATIENT)
Dept: ENDOCRINOLOGY | Age: 46
End: 2021-01-13

## 2021-01-13 NOTE — TELEPHONE ENCOUNTER
[facility] CALLED IN SAYING THEY ARE UNABLE TO READ FAX FORM THAT WAS SENT IN WANTS US TO PLEASE REFAX IT TO THEM FAX # [phone]

## 2021-02-24 DIAGNOSIS — E10.43 DIABETIC AUTONOMIC NEUROPATHY ASSOCIATED WITH TYPE 1 DIABETES MELLITUS (HCC): ICD-10-CM

## 2021-02-25 RX ORDER — PREGABALIN 150 MG/1
CAPSULE ORAL
Qty: 60 CAPSULE | Refills: 1 | Status: SHIPPED | OUTPATIENT
Start: 2021-02-25 | End: 2021-05-03

## 2021-03-10 ENCOUNTER — OFFICE VISIT (OUTPATIENT)
Dept: ENDOCRINOLOGY | Age: 46
End: 2021-03-10

## 2021-03-10 VITALS
DIASTOLIC BLOOD PRESSURE: 68 MMHG | WEIGHT: 284.8 LBS | BODY MASS INDEX: 42.18 KG/M2 | HEIGHT: 69 IN | SYSTOLIC BLOOD PRESSURE: 126 MMHG

## 2021-03-10 DIAGNOSIS — Z79.4 TYPE 2 DIABETES MELLITUS WITH HYPERGLYCEMIA, WITH LONG-TERM CURRENT USE OF INSULIN (HCC): Primary | ICD-10-CM

## 2021-03-10 DIAGNOSIS — E11.65 TYPE 2 DIABETES MELLITUS WITH HYPERGLYCEMIA, WITH LONG-TERM CURRENT USE OF INSULIN (HCC): Primary | ICD-10-CM

## 2021-03-10 PROCEDURE — 99214 OFFICE O/P EST MOD 30 MIN: CPT | Performed by: NURSE PRACTITIONER

## 2021-03-10 RX ORDER — IMMUNE GLOBULIN INTRAVENOUS (HUMAN) 100 MG/ML
SOLUTION INTRAVENOUS
COMMUNITY
Start: 2021-01-06 | End: 2021-04-07

## 2021-03-10 RX ORDER — INSULIN LISPRO 200 [IU]/ML
300 INJECTION, SOLUTION SUBCUTANEOUS DAILY
Qty: 15 PEN | Refills: 1 | Status: SHIPPED | OUTPATIENT
Start: 2021-03-10 | End: 2021-03-11 | Stop reason: ALTCHOICE

## 2021-03-10 NOTE — PROGRESS NOTES
"Chief Complaint  Diabetes (type 2 diabetes, pump patient, checks 2x a day, eye exam 2020, blood sugars going up and down)    Subjective          Danielle Dudley presents to [facility]  History of Present Illness  She has a port for her weekly IVIG.  Has stomach stimulator for gastroparesis   H/o pancreatitis  H/o yeast infections      HPI     Danielle Dudley 45 y.o. presents with Type  2 dm as a F/u patient.      Type 2 dm - Diagnosed in 2010  Today in clinic pt reports being on novolog with pump  FBG - 200s  Post meals - 200-300  Checks BG - 3x/day  Insulin pump - medtronic 630g, uses dual wave with boluses r/t gastroparesis   Sensor - n/a, used dexcom in the past   Dm retinopathy - denies ,Last eye exam - 2-3 months ago  Dm nephropathy - denies   Dm neuropathy - moderate pain in feet and legs, on lyrica   CAD - denies  CVA - denies  Episodes of hypoglycemia - very rare, not able to give history of when it happens  Pt is physically active. weight is trending down.   Pt tries to follow DM diet for most part.   On ARB     Mixed hyperlipidemia   zetia 10mg daily   Rosuvastatin 40mg daily  Not on vascepa any longer  Lab Results   Component Value Date    CHLPL 114 12/23/2020    TRIG 162 (H) 12/23/2020    HDL 37 (L) 12/23/2020    LDL 50 12/23/2020         Objective   Vital Signs:   /68   Ht 175.3 cm (69\")   Wt 129 kg (284 lb 12.8 oz)   BMI 42.06 kg/m²     Physical Exam  Vitals reviewed.   Constitutional:       General: She is not in acute distress.  HENT:      Head: Normocephalic and atraumatic.   Cardiovascular:      Rate and Rhythm: Normal rate and regular rhythm.   Pulmonary:      Effort: Pulmonary effort is normal. No respiratory distress.   Musculoskeletal:         General: No signs of injury. Normal range of motion.      Cervical back: Normal range of motion and neck supple.   Skin:     General: Skin is warm and dry.   Neurological:      Mental Status: She is alert and " oriented to person, place, and time. Mental status is at baseline.   Psychiatric:         Mood and Affect: Mood normal.         Behavior: Behavior normal.         Thought Content: Thought content normal.         Judgment: Judgment normal.        Result Review :   The following data was reviewed by: RICKI Westfall on 03/10/2021:  Common labs    Common Labsle 5/7/20 5/7/20 5/7/20 5/7/20 5/7/20 8/12/20 8/12/20 8/12/20 8/12/20 12/23/20 12/23/20 12/23/20    1051 1051 1051 1051 1051 1024 1024 1024 1024 0914 0914 0914   Glucose  198 (A)     257 (A)    259 (A)    BUN  14     16    19    Creatinine  0.85     0.97    0.96    eGFR Non  Am  72     62    62    eGFR  Am  87     75    76    Sodium  139     135 (A)    132 (A)    Potassium  3.9     4.1    4.3    Chloride  104     103    102    Calcium  9.1     9.1    9.2    Total Protein  7.5     7.3    7.9    Albumin  3.80     4.00    3.60    Total Bilirubin  0.3     0.3    0.2    Alkaline Phosphatase  67     65    65    AST (SGOT)  14     15    18    ALT (SGPT)  15     18    20    Total Cholesterol     175    121   114   Triglycerides     157 (A)    144   162 (A)   HDL Cholesterol            37 (A)   LDL Cholesterol             50   Hemoglobin A1C   9.64 (A)     8.60 (A)  10.60 (A)     Microalbumin, Urine    9.7           Uric Acid 5.2     4.6         (A) Abnormal value       Comments are available for some flowsheets but are not being displayed.                     Assessment and Plan    Diagnoses and all orders for this visit:    1. Type 2 diabetes mellitus with hyperglycemia, with long-term current use of insulin (CMS/[facility]) (Primary)  -     Insulin Lispro (HumaLOG KwikPen) 200 UNIT/ML solution pen-injector; Inject 300 Units under the skin into the appropriate area as directed Daily. Max dose 300u daily with pump. Must come to office before using  Dispense: 15 pen; Refill: 1  -     Hemoglobin A1c        Follow Up   Return in about 3 months (around 6/10/2021).    Discussed wearing the pump in new sites to avoid scar tissue. She is worried about insulin resistance. She is very frustrated.  If cant get u200 covered, will need to add long acting insulin or consider u500   Try to get U200 insulin. Do not start at home. Come to the office to get settings changes in 4 weeks  Change carb ratio to 3-->2  sensitivity 10-->5  Basal 3.0-->3.3 and 3.75--> 4  Active insulin time 2-->3  Enter BS into bolus at least 3-4x/day   Poor candidate for most diabetic medications r/t multiple allergies and poor tolerance to medications  High risk of complications r/t ongoing hyperglycemia   Got new phone to use dexcom and still cant get it connected. Will try to get dexcom    Gave lumjev sample to use in pump as well to see if glycemic control improves     Patient was given instructions and counseling regarding her condition or for health maintenance advice. Please see specific information pulled into the AVS if appropriate.     RICKI Westfall

## 2021-03-11 LAB — HBA1C MFR BLD: 11.7 % (ref 4.8–5.6)

## 2021-03-11 RX ORDER — INSULIN ASPART 100 [IU]/ML
INJECTION, SOLUTION INTRAVENOUS; SUBCUTANEOUS
Qty: 230 ML | Refills: 0 | Status: SHIPPED | OUTPATIENT
Start: 2021-03-11 | End: 2021-03-23 | Stop reason: ALTCHOICE

## 2021-03-11 NOTE — TELEPHONE ENCOUNTER
Sent to [place]     ----- Message from RICKI Westfall sent at 3/10/2021 10:08 AM EST -----  This patient need dexcom , sensors and transmitter, please get from [place]

## 2021-03-12 ENCOUNTER — TELEPHONE (OUTPATIENT)
Dept: ENDOCRINOLOGY | Age: 46
End: 2021-03-12

## 2021-03-22 RX ORDER — INSULIN LISPRO 200 [IU]/ML
400 INJECTION, SOLUTION SUBCUTANEOUS DAILY
Qty: 30 ML | Refills: 1 | Status: SHIPPED | OUTPATIENT
Start: 2021-03-22 | End: 2021-04-07 | Stop reason: SDUPTHER

## 2021-03-23 RX ORDER — PEN NEEDLE, DIABETIC 32GX 5/32"
NEEDLE, DISPOSABLE MISCELLANEOUS
Qty: 100 EACH | Refills: 1 | Status: SHIPPED | OUTPATIENT
Start: 2021-03-23

## 2021-03-24 ENCOUNTER — TELEPHONE (OUTPATIENT)
Dept: ENDOCRINOLOGY | Age: 46
End: 2021-03-24

## 2021-03-24 NOTE — TELEPHONE ENCOUNTER
Patient called and stated that she was told to call here when she received the humalog and needles so she can be instructed to adjust her insulin pump

## 2021-03-30 RX ORDER — APIXABAN 5 MG/1
TABLET, FILM COATED ORAL
Qty: 60 TABLET | Refills: 4 | Status: SHIPPED | OUTPATIENT
Start: 2021-03-30 | End: 2021-09-13

## 2021-03-30 RX ORDER — PROMETHAZINE HYDROCHLORIDE 25 MG/1
25 TABLET ORAL EVERY 6 HOURS PRN
Qty: 30 TABLET | Refills: 2 | Status: SHIPPED | OUTPATIENT
Start: 2021-03-30 | End: 2021-09-15

## 2021-03-31 ENCOUNTER — BULK ORDERING (OUTPATIENT)
Dept: CASE MANAGEMENT | Facility: OTHER | Age: 46
End: 2021-03-31

## 2021-03-31 DIAGNOSIS — Z23 IMMUNIZATION DUE: ICD-10-CM

## 2021-04-07 ENCOUNTER — OFFICE VISIT (OUTPATIENT)
Dept: ENDOCRINOLOGY | Age: 46
End: 2021-04-07

## 2021-04-07 VITALS
BODY MASS INDEX: 43.4 KG/M2 | WEIGHT: 293 LBS | HEIGHT: 69 IN | DIASTOLIC BLOOD PRESSURE: 92 MMHG | SYSTOLIC BLOOD PRESSURE: 124 MMHG

## 2021-04-07 DIAGNOSIS — E11.65 TYPE 2 DIABETES MELLITUS WITH HYPERGLYCEMIA, WITH LONG-TERM CURRENT USE OF INSULIN (HCC): Primary | ICD-10-CM

## 2021-04-07 DIAGNOSIS — Z79.4 TYPE 2 DIABETES MELLITUS WITH HYPERGLYCEMIA, WITH LONG-TERM CURRENT USE OF INSULIN (HCC): Primary | ICD-10-CM

## 2021-04-07 PROCEDURE — 99213 OFFICE O/P EST LOW 20 MIN: CPT | Performed by: NURSE PRACTITIONER

## 2021-04-07 RX ORDER — LIDOCAINE AND PRILOCAINE 25; 25 MG/G; MG/G
CREAM TOPICAL
COMMUNITY
Start: 2021-03-10 | End: 2022-12-28

## 2021-04-07 RX ORDER — FAMOTIDINE 40 MG/1
20 TABLET, FILM COATED ORAL 2 TIMES DAILY
COMMUNITY
Start: 2021-03-24

## 2021-04-07 RX ORDER — INSULIN LISPRO 200 [IU]/ML
400 INJECTION, SOLUTION SUBCUTANEOUS DAILY
Qty: 180 ML | Refills: 0 | Status: SHIPPED | OUTPATIENT
Start: 2021-04-07 | End: 2021-12-20

## 2021-04-07 RX ORDER — SODIUM CHLORIDE 9 MG/ML
INJECTION, SOLUTION INTRAVENOUS
COMMUNITY
Start: 2021-03-29

## 2021-04-07 RX ORDER — IMMUNE GLOBULIN (HUMAN) 10 G/100ML
INJECTION INTRAVENOUS; SUBCUTANEOUS
COMMUNITY
Start: 2021-03-29

## 2021-04-07 RX ORDER — COVID-19 ANTIGEN TEST
KIT MISCELLANEOUS
COMMUNITY
End: 2022-02-23

## 2021-04-07 RX ORDER — DIPHENHYDRAMINE HYDROCHLORIDE 50 MG/ML
INJECTION INTRAMUSCULAR; INTRAVENOUS
COMMUNITY
Start: 2021-03-29

## 2021-04-07 NOTE — PROGRESS NOTES
"Chief Complaint  Diabetes    Subjective          Danielle Dudley presents to [facility]CRINOLOG[facility]  History of Present Illness   Patient is in today for follow-up on her new insulin that she received to put in her pump which is Humalog U200 which will be used off label in her pump.  She did start this insulin in her pump at home AGAINST MEDICAL ADVICE however she has been very happy with her blood sugar readings and is really enjoying how well she feels now that her blood sugars are under better control  She is wanting a continuous glucose monitoring system.  We did try the Dexcom in the past however she purchased a new phone and was still unable to use the system.  We will attempt to get her the [employer] system    Recommended settings that were not initiated by the patient before starting her U2 100  New pump settings with u200  Carb ratio 4  Sens 8  Basal 2u/hr    Checking BS multiple times a day   BS 95 this am   Only one low blood sugar,48-she is unsure the context of this low blood sugar.  Denies hypoglycemia unawareness  Denies polyphagia, polydipsia, polyuria  Energy levels have improved  Her overall wellbeing and mood has improved  She has unexpired glucagon at home        Objective   Vital Signs:   /92   Ht 175.3 cm (69.02\")   Wt 135 kg (297 lb)   BMI 43.84 kg/m²     Physical Exam  Vitals reviewed.   Constitutional:       General: She is not in acute distress.  HENT:      Head: Normocephalic and atraumatic.   Cardiovascular:      Rate and Rhythm: Normal rate and regular rhythm.   Pulmonary:      Effort: Pulmonary effort is normal. No respiratory distress.   Musculoskeletal:         General: No signs of injury. Normal range of motion.      Cervical back: Normal range of motion and neck supple.   Skin:     General: Skin is warm and dry.   Neurological:      Mental Status: She is alert and oriented to person, place, and time. Mental status is at baseline.   Psychiatric:         Mood " and Affect: Mood normal.         Behavior: Behavior normal.         Thought Content: Thought content normal.         Judgment: Judgment normal.        Result Review :   The following data was reviewed by: RICKI Westfall on 04/07/2021:  Common labs    Common Labsle 8/12/20 8/12/20 8/12/20 8/12/20 12/23/20 12/23/20 12/23/20 3/10/21    1024 1024 1024 1024 0914 0914 0914    Glucose  257 (A)    259 (A)     BUN  16    19     Creatinine  0.97    0.96     eGFR Non  Am  62    62     eGFR  Am  75    76     Sodium  135 (A)    132 (A)     Potassium  4.1    4.3     Chloride  103    102     Calcium  9.1    9.2     Total Protein  7.3    7.9     Albumin  4.00    3.60     Total Bilirubin  0.3    0.2     Alkaline Phosphatase  65    65     AST (SGOT)  15    18     ALT (SGPT)  18    20     Total Cholesterol    121   114    Triglycerides    144   162 (A)    HDL Cholesterol       37 (A)    LDL Cholesterol        50    Hemoglobin A1C   8.60 (A)  10.60 (A)   11.70 (A)   Uric Acid 4.6          (A) Abnormal value       Comments are available for some flowsheets but are not being displayed.                     Assessment and Plan    Diagnoses and all orders for this visit:    1. Type 2 diabetes mellitus with hyperglycemia, with long-term current use of insulin (CMS/[facility]) (Primary)  -     Hemoglobin A1c; Future    Other orders  -     Continuous Blood Gluc Sensor (FreeStyle Mili 2 Sensor) misc; 1 Device Every 14 (Fourteen) Days.  Dispense: 3 each; Refill: 5  -     glucagon (GLUCAGEN) 1 MG injection; Inject 1 mg under the skin into the appropriate area as directed 1 (One) Time As Needed for Low Blood Sugar for up to 1 dose.  Dispense: 1 kit; Refill: 1  -     Insulin Lispro (HumaLOG KwikPen) 200 UNIT/ML solution pen-injector; Inject 400 Units under the skin into the appropriate area as directed Daily. Use as directed  Dispense: 180 mL; Refill: 0        Follow Up   No follow-ups on file.   Start mili sample today, send  RX  Patient will follow up with me in June  She will update me with blood sugars every 2 weeks to ensure settings are appropriate  Medtronic educator to contact patient regarding pump upgrade eligibility and possible pump with sensor qualifications  Moderate hypoglycemia risk that will improve with setting changes to pump today  Basal rate was changed to 3 units an hour, sensitivity 8, carb ratio 4, active insulin time 3 hours, target blood glucose 120  Continue to enter carbs and blood sugar and to bolus wizard function with meals or with hypoglycemia    Patient was given instructions and counseling regarding her condition or for health maintenance advice. Please see specific information pulled into the AVS if appropriate.     Lilian Holloway, APRN

## 2021-04-16 ENCOUNTER — TELEPHONE (OUTPATIENT)
Dept: ENDOCRINOLOGY | Age: 46
End: 2021-04-16

## 2021-04-19 DIAGNOSIS — Z79.4 TYPE 2 DIABETES MELLITUS WITH HYPERGLYCEMIA, WITH LONG-TERM CURRENT USE OF INSULIN (HCC): Primary | ICD-10-CM

## 2021-04-19 DIAGNOSIS — E11.65 TYPE 2 DIABETES MELLITUS WITH HYPERGLYCEMIA, WITH LONG-TERM CURRENT USE OF INSULIN (HCC): Primary | ICD-10-CM

## 2021-04-27 DIAGNOSIS — F41.9 ANXIETY: ICD-10-CM

## 2021-04-27 RX ORDER — METOPROLOL SUCCINATE 100 MG/1
TABLET, EXTENDED RELEASE ORAL
Qty: 90 TABLET | Refills: 1 | OUTPATIENT
Start: 2021-04-27

## 2021-04-27 RX ORDER — GLUCAGON INJECTION, SOLUTION 1 MG/.2ML
1 INJECTION, SOLUTION SUBCUTANEOUS AS NEEDED
Qty: 0.4 ML | Refills: 1 | Status: SHIPPED | OUTPATIENT
Start: 2021-04-27

## 2021-04-27 RX ORDER — BUSPIRONE HYDROCHLORIDE 5 MG/1
TABLET ORAL
Qty: 270 TABLET | Refills: 0 | Status: SHIPPED | OUTPATIENT
Start: 2021-04-27 | End: 2021-07-26

## 2021-04-27 RX ORDER — EZETIMIBE 10 MG/1
TABLET ORAL
Qty: 90 TABLET | Refills: 1 | Status: SHIPPED | OUTPATIENT
Start: 2021-04-27 | End: 2022-12-28

## 2021-04-28 DIAGNOSIS — E11.65 TYPE 2 DIABETES MELLITUS WITH HYPERGLYCEMIA, WITH LONG-TERM CURRENT USE OF INSULIN (HCC): Primary | ICD-10-CM

## 2021-04-28 DIAGNOSIS — Z79.4 TYPE 2 DIABETES MELLITUS WITH HYPERGLYCEMIA, WITH LONG-TERM CURRENT USE OF INSULIN (HCC): Primary | ICD-10-CM

## 2021-04-30 DIAGNOSIS — E10.43 DIABETIC AUTONOMIC NEUROPATHY ASSOCIATED WITH TYPE 1 DIABETES MELLITUS (HCC): ICD-10-CM

## 2021-04-30 RX ORDER — PANTOPRAZOLE SODIUM 40 MG/1
TABLET, DELAYED RELEASE ORAL
Qty: 90 TABLET | Refills: 1 | Status: SHIPPED | OUTPATIENT
Start: 2021-04-30 | End: 2021-12-22 | Stop reason: SDUPTHER

## 2021-05-03 DIAGNOSIS — E10.43 DIABETIC AUTONOMIC NEUROPATHY ASSOCIATED WITH TYPE 1 DIABETES MELLITUS (HCC): ICD-10-CM

## 2021-05-03 RX ORDER — PREGABALIN 150 MG/1
150 CAPSULE ORAL 2 TIMES DAILY
Qty: 60 CAPSULE | Refills: 1 | Status: SHIPPED | OUTPATIENT
Start: 2021-05-03 | End: 2021-07-12

## 2021-05-03 RX ORDER — PREGABALIN 150 MG/1
150 CAPSULE ORAL 2 TIMES DAILY
Qty: 60 CAPSULE | Refills: 1 | Status: CANCELLED | OUTPATIENT
Start: 2021-05-03

## 2021-05-06 RX ORDER — VARENICLINE TARTRATE 1 MG/1
TABLET, FILM COATED ORAL
Qty: 168 TABLET | Refills: 1 | OUTPATIENT
Start: 2021-05-06

## 2021-06-03 DIAGNOSIS — K86.9 PANCREAS DISORDER: ICD-10-CM

## 2021-06-03 DIAGNOSIS — K31.84 GASTROPARESIS: ICD-10-CM

## 2021-06-07 ENCOUNTER — TELEPHONE (OUTPATIENT)
Dept: ENDOCRINOLOGY | Age: 46
End: 2021-06-07

## 2021-06-07 DIAGNOSIS — Z79.4 TYPE 2 DIABETES MELLITUS WITH HYPERGLYCEMIA, WITH LONG-TERM CURRENT USE OF INSULIN (HCC): Primary | ICD-10-CM

## 2021-06-07 DIAGNOSIS — E11.65 TYPE 2 DIABETES MELLITUS WITH HYPERGLYCEMIA, WITH LONG-TERM CURRENT USE OF INSULIN (HCC): Primary | ICD-10-CM

## 2021-06-07 RX ORDER — PANCRELIPASE 24000; 76000; 120000 [USP'U]/1; [USP'U]/1; [USP'U]/1
CAPSULE, DELAYED RELEASE PELLETS ORAL
Qty: 1 CAPSULE | Refills: 0 | OUTPATIENT
Start: 2021-06-07

## 2021-06-19 RX ORDER — TIZANIDINE 4 MG/1
4 TABLET ORAL EVERY 8 HOURS PRN
Qty: 270 TABLET | Refills: 1 | Status: SHIPPED | OUTPATIENT
Start: 2021-06-19 | End: 2022-07-27

## 2021-06-21 ENCOUNTER — OFFICE VISIT (OUTPATIENT)
Dept: ENDOCRINOLOGY | Age: 46
End: 2021-06-21

## 2021-06-21 DIAGNOSIS — E11.65 TYPE 2 DIABETES MELLITUS WITH HYPERGLYCEMIA, WITH LONG-TERM CURRENT USE OF INSULIN (HCC): Primary | ICD-10-CM

## 2021-06-21 DIAGNOSIS — Z79.4 TYPE 2 DIABETES MELLITUS WITH HYPERGLYCEMIA, WITH LONG-TERM CURRENT USE OF INSULIN (HCC): Primary | ICD-10-CM

## 2021-06-21 PROCEDURE — 99213 OFFICE O/P EST LOW 20 MIN: CPT | Performed by: NURSE PRACTITIONER

## 2021-06-21 RX ORDER — PERPHENAZINE 16 MG/1
TABLET, FILM COATED ORAL
Qty: 300 EACH | Refills: 1 | Status: SHIPPED | OUTPATIENT
Start: 2021-06-21 | End: 2021-06-21 | Stop reason: SDUPTHER

## 2021-06-21 RX ORDER — IMMUNE GLOBULIN (HUMAN) 10 G/100ML
INJECTION INTRAVENOUS; SUBCUTANEOUS
COMMUNITY
Start: 2021-06-03

## 2021-06-21 RX ORDER — PERPHENAZINE 16 MG/1
TABLET, FILM COATED ORAL
Qty: 300 EACH | Refills: 1 | Status: SHIPPED | OUTPATIENT
Start: 2021-06-21 | End: 2022-03-23 | Stop reason: SDUPTHER

## 2021-06-21 NOTE — PROGRESS NOTES
"Chief Complaint  Diabetes (havent been checking bs. )    You have chosen to receive care through a telephone visit. Do you consent to use a telephone visit for your medical care today? Yes      She has had allergies causing a headache and \"stopped up\" so she has not been testing her blood sugars in the last 1-2 weeks     Subjective          Danielle Dudley presents to [facility] ENDOCRINOLOGY  History of Present Illness     Type 2 dm   Diagnosed in 2010  Today in clinic pt reports being on u 200 humalog in her pump   \" my blood sugars have been good\"  Before her recent allergy/cold she was feeling really good and doing really well with her blood sugars  Insulin pump - medtronic 630  Sensor - too expensive  Dm retinopathy - denies ,Last eye exam - UTD 2021  Dm nephropathy - denies   Dm neuropathy - moderate pain in feet and legs, on lyrica   CAD - denies  CVA - denies  Episodes of hypoglycemia -if she misses meal  Pt is physically active. weight is trending down.   Pt tries to follow DM diet for most part.   On ARB     Mixed hyperlipidemia   zetia 10mg daily   Rosuvastatin 40mg daily  Not on vascepa any longer      Objective   Vital Signs:   There were no vitals taken for this visit.      Physical Exam   Alert and oriented   No apparent distress    Result Review :   The following data was reviewed by: RICKI Westfall on 06/21/2021:  Common labs    Common Labsle 8/12/20 8/12/20 8/12/20 8/12/20 12/23/20 12/23/20 12/23/20 3/10/21    1024 1024 1024 1024 0914 0914 0914    Glucose  257 (A)    259 (A)     BUN  16    19     Creatinine  0.97    0.96     eGFR Non  Am  62    62     eGFR  Am  75    76     Sodium  135 (A)    132 (A)     Potassium  4.1    4.3     Chloride  103    102     Calcium  9.1    9.2     Total Protein  7.3    7.9     Albumin  4.00    3.60     Total Bilirubin  0.3    0.2     Alkaline Phosphatase  65    65     AST (SGOT)  15    18     ALT (SGPT)  18    20     Total " Cholesterol    121   114    Triglycerides    144   162 (A)    HDL Cholesterol       37 (A)    LDL Cholesterol        50    Hemoglobin A1C   8.60 (A)  10.60 (A)   11.70 (A)   Uric Acid 4.6          (A) Abnormal value       Comments are available for some flowsheets but are not being displayed.                     Assessment and Plan    Diagnoses and all orders for this visit:    1. Type 2 diabetes mellitus with hyperglycemia, with long-term current use of insulin (CMS/[facility]) (Primary)    Other orders  -     Contour Next Test test strip; Use as instructed  Dispense: 300 each; Refill: 1        Follow Up   No follow-ups on file.     Will get labs done Wednesday   Continue current pump settings until a1c is back   Hypoglycemia risk reduction with regular meals and regular BGM  u200 off label in insulin pump howver she is feeling so much better with her improved BS readings and is very happy with her improved control of her DM2    Patient was given instructions and counseling regarding her condition or for health maintenance advice. Please see specific information pulled into the AVS if appropriate.     12 minutes on the phone

## 2021-07-10 DIAGNOSIS — E10.43 DIABETIC AUTONOMIC NEUROPATHY ASSOCIATED WITH TYPE 1 DIABETES MELLITUS (HCC): ICD-10-CM

## 2021-07-12 RX ORDER — PREGABALIN 150 MG/1
CAPSULE ORAL
Qty: 60 CAPSULE | Refills: 1 | Status: SHIPPED | OUTPATIENT
Start: 2021-07-12 | End: 2021-09-17

## 2021-07-26 DIAGNOSIS — F41.9 ANXIETY: ICD-10-CM

## 2021-07-26 RX ORDER — BUSPIRONE HYDROCHLORIDE 5 MG/1
TABLET ORAL
Qty: 270 TABLET | Refills: 0 | Status: SHIPPED | OUTPATIENT
Start: 2021-07-26 | End: 2021-12-22 | Stop reason: SDUPTHER

## 2021-08-11 ENCOUNTER — OFFICE VISIT (OUTPATIENT)
Dept: INTERNAL MEDICINE | Facility: CLINIC | Age: 46
End: 2021-08-11

## 2021-08-11 VITALS
DIASTOLIC BLOOD PRESSURE: 78 MMHG | TEMPERATURE: 98.4 F | OXYGEN SATURATION: 98 % | SYSTOLIC BLOOD PRESSURE: 152 MMHG | WEIGHT: 293 LBS | HEART RATE: 94 BPM | BODY MASS INDEX: 44.43 KG/M2

## 2021-08-11 DIAGNOSIS — E55.9 VITAMIN D DEFICIENCY: ICD-10-CM

## 2021-08-11 DIAGNOSIS — R00.2 HEART PALPITATIONS: ICD-10-CM

## 2021-08-11 DIAGNOSIS — E78.2 MIXED HYPERLIPIDEMIA: ICD-10-CM

## 2021-08-11 DIAGNOSIS — IMO0002 UNCONTROLLED TYPE 2 DIABETES MELLITUS WITH COMPLICATION, WITH LONG-TERM CURRENT USE OF INSULIN: ICD-10-CM

## 2021-08-11 DIAGNOSIS — K31.84 GASTROPARESIS: ICD-10-CM

## 2021-08-11 DIAGNOSIS — I10 ESSENTIAL HYPERTENSION: Primary | ICD-10-CM

## 2021-08-11 PROCEDURE — 99214 OFFICE O/P EST MOD 30 MIN: CPT | Performed by: FAMILY MEDICINE

## 2021-08-11 PROCEDURE — 93000 ELECTROCARDIOGRAM COMPLETE: CPT | Performed by: FAMILY MEDICINE

## 2021-08-11 RX ORDER — EPINEPHRINE 0.3 MG/.3ML
0.3 INJECTION SUBCUTANEOUS ONCE
Qty: 1 EACH | Refills: 5 | Status: SHIPPED | OUTPATIENT
Start: 2021-08-11 | End: 2021-08-11

## 2021-08-11 RX ORDER — LOSARTAN POTASSIUM 25 MG/1
25 TABLET ORAL DAILY
Qty: 90 TABLET | Refills: 1 | Status: SHIPPED | OUTPATIENT
Start: 2021-08-11 | End: 2021-11-29

## 2021-08-11 NOTE — PROGRESS NOTES
Chief Complaint  Hyperlipidemia, Hypertension, and Diabetes    Subjective          Danielle Dudley presents to [facility] PRIMARY CARE  Patient has myriad issues.  She is seeing motility clinic at [facility] and has a Lazo catheter for IVIG.  Otherwise she has difficulty-year-old diabetes type 2 insulin requiring and has gained weight.  Her blood pressure is not controlled and she has been taken off previously losartan and after that metoprolol XL.  She has had a fluttering in her chest periodically and does have evidence of some radiculopathy affecting the left shoulder versus ischemic equivalent.  Given the fluttering we will set her up for a Zio patch and send her for cardiology consultation.    EKG shows sinus rhythm although her picking up the stomach stimulator.  I see P waves and no obvious ischemia which looks like a sinus rhythm.    Restart losartan 25 mg daily recheck in 3 months get Zio patch referral to cardiology [place].  Follow-up with endocrinology.  Prior labs done by endocrinology are reviewed with normal kidney function.      Objective   Vital Signs:   /78 (BP Location: Left arm, Patient Position: Sitting, Cuff Size: Large Adult)   Pulse 94   Temp 98.4 °F (36.9 °C) (Tympanic)   Wt (!) 137 kg (301 lb)   SpO2 98%   BMI 44.43 kg/m²     Physical Exam  Vitals reviewed.   Constitutional:       Appearance: She is well-developed. She is obese.   HENT:      Head: Normocephalic and atraumatic.      Right Ear: Tympanic membrane and external ear normal.      Left Ear: Tympanic membrane and external ear normal.      Nose: Nose normal.   Eyes:      Conjunctiva/sclera: Conjunctivae normal.      Pupils: Pupils are equal, round, and reactive to light.   Neck:      Thyroid: No thyromegaly.      Vascular: No JVD.   Cardiovascular:      Rate and Rhythm: Normal rate and regular rhythm.      Heart sounds: Normal heart sounds.   Pulmonary:       Effort: Pulmonary effort is normal.      Breath sounds: Normal breath sounds.   Chest:       Abdominal:      General: Bowel sounds are normal.      Palpations: Abdomen is soft.   Musculoskeletal:      Cervical back: Neck supple. Muscular tenderness present. Decreased range of motion.   Lymphadenopathy:      Cervical: No cervical adenopathy.   Skin:     General: Skin is warm and dry.      Findings: No rash.   Neurological:      Mental Status: She is alert and oriented to person, place, and time.      Cranial Nerves: No cranial nerve deficit.      Coordination: Coordination normal.   Psychiatric:         Behavior: Behavior normal.         Thought Content: Thought content normal.         Judgment: Judgment normal.        Result Review :            ECG 12 Lead    Date/Time: 8/11/2021 9:42 AM  Performed by: Jairo Walker Jr., MD  Authorized by: Jairo Walker Jr., MD   Comparison: not compared with previous ECG   Previous ECG: no previous ECG available  Rhythm: sinus rhythm  Rate: normal  Conduction: conduction normal  ST Segments: ST segments normal  T Waves: T waves normal  Comments: Picking up signal from gastric stimulator.              Assessment and Plan    Diagnoses and all orders for this visit:    1. Essential hypertension (Primary)  Comments:  Restart losartan 25 mg daily  Orders:  -     Ambulatory Referral to Cardiology    2. Mixed hyperlipidemia    3. Uncontrolled type 2 diabetes mellitus with complication, with long-term current use of insulin (CMS/[facility])    4. Vitamin D deficiency    5. Gastroparesis  Comments:  Receiving IVIG    6. Heart palpitations  Comments:  Zio patch and follow-up with cardiology.  Orders:  -     Holter Monitor - 72 Hour Up To 15 Days; Future  -     ECG 12 Lead  -     Ambulatory Referral to Cardiology    Other orders  -     EPINEPHrine (EPIPEN) 0.3 MG/0.3ML solution auto-injector injection; Inject 0.3 mL into the appropriate muscle as directed by prescriber 1 (One) Time for 1 dose.   Dispense: 1 each; Refill: 5  -     losartan (Cozaar) 25 MG tablet; Take 1 tablet by mouth Daily.  Dispense: 90 tablet; Refill: 1        Follow Up   Return in about 3 months (around 11/11/2021) for Recheck.  Patient was given instructions and counseling regarding her condition or for health maintenance advice. Please see specific information pulled into the AVS if appropriate.

## 2021-08-18 ENCOUNTER — HOSPITAL ENCOUNTER (OUTPATIENT)
Dept: CARDIOLOGY | Facility: HOSPITAL | Age: 46
Discharge: HOME | End: 2021-08-18
Admitting: FAMILY MEDICINE

## 2021-08-18 DIAGNOSIS — R00.2 HEART PALPITATIONS: ICD-10-CM

## 2021-08-18 PROCEDURE — 93246 EXT ECG>7D<15D RECORDING: CPT

## 2021-09-08 ENCOUNTER — OFFICE VISIT (OUTPATIENT)
Dept: CARDIOLOGY | Age: 46
End: 2021-09-08

## 2021-09-08 VITALS
WEIGHT: 293 LBS | HEART RATE: 84 BPM | DIASTOLIC BLOOD PRESSURE: 84 MMHG | SYSTOLIC BLOOD PRESSURE: 149 MMHG | BODY MASS INDEX: 43.4 KG/M2 | HEIGHT: 69 IN

## 2021-09-08 DIAGNOSIS — R00.2 PALPITATIONS: ICD-10-CM

## 2021-09-08 DIAGNOSIS — E66.01 MORBIDLY OBESE (HCC): Primary | ICD-10-CM

## 2021-09-08 DIAGNOSIS — E78.5 DYSLIPIDEMIA: ICD-10-CM

## 2021-09-08 DIAGNOSIS — R94.31 ABNORMAL ELECTROCARDIOGRAM (ECG) (EKG): ICD-10-CM

## 2021-09-08 DIAGNOSIS — I10 ESSENTIAL HYPERTENSION: ICD-10-CM

## 2021-09-08 DIAGNOSIS — R55 SYNCOPE AND COLLAPSE: ICD-10-CM

## 2021-09-08 PROCEDURE — 99203 OFFICE O/P NEW LOW 30 MIN: CPT | Performed by: INTERNAL MEDICINE

## 2021-09-08 RX ORDER — EPINEPHRINE 0.3 MG/.3ML
INJECTION SUBCUTANEOUS
COMMUNITY
Start: 2021-08-11

## 2021-09-08 RX ORDER — ROSUVASTATIN CALCIUM 40 MG/1
40 TABLET, COATED ORAL DAILY
COMMUNITY
End: 2021-10-06

## 2021-09-08 NOTE — PROGRESS NOTES
NP REF DR CARRANZA  HTN, PALPITATIONS   Subjective:        [facility]  Cardiology Consult Note    Patient Identification:  Name: Danielle Dudley  Age: 46 y.o.  Sex: female  :  1975  MRN: [id number]             CC  PALPITATIO  COUGHING WITH SYNCOPE  DM  GASTROPARESIS    History of Present Illness:   46-year-old female here for the cardiac evaluation as well as establishment of the care as the patient complaining of intermittent palpitation    Patient has significant coughing episodes leading to syncope    Patient with known history of the diabetes and gastroparesis    Comorbid cardiac risk factors:     Past Medical History:  Past Medical History:   Diagnosis Date   • Anxiety and depression    • Gastroparesis    • Headache, tension-type    • High blood pressure    • High cholesterol    • History of MRSA infection    • Migraine    • Pancreatitis, chronic (CMS/HCC)    • Type 2 diabetes mellitus (CMS/HCC)    • Vitamin D deficiency      Past Surgical History:  Past Surgical History:   Procedure Laterality Date   •  SECTION     • COLONOSCOPY N/A     UL   • GASTRIC STIMULATOR IMPLANT SURGERY N/A 2018    Open placement of gastric stimulator electrodes, open placement of multi-array gastric stimulator generator, initial programming of gastric stimulator, open gastric biopsy, On-Q pump placement, wedge biopsy of liver-Dr. Jose Cantu   • HYSTERECTOMY N/A     UL   • MOLE REMOVAL     • SKIN GRAFT        Allergies:  Allergies   Allergen Reactions   • Imitrex [Sumatriptan] Shortness Of Breath   • Acetaminophen Other (See Comments)     No known reaction    • Contrast Dye Other (See Comments)     Contrast dye 3    Stopped breathing   • Linaclotide GI Intolerance     constipation   • Lisinopril Cough   • Codeine Hives   • Levemir [Insulin Detemir] Hives, Rash and Other (See Comments)     Bruising   • Morphine And Related Rash and Other (See Comments)     Alopecia     Home Meds:  (Not in a  hospital admission)    Current Meds:   [unfilled]  Social History:   Social History     Tobacco Use   • Smoking status: Former Smoker     Packs/day: 0.50     Years: 24.00     Pack years: 12.00     Quit date: 11/16/2017     Years since quitting: 3.8   • Smokeless tobacco: Never Used   Substance Use Topics   • Alcohol use: No      Family History:  Family History   Problem Relation Age of Onset   • Diabetes Mother    • Heart disease Father    • Diabetes Father    • Cancer Father    • Kidney disease Father    • Heart disease Brother    • Stroke Paternal Grandmother    • Heart disease Paternal Grandfather         Review of Systems    Constitutional: No weakness,fatigue, fever, rigors, chills   Eyes: No vision changes, eye pain   ENT/oropharynx: No difficulty swallowing, sore throat, epistaxis, changes in hearing   Cardiovascular:  Palpitations   Respiratory: No shortness of breath, dyspnea on exertion, cough, wheezing hemoptysis   Gastrointestinal: No abdominal pain, nausea, vomiting, diarrhea, bloody stools   Genitourinary: No hematuria, dysuria   Neurological: No headache, tremors, numbness,  one-sided weakness    Musculoskeletal: No cramps, myalgias,  joint pain, joint swelling   Integument: No rash, edema           Constitutional:  Heart Rate:  [84] 84  BP: (149)/(84) 149/84    Physical Exam   General:  Appears in no acute distress  Eyes: PERTL,  HEENT:  No JVD. Thyroid not visibly enlarged. No mucosal pallor or cyanosis  Respiratory: Respirations regular and unlabored at rest. BBS with good air entry in all fields. No crackles, rubs or wheezes auscultated  Cardiovascular: S1S2 Regular rate and rhythm. No murmur, rub or gallop auscultated. No carotid bruits. DP/PT pulses    . No pretibial pitting edema  Gastrointestinal: Abdomen soft, flat, non tender. Bowel sounds present. No hepatosplenomegaly. No ascites  Musculoskeletal: CONLEY x4. No abnormal movements  Extremities: No digital clubbing or cyanosis  Skin: Color pink.  Skin warm and dry to touch. No rashes  No xanthoma  Neuro: AAO x3 CN II-XII grossly intact          Procedures        Cardiographics  ECG:     Telemetry:    Echocardiogram:     Imaging  Chest X-ray:     Lab Review               @[email or web address]@              Assessment:/ Recommendations / Plan:   Patient Active Problem List   Diagnosis   • Uncontrolled type 2 diabetes mellitus with complication, with long-term current use of insulin (CMS/[facility])   • Hypoglycemia   • Hyperglycemia   • Chronic pancreatitis (CMS/[facility])   • Gastroparesis   • Pancreas disorder   • Long-term insulin use (CMS/[facility])   • Essential hypertension   • Dyslipidemia   • Vitamin D deficiency   • Premature menopause   • Tobacco abuse counseling   • Mixed hyperlipidemia   • Adjustment insomnia   • Chronic tension-type headache, intractable   • Migraine without aura and without status migrainosus, not intractable   • Encounter for smoking cessation counseling   • Optic neuritis   • Umbilical pain   • Neck pain   • Eczema of both hands   • Morbidly obese (CMS/[facility])   • Cervical radiculopathy                    ICD-10-CM ICD-9-CM   1. Morbidly obese (CMS/[facility])  E66.01 278.01   2. Essential hypertension  I10 401.9   3. Dyslipidemia  E78.5 272.4   4. Palpitations  R00.2 785.1     1. Morbidly obese (CMS/[facility])  Counseling done    2. Essential hypertension  Continue current treatment    3. Dyslipidemia  Continue current treatment    4. Palpitations  Considering the patient's symptoms as well as clinical situation and  EKG findings, along with cardiac risk factors, ischemic workup is necessary to rule out ischemic cardiomyopathy, stress induced arrhythmias, and functional capacity for diagnosis as well as prognostic consideration    Considering patient's medical condition as well as the risk factors, patient will require echocardiogram for further evaluation for the LV function, four-chamber evaluation, including the pressures, valvular function and  pericardial disease  and pericardial effusion    ETT, ECHO, , TILT TABLE     1 MON TH    Labs/tests ordered for am      Bharti Sommers MD  9/8/2021, 15:21 EDT      EMR Dragon/Transcription:   Dictated utilizing Dragon dictation

## 2021-09-09 LAB
MAXIMAL PREDICTED HEART RATE: 172 BPM
STRESS TARGET HR: 146 BPM

## 2021-09-09 PROCEDURE — 93248 EXT ECG>7D<15D REV&INTERPJ: CPT | Performed by: INTERNAL MEDICINE

## 2021-09-13 RX ORDER — APIXABAN 5 MG/1
TABLET, FILM COATED ORAL
Qty: 60 TABLET | Refills: 1 | Status: SHIPPED | OUTPATIENT
Start: 2021-09-13 | End: 2021-11-15

## 2021-09-15 RX ORDER — PROMETHAZINE HYDROCHLORIDE 25 MG/1
25 TABLET ORAL EVERY 6 HOURS PRN
Qty: 30 TABLET | Refills: 2 | Status: SHIPPED | OUTPATIENT
Start: 2021-09-15 | End: 2022-07-27

## 2021-09-17 DIAGNOSIS — E10.43 DIABETIC AUTONOMIC NEUROPATHY ASSOCIATED WITH TYPE 1 DIABETES MELLITUS (HCC): ICD-10-CM

## 2021-09-17 RX ORDER — PREGABALIN 150 MG/1
CAPSULE ORAL
Qty: 60 CAPSULE | Refills: 1 | Status: SHIPPED | OUTPATIENT
Start: 2021-09-17 | End: 2021-12-20

## 2021-09-17 RX ORDER — ROSUVASTATIN CALCIUM 40 MG/1
TABLET, COATED ORAL
Qty: 30 TABLET | Refills: 0 | Status: SHIPPED | OUTPATIENT
Start: 2021-09-17 | End: 2021-09-22 | Stop reason: SDUPTHER

## 2021-09-22 ENCOUNTER — HOSPITAL ENCOUNTER (OUTPATIENT)
Dept: CARDIOLOGY | Facility: HOSPITAL | Age: 46
Discharge: HOME | End: 2021-09-22
Admitting: INTERNAL MEDICINE

## 2021-09-22 VITALS
BODY MASS INDEX: 43.4 KG/M2 | HEIGHT: 69 IN | HEART RATE: 69 BPM | DIASTOLIC BLOOD PRESSURE: 83 MMHG | SYSTOLIC BLOOD PRESSURE: 129 MMHG | WEIGHT: 293 LBS

## 2021-09-22 PROCEDURE — 93306 TTE W/DOPPLER COMPLETE: CPT

## 2021-09-22 PROCEDURE — 93306 TTE W/DOPPLER COMPLETE: CPT | Performed by: INTERNAL MEDICINE

## 2021-09-23 LAB
AORTIC DIMENSIONLESS INDEX: 0.9 (DI)
BH CV ECHO MEAS - ACS: 2 CM
BH CV ECHO MEAS - AO MAX PG (FULL): 2.1 MMHG
BH CV ECHO MEAS - AO MAX PG: 8.7 MMHG
BH CV ECHO MEAS - AO MEAN PG (FULL): 0.91 MMHG
BH CV ECHO MEAS - AO MEAN PG: 4.6 MMHG
BH CV ECHO MEAS - AO ROOT AREA (BSA CORRECTED): 1.2
BH CV ECHO MEAS - AO ROOT AREA: 6.7 CM^2
BH CV ECHO MEAS - AO ROOT DIAM: 2.9 CM
BH CV ECHO MEAS - AO V2 MAX: 147.7 CM/SEC
BH CV ECHO MEAS - AO V2 MEAN: 100.3 CM/SEC
BH CV ECHO MEAS - AO V2 VTI: 30.5 CM
BH CV ECHO MEAS - AVA(I,A): 3.3 CM^2
BH CV ECHO MEAS - AVA(I,D): 3.3 CM^2
BH CV ECHO MEAS - AVA(V,A): 3 CM^2
BH CV ECHO MEAS - AVA(V,D): 3 CM^2
BH CV ECHO MEAS - BSA(HAYCOCK): 2.6 M^2
BH CV ECHO MEAS - BSA: 2.4 M^2
BH CV ECHO MEAS - BZI_BMI: 44 KILOGRAMS/M^2
BH CV ECHO MEAS - BZI_METRIC_HEIGHT: 175.3 CM
BH CV ECHO MEAS - BZI_METRIC_WEIGHT: 135.2 KG
BH CV ECHO MEAS - EDV(CUBED): 113.5 ML
BH CV ECHO MEAS - EDV(MOD-SP2): 70 ML
BH CV ECHO MEAS - EDV(MOD-SP4): 65 ML
BH CV ECHO MEAS - EDV(TEICH): 109.7 ML
BH CV ECHO MEAS - EF(CUBED): 81.6 %
BH CV ECHO MEAS - EF(MOD-BP): 62.2 %
BH CV ECHO MEAS - EF(MOD-SP2): 61.4 %
BH CV ECHO MEAS - EF(MOD-SP4): 64.6 %
BH CV ECHO MEAS - EF(TEICH): 74.2 %
BH CV ECHO MEAS - ESV(CUBED): 20.9 ML
BH CV ECHO MEAS - ESV(MOD-SP2): 27 ML
BH CV ECHO MEAS - ESV(MOD-SP4): 23 ML
BH CV ECHO MEAS - ESV(TEICH): 28.4 ML
BH CV ECHO MEAS - FS: 43.1 %
BH CV ECHO MEAS - IVS/LVPW: 1.1
BH CV ECHO MEAS - IVSD: 1.1 CM
BH CV ECHO MEAS - LAT PEAK E' VEL: 16.3 CM/SEC
BH CV ECHO MEAS - LV DIASTOLIC VOL/BSA (35-75): 26.6 ML/M^2
BH CV ECHO MEAS - LV MASS(C)D: 177.7 GRAMS
BH CV ECHO MEAS - LV MASS(C)DI: 72.6 GRAMS/M^2
BH CV ECHO MEAS - LV MAX PG: 6.6 MMHG
BH CV ECHO MEAS - LV MEAN PG: 3.7 MMHG
BH CV ECHO MEAS - LV SYSTOLIC VOL/BSA (12-30): 9.4 ML/M^2
BH CV ECHO MEAS - LV V1 MAX: 128.5 CM/SEC
BH CV ECHO MEAS - LV V1 MEAN: 89.6 CM/SEC
BH CV ECHO MEAS - LV V1 VTI: 28.6 CM
BH CV ECHO MEAS - LVIDD: 4.8 CM
BH CV ECHO MEAS - LVIDS: 2.8 CM
BH CV ECHO MEAS - LVLD AP2: 7.8 CM
BH CV ECHO MEAS - LVLD AP4: 7.8 CM
BH CV ECHO MEAS - LVLS AP2: 6.7 CM
BH CV ECHO MEAS - LVLS AP4: 6.2 CM
BH CV ECHO MEAS - LVOT AREA (M): 3.5 CM^2
BH CV ECHO MEAS - LVOT AREA: 3.5 CM^2
BH CV ECHO MEAS - LVOT DIAM: 2.1 CM
BH CV ECHO MEAS - LVPWD: 0.98 CM
BH CV ECHO MEAS - MED PEAK E' VEL: 9.7 CM/SEC
BH CV ECHO MEAS - MR MAX PG: 27.2 MMHG
BH CV ECHO MEAS - MR MAX VEL: 260.6 CM/SEC
BH CV ECHO MEAS - MV A DUR: 0.12 SEC
BH CV ECHO MEAS - MV A MAX VEL: 90.7 CM/SEC
BH CV ECHO MEAS - MV DEC SLOPE: 590.4 CM/SEC^2
BH CV ECHO MEAS - MV DEC TIME: 187 SEC
BH CV ECHO MEAS - MV E MAX VEL: 107.2 CM/SEC
BH CV ECHO MEAS - MV E/A: 1.2
BH CV ECHO MEAS - MV MAX PG: 5.4 MMHG
BH CV ECHO MEAS - MV MEAN PG: 2.1 MMHG
BH CV ECHO MEAS - MV P1/2T MAX VEL: 124.1 CM/SEC
BH CV ECHO MEAS - MV P1/2T: 61.6 MSEC
BH CV ECHO MEAS - MV V2 MAX: 116.4 CM/SEC
BH CV ECHO MEAS - MV V2 MEAN: 68.6 CM/SEC
BH CV ECHO MEAS - MV V2 VTI: 31.1 CM
BH CV ECHO MEAS - MVA P1/2T LCG: 1.8 CM^2
BH CV ECHO MEAS - MVA(P1/2T): 3.6 CM^2
BH CV ECHO MEAS - MVA(VTI): 3.2 CM^2
BH CV ECHO MEAS - PA ACC TIME: 0.07 SEC
BH CV ECHO MEAS - PA MAX PG (FULL): 1.8 MMHG
BH CV ECHO MEAS - PA MAX PG: 5.7 MMHG
BH CV ECHO MEAS - PA PR(ACCEL): 46.9 MMHG
BH CV ECHO MEAS - PA V2 MAX: 119.9 CM/SEC
BH CV ECHO MEAS - PULM A REVS DUR: 0.11 SEC
BH CV ECHO MEAS - PULM A REVS VEL: 29.1 CM/SEC
BH CV ECHO MEAS - PULM DIAS VEL: 38.4 CM/SEC
BH CV ECHO MEAS - PULM S/D: 2
BH CV ECHO MEAS - PULM SYS VEL: 75.4 CM/SEC
BH CV ECHO MEAS - RAP SYSTOLE: 3 MMHG
BH CV ECHO MEAS - RV MAX PG: 3.9 MMHG
BH CV ECHO MEAS - RV MEAN PG: 2.1 MMHG
BH CV ECHO MEAS - RV V1 MAX: 99 CM/SEC
BH CV ECHO MEAS - RV V1 MEAN: 67.1 CM/SEC
BH CV ECHO MEAS - RV V1 VTI: 21.3 CM
BH CV ECHO MEAS - RVSP: 29 MMHG
BH CV ECHO MEAS - SI(AO): 83.9 ML/M^2
BH CV ECHO MEAS - SI(CUBED): 37.9 ML/M^2
BH CV ECHO MEAS - SI(LVOT): 40.8 ML/M^2
BH CV ECHO MEAS - SI(MOD-SP2): 17.6 ML/M^2
BH CV ECHO MEAS - SI(MOD-SP4): 17.2 ML/M^2
BH CV ECHO MEAS - SI(TEICH): 33.3 ML/M^2
BH CV ECHO MEAS - SV(AO): 205.3 ML
BH CV ECHO MEAS - SV(CUBED): 92.7 ML
BH CV ECHO MEAS - SV(LVOT): 100 ML
BH CV ECHO MEAS - SV(MOD-SP2): 43 ML
BH CV ECHO MEAS - SV(MOD-SP4): 42 ML
BH CV ECHO MEAS - SV(TEICH): 81.4 ML
BH CV ECHO MEAS - TAPSE (>1.6): 2.9 CM
BH CV ECHO MEAS - TR MAX PG: 26 MMHG
BH CV ECHO MEAS - TR MAX VEL: 253.8 CM/SEC
BH CV ECHO MEASUREMENTS AVERAGE E/E' RATIO: 8.25
BH CV XLRA - RV BASE: 3.6 CM
BH CV XLRA - RV LENGTH: 7.5 CM
BH CV XLRA - RV MID: 2.9 CM
BH CV XLRA - TDI S': 13.1 CM/SEC
LEFT ATRIUM VOLUME INDEX: 18.7 ML/M2
MAXIMAL PREDICTED HEART RATE: 172 BPM
STRESS TARGET HR: 146 BPM

## 2021-09-23 RX ORDER — TOPIRAMATE 100 MG/1
100 TABLET, FILM COATED ORAL 2 TIMES DAILY
Qty: 180 TABLET | Refills: 1 | OUTPATIENT
Start: 2021-09-23

## 2021-10-01 ENCOUNTER — TRANSCRIBE ORDERS (OUTPATIENT)
Dept: SLEEP MEDICINE | Facility: HOSPITAL | Age: 46
End: 2021-10-01

## 2021-10-01 DIAGNOSIS — Z01.818 OTHER SPECIFIED PRE-OPERATIVE EXAMINATION: Primary | ICD-10-CM

## 2021-10-05 ENCOUNTER — LAB (OUTPATIENT)
Dept: LAB | Facility: HOSPITAL | Age: 46
End: 2021-10-05

## 2021-10-05 LAB — SARS-COV-2 ORF1AB RESP QL NAA+PROBE: NOT DETECTED

## 2021-10-05 PROCEDURE — U0005 INFEC AGEN DETEC AMPLI PROBE: HCPCS | Performed by: INTERNAL MEDICINE

## 2021-10-05 PROCEDURE — U0004 COV-19 TEST NON-CDC HGH THRU: HCPCS | Performed by: INTERNAL MEDICINE

## 2021-10-05 PROCEDURE — C9803 HOPD COVID-19 SPEC COLLECT: HCPCS | Performed by: INTERNAL MEDICINE

## 2021-10-06 ENCOUNTER — HOSPITAL ENCOUNTER (OUTPATIENT)
Dept: CARDIOLOGY | Facility: HOSPITAL | Age: 46
Discharge: HOME | End: 2021-10-06
Admitting: INTERNAL MEDICINE

## 2021-10-06 VITALS
RESPIRATION RATE: 18 BRPM | WEIGHT: 293 LBS | HEIGHT: 69 IN | DIASTOLIC BLOOD PRESSURE: 87 MMHG | OXYGEN SATURATION: 98 % | BODY MASS INDEX: 43.4 KG/M2 | TEMPERATURE: 97.3 F | SYSTOLIC BLOOD PRESSURE: 133 MMHG | HEART RATE: 69 BPM

## 2021-10-06 LAB
BH CV TILT AGENT USED: NORMAL
GLUCOSE BLDC GLUCOMTR-MCNC: 160 MG/DL (ref 70–130)
GLUCOSE BLDC GLUCOMTR-MCNC: 72 MG/DL (ref 70–130)
MAXIMAL PREDICTED HEART RATE: 172 BPM
STRESS TARGET HR: 146 BPM

## 2021-10-06 PROCEDURE — 82962 GLUCOSE BLOOD TEST: CPT

## 2021-10-06 PROCEDURE — A9270 NON-COVERED ITEM OR SERVICE: HCPCS | Performed by: INTERNAL MEDICINE

## 2021-10-06 PROCEDURE — 63710000001 NITROGLYCERIN 0.4 MG SUBLINGUAL TABLET 25 EACH BOTTLE: Performed by: INTERNAL MEDICINE

## 2021-10-06 PROCEDURE — 93660 TILT TABLE EVALUATION: CPT | Performed by: INTERNAL MEDICINE

## 2021-10-06 PROCEDURE — 93660 TILT TABLE EVALUATION: CPT

## 2021-10-06 RX ORDER — SODIUM CHLORIDE 0.9 % (FLUSH) 0.9 %
10 SYRINGE (ML) INJECTION AS NEEDED
Status: DISCONTINUED | OUTPATIENT
Start: 2021-10-06 | End: 2021-10-07 | Stop reason: HOSPADM

## 2021-10-06 RX ORDER — NITROGLYCERIN 0.4 MG/1
0.4 TABLET SUBLINGUAL
Status: DISCONTINUED | OUTPATIENT
Start: 2021-10-06 | End: 2021-10-07 | Stop reason: HOSPADM

## 2021-10-06 RX ORDER — LIDOCAINE HYDROCHLORIDE 10 MG/ML
0.1 INJECTION, SOLUTION EPIDURAL; INFILTRATION; INTRACAUDAL; PERINEURAL ONCE AS NEEDED
Status: DISCONTINUED | OUTPATIENT
Start: 2021-10-06 | End: 2021-10-07 | Stop reason: HOSPADM

## 2021-10-06 RX ORDER — SODIUM CHLORIDE 0.9 % (FLUSH) 0.9 %
3 SYRINGE (ML) INJECTION EVERY 12 HOURS SCHEDULED
Status: DISCONTINUED | OUTPATIENT
Start: 2021-10-06 | End: 2021-10-07 | Stop reason: HOSPADM

## 2021-10-06 RX ADMIN — NITROGLYCERIN 0.4 MG: 0.4 TABLET SUBLINGUAL at 09:00

## 2021-10-06 NOTE — NURSING NOTE
Pt glucose was 72. Pt states she has no symptoms of hypoglycemia at this time. She reports she had a glucose reading of 73 yesterday a.m.. She states she took her insulin pump off at 0600 this morning. Pt given apple juice 8oz.

## 2021-10-13 ENCOUNTER — APPOINTMENT (OUTPATIENT)
Dept: CARDIOLOGY | Facility: HOSPITAL | Age: 46
End: 2021-10-13

## 2021-10-27 ENCOUNTER — OFFICE VISIT (OUTPATIENT)
Dept: CARDIOLOGY | Age: 46
End: 2021-10-27

## 2021-10-27 VITALS
WEIGHT: 293 LBS | SYSTOLIC BLOOD PRESSURE: 128 MMHG | HEIGHT: 69 IN | DIASTOLIC BLOOD PRESSURE: 76 MMHG | HEART RATE: 82 BPM | BODY MASS INDEX: 43.4 KG/M2

## 2021-10-27 DIAGNOSIS — R06.02 SHORTNESS OF BREATH: ICD-10-CM

## 2021-10-27 DIAGNOSIS — R55 SYNCOPE, UNSPECIFIED SYNCOPE TYPE: ICD-10-CM

## 2021-10-27 DIAGNOSIS — I10 ESSENTIAL HYPERTENSION: ICD-10-CM

## 2021-10-27 DIAGNOSIS — R00.2 PALPITATIONS: Primary | ICD-10-CM

## 2021-10-27 DIAGNOSIS — E78.2 MIXED HYPERLIPIDEMIA: ICD-10-CM

## 2021-10-27 PROCEDURE — 99213 OFFICE O/P EST LOW 20 MIN: CPT | Performed by: INTERNAL MEDICINE

## 2021-11-15 RX ORDER — APIXABAN 5 MG/1
TABLET, FILM COATED ORAL
Qty: 60 TABLET | Refills: 1 | Status: SHIPPED | OUTPATIENT
Start: 2021-11-15 | End: 2022-01-13

## 2021-11-17 ENCOUNTER — HOSPITAL ENCOUNTER (OUTPATIENT)
Dept: CARDIOLOGY | Facility: HOSPITAL | Age: 46
Discharge: HOME | End: 2021-11-17
Admitting: NURSE PRACTITIONER

## 2021-11-17 VITALS
HEIGHT: 69 IN | RESPIRATION RATE: 16 BRPM | DIASTOLIC BLOOD PRESSURE: 116 MMHG | SYSTOLIC BLOOD PRESSURE: 191 MMHG | HEART RATE: 89 BPM | BODY MASS INDEX: 43.4 KG/M2 | WEIGHT: 293 LBS

## 2021-11-17 PROCEDURE — 93017 CV STRESS TEST TRACING ONLY: CPT

## 2021-11-17 PROCEDURE — 93016 CV STRESS TEST SUPVJ ONLY: CPT | Performed by: NURSE PRACTITIONER

## 2021-11-17 PROCEDURE — A9270 NON-COVERED ITEM OR SERVICE: HCPCS | Performed by: NURSE PRACTITIONER

## 2021-11-17 PROCEDURE — 93018 CV STRESS TEST I&R ONLY: CPT | Performed by: INTERNAL MEDICINE

## 2021-11-17 PROCEDURE — 63710000001 NITROGLYCERIN 0.4 MG SUBLINGUAL TABLET: Performed by: NURSE PRACTITIONER

## 2021-11-17 RX ORDER — NITROGLYCERIN 0.4 MG/1
0.4 TABLET SUBLINGUAL
Status: DISCONTINUED | OUTPATIENT
Start: 2021-11-17 | End: 2021-11-18 | Stop reason: HOSPADM

## 2021-11-17 RX ADMIN — NITROGLYCERIN 0.4 MG: 0.4 TABLET SUBLINGUAL at 12:26

## 2021-11-18 ENCOUNTER — TELEPHONE (OUTPATIENT)
Dept: CARDIOLOGY | Facility: CLINIC | Age: 46
End: 2021-11-18

## 2021-11-18 LAB
BH CV IMMEDIATE POST TECH DATA BLOOD PRESSURE: NORMAL MMHG
BH CV IMMEDIATE POST TECH DATA HEART RATE: 126 BPM
BH CV NINE MINUTE RECOVERY TECH DATA BLOOD PRESSURE: NORMAL MMHG
BH CV NINE MINUTE RECOVERY TECH DATA HEART RATE: 96 BPM
BH CV SIX MINUTE RECOVERY TECH DATA BLOOD PRESSURE: NORMAL
BH CV SIX MINUTE RECOVERY TECH DATA HEART RATE: 92 BPM
BH CV STRESS BP STAGE 1: NORMAL
BH CV STRESS DURATION MIN STAGE 1: 3
BH CV STRESS DURATION SEC STAGE 1: 38
BH CV STRESS GRADE STAGE 1: 10
BH CV STRESS HR STAGE 1: 153
BH CV STRESS METS STAGE 1: 4.6
BH CV STRESS PROTOCOL 1: NORMAL
BH CV STRESS RECOVERY BP: NORMAL MMHG
BH CV STRESS RECOVERY HR: 88 BPM
BH CV STRESS SPEED STAGE 1: 1.7
BH CV STRESS STAGE 1: 1
BH CV THREE MINUTE POST TECH DATA BLOOD PRESSURE: NORMAL MMHG
BH CV THREE MINUTE POST TECH DATA HEART RATE: 98 BPM
BH CV TWELVE MINUTE RECOVERY TECH DATA BLOOD PRESSURE: NORMAL MMHG
BH CV TWELVE MINUTE RECOVERY TECH DATA HEART RATE: 88 BPM
MAXIMAL PREDICTED HEART RATE: 172 BPM
PERCENT MAX PREDICTED HR: 88.95 %
STRESS BASELINE BP: NORMAL MMHG
STRESS BASELINE HR: 83 BPM
STRESS PERCENT HR: 105 %
STRESS POST ESTIMATED WORKLOAD: 4.6 METS
STRESS POST EXERCISE DUR MIN: 3 MIN
STRESS POST EXERCISE DUR SEC: 38 SEC
STRESS POST PEAK BP: NORMAL MMHG
STRESS POST PEAK HR: 153 BPM
STRESS TARGET HR: 146 BPM

## 2021-11-18 NOTE — TELEPHONE ENCOUNTER
request you to call your PCP about BP management. Hypertension.  Baseline 160/90,peak 240/90  recovery 240//91 took 11 minutes of rest to decrease BP.  EKG was negative and will not need to come back for 1 year.  If your PCP wants us to treat your BP, please call and make an appointment.  Please call for your 1 year appointment     MSG left per patient's instructions.

## 2021-11-24 ENCOUNTER — OFFICE VISIT (OUTPATIENT)
Dept: ENDOCRINOLOGY | Age: 46
End: 2021-11-24

## 2021-11-24 VITALS — BODY MASS INDEX: 43.25 KG/M2 | WEIGHT: 292 LBS | HEIGHT: 69 IN

## 2021-11-24 DIAGNOSIS — K31.84 GASTROPARESIS: ICD-10-CM

## 2021-11-24 DIAGNOSIS — E78.2 MIXED HYPERLIPIDEMIA: ICD-10-CM

## 2021-11-24 DIAGNOSIS — E11.65 TYPE 2 DIABETES MELLITUS WITH HYPERGLYCEMIA, WITH LONG-TERM CURRENT USE OF INSULIN (HCC): Primary | ICD-10-CM

## 2021-11-24 DIAGNOSIS — Z79.4 TYPE 2 DIABETES MELLITUS WITH HYPERGLYCEMIA, WITH LONG-TERM CURRENT USE OF INSULIN (HCC): Primary | ICD-10-CM

## 2021-11-24 PROCEDURE — 99214 OFFICE O/P EST MOD 30 MIN: CPT | Performed by: NURSE PRACTITIONER

## 2021-11-24 NOTE — PROGRESS NOTES
"Chief Complaint  Diabetes Mellitus    Subjective        You have chosen to receive care through a telephone visit. Do you consent to use a telephone visit for your medical care today? Yes      Danielle Dudley presents to [facility] ENDOCRINOLOGY  History of Present Illness     H/o pancreatitis      I have reviewed PMH, allergies and medications UTD at this visit      S/p echo, reports stress test was stopped related to increased blood pressure.  Patient reports her blood pressure medication has been changed and she is unsure what she is supposed to be on.  Patient is asking me to follow-up with her cardiologist and primary care provider to figure out what she is supposed to be on     Gets IVIG 4 doses Q12 weeks         Type 2 dm   Diagnosed in 2010  Today in clinic pt reports being on u 200 humalog in her pump   Checking 2-3x/day, average 1 20-1 60  Lowest blood sugar 40, while she was sleeping.  Unexplainable  Insulin pump - medtronic 630  Dm retinopathy - denies, Last eye exam - UTD 2021  Dm nephropathy - denies   Dm neuropathy -yes, on lyrica   CAD - denies  CVA - denies  Episodes of hypoglycemia -yes, through the night  Pt is somewhat physically active.  Denies any significant weight changes  Pt tries to follow DM diet for most part but has been improving her diet since her last visit which she thinks is contributing to her low blood sugars.   On ARB  + gastroparesis with stimulator   Lab Results   Component Value Date    HGBA1C 7.1 (H) 11/17/2021       Objective   Vital Signs:   Ht 175.3 cm (69\")   Wt 132 kg (292 lb)   BMI 43.12 kg/m²     Physical Exam   Alert and oriented  No apparent distress    Result Review :   The following data was reviewed by: RICKI Westfall on 11/24/2021:  Common labs    Common Labsle 3/10/21 9/8/21 9/8/21 9/8/21 9/8/21 11/17/21     0915 0915 0915 0915    Glucose   176 (A)      BUN   13      Creatinine   0.92      eGFR Non  Am   65      eGFR  Am   " 79      Sodium   140      Potassium   4.0      Chloride   105      Calcium   9.0      Total Protein   7.1      Albumin   3.80      Total Bilirubin   0.2      Alkaline Phosphatase   57      AST (SGOT)   22      ALT (SGPT)   22      Total Cholesterol    116     Triglycerides    149     HDL Cholesterol    34 (A)     LDL Cholesterol     56     Hemoglobin A1C 11.70 (A) 8.20 (A)    7.1 (A)   Microalbumin, Urine     50.4    (A) Abnormal value       Comments are available for some flowsheets but are not being displayed.                     Assessment and Plan    Diagnoses and all orders for this visit:    1. Type 2 diabetes mellitus with hyperglycemia, with long-term current use of insulin (HCC) (Primary)  -     Hemoglobin A1c; Future  -     Comprehensive Metabolic Panel; Future  -     Microalbumin / Creatinine Urine Ratio - Urine, Clean Catch; Future  -     Lipid Panel; Future  -     TSH; Future    2. Gastroparesis    3. Mixed hyperlipidemia  -     Hemoglobin A1c; Future  -     Comprehensive Metabolic Panel; Future  -     Microalbumin / Creatinine Urine Ratio - Urine, Clean Catch; Future  -     Lipid Panel; Future  -     TSH; Future        Follow Up   No follow-ups on file.     Will notify cardiology and PCP that patient is in need of clarification on her blood pressure medication.  Continue monitoring blood pressure daily and keep log to update cardiology and PCP  Medtronic educator to decrease basal rate with patient by 10% from 12a to 8a.  Patient taught how to use temporary basal rate during the night until these changes are made    Hypoglycemia education and prevention  A1c at goal and significantly improved over the last several months  Continue diet changes  Stay in touch with us every 2 weeks to ensure pump settings are correct and allow for prevention of continued hyperglycemia and unchanged hypoglycemia    On ARB    Patient was given instructions and counseling regarding her condition or for health maintenance  advice. Please see specific information pulled into the AVS if appropriate.     21-minute phone call  RICKI Westfall

## 2021-11-29 RX ORDER — LOSARTAN POTASSIUM 25 MG/1
TABLET ORAL
Qty: 90 TABLET | Refills: 1 | Status: SHIPPED | OUTPATIENT
Start: 2021-11-29 | End: 2022-05-25

## 2021-12-07 ENCOUNTER — TELEPHONE (OUTPATIENT)
Dept: INTERNAL MEDICINE | Facility: CLINIC | Age: 46
End: 2021-12-07

## 2021-12-07 NOTE — TELEPHONE ENCOUNTER
----- Message from Jairo Walker Jr., MD sent at 11/29/2021  5:57 PM EST -----  Regarding: Blood pressure meds  Katina please reach out and call her and see if she knows what blood pressure meds she is taking.  Thanks!  ----- Message -----  From: Lilian Holloway APRN  Sent: 11/29/2021   9:08 AM EST  To: Bharti Sommers MD, Jairo Walker Jr., MD    I had a telephone visit with our mutual patient the day before Thanksgiving and she reported that there was some confusion with her current blood pressure medication.  She reports her stress test had to be stopped related to high blood pressure but she also reports that she does not know who is supposed to be managing her hypertension or what medication she should be taking.  Can your medical assistants please reach out to the patient.  I have asked her to record her blood pressures until she is able to contact 1 of you.    Thank you so much    Lilian Holloway

## 2021-12-08 ENCOUNTER — TELEPHONE (OUTPATIENT)
Dept: INTERNAL MEDICINE | Facility: CLINIC | Age: 46
End: 2021-12-08

## 2021-12-08 NOTE — TELEPHONE ENCOUNTER
PATIENT IS CALLING JUVENTINO BACK TO ADVISE HER AND DR. CARRANZA THAT THE ONLY BLOOD PRESSURE MEDICATION THAT SHE IS TAKING IS losartan (COZAAR) 25 MG tablet.  PLEASE ADVISE PATIENT @ [phone].

## 2021-12-16 ENCOUNTER — TELEPHONE (OUTPATIENT)
Dept: INTERNAL MEDICINE | Facility: CLINIC | Age: 46
End: 2021-12-16

## 2021-12-16 DIAGNOSIS — E10.43 DIABETIC AUTONOMIC NEUROPATHY ASSOCIATED WITH TYPE 1 DIABETES MELLITUS (HCC): ICD-10-CM

## 2021-12-16 NOTE — TELEPHONE ENCOUNTER
Pt's BP is running 130's-150's over 80's so we scheduled an appt with Dr Walker to review next week

## 2021-12-17 DIAGNOSIS — F32.0 CURRENT MILD EPISODE OF MAJOR DEPRESSIVE DISORDER WITHOUT PRIOR EPISODE (HCC): ICD-10-CM

## 2021-12-17 DIAGNOSIS — E10.43 DIABETIC AUTONOMIC NEUROPATHY ASSOCIATED WITH TYPE 1 DIABETES MELLITUS (HCC): ICD-10-CM

## 2021-12-17 RX ORDER — DULOXETIN HYDROCHLORIDE 60 MG/1
60 CAPSULE, DELAYED RELEASE ORAL DAILY
Qty: 90 CAPSULE | Refills: 3 | Status: SHIPPED | OUTPATIENT
Start: 2021-12-17 | End: 2023-01-29 | Stop reason: SDUPTHER

## 2021-12-20 RX ORDER — PREGABALIN 150 MG/1
CAPSULE ORAL
Qty: 60 CAPSULE | Refills: 0 | Status: SHIPPED | OUTPATIENT
Start: 2021-12-20 | End: 2021-12-22 | Stop reason: SDUPTHER

## 2021-12-20 RX ORDER — INSULIN LISPRO 200 [IU]/ML
400 INJECTION, SOLUTION SUBCUTANEOUS DAILY
Qty: 180 PEN | Refills: 3 | Status: SHIPPED | OUTPATIENT
Start: 2021-12-20 | End: 2022-03-23 | Stop reason: SDUPTHER

## 2021-12-22 ENCOUNTER — OFFICE VISIT (OUTPATIENT)
Dept: INTERNAL MEDICINE | Facility: CLINIC | Age: 46
End: 2021-12-22

## 2021-12-22 VITALS
HEART RATE: 81 BPM | WEIGHT: 289 LBS | OXYGEN SATURATION: 99 % | HEIGHT: 69 IN | TEMPERATURE: 98 F | BODY MASS INDEX: 42.8 KG/M2 | DIASTOLIC BLOOD PRESSURE: 98 MMHG | SYSTOLIC BLOOD PRESSURE: 184 MMHG

## 2021-12-22 DIAGNOSIS — F41.9 ANXIETY AND DEPRESSION: ICD-10-CM

## 2021-12-22 DIAGNOSIS — I10 ESSENTIAL HYPERTENSION: Primary | ICD-10-CM

## 2021-12-22 DIAGNOSIS — Z79.4 TYPE 2 DIABETES MELLITUS WITH DIABETIC NEUROPATHY, WITH LONG-TERM CURRENT USE OF INSULIN (HCC): ICD-10-CM

## 2021-12-22 DIAGNOSIS — F32.A ANXIETY AND DEPRESSION: ICD-10-CM

## 2021-12-22 DIAGNOSIS — E11.40 TYPE 2 DIABETES MELLITUS WITH DIABETIC NEUROPATHY, WITH LONG-TERM CURRENT USE OF INSULIN (HCC): ICD-10-CM

## 2021-12-22 DIAGNOSIS — E10.43 DIABETIC AUTONOMIC NEUROPATHY ASSOCIATED WITH TYPE 1 DIABETES MELLITUS (HCC): ICD-10-CM

## 2021-12-22 DIAGNOSIS — G43.009 MIGRAINE WITHOUT AURA AND WITHOUT STATUS MIGRAINOSUS, NOT INTRACTABLE: ICD-10-CM

## 2021-12-22 DIAGNOSIS — F41.9 ANXIETY: ICD-10-CM

## 2021-12-22 DIAGNOSIS — E78.5 DYSLIPIDEMIA: ICD-10-CM

## 2021-12-22 DIAGNOSIS — IMO0002 UNCONTROLLED TYPE 2 DIABETES MELLITUS WITH COMPLICATION, WITH LONG-TERM CURRENT USE OF INSULIN: ICD-10-CM

## 2021-12-22 PROCEDURE — 99214 OFFICE O/P EST MOD 30 MIN: CPT | Performed by: FAMILY MEDICINE

## 2021-12-22 RX ORDER — PREGABALIN 150 MG/1
150 CAPSULE ORAL 2 TIMES DAILY
Qty: 60 CAPSULE | Refills: 5 | Status: SHIPPED | OUTPATIENT
Start: 2021-12-22 | End: 2022-07-08

## 2021-12-22 RX ORDER — BUSPIRONE HYDROCHLORIDE 5 MG/1
5 TABLET ORAL 3 TIMES DAILY
Qty: 270 TABLET | Refills: 2 | Status: SHIPPED | OUTPATIENT
Start: 2021-12-22 | End: 2023-01-25

## 2021-12-22 RX ORDER — PANTOPRAZOLE SODIUM 40 MG/1
40 TABLET, DELAYED RELEASE ORAL DAILY
Qty: 90 TABLET | Refills: 1 | Status: SHIPPED | OUTPATIENT
Start: 2021-12-22 | End: 2022-05-25

## 2021-12-22 RX ORDER — METOPROLOL SUCCINATE 100 MG/1
100 TABLET, EXTENDED RELEASE ORAL DAILY
Qty: 90 TABLET | Refills: 3 | Status: SHIPPED | OUTPATIENT
Start: 2021-12-22 | End: 2023-01-29 | Stop reason: SDUPTHER

## 2021-12-22 NOTE — PROGRESS NOTES
"Chief Complaint  Hypertension (bp has been running high), Anxiety, Headache, Acne (out break above eyes), and Generalized Body Aches (joint pain)    Subjective          Danielle Dudley presents to [facility] PRIMARY CARE  Patient has a complicated history and had metoprolol discontinued when had recurrence of migraine headache plus accelerated hypertension.  Will restart metoprolol  mg daily and also make sure she has refills on Cymbalta 60 mg daily plus Lyrica 150 twice daily for diabetic peripheral neuropathy.  She has [place] [facility].  Otherwise reviewed her insulin pump which appears to be working very well.      Objective   Vital Signs:   BP (!) 184/98   Pulse 81   Temp 98 °F (36.7 °C)   Ht 175.3 cm (69\")   Wt 131 kg (289 lb)   SpO2 99%   BMI 42.68 kg/m²     Physical Exam  Vitals reviewed.   Constitutional:       General: She is in acute distress.      Appearance: She is well-developed.      Comments: Distress secondary to headache   HENT:      Head: Normocephalic and atraumatic.      Right Ear: Tympanic membrane and external ear normal.      Left Ear: Tympanic membrane and external ear normal.   Eyes:      Conjunctiva/sclera: Conjunctivae normal.      Pupils: Pupils are equal, round, and reactive to light.   Neck:      Thyroid: No thyromegaly.      Vascular: No JVD.   Cardiovascular:      Rate and Rhythm: Normal rate and regular rhythm.      Heart sounds: Normal heart sounds.   Pulmonary:      Effort: Pulmonary effort is normal.      Breath sounds: Normal breath sounds.   Abdominal:      General: Bowel sounds are normal.      Palpations: Abdomen is soft.   Musculoskeletal:         General: Normal range of motion.      Cervical back: Normal range of motion and neck supple.   Lymphadenopathy:      Cervical: No cervical adenopathy.   Skin:     General: Skin is warm and dry.      Findings: No rash.   Neurological:      Mental " Status: She is alert and oriented to person, place, and time.      Cranial Nerves: No cranial nerve deficit.      Coordination: Coordination normal.   Psychiatric:         Behavior: Behavior normal.         Thought Content: Thought content normal.         Judgment: Judgment normal.        Result Review :                 Assessment and Plan    Diagnoses and all orders for this visit:    1. Essential hypertension (Primary)  Comments:  Restart metoprolol  mg daily plus losartan 25 mg daily    2. Diabetic autonomic neuropathy associated with type 1 diabetes mellitus (HCC)  Comments:  Refill Lyrica 150 twice daily  Orders:  -     pregabalin (LYRICA) 150 MG capsule; Take 1 capsule by mouth 2 (Two) Times a Day.  Dispense: 60 capsule; Refill: 5    3. Dyslipidemia    4. Uncontrolled type 2 diabetes mellitus with complication, with long-term current use of insulin ([facility])  Comments:  Insulin pump reviewed    5. Anxiety and depression  Comments:  Continue Cymbalta 60 mg daily    6. Type 2 diabetes mellitus with diabetic neuropathy, with long-term current use of insulin ([facility])    7. Anxiety  -     busPIRone (BUSPAR) 5 MG tablet; Take 1 tablet by mouth 3 (Three) Times a Day.  Dispense: 270 tablet; Refill: 2    8. Migraine without aura and without status migrainosus, not intractable  Comments:  Restart metoprolol  mg daily.  Topiramate is not effective.    Other orders  -     metoprolol succinate XL (Toprol XL) 100 MG 24 hr tablet; Take 1 tablet by mouth Daily.  Dispense: 90 tablet; Refill: 3  -     pantoprazole (PROTONIX) 40 MG EC tablet; Take 1 tablet by mouth Daily.  Dispense: 90 tablet; Refill: 1        Follow Up   Return in about 2 months (around 2/22/2022) for Recheck.  Patient was given instructions and counseling regarding her condition or for health maintenance advice. Please see specific information pulled into the AVS if appropriate.

## 2022-01-13 RX ORDER — APIXABAN 5 MG/1
TABLET, FILM COATED ORAL
Qty: 60 TABLET | Refills: 1 | Status: SHIPPED | OUTPATIENT
Start: 2022-01-13 | End: 2022-02-23 | Stop reason: SDUPTHER

## 2022-02-23 ENCOUNTER — OFFICE VISIT (OUTPATIENT)
Dept: INTERNAL MEDICINE | Facility: CLINIC | Age: 47
End: 2022-02-23

## 2022-02-23 VITALS
BODY MASS INDEX: 43.28 KG/M2 | TEMPERATURE: 97.3 F | SYSTOLIC BLOOD PRESSURE: 130 MMHG | OXYGEN SATURATION: 98 % | WEIGHT: 292.2 LBS | HEIGHT: 69 IN | DIASTOLIC BLOOD PRESSURE: 98 MMHG | HEART RATE: 77 BPM

## 2022-02-23 DIAGNOSIS — Z79.4 TYPE 2 DIABETES MELLITUS WITH HYPOGLYCEMIA WITHOUT COMA, WITH LONG-TERM CURRENT USE OF INSULIN: ICD-10-CM

## 2022-02-23 DIAGNOSIS — K31.84 GASTROPARESIS: ICD-10-CM

## 2022-02-23 DIAGNOSIS — E11.649 TYPE 2 DIABETES MELLITUS WITH HYPOGLYCEMIA WITHOUT COMA, WITH LONG-TERM CURRENT USE OF INSULIN: ICD-10-CM

## 2022-02-23 DIAGNOSIS — K86.9 PANCREAS DISORDER: ICD-10-CM

## 2022-02-23 DIAGNOSIS — IMO0002 UNCONTROLLED TYPE 2 DIABETES MELLITUS WITH COMPLICATION, WITH LONG-TERM CURRENT USE OF INSULIN: ICD-10-CM

## 2022-02-23 DIAGNOSIS — I10 ESSENTIAL HYPERTENSION: Primary | ICD-10-CM

## 2022-02-23 PROCEDURE — 99214 OFFICE O/P EST MOD 30 MIN: CPT | Performed by: FAMILY MEDICINE

## 2022-02-23 RX ORDER — APIXABAN 5 MG/1
5 TABLET, FILM COATED ORAL EVERY 12 HOURS
Qty: 60 TABLET | Refills: 5 | Status: SHIPPED | OUTPATIENT
Start: 2022-02-23 | End: 2022-09-07

## 2022-02-23 NOTE — PROGRESS NOTES
"Chief Complaint  Allergies (Pt is having allergies), Follow-up (3 month follow up), and Medication Problem (Question about Eliquis.)    Subjective          Danielle Dudley presents to [facility]  Patient with a complex history and chronic anticoagulation.  We will refill her Eliquis 5 mg twice daily.    Otherwise her headaches are improved and she is off topiramate and on metoprolol XL.  Cymbalta has been restarted which is helping also.    She has had some episodes of hypoglycemia and will get her back in with endocrinology for possible adjustment of her insulin pump.  Otherwise she is to follow-up with gastroparesis/motility clinic at [facility].      Objective   Vital Signs:   /98 (BP Location: Right arm, Patient Position: Sitting, Cuff Size: Adult)   Pulse 77   Temp 97.3 °F (36.3 °C) (Temporal)   Ht 175.3 cm (69.02\")   Wt 133 kg (292 lb 3.2 oz)   SpO2 98%   BMI 43.13 kg/m²     Physical Exam  Vitals reviewed.   Constitutional:       Appearance: She is well-developed. She is obese.   HENT:      Head: Normocephalic and atraumatic.      Right Ear: Tympanic membrane and external ear normal.      Left Ear: Tympanic membrane and external ear normal.   Eyes:      Conjunctiva/sclera: Conjunctivae normal.      Pupils: Pupils are equal, round, and reactive to light.   Neck:      Thyroid: No thyromegaly.      Vascular: No JVD.   Cardiovascular:      Rate and Rhythm: Normal rate and regular rhythm.      Heart sounds: Normal heart sounds.   Pulmonary:      Effort: Pulmonary effort is normal.      Breath sounds: Normal breath sounds.   Abdominal:      General: Bowel sounds are normal.      Palpations: Abdomen is soft.   Musculoskeletal:         General: Normal range of motion.      Cervical back: Normal range of motion and neck supple.   Lymphadenopathy:      Cervical: No cervical adenopathy.   Skin:     General: Skin is warm and dry.      Findings: No rash. "   Neurological:      Mental Status: She is alert and oriented to person, place, and time.      Cranial Nerves: No cranial nerve deficit.      Coordination: Coordination normal.   Psychiatric:         Behavior: Behavior normal.         Thought Content: Thought content normal.         Judgment: Judgment normal.        Result Review :   The following data was reviewed by: Jairo Walker MD on 02/23/2022:  Common labs    Common Labsle 3/10/21 9/8/21 9/8/21 9/8/21 9/8/21 11/17/21     0915 0915 0915 0915    Glucose   176 (A)      BUN   13      Creatinine   0.92      eGFR Non  Am   65      eGFR African Am   79      Sodium   140      Potassium   4.0      Chloride   105      Calcium   9.0      Total Protein   7.1      Albumin   3.80      Total Bilirubin   0.2      Alkaline Phosphatase   57      AST (SGOT)   22      ALT (SGPT)   22      Total Cholesterol    116     Triglycerides    149     HDL Cholesterol    34 (A)     LDL Cholesterol     56     Hemoglobin A1C 11.70 (A) 8.20 (A)    7.1 (A)   Microalbumin, Urine     50.4    (A) Abnormal value       Comments are available for some flowsheets but are not being displayed.           Data reviewed: Consultant notes gastro motility and endocrinology          Assessment and Plan    Diagnoses and all orders for this visit:    1. Essential hypertension (Primary)  Comments:  Toprol- mg daily losartan 25 mg daily    2. Gastroparesis  Comments:  Ongoing follow-up with motility clinic    3. Pancreas disorder    4. Uncontrolled type 2 diabetes mellitus with complication, with long-term current use of insulin (HCC)  Comments:  Insulin pump reviewed    5. Type 2 diabetes mellitus with hypoglycemia without coma, with long-term current use of insulin (HCC)  Comments:  Follow-up with diabetic/endocrinology office for insulin pump adjustment.    Other orders  -     Eliquis 5 MG tablet tablet; Take 1 tablet by mouth Every 12 (Twelve) Hours.  Dispense: 60 tablet; Refill: 5        Follow  Up   Return in about 4 months (around 6/23/2022) for Recheck, Medicare Wellness.  Patient was given instructions and counseling regarding her condition or for health maintenance advice. Please see specific information pulled into the AVS if appropriate.

## 2022-03-23 ENCOUNTER — OFFICE VISIT (OUTPATIENT)
Dept: ENDOCRINOLOGY | Age: 47
End: 2022-03-23

## 2022-03-23 VITALS
SYSTOLIC BLOOD PRESSURE: 130 MMHG | BODY MASS INDEX: 43.4 KG/M2 | WEIGHT: 293 LBS | HEIGHT: 69 IN | OXYGEN SATURATION: 95 % | DIASTOLIC BLOOD PRESSURE: 77 MMHG | HEART RATE: 75 BPM

## 2022-03-23 DIAGNOSIS — Z79.4 TYPE 2 DIABETES MELLITUS WITH HYPERGLYCEMIA, WITH LONG-TERM CURRENT USE OF INSULIN: Primary | ICD-10-CM

## 2022-03-23 DIAGNOSIS — E55.9 VITAMIN D DEFICIENCY: ICD-10-CM

## 2022-03-23 DIAGNOSIS — E11.65 TYPE 2 DIABETES MELLITUS WITH HYPERGLYCEMIA, WITH LONG-TERM CURRENT USE OF INSULIN: Primary | ICD-10-CM

## 2022-03-23 DIAGNOSIS — E78.2 MIXED HYPERLIPIDEMIA: ICD-10-CM

## 2022-03-23 PROCEDURE — 99214 OFFICE O/P EST MOD 30 MIN: CPT | Performed by: NURSE PRACTITIONER

## 2022-03-23 RX ORDER — INSULIN LISPRO 200 [IU]/ML
400 INJECTION, SOLUTION SUBCUTANEOUS DAILY
Qty: 180 PEN | Refills: 3 | Status: SHIPPED | OUTPATIENT
Start: 2022-03-23 | End: 2022-12-28 | Stop reason: SDUPTHER

## 2022-03-23 RX ORDER — PERPHENAZINE 16 MG/1
TABLET, FILM COATED ORAL
Qty: 300 EACH | Refills: 5 | Status: SHIPPED | OUTPATIENT
Start: 2022-03-23 | End: 2022-07-08

## 2022-03-23 NOTE — PROGRESS NOTES
"Chief Complaint  Diabetes (Type 2)    Subjective          Danielle Dudley presents to [facility]  History of Present Illness     I have reviewed PMH, allergies and medications UTD at this visit     She is not bolusing with her pump bc she can not get enough test strips   She also has lows if he takes a bolus  Using a lot of temp basal rates     IVIG weekly with steroids      Type 2 dm   Diagnosed in 2010  Today in clinic pt reports being on u200 humalog in her pump   Checking 2-3x/day, average <180  Insulin pump - medtronic 630  Dm retinopathy - denies, Last eye exam - UTD 2021  Dm nephropathy - denies   Dm neuropathy -yes, on lyrica   CAD - denies  CVA - denies  Episodes of hypoglycemia -yes 50-60s when she boluses    On ARB  + gastroparesis with stimulator     Objective   Vital Signs:   /77   Pulse 75   Ht 175.3 cm (69\")   Wt 135 kg (297 lb 6.4 oz)   SpO2 95%   BMI 43.92 kg/m²            Physical Exam  Vitals reviewed.   Constitutional:       General: She is not in acute distress.  HENT:      Head: Normocephalic and atraumatic.   Cardiovascular:      Rate and Rhythm: Normal rate and regular rhythm.   Pulmonary:      Effort: Pulmonary effort is normal. No respiratory distress.   Musculoskeletal:         General: No signs of injury. Normal range of motion.      Cervical back: Normal range of motion and neck supple.   Skin:     General: Skin is warm and dry.   Neurological:      Mental Status: She is alert and oriented to person, place, and time. Mental status is at baseline.   Psychiatric:         Mood and Affect: Mood normal.         Behavior: Behavior normal.         Thought Content: Thought content normal.         Judgment: Judgment normal.          Result Review :   The following data was reviewed by: RICKI Westfall on 03/23/2022:  Common labs    Common Labsle 9/8/21 9/8/21 9/8/21 9/8/21 11/17/21    0915 0915 0915 0915    Glucose  176 (A)      BUN  13    "   Creatinine  0.92      eGFR Non  Am  65      eGFR African Am  79      Sodium  140      Potassium  4.0      Chloride  105      Calcium  9.0      Total Protein  7.1      Albumin  3.80      Total Bilirubin  0.2      Alkaline Phosphatase  57      AST (SGOT)  22      ALT (SGPT)  22      Total Cholesterol   116     Triglycerides   149     HDL Cholesterol   34 (A)     LDL Cholesterol    56     Hemoglobin A1C 8.20 (A)    7.1 (A)   Microalbumin, Urine    50.4    (A) Abnormal value       Comments are available for some flowsheets but are not being displayed.                     Assessment and Plan    Diagnoses and all orders for this visit:    1. Type 2 diabetes mellitus with hyperglycemia, with long-term current use of insulin (HCC) (Primary)  -     Hemoglobin A1c  -     Comprehensive Metabolic Panel  -     Lipid Panel  -     Microalbumin / Creatinine Urine Ratio - Urine, Clean Catch  -     Vitamin D 25 Hydroxy    2. Mixed hyperlipidemia  -     Hemoglobin A1c  -     Comprehensive Metabolic Panel  -     Lipid Panel  -     Microalbumin / Creatinine Urine Ratio - Urine, Clean Catch  -     Vitamin D 25 Hydroxy    3. Vitamin D deficiency  -     Hemoglobin A1c  -     Comprehensive Metabolic Panel  -     Lipid Panel  -     Microalbumin / Creatinine Urine Ratio - Urine, Clean Catch  -     Vitamin D 25 Hydroxy    Other orders  -     Contour Next Test test strip; 3x/day e11.9  Dispense: 300 each; Refill: 5  -     Insulin Lispro (HumaLOG KwikPen) 200 UNIT/ML solution pen-injector; Inject 400 Units under the skin into the appropriate area as directed Daily. Use as directed  Dispense: 180 pen; Refill: 3        Follow Up   Return in about 3 months (around 6/23/2022).     Basal rate decreased to 3.35  carbs increased to 6  sens increased to 15  Target changed to  120-120  Try to bolus with each meal up to 4x/day or every 3-4 hours   Continue zetia, check lipid panel  Check vitamin d levels off replacement     Patient was given  instructions and counseling regarding her condition or for health maintenance advice. Please see specific information pulled into the AVS if appropriate.     RICKI Westfall

## 2022-03-24 LAB
25(OH)D3+25(OH)D2 SERPL-MCNC: 15.3 NG/ML (ref 30–100)
ALBUMIN SERPL-MCNC: 3.5 G/DL (ref 3.8–4.8)
ALBUMIN/CREAT UR: 94 MG/G CREAT (ref 0–29)
ALBUMIN/GLOB SERPL: 1 {RATIO} (ref 1.2–2.2)
ALP SERPL-CCNC: 56 IU/L (ref 44–121)
ALT SERPL-CCNC: 20 IU/L (ref 0–32)
AST SERPL-CCNC: 19 IU/L (ref 0–40)
BILIRUB SERPL-MCNC: 0.3 MG/DL (ref 0–1.2)
BUN SERPL-MCNC: 9 MG/DL (ref 6–24)
BUN/CREAT SERPL: 11 (ref 9–23)
CALCIUM SERPL-MCNC: 8.7 MG/DL (ref 8.7–10.2)
CHLORIDE SERPL-SCNC: 101 MMOL/L (ref 96–106)
CHOLEST SERPL-MCNC: 260 MG/DL (ref 100–199)
CO2 SERPL-SCNC: 25 MMOL/L (ref 20–29)
CREAT SERPL-MCNC: 0.82 MG/DL (ref 0.57–1)
CREAT UR-MCNC: 160.2 MG/DL
EGFRCR SERPLBLD CKD-EPI 2021: 88 ML/MIN/1.73
GLOBULIN SER CALC-MCNC: 3.6 G/DL (ref 1.5–4.5)
GLUCOSE SERPL-MCNC: 108 MG/DL (ref 65–99)
HBA1C MFR BLD: 6.9 % (ref 4.8–5.6)
HDLC SERPL-MCNC: 32 MG/DL
IMP & REVIEW OF LAB RESULTS: NORMAL
LDLC SERPL CALC-MCNC: 165 MG/DL (ref 0–99)
MICROALBUMIN UR-MCNC: 150.8 UG/ML
POTASSIUM SERPL-SCNC: 4.3 MMOL/L (ref 3.5–5.2)
PROT SERPL-MCNC: 7.1 G/DL (ref 6–8.5)
SODIUM SERPL-SCNC: 139 MMOL/L (ref 134–144)
TRIGL SERPL-MCNC: 327 MG/DL (ref 0–149)
VLDLC SERPL CALC-MCNC: 63 MG/DL (ref 5–40)

## 2022-03-25 RX ORDER — ERGOCALCIFEROL 1.25 MG/1
50000 CAPSULE ORAL
Qty: 12 CAPSULE | Refills: 1 | Status: SHIPPED | OUTPATIENT
Start: 2022-03-25 | End: 2022-07-28

## 2022-03-25 NOTE — PROGRESS NOTES
Pump setting changes adjusted at visit  Cholesterol significantly worse, will need to consider statin therapy if levels remain elevated in 3 months  Add vitamin D 50,000 units once weekly

## 2022-05-24 DIAGNOSIS — E11.65 TYPE 2 DIABETES MELLITUS WITH HYPERGLYCEMIA, WITH LONG-TERM CURRENT USE OF INSULIN: Primary | ICD-10-CM

## 2022-05-24 DIAGNOSIS — Z79.4 TYPE 2 DIABETES MELLITUS WITH HYPERGLYCEMIA, WITH LONG-TERM CURRENT USE OF INSULIN: Primary | ICD-10-CM

## 2022-05-25 DIAGNOSIS — I10 ESSENTIAL HYPERTENSION: ICD-10-CM

## 2022-05-25 DIAGNOSIS — K31.84 GASTROPARESIS: Primary | ICD-10-CM

## 2022-05-25 RX ORDER — LOSARTAN POTASSIUM 25 MG/1
TABLET ORAL
Qty: 90 TABLET | Refills: 3 | Status: SHIPPED | OUTPATIENT
Start: 2022-05-25 | End: 2022-11-08 | Stop reason: DRUGHIGH

## 2022-05-25 RX ORDER — PANTOPRAZOLE SODIUM 40 MG/1
TABLET, DELAYED RELEASE ORAL
Qty: 90 TABLET | Refills: 3 | Status: SHIPPED | OUTPATIENT
Start: 2022-05-25

## 2022-06-29 ENCOUNTER — TELEMEDICINE (OUTPATIENT)
Dept: ENDOCRINOLOGY | Age: 47
End: 2022-06-29

## 2022-06-29 DIAGNOSIS — E78.2 MIXED HYPERLIPIDEMIA: ICD-10-CM

## 2022-06-29 DIAGNOSIS — Z79.4 TYPE 2 DIABETES MELLITUS WITH HYPERGLYCEMIA, WITH LONG-TERM CURRENT USE OF INSULIN: Primary | ICD-10-CM

## 2022-06-29 DIAGNOSIS — E55.9 VITAMIN D DEFICIENCY: ICD-10-CM

## 2022-06-29 DIAGNOSIS — E11.65 TYPE 2 DIABETES MELLITUS WITH HYPERGLYCEMIA, WITH LONG-TERM CURRENT USE OF INSULIN: Primary | ICD-10-CM

## 2022-06-29 PROCEDURE — 99214 OFFICE O/P EST MOD 30 MIN: CPT | Performed by: NURSE PRACTITIONER

## 2022-06-29 RX ORDER — ROSUVASTATIN CALCIUM 40 MG/1
40 TABLET, COATED ORAL DAILY
Qty: 30 TABLET | Refills: 5 | Status: SHIPPED | OUTPATIENT
Start: 2022-06-29 | End: 2022-10-18

## 2022-06-29 NOTE — PROGRESS NOTES
"Chief Complaint  Diabetes     consent for video visit obtained through [employer] log in     Subjective        Danielle Dudley presents to [facility]  History of Present Illness     IVIG weekly with steroids      Reports pharmacy will only give her 100 strips per 90 days     Lab Results   Component Value Date    HGBA1C 6.9 (H) 03/23/2022        Type 2 dm   Diagnosed in 2010  Today in clinic pt reports being on u200 humalog in her pump   Checking 2-3x/day, average 100-200  Insulin pump - medtronic 630  Dm retinopathy - denies, Last eye exam - UTD 2021  Dm nephropathy - denies   Dm neuropathy -yes, on lyrica   CAD - denies  CVA - denies  Episodes of hypoglycemia - improving   On ARB  + gastroparesis with stimulator- needs a replacement   On arb    needs refill on crestor    emergency kit: yes  Backup plan: syringe and vials    Objective   Vital Signs:  There were no vitals taken for this visit.  Estimated body mass index is 43.92 kg/m² as calculated from the following:    Height as of 3/23/22: 175.3 cm (69\").    Weight as of 3/23/22: 135 kg (297 lb 6.4 oz).          Physical Exam   Alert and oriented   No apparent distress    Result Review :  The following data was reviewed by: RICKI Westfall on 06/29/2022:  Common labs    Common Labsle 9/8/21 9/8/21 9/8/21 9/8/21 11/17/21 3/23/22 3/23/22 3/23/22 3/23/22    0915 0915 0915 0915  0927 0927 0927 0927   Glucose  176 (A)     108 (A)     BUN  13     9     Creatinine  0.92     0.82     eGFR Non  Am  65          eGFR African Am  79          Sodium  140     139     Potassium  4.0     4.3     Chloride  105     101     Calcium  9.0     8.7     Total Protein  7.1     7.1     Albumin  3.80     3.5 (A)     Total Bilirubin  0.2     0.3     Alkaline Phosphatase  57     56     AST (SGOT)  22     19     ALT (SGPT)  22     20     Total Cholesterol   116     260 (A)    Triglycerides   149     327 (A)    HDL Cholesterol   34 (A)     32 (A)    LDL " Cholesterol    56     165 (A)    Hemoglobin A1C 8.20 (A)    7.1 (A) 6.9 (A)      Microalbumin, Urine    50.4     150.8   (A) Abnormal value       Comments are available for some flowsheets but are not being displayed.                     Assessment and Plan   Diagnoses and all orders for this visit:    1. Type 2 diabetes mellitus with hyperglycemia, with long-term current use of insulin (HCC) (Primary)  -     Hemoglobin A1c; Future  -     Comprehensive Metabolic Panel; Future  -     Lipid Panel; Future  -     Microalbumin / Creatinine Urine Ratio - Urine, Clean Catch; Future  -     Vitamin D 25 Hydroxy; Future    2. Mixed hyperlipidemia  -     Hemoglobin A1c; Future  -     Comprehensive Metabolic Panel; Future  -     Lipid Panel; Future  -     Microalbumin / Creatinine Urine Ratio - Urine, Clean Catch; Future  -     Vitamin D 25 Hydroxy; Future    3. Vitamin D deficiency  -     Vitamin D 25 Hydroxy; Future    Other orders  -     rosuvastatin (CRESTOR) 40 MG tablet; Take 1 tablet by mouth Daily.  Dispense: 30 tablet; Refill: 5             Follow Up   No follow-ups on file.     Getting labs at pcp on 7/12/2022  Resume crestor   Will adjust pump settings to maintain goal a1c < 7%  Still needs additional test strips to safely monitor her BS and adjust her insulin dosing     Patient was given instructions and counseling regarding her condition or for health maintenance advice. Please see specific information pulled into the AVS if appropriate.     Lilian Holloway, RICKI     7/12/2022 addendum; patient should be checking 4x/day however she has not been able to get enough strips to test that frequently.

## 2022-07-08 DIAGNOSIS — Z79.4 TYPE 2 DIABETES MELLITUS WITH HYPERGLYCEMIA, WITH LONG-TERM CURRENT USE OF INSULIN: Primary | ICD-10-CM

## 2022-07-08 DIAGNOSIS — E11.65 TYPE 2 DIABETES MELLITUS WITH HYPERGLYCEMIA, WITH LONG-TERM CURRENT USE OF INSULIN: Primary | ICD-10-CM

## 2022-07-08 DIAGNOSIS — E10.43 DIABETIC AUTONOMIC NEUROPATHY ASSOCIATED WITH TYPE 1 DIABETES MELLITUS: ICD-10-CM

## 2022-07-08 RX ORDER — PERPHENAZINE 16 MG/1
TABLET, FILM COATED ORAL
Qty: 100 EACH | Refills: 5 | Status: SHIPPED | OUTPATIENT
Start: 2022-07-08 | End: 2022-07-13 | Stop reason: SDUPTHER

## 2022-07-08 RX ORDER — PREGABALIN 150 MG/1
CAPSULE ORAL
Qty: 60 CAPSULE | Refills: 2 | Status: SHIPPED | OUTPATIENT
Start: 2022-07-08 | End: 2022-10-17 | Stop reason: SDUPTHER

## 2022-07-13 ENCOUNTER — TELEPHONE (OUTPATIENT)
Dept: ENDOCRINOLOGY | Age: 47
End: 2022-07-13

## 2022-07-13 DIAGNOSIS — E11.65 TYPE 2 DIABETES MELLITUS WITH HYPERGLYCEMIA, WITH LONG-TERM CURRENT USE OF INSULIN: ICD-10-CM

## 2022-07-13 DIAGNOSIS — Z79.4 TYPE 2 DIABETES MELLITUS WITH HYPERGLYCEMIA, WITH LONG-TERM CURRENT USE OF INSULIN: ICD-10-CM

## 2022-07-13 RX ORDER — PERPHENAZINE 16 MG/1
TABLET, FILM COATED ORAL
Qty: 200 EACH | Refills: 5 | Status: SHIPPED | OUTPATIENT
Start: 2022-07-13 | End: 2022-12-28

## 2022-07-27 ENCOUNTER — OFFICE VISIT (OUTPATIENT)
Dept: INTERNAL MEDICINE | Facility: CLINIC | Age: 47
End: 2022-07-27

## 2022-07-27 VITALS
HEIGHT: 69 IN | OXYGEN SATURATION: 97 % | BODY MASS INDEX: 43.4 KG/M2 | WEIGHT: 293 LBS | TEMPERATURE: 96.2 F | SYSTOLIC BLOOD PRESSURE: 140 MMHG | DIASTOLIC BLOOD PRESSURE: 80 MMHG | HEART RATE: 65 BPM

## 2022-07-27 DIAGNOSIS — K31.84 GASTROPARESIS: ICD-10-CM

## 2022-07-27 DIAGNOSIS — E11.65 TYPE 2 DIABETES MELLITUS WITH HYPERGLYCEMIA, WITH LONG-TERM CURRENT USE OF INSULIN: ICD-10-CM

## 2022-07-27 DIAGNOSIS — Z00.00 MEDICARE ANNUAL WELLNESS VISIT, SUBSEQUENT: Primary | ICD-10-CM

## 2022-07-27 DIAGNOSIS — E78.2 MIXED HYPERLIPIDEMIA: ICD-10-CM

## 2022-07-27 DIAGNOSIS — I10 ESSENTIAL HYPERTENSION: ICD-10-CM

## 2022-07-27 DIAGNOSIS — K86.9 PANCREAS DISORDER: ICD-10-CM

## 2022-07-27 DIAGNOSIS — F32.A ANXIETY AND DEPRESSION: ICD-10-CM

## 2022-07-27 DIAGNOSIS — F41.9 ANXIETY AND DEPRESSION: ICD-10-CM

## 2022-07-27 DIAGNOSIS — Z79.4 TYPE 2 DIABETES MELLITUS WITH HYPERGLYCEMIA, WITH LONG-TERM CURRENT USE OF INSULIN: ICD-10-CM

## 2022-07-27 PROBLEM — R73.9 HYPERGLYCEMIA: Status: RESOLVED | Noted: 2017-05-23 | Resolved: 2022-07-27

## 2022-07-27 PROBLEM — R10.33 UMBILICAL PAIN: Status: RESOLVED | Noted: 2018-11-29 | Resolved: 2022-07-27

## 2022-07-27 PROBLEM — E16.2 HYPOGLYCEMIA: Status: RESOLVED | Noted: 2017-05-23 | Resolved: 2022-07-27

## 2022-07-27 PROCEDURE — 1170F FXNL STATUS ASSESSED: CPT | Performed by: NURSE PRACTITIONER

## 2022-07-27 PROCEDURE — G0439 PPPS, SUBSEQ VISIT: HCPCS | Performed by: NURSE PRACTITIONER

## 2022-07-27 PROCEDURE — 1159F MED LIST DOCD IN RCRD: CPT | Performed by: NURSE PRACTITIONER

## 2022-07-27 RX ORDER — HYDROCORTISONE SOD SUCCINATE 100 MG
VIAL (EA) INJECTION
COMMUNITY
Start: 2022-07-18 | End: 2022-07-27

## 2022-07-27 RX ORDER — TRAMADOL HYDROCHLORIDE 50 MG/1
50 TABLET ORAL EVERY 6 HOURS PRN
COMMUNITY
Start: 2022-07-19 | End: 2022-11-08

## 2022-07-27 NOTE — ASSESSMENT & PLAN NOTE
Diabetes is unchanged.   Continue current treatment regimen.  Reminded to bring in blood sugar diary at next visit.  Dietary recommendations for ADA diet.  Regular aerobic exercise.  Discussed ways to avoid symptomatic hypoglycemia.  Discussed sick day management.  Discussed foot care.  Reminded to get yearly retinal exam.  Diabetes will be reassessed at next appt.

## 2022-07-27 NOTE — PROGRESS NOTES
The ABCs of the Annual Wellness Visit  Subsequent Medicare Wellness Visit    Chief Complaint   Patient presents with   • Medicare Wellness-subsequent      Subjective    History of Present Illness:  Danielle Dudley is a 47 y.o. female who presents for a Subsequent Medicare Wellness Visit.    The following portions of the patient's history were reviewed and   updated as appropriate: allergies, current medications, past family history, past medical history, past social history, past surgical history and problem list.    Compared to one year ago, the patient feels her physical   health is worse.    Compared to one year ago, the patient feels her mental   health is worse.    Recent Hospitalizations:  She was admitted within the past 365 days at [facility]       Current Medical Providers:  Patient Care Team:  Jairo Walker MD as PCP - General (Family Medicine)  Santino Andrade MD as Consulting Physician (Endocrinology)    Outpatient Medications Prior to Visit   Medication Sig Dispense Refill   • betamethasone valerate (VALISONE) 0.1 % ointment APPLY TOPICALLY TO THE APPROPRIATE AREA AS DIRECTED 2 (TWO) TIMES A DAY. TO HANDS AS NEEDED 45 g 1   • busPIRone (BUSPAR) 5 MG tablet Take 1 tablet by mouth 3 (Three) Times a Day. 270 tablet 2   • Contour Next Test test strip USE TO TEST BLOOD SUGAR 4 TIMES DAILY 200 each 5   • diphenhydrAMINE (BENADRYL) 50 MG/ML injection      • DULoxetine (CYMBALTA) 60 MG capsule Take 1 capsule by mouth Daily. 90 capsule 3   • Eliquis 5 MG tablet tablet Take 1 tablet by mouth Every 12 (Twelve) Hours. 60 tablet 5   • EPINEPHrine (EPIPEN) 0.3 MG/0.3ML solution auto-injector injection INJECT 0.3 ML INTO THE APPROPRIATE MUSCLE AS DIRECTED BY PRESCRIBER 1 (ONE) TIME FOR 1 DOSE.     • ezetimibe (ZETIA) 10 MG tablet TAKE 1 TABLET BY MOUTH EVERY DAY 90 tablet 1   • famotidine (PEPCID) 40 MG tablet Take 20 mg by mouth 2 (Two) Times a Day.     • Gammaked 10 GM/100ML solution infusion      •  Gammaked 20 GM/200ML solution infusion      • Glucagon (Gvoke HypoPen 2-Pack) 1 MG/0.2ML solution auto-injector Inject 1 mg under the skin into the appropriate area as directed As Needed (hypoglycemia). 0.4 mL 1   • Insulin Infusion Pump device MEDTRONIC INSULIN PUMP  770 G WITH GUARDIAN  SENSOR 1 each 0   • Insulin Lispro (HumaLOG KwikPen) 200 UNIT/ML solution pen-injector Inject 400 Units under the skin into the appropriate area as directed Daily. Use as directed 180 pen 3   • Insulin Pen Needle (BD Pen Needle Neida U/F) 32G X 4 MM misc Using 1 pen needle daily 100 each 1   • Lancets (FREESTYLE) lancets Check blood glucose 4-5 times daily 120 each 5   • lidocaine-prilocaine (EMLA) 2.5-2.5 % cream APPLY TOPICALLY 1 (ONE) TIME EACH DAY IF NEEDED FOR MILD PAIN.     • losartan (COZAAR) 25 MG tablet TAKE 1 TABLET BY MOUTH EVERY DAY 90 tablet 3   • lubiprostone (Amitiza) 24 MCG capsule TAKE 1 CAPSULE BY MOUTH TWICE DAILY WITH FOOD 180 capsule 1   • metoprolol succinate XL (Toprol XL) 100 MG 24 hr tablet Take 1 tablet by mouth Daily. 90 tablet 3   • pantoprazole (PROTONIX) 40 MG EC tablet TAKE 1 TABLET BY MOUTH EVERY DAY 90 tablet 3   • pregabalin (LYRICA) 150 MG capsule TAKE 1 CAPSULE BY MOUTH TWICE A DAY 60 capsule 2   • rosuvastatin (CRESTOR) 40 MG tablet Take 1 tablet by mouth Daily. 30 tablet 5   • sodium chloride 0.9 % solution      • traMADol (ULTRAM) 50 MG tablet Take 50 mg by mouth Every 6 (Six) Hours As Needed. for pain     • TRANSDERM-SCOP, 1.5 MG, 1.5 MG/3DAYS patch Place 1 patch on the skin as directed by provider Every 3 (Three) Days.     • vitamin D (ERGOCALCIFEROL) 1.25 MG (57785 UT) capsule capsule Take 1 capsule by mouth Every 7 (Seven) Days. 12 capsule 1   • CREON 92163-56996 units capsule delayed-release particles capsule 1 capsule p.o. with each meal. 300 capsule 3   • promethazine (PHENERGAN) 25 MG tablet TAKE 1 TABLET BY MOUTH EVERY 6 (SIX) HOURS AS NEEDED FOR NAUSEA OR VOMITING. 30 tablet 2   •  Solu-CORTEF 100 MG injection      • tiZANidine (ZANAFLEX) 4 MG tablet TAKE 1 TABLET BY MOUTH EVERY 8 (EIGHT) HOURS AS NEEDED FOR MUSCLE SPASMS. 270 tablet 1     No facility-administered medications prior to visit.       Opioid medication/s are on active medication list.  and I have evaluated her active treatment plan and pain score trends (see table).  There were no vitals filed for this visit.  I have reviewed the chart for potential of high risk medication and harmful drug interactions in the elderly.            Aspirin is not on active medication list.  Aspirin use is not indicated based on review of current medical condition/s. Risk of harm outweighs potential benefits.  .    Patient Active Problem List   Diagnosis   • Type 2 diabetes mellitus with hyperglycemia, with long-term current use of insulin (HCC)   • Chronic pancreatitis (HCC)   • Gastroparesis   • Pancreas disorder   • Long-term insulin use (HCC)   • Essential hypertension   • Dyslipidemia   • Vitamin D deficiency   • Premature menopause   • Tobacco abuse counseling   • Mixed hyperlipidemia   • Adjustment insomnia   • Chronic tension-type headache, intractable   • Migraine without aura and without status migrainosus, not intractable   • Encounter for smoking cessation counseling   • Optic neuritis   • Neck pain   • Eczema of both hands   • Morbidly obese (HCC)   • Cervical radiculopathy   • Palpitations   • Anxiety and depression   • Medicare annual wellness visit, subsequent     Advance Care Planning  Advance Directive is not on file.  ACP discussion was held with the patient during this visit. Patient does not have an advance directive, information provided.          Objective    Vitals:    07/27/22 1304 07/27/22 1342   BP: (!) 194/110 140/80   BP Location: Right arm Right arm   Patient Position: Sitting Sitting   Cuff Size: Adult Adult   Pulse: 65    Temp: 96.2 °F (35.7 °C)    TempSrc: Temporal    SpO2: 97%    Weight: 134 kg (296 lb 3.2 oz)   "  Height: 175.3 cm (69.02\")      Estimated body mass index is 43.72 kg/m² as calculated from the following:    Height as of this encounter: 175.3 cm (69.02\").    Weight as of this encounter: 134 kg (296 lb 3.2 oz).    Class 3 Severe Obesity (BMI >=40). Obesity-related health conditions include the following: hypertension, diabetes mellitus and dyslipidemias. Obesity is unchanged. BMI is is above average; BMI management plan is completed. We discussed portion control and increasing exercise.      Does the patient have evidence of cognitive impairment? No    Physical Exam  Constitutional:       Appearance: She is obese.   HENT:      Head: Normocephalic and atraumatic.      Nose: Nose normal.      Mouth/Throat:      Mouth: Mucous membranes are moist.   Eyes:      Pupils: Pupils are equal, round, and reactive to light.      Comments: +glasses   Cardiovascular:      Rate and Rhythm: Normal rate and regular rhythm.      Pulses: Normal pulses.      Heart sounds: Murmur heard.     No friction rub. No gallop.   Pulmonary:      Effort: Pulmonary effort is normal. No respiratory distress.      Breath sounds: Normal breath sounds. No wheezing or rales.   Abdominal:      General: Bowel sounds are normal. There is no distension.      Palpations: There is no mass.      Tenderness: There is abdominal tenderness.      Hernia: No hernia is present.   Musculoskeletal:         General: Normal range of motion.      Cervical back: Normal range of motion.   Skin:     General: Skin is warm and dry.          Neurological:      General: No focal deficit present.      Mental Status: She is alert and oriented to person, place, and time. Mental status is at baseline.      Motor: No weakness.   Psychiatric:         Mood and Affect: Mood normal.         Thought Content: Thought content normal.                 HEALTH RISK ASSESSMENT    Smoking Status:  Social History     Tobacco Use   Smoking Status Former Smoker   • Packs/day: 1.00   • Years: " 24.00   • Pack years: 24.00   • Quit date: 2017   • Years since quittin.6   Smokeless Tobacco Never Used     Alcohol Consumption:  Social History     Substance and Sexual Activity   Alcohol Use No     Fall Risk Screen:    TIAGO Fall Risk Assessment was completed, and patient is at LOW risk for falls.Assessment completed on:2022    Depression Screening:  PHQ-2/PHQ-9 Depression Screening 2022   Retired PHQ-9 Total Score -   Retired Total Score -   Little Interest or Pleasure in Doing Things 0-->not at all   Feeling Down, Depressed or Hopeless 0-->not at all   PHQ-9: Brief Depression Severity Measure Score 0       Health Habits and Functional and Cognitive Screening:  Functional & Cognitive Status 2022   Do you have difficulty preparing food and eating? Yes   Do you have difficulty bathing yourself, getting dressed or grooming yourself? No   Do you have difficulty using the toilet? No   Do you have difficulty moving around from place to place? No   Do you have trouble with steps or getting out of a bed or a chair? Yes   Current Diet Unhealthy Diet   Dental Exam Up to date   Eye Exam Up to date   Exercise (times per week) 7 times per week   Current Exercises Include Other;Walking   Do you need help using the phone?  No   Are you deaf or do you have serious difficulty hearing?  No   Do you need help with transportation? Yes   Do you need help shopping? No   Do you need help preparing meals?  No   Do you need help with housework?  No   Do you need help with laundry? No   Do you need help taking your medications? No   Do you need help managing money? No   Do you ever drive or ride in a car without wearing a seat belt? No   Have you felt unusual stress, anger or loneliness in the last month? Yes   Who do you live with? Spouse   If you need help, do you have trouble finding someone available to you? No   Have you been bothered in the last four weeks by sexual problems? No       Age-appropriate  Screening Schedule:  Refer to the list below for future screening recommendations based on patient's age, sex and/or medical conditions. Orders for these recommended tests are listed in the plan section. The patient has been provided with a written plan.    Health Maintenance   Topic Date Due   • PAP SMEAR  Never done   • DIABETIC FOOT EXAM  08/11/2022 (Originally 3/10/2022)   • HEMOGLOBIN A1C  09/23/2022   • INFLUENZA VACCINE  10/01/2022   • DIABETIC EYE EXAM  01/01/2023   • LIPID PANEL  03/23/2023   • URINE MICROALBUMIN  03/23/2023   • TDAP/TD VACCINES  Discontinued              Assessment & Plan   CMS Preventative Services Quick Reference  Risk Factors Identified During Encounter  Cardiovascular Disease  Immunizations Discussed/Encouraged (specific Immunizations; Tdap, Influenza and Prevnar 20 (Pneumococcal 20-valent conjugate)  Inactivity/Sedentary  Obesity/Overweight   The above risks/problems have been discussed with the patient.  Follow up actions/plans if indicated are seen below in the Assessment/Plan Section.  Pertinent information has been shared with the patient in the After Visit Summary.    Diagnoses and all orders for this visit:    1. Medicare annual wellness visit, subsequent (Primary)    2. Gastroparesis  Assessment & Plan:  S/p gastric stimulator replacement.   Continues following with [facility].       3. Pancreas disorder  Assessment & Plan:  Continues following with GI.   Continues on creon.       4. Mixed hyperlipidemia  Assessment & Plan:  Continues on statin.       5. Essential hypertension  Assessment & Plan:  Hypertension is improving with treatment.  Continue current treatment regimen.  Blood pressure will be reassessed at the next regular appointment.      6. Type 2 diabetes mellitus with hyperglycemia, with long-term current use of insulin (HCC)  Assessment & Plan:  Diabetes is unchanged.   Continue current treatment regimen.  Reminded to bring in blood sugar diary at next visit.  Dietary  recommendations for ADA diet.  Regular aerobic exercise.  Discussed ways to avoid symptomatic hypoglycemia.  Discussed sick day management.  Discussed foot care.  Reminded to get yearly retinal exam.  Diabetes will be reassessed at next appt.      7. Anxiety and depression  Assessment & Plan:  Continues on buspar and cymbalta.         Follow Up:   Return in about 3 months (around 10/27/2022) for Recheck chronic conditions.     An After Visit Summary and PPPS were made available to the patient.

## 2022-07-28 RX ORDER — ERGOCALCIFEROL 1.25 MG/1
CAPSULE ORAL
Qty: 12 CAPSULE | Refills: 1 | Status: SHIPPED | OUTPATIENT
Start: 2022-07-28 | End: 2022-12-28

## 2022-07-28 NOTE — TELEPHONE ENCOUNTER
Rx Refill Note  Requested Prescriptions     Pending Prescriptions Disp Refills    vitamin D (ERGOCALCIFEROL) 1.25 MG (66266 UT) capsule capsule [Pharmacy Med Name: VITAMIN D2 1.25MG(50,000 UNIT)] 12 capsule 1     Sig: TAKE 1 CAPSULE BY MOUTH EVERY 7 DAYS.      Last office visit with prescribing clinician: 3/23/2022      Next office visit with prescribing clinician: 9/28/2022            Tania Vasquez  07/28/22, 12:04 EDT

## 2022-09-06 RX ORDER — TIZANIDINE 4 MG/1
4 TABLET ORAL EVERY 8 HOURS PRN
Qty: 270 TABLET | Refills: 1 | OUTPATIENT
Start: 2022-09-06

## 2022-09-07 ENCOUNTER — TELEPHONE (OUTPATIENT)
Dept: ENDOCRINOLOGY | Age: 47
End: 2022-09-07

## 2022-09-07 RX ORDER — APIXABAN 5 MG/1
TABLET, FILM COATED ORAL
Qty: 60 TABLET | Refills: 5 | Status: SHIPPED | OUTPATIENT
Start: 2022-09-07 | End: 2023-03-27

## 2022-09-21 ENCOUNTER — LAB (OUTPATIENT)
Dept: ENDOCRINOLOGY | Age: 47
End: 2022-09-21

## 2022-09-21 DIAGNOSIS — E55.9 VITAMIN D DEFICIENCY: ICD-10-CM

## 2022-09-21 DIAGNOSIS — E11.65 TYPE 2 DIABETES MELLITUS WITH HYPERGLYCEMIA, WITH LONG-TERM CURRENT USE OF INSULIN: ICD-10-CM

## 2022-09-21 DIAGNOSIS — E78.2 MIXED HYPERLIPIDEMIA: ICD-10-CM

## 2022-09-21 DIAGNOSIS — Z79.4 TYPE 2 DIABETES MELLITUS WITH HYPERGLYCEMIA, WITH LONG-TERM CURRENT USE OF INSULIN: ICD-10-CM

## 2022-09-22 LAB
25(OH)D3+25(OH)D2 SERPL-MCNC: 35.8 NG/ML (ref 30–100)
ALBUMIN SERPL-MCNC: 3.6 G/DL (ref 3.5–5.2)
ALBUMIN/CREAT UR: 177 MG/G CREAT (ref 0–29)
ALBUMIN/GLOB SERPL: 1.2 G/DL
ALP SERPL-CCNC: 58 U/L (ref 39–117)
ALT SERPL-CCNC: 9 U/L (ref 1–33)
AST SERPL-CCNC: 13 U/L (ref 1–32)
BILIRUB SERPL-MCNC: <0.2 MG/DL (ref 0–1.2)
BUN SERPL-MCNC: 18 MG/DL (ref 6–20)
BUN/CREAT SERPL: 15 (ref 7–25)
CALCIUM SERPL-MCNC: 8.9 MG/DL (ref 8.6–10.5)
CHLORIDE SERPL-SCNC: 103 MMOL/L (ref 98–107)
CHOLEST SERPL-MCNC: 151 MG/DL (ref 0–200)
CO2 SERPL-SCNC: 28.1 MMOL/L (ref 22–29)
CREAT SERPL-MCNC: 1.2 MG/DL (ref 0.57–1)
CREAT UR-MCNC: 205.3 MG/DL
EGFRCR SERPLBLD CKD-EPI 2021: 55.6 ML/MIN/1.73
GLOBULIN SER CALC-MCNC: 2.9 GM/DL
GLUCOSE SERPL-MCNC: 254 MG/DL (ref 65–99)
HBA1C MFR BLD: 7.3 % (ref 4.8–5.6)
HDLC SERPL-MCNC: 34 MG/DL (ref 40–60)
IMP & REVIEW OF LAB RESULTS: NORMAL
LDLC SERPL CALC-MCNC: 77 MG/DL (ref 0–100)
MICROALBUMIN UR-MCNC: 364 UG/ML
POTASSIUM SERPL-SCNC: 3.9 MMOL/L (ref 3.5–5.2)
PROT SERPL-MCNC: 6.5 G/DL (ref 6–8.5)
SODIUM SERPL-SCNC: 140 MMOL/L (ref 136–145)
TRIGL SERPL-MCNC: 241 MG/DL (ref 0–150)
VLDLC SERPL CALC-MCNC: 40 MG/DL (ref 5–40)

## 2022-09-28 ENCOUNTER — TELEMEDICINE (OUTPATIENT)
Dept: ENDOCRINOLOGY | Age: 47
End: 2022-09-28

## 2022-09-28 DIAGNOSIS — E78.2 MIXED HYPERLIPIDEMIA: ICD-10-CM

## 2022-09-28 DIAGNOSIS — Z79.4 TYPE 2 DIABETES MELLITUS WITH HYPERGLYCEMIA, WITH LONG-TERM CURRENT USE OF INSULIN: Primary | ICD-10-CM

## 2022-09-28 DIAGNOSIS — E11.65 TYPE 2 DIABETES MELLITUS WITH HYPERGLYCEMIA, WITH LONG-TERM CURRENT USE OF INSULIN: Primary | ICD-10-CM

## 2022-09-28 PROCEDURE — 99214 OFFICE O/P EST MOD 30 MIN: CPT | Performed by: NURSE PRACTITIONER

## 2022-09-28 NOTE — PROGRESS NOTES
"Chief Complaint  Diabetes   Patient lost her ride to the appointment today so visit was completed through the [employer] video session  Video and audio was maintained the entirety of the visit  Patient was in her home with her   I am in my office with yMriam Arias NP student       Subjective        Danielle Dudley presents to [facility] ENDOCRINOLOGY  History of Present Illness     Lab Results   Component Value Date    HGBA1C 7.30 (H) 09/21/2022     Type 2 dm   Diagnosed in 2010  Today in clinic pt reports being on u200 humalog in her pump- waiting for new pump and CGM to start  Feels like her current pump is not working currently   Checking 4x/day  AM: 110s  Noon: 150s  PM: 200s  Insulin pump - medtronic  Sensor- will be starting soon  Dm retinopathy - denies, Last eye exam - goes in december  Dm nephropathy - denies   Dm neuropathy -yes, on lyrica- having to take extra Ibuprofen  CAD - denies  CVA - denies  Episodes of hypoglycemia - rare, waiting for CGM  + gastroparesis with stimulator- did get replaced in July 2022  On arb and statin    emergency kit: yes  Backup plan: syringe and vials    Taking Ibuprofen: taking 4-5 pills at a time- recommended to back off and notify PCP of uncontrolled      Objective   Vital Signs:  There were no vitals taken for this visit.  Estimated body mass index is 43.72 kg/m² as calculated from the following:    Height as of 7/27/22: 175.3 cm (69.02\").    Weight as of 7/27/22: 134 kg (296 lb 3.2 oz).          Physical Exam   Alert and oriented   No apparent distress    Result Review :  The following data was reviewed by: RICKI Westfall on 09/28/2022:  Common labs    Common Labs 11/17/21 3/23/22 3/23/22 3/23/22 3/23/22 9/21/22 9/21/22 9/21/22 9/21/22     0927 0927 0927 0927 0821 0821 0821 0821   Glucose   108 (A)     254 (A)    BUN   9     18    Creatinine   0.82     1.20 (A)    Sodium   139     140    Potassium   4.3     3.9    Chloride   101     103  "   Calcium   8.7     8.9    Total Protein   7.1     6.5    Albumin   3.5 (A)     3.60    Total Bilirubin   0.3     <0.2    Alkaline Phosphatase   56     58    AST (SGOT)   19     13    ALT (SGPT)   20     9    Total Cholesterol    260 (A)   151     Triglycerides    327 (A)   241 (A)     HDL Cholesterol    32 (A)   34 (A)     LDL Cholesterol     165 (A)   77     Hemoglobin A1C 7.1 (A) 6.9 (A)       7.30 (A)   Microalbumin, Urine     150.8 364.0      (A) Abnormal value       Comments are available for some flowsheets but are not being displayed.                     Assessment and Plan   Diagnoses and all orders for this visit:    1. Type 2 diabetes mellitus with hyperglycemia, with long-term current use of insulin (HCC) (Primary)  -     Ambulatory Referral to Podiatry  -     Basic Metabolic Panel; Future  -     Hemoglobin A1c; Future    2. Mixed hyperlipidemia  -     Basic Metabolic Panel; Future  -     Hemoglobin A1c; Future             Follow Up   No follow-ups on file.     Will repeat kidney function with PCP in November   Start new pump in the next few weeks   Use manual injection of rapid insulin if BS not responding to bolus  Limited soda intake   LDL at goal- on statin   Patient to contact [facility] medical to get her test strips     Patient was given instructions and counseling regarding her condition or for health maintenance advice. Please see specific information pulled into the AVS if appropriate.     RICKI Westfall

## 2022-10-17 DIAGNOSIS — E10.43 DIABETIC AUTONOMIC NEUROPATHY ASSOCIATED WITH TYPE 1 DIABETES MELLITUS: ICD-10-CM

## 2022-10-17 RX ORDER — PREGABALIN 150 MG/1
150 CAPSULE ORAL 2 TIMES DAILY
Qty: 60 CAPSULE | Refills: 2 | Status: SHIPPED | OUTPATIENT
Start: 2022-10-17 | End: 2023-01-25

## 2022-10-18 RX ORDER — ROSUVASTATIN CALCIUM 40 MG/1
TABLET, COATED ORAL
Qty: 90 TABLET | Refills: 1 | Status: SHIPPED | OUTPATIENT
Start: 2022-10-18

## 2022-11-08 ENCOUNTER — OFFICE VISIT (OUTPATIENT)
Dept: INTERNAL MEDICINE | Facility: CLINIC | Age: 47
End: 2022-11-08

## 2022-11-08 VITALS
BODY MASS INDEX: 44.87 KG/M2 | HEART RATE: 75 BPM | WEIGHT: 293 LBS | SYSTOLIC BLOOD PRESSURE: 168 MMHG | DIASTOLIC BLOOD PRESSURE: 106 MMHG | OXYGEN SATURATION: 98 % | TEMPERATURE: 97.7 F

## 2022-11-08 DIAGNOSIS — Z79.4 LONG-TERM INSULIN USE: ICD-10-CM

## 2022-11-08 DIAGNOSIS — Z79.4 TYPE 2 DIABETES MELLITUS WITH HYPERGLYCEMIA, WITH LONG-TERM CURRENT USE OF INSULIN: Primary | ICD-10-CM

## 2022-11-08 DIAGNOSIS — R80.9 PROTEINURIA, UNSPECIFIED TYPE: ICD-10-CM

## 2022-11-08 DIAGNOSIS — R79.89 ELEVATED SERUM CREATININE: ICD-10-CM

## 2022-11-08 DIAGNOSIS — G43.009 MIGRAINE WITHOUT AURA AND WITHOUT STATUS MIGRAINOSUS, NOT INTRACTABLE: ICD-10-CM

## 2022-11-08 DIAGNOSIS — E66.01 MORBIDLY OBESE: ICD-10-CM

## 2022-11-08 DIAGNOSIS — E11.65 TYPE 2 DIABETES MELLITUS WITH HYPERGLYCEMIA, WITH LONG-TERM CURRENT USE OF INSULIN: Primary | ICD-10-CM

## 2022-11-08 DIAGNOSIS — E78.5 DYSLIPIDEMIA: ICD-10-CM

## 2022-11-08 DIAGNOSIS — I10 ESSENTIAL HYPERTENSION: ICD-10-CM

## 2022-11-08 LAB
BUN SERPL-MCNC: 12 MG/DL (ref 6–20)
BUN/CREAT SERPL: 13.2 (ref 7–25)
CALCIUM SERPL-MCNC: 8.6 MG/DL (ref 8.6–10.5)
CHLORIDE SERPL-SCNC: 101 MMOL/L (ref 98–107)
CO2 SERPL-SCNC: 32.4 MMOL/L (ref 22–29)
CREAT SERPL-MCNC: 0.91 MG/DL (ref 0.57–1)
EGFRCR SERPLBLD CKD-EPI 2021: 77.5 ML/MIN/1.73
GLUCOSE SERPL-MCNC: 95 MG/DL (ref 65–99)
POTASSIUM SERPL-SCNC: 3.7 MMOL/L (ref 3.5–5.2)
SODIUM SERPL-SCNC: 141 MMOL/L (ref 136–145)

## 2022-11-08 PROCEDURE — 99214 OFFICE O/P EST MOD 30 MIN: CPT | Performed by: FAMILY MEDICINE

## 2022-11-08 RX ORDER — LOSARTAN POTASSIUM 50 MG/1
50 TABLET ORAL DAILY
Qty: 90 TABLET | Refills: 2 | Status: SHIPPED | OUTPATIENT
Start: 2022-11-08

## 2022-11-08 NOTE — PROGRESS NOTES
"Chief Complaint  Hypertension, Hyperlipidemia, Depression, Anxiety, and Diabetes    Subjective        Danielle Dudley presents to [facility] PRIMARY CARE  History of Present Illness  Danielle Dudley is a 47-year-old female who presents today for a follow-up visit.     The patient was seen by her endocrinologist approximately 1 month ago, who performed a hemoglobin A1c test. She states that she has a new Medtronic insulin pump and is currently on Humalog. Her average glucose level within the past 24 hours was 159 mg/dL. She mentions that she is urinating less often than before. The patient states that she has never had renal issues in the past.    Her blood pressure is slightly elevated today and she wonders if it may be related to her appointment today. Upon rechecking her blood pressure, it remained elevated at 170/88 mmHg. She is currently taking losartan 25 mg and metoprolol. She has a history of taking lisinopril in 2016. She has also taken labetalol in the past. She is treated with intravenous immunoglobulin by a nurse once a week secondary to gastroparesis. The patient has a gastric stimulator.    Objective   Vital Signs:  BP (!) 168/106 (BP Location: Left arm, Patient Position: Sitting, Cuff Size: Large Adult)   Pulse 75   Temp 97.7 °F (36.5 °C) (Tympanic)   Wt (!) 138 kg (304 lb)   SpO2 98%   BMI 44.87 kg/m²   Estimated body mass index is 44.87 kg/m² as calculated from the following:    Height as of 7/27/22: 175.3 cm (69.02\").    Weight as of this encounter: 138 kg (304 lb).          Physical Exam  Constitutional:       Appearance: She is obese.   HENT:      Head: Normocephalic and atraumatic.      Nose: Nose normal.   Eyes:      Extraocular Movements: Extraocular movements intact.      Conjunctiva/sclera: Conjunctivae normal.      Pupils: Pupils are equal, round, and reactive to light.   Cardiovascular:      Rate and Rhythm: Normal rate and regular rhythm.      Pulses: Normal pulses.    "   Heart sounds: Normal heart sounds.   Pulmonary:      Effort: Pulmonary effort is normal.      Breath sounds: Normal breath sounds.   Musculoskeletal:         General: Normal range of motion.      Cervical back: Normal range of motion.   Neurological:      Mental Status: She is alert and oriented to person, place, and time.   Psychiatric:         Mood and Affect: Mood normal.         Behavior: Behavior normal.         Thought Content: Thought content normal.         Judgment: Judgment normal.        Result Review :                Assessment and Plan   Diagnoses and all orders for this visit:    1. Type 2 diabetes mellitus with hyperglycemia, with long-term current use of insulin ([facility]) (Primary)    2. Long-term insulin use (HCC)    3. Morbidly obese ([facility])    4. Essential hypertension  Comments:  metoprolol  mg, losartan 50 mg qd,     5. Dyslipidemia  Comments:  zetia 10 mg daily, crestor 40 mg daily    6. Migraine without aura and without status migrainosus, not intractable    7. Elevated serum creatinine  -     Basic Metabolic Panel    8. Proteinuria, unspecified type  -     Basic Metabolic Panel    Other orders  -     losartan (Cozaar) 50 MG tablet; Take 1 tablet by mouth Daily.  Dispense: 90 tablet; Refill: 2    Diabetes mellitus  - Laboratory tests will be done to assess the patient's renal function.    Hypertension  - Losartan will be increased from 25 mg to 50 mg.       Follow Up   Return in about 3 months (around 2/8/2023) for Recheck.  Patient was given instructions and counseling regarding her condition or for health maintenance advice. Please see specific information pulled into the AVS if appropriate.     .Transcribed from ambient dictation for Jairo Walker MD by Lilian Johnson.  11/08/22   17:55 EST    I have personally performed the services described in this document as transcribed by the above individual, and it is both accurate and complete.

## 2022-12-28 ENCOUNTER — OFFICE VISIT (OUTPATIENT)
Dept: ENDOCRINOLOGY | Age: 47
End: 2022-12-28

## 2022-12-28 VITALS
HEART RATE: 62 BPM | HEIGHT: 69 IN | DIASTOLIC BLOOD PRESSURE: 80 MMHG | SYSTOLIC BLOOD PRESSURE: 118 MMHG | OXYGEN SATURATION: 98 % | WEIGHT: 293 LBS | TEMPERATURE: 95.9 F | BODY MASS INDEX: 43.4 KG/M2

## 2022-12-28 DIAGNOSIS — Z79.4 TYPE 2 DIABETES MELLITUS WITH HYPERGLYCEMIA, WITH LONG-TERM CURRENT USE OF INSULIN: Primary | ICD-10-CM

## 2022-12-28 DIAGNOSIS — E10.43 DIABETIC AUTONOMIC NEUROPATHY ASSOCIATED WITH TYPE 1 DIABETES MELLITUS: ICD-10-CM

## 2022-12-28 DIAGNOSIS — E11.65 TYPE 2 DIABETES MELLITUS WITH HYPERGLYCEMIA, WITH LONG-TERM CURRENT USE OF INSULIN: Primary | ICD-10-CM

## 2022-12-28 DIAGNOSIS — E78.2 MIXED HYPERLIPIDEMIA: ICD-10-CM

## 2022-12-28 PROCEDURE — 99214 OFFICE O/P EST MOD 30 MIN: CPT | Performed by: NURSE PRACTITIONER

## 2022-12-28 PROCEDURE — 95251 CONT GLUC MNTR ANALYSIS I&R: CPT | Performed by: NURSE PRACTITIONER

## 2022-12-28 RX ORDER — INSULIN LISPRO 200 [IU]/ML
400 INJECTION, SOLUTION SUBCUTANEOUS DAILY
Qty: 180 ML | Refills: 5 | Status: SHIPPED | OUTPATIENT
Start: 2022-12-28 | End: 2023-03-29

## 2022-12-28 NOTE — PROGRESS NOTES
"Chief Complaint  Diabetes (Type2: Pump, pt has no hx of retinopathy with a moderate case of neuropathy in her feet )    Subjective        Danielle Dudley presents to [facility] ENDOCRINOLOGY  History of Present Illness     New pump started since last visit     Type 2 dm   Diagnosed in 2010  Today in clinic pt reports being on u200 humalog in her pump  Checking 4x/day with sensor and finger stick   Insulin pump - medtronic 770g  Sensor- yes: 70% time in range   Last eye exam - today   Dm nephropathy - (+) proteinuria  On ARB   Dm neuropathy -yes, on lyrica/ cymbalta- podiatry contacted her for eval but she didn't call them back    CAD - denies CP, SOA- on statin   CVA - denies  Episodes of hypoglycemia - < 1% on cgm   + gastroparesis with stimulator     emergency kit: yes  Backup plan: syringe and vials    Objective   Vital Signs:  /80   Pulse 62   Temp 95.9 °F (35.5 °C) (Temporal)   Ht 175.3 cm (69\")   Wt 135 kg (297 lb 3.2 oz)   SpO2 98%   BMI 43.89 kg/m²   Estimated body mass index is 43.89 kg/m² as calculated from the following:    Height as of this encounter: 175.3 cm (69\").    Weight as of this encounter: 135 kg (297 lb 3.2 oz).          Physical Exam  Vitals reviewed.   Constitutional:       General: She is not in acute distress.  HENT:      Head: Normocephalic and atraumatic.   Eyes:      General:         Right eye: No discharge.         Left eye: No discharge.   Pulmonary:      Effort: Pulmonary effort is normal. No respiratory distress.   Musculoskeletal:         General: No signs of injury. Normal range of motion.      Cervical back: Normal range of motion and neck supple.   Skin:     General: Skin is warm and dry.   Neurological:      Mental Status: She is alert and oriented to person, place, and time. Mental status is at baseline.   Psychiatric:         Mood and Affect: Mood normal.         Behavior: Behavior normal.         Thought Content: Thought content normal.         " Judgment: Judgment normal.        Result Review :  The following data was reviewed by: RICKI Westfall on 12/28/2022:  Common labs    Common Labs 3/23/22 3/23/22 3/23/22 3/23/22 9/21/22 9/21/22 9/21/22 9/21/22 11/8/22    0927 0927 0927 0927 0821 0821 0821 0821    Glucose  108 (A)     254 (A)  95   BUN  9     18  12   Creatinine  0.82     1.20 (A)  0.91   Sodium  139     140  141   Potassium  4.3     3.9  3.7   Chloride  101     103  101   Calcium  8.7     8.9  8.6   Total Protein  7.1     6.5     Albumin  3.5 (A)     3.60     Total Bilirubin  0.3     <0.2     Alkaline Phosphatase  56     58     AST (SGOT)  19     13     ALT (SGPT)  20     9     Total Cholesterol   260 (A)   151      Triglycerides   327 (A)   241 (A)      HDL Cholesterol   32 (A)   34 (A)      LDL Cholesterol    165 (A)   77      Hemoglobin A1C 6.9 (A)       7.30 (A)    Microalbumin, Urine    150.8 364.0       (A) Abnormal value       Comments are available for some flowsheets but are not being displayed.                     Assessment and Plan   Diagnoses and all orders for this visit:    1. Type 2 diabetes mellitus with hyperglycemia, with long-term current use of insulin (HCC) (Primary)  -     Hemoglobin A1c  -     Microalbumin / Creatinine Urine Ratio - Urine, Clean Catch    2. Mixed hyperlipidemia    3. Diabetic autonomic neuropathy associated with type 1 diabetes mellitus (HCC)    Other orders  -     glucose blood test strip; Dispense based on insurance preference: Check 3 times a day Dx: E 11.9  Dispense: 300 each; Refill: 4  -     Insulin Lispro (HumaLOG KwikPen) 200 UNIT/ML solution pen-injector; Inject 400 Units under the skin into the appropriate area as directed Daily. Use as directed  Dispense: 180 mL; Refill: 5             Follow Up   No follow-ups on file.     Labs today  No pump changes today  Cgm looks great, 70% time in range  Continue statin and arb     Patient was given instructions and counseling regarding her condition or  for health maintenance advice. Please see specific information pulled into the AVS if appropriate.     RICKI Westfall

## 2022-12-29 LAB
ALBUMIN/CREAT UR: 727 MG/G CREAT (ref 0–29)
CREAT UR-MCNC: 106.2 MG/DL
HBA1C MFR BLD: 6.6 % (ref 4.8–5.6)
MICROALBUMIN UR-MCNC: 772.5 UG/ML

## 2023-01-13 DIAGNOSIS — E11.65 TYPE 2 DIABETES MELLITUS WITH HYPERGLYCEMIA, WITH LONG-TERM CURRENT USE OF INSULIN: Primary | ICD-10-CM

## 2023-01-13 DIAGNOSIS — Z79.4 TYPE 2 DIABETES MELLITUS WITH HYPERGLYCEMIA, WITH LONG-TERM CURRENT USE OF INSULIN: Primary | ICD-10-CM

## 2023-01-24 DIAGNOSIS — E10.43 DIABETIC AUTONOMIC NEUROPATHY ASSOCIATED WITH TYPE 1 DIABETES MELLITUS: ICD-10-CM

## 2023-01-24 DIAGNOSIS — F41.9 ANXIETY: ICD-10-CM

## 2023-01-24 NOTE — TELEPHONE ENCOUNTER
Last office visit with prescribing clinician: 11/8/2022     Next office visit with prescribing clinician: 2/8/2023

## 2023-01-25 RX ORDER — PREGABALIN 150 MG/1
CAPSULE ORAL
Qty: 60 CAPSULE | Refills: 2 | Status: SHIPPED | OUTPATIENT
Start: 2023-01-25

## 2023-01-25 RX ORDER — BUSPIRONE HYDROCHLORIDE 5 MG/1
TABLET ORAL
Qty: 270 TABLET | Refills: 2 | Status: SHIPPED | OUTPATIENT
Start: 2023-01-25

## 2023-01-29 DIAGNOSIS — I10 ESSENTIAL HYPERTENSION: Primary | ICD-10-CM

## 2023-01-29 DIAGNOSIS — F32.0 CURRENT MILD EPISODE OF MAJOR DEPRESSIVE DISORDER WITHOUT PRIOR EPISODE: ICD-10-CM

## 2023-01-29 DIAGNOSIS — E10.43 DIABETIC AUTONOMIC NEUROPATHY ASSOCIATED WITH TYPE 1 DIABETES MELLITUS: ICD-10-CM

## 2023-01-30 RX ORDER — DULOXETIN HYDROCHLORIDE 60 MG/1
60 CAPSULE, DELAYED RELEASE ORAL DAILY
Qty: 90 CAPSULE | Refills: 1 | Status: SHIPPED | OUTPATIENT
Start: 2023-01-30

## 2023-01-30 RX ORDER — METOPROLOL SUCCINATE 100 MG/1
100 TABLET, EXTENDED RELEASE ORAL DAILY
Qty: 90 TABLET | Refills: 1 | Status: SHIPPED | OUTPATIENT
Start: 2023-01-30

## 2023-02-08 ENCOUNTER — OFFICE VISIT (OUTPATIENT)
Dept: INTERNAL MEDICINE | Facility: CLINIC | Age: 48
End: 2023-02-08
Payer: MEDICARE

## 2023-02-08 VITALS
TEMPERATURE: 98.2 F | OXYGEN SATURATION: 98 % | DIASTOLIC BLOOD PRESSURE: 106 MMHG | WEIGHT: 293 LBS | HEART RATE: 68 BPM | BODY MASS INDEX: 43.4 KG/M2 | HEIGHT: 69 IN | SYSTOLIC BLOOD PRESSURE: 188 MMHG

## 2023-02-08 DIAGNOSIS — R10.9 FLANK PAIN: ICD-10-CM

## 2023-02-08 DIAGNOSIS — R74.8 ELEVATED LIPASE: ICD-10-CM

## 2023-02-08 DIAGNOSIS — N30.01 ACUTE CYSTITIS WITH HEMATURIA: ICD-10-CM

## 2023-02-08 DIAGNOSIS — R30.0 DYSURIA: Primary | ICD-10-CM

## 2023-02-08 PROCEDURE — 99214 OFFICE O/P EST MOD 30 MIN: CPT | Performed by: FAMILY MEDICINE

## 2023-02-08 RX ORDER — CEFDINIR 300 MG/1
300 CAPSULE ORAL 2 TIMES DAILY
Qty: 14 CAPSULE | Refills: 0 | Status: SHIPPED | OUTPATIENT
Start: 2023-02-08 | End: 2023-03-29

## 2023-02-08 RX ORDER — TRAMADOL HYDROCHLORIDE 50 MG/1
50 TABLET ORAL EVERY 6 HOURS PRN
Qty: 24 TABLET | Refills: 0 | Status: SHIPPED | OUTPATIENT
Start: 2023-02-08 | End: 2023-03-29 | Stop reason: SDUPTHER

## 2023-02-08 NOTE — PROGRESS NOTES
"Chief Complaint  Hyperglycemia, Flank Pain, Follow-up, and Vomiting    Subjective        Danielle Dudley presents to [facility] PRIMARY CARE  History of Present Illness  Danielle has had 1 month of severe bilateral flank pain back pain dysuria and evaluation on January 13 at [facility] with negative findings other than microhematuria.  CT scan of the abdomen was reported as unremarkable.  However prior urinalysis did show blood in the urine.  She has a gastric stimulator in place.  She has a follow-up with nephrology with most recent creatinine 1.06 and what appears to be adequate kidney function.    She has had some chills repetitively no fever but has felt sick for a whole month with shaking chills some vomiting.    Labs are to follow-up with nephrology very soon.    Referral to Dr. Jones for elevated lipase.      Hyperglycemia  Associated symptoms include vomiting.   Flank Pain    Vomiting         Objective   Vital Signs:  BP (!) 188/106 (BP Location: Right arm, Patient Position: Sitting, Cuff Size: Large Adult)   Pulse 68   Temp 98.2 °F (36.8 °C) (Infrared)   Ht 175.3 cm (69\")   Wt 134 kg (294 lb 12.8 oz)   SpO2 98%   BMI 43.53 kg/m²   Estimated body mass index is 43.53 kg/m² as calculated from the following:    Height as of this encounter: 175.3 cm (69\").    Weight as of this encounter: 134 kg (294 lb 12.8 oz).             Physical Exam  Vitals reviewed.   Constitutional:       General: She is in acute distress.      Appearance: She is well-developed. She is obese.   HENT:      Head: Normocephalic and atraumatic.      Right Ear: Tympanic membrane and external ear normal.      Left Ear: Tympanic membrane and external ear normal.      Nose: Nose normal.   Eyes:      Conjunctiva/sclera: Conjunctivae normal.      Pupils: Pupils are equal, round, and reactive to light.   Neck:      Thyroid: No thyromegaly.      Vascular: No JVD.   Cardiovascular:      Rate and Rhythm: Normal rate and " regular rhythm.      Heart sounds: Normal heart sounds.   Pulmonary:      Effort: Pulmonary effort is normal.      Breath sounds: Normal breath sounds.   Abdominal:      General: Bowel sounds are normal.      Palpations: Abdomen is soft.   Musculoskeletal:         General: Normal range of motion.      Cervical back: Normal range of motion and neck supple.   Lymphadenopathy:      Cervical: No cervical adenopathy.   Skin:     General: Skin is warm and dry.      Findings: No rash.   Neurological:      Mental Status: She is alert and oriented to person, place, and time.      Cranial Nerves: No cranial nerve deficit.      Coordination: Coordination normal.   Psychiatric:         Behavior: Behavior normal.         Thought Content: Thought content normal.         Judgment: Judgment normal.        Result Review :                   Assessment and Plan   Diagnoses and all orders for this visit:    1. Dysuria (Primary)  -     Urinalysis With Microscopic - Urine, Clean Catch  -     Urine Culture - Urine, Urine, Clean Catch    2. Flank pain  Comments:  Difficult to tell if it is truly kidney or pancreas.  Given the hematuria we will get a culture and treat with Omnicef 300 twice daily.  Urology for no improvem  Orders:  -     traMADol (ULTRAM) 50 MG tablet; Take 1 tablet by mouth Every 6 (Six) Hours As Needed for Moderate Pain.  Dispense: 24 tablet; Refill: 0  -     Urinalysis With Microscopic - Urine, Clean Catch  -     Urine Culture - Urine, Urine, Clean Catch    3. Elevated lipase  -     Ambulatory Referral to Gastroenterology    4. Acute cystitis with hematuria  -     Urinalysis With Microscopic - Urine, Clean Catch  -     Urine Culture - Urine, Urine, Clean Catch    Other orders  -     cefdinir (OMNICEF) 300 MG capsule; Take 1 capsule by mouth 2 (Two) Times a Day.  Dispense: 14 capsule; Refill: 0             Follow Up   Return in about 6 weeks (around 3/22/2023) for Recheck.  Patient was given instructions and counseling  regarding her condition or for health maintenance advice. Please see specific information pulled into the AVS if appropriate.

## 2023-02-10 LAB
APPEARANCE UR: ABNORMAL
BACTERIA #/AREA URNS HPF: ABNORMAL /HPF
BACTERIA UR CULT: NORMAL
BACTERIA UR CULT: NORMAL
BILIRUB UR QL STRIP: NEGATIVE
COLOR UR: ABNORMAL
EPI CELLS #/AREA URNS HPF: ABNORMAL /HPF
GLUCOSE UR QL STRIP: NEGATIVE
HGB UR QL STRIP: ABNORMAL
KETONES UR QL STRIP: ABNORMAL
LEUKOCYTE ESTERASE UR QL STRIP: ABNORMAL
NITRITE UR QL STRIP: NEGATIVE
PH UR STRIP: 6.5 [PH] (ref 5–8)
PROT UR QL STRIP: ABNORMAL
RBC #/AREA URNS HPF: ABNORMAL /HPF
SP GR UR STRIP: 1.02 (ref 1–1.03)
UROBILINOGEN UR STRIP-MCNC: ABNORMAL MG/DL
WBC #/AREA URNS HPF: ABNORMAL /HPF

## 2023-03-27 RX ORDER — APIXABAN 5 MG/1
TABLET, FILM COATED ORAL
Qty: 60 TABLET | Refills: 5 | Status: SHIPPED | OUTPATIENT
Start: 2023-03-27

## 2023-03-29 ENCOUNTER — OFFICE VISIT (OUTPATIENT)
Dept: ENDOCRINOLOGY | Age: 48
End: 2023-03-29
Payer: MEDICARE

## 2023-03-29 ENCOUNTER — OFFICE VISIT (OUTPATIENT)
Dept: INTERNAL MEDICINE | Facility: CLINIC | Age: 48
End: 2023-03-29
Payer: MEDICARE

## 2023-03-29 VITALS
SYSTOLIC BLOOD PRESSURE: 148 MMHG | WEIGHT: 293 LBS | DIASTOLIC BLOOD PRESSURE: 92 MMHG | OXYGEN SATURATION: 98 % | HEART RATE: 69 BPM | BODY MASS INDEX: 44.87 KG/M2

## 2023-03-29 VITALS
WEIGHT: 293 LBS | OXYGEN SATURATION: 98 % | DIASTOLIC BLOOD PRESSURE: 82 MMHG | SYSTOLIC BLOOD PRESSURE: 140 MMHG | HEART RATE: 77 BPM | BODY MASS INDEX: 43.4 KG/M2 | HEIGHT: 69 IN | TEMPERATURE: 97.3 F

## 2023-03-29 DIAGNOSIS — K31.84 GASTROPARESIS: ICD-10-CM

## 2023-03-29 DIAGNOSIS — R10.9 FLANK PAIN: ICD-10-CM

## 2023-03-29 DIAGNOSIS — Z79.4 LONG-TERM INSULIN USE: ICD-10-CM

## 2023-03-29 DIAGNOSIS — E10.65 TYPE 1 DIABETES MELLITUS WITH HYPERGLYCEMIA: Primary | ICD-10-CM

## 2023-03-29 DIAGNOSIS — E11.42 DIABETIC PERIPHERAL NEUROPATHY ASSOCIATED WITH TYPE 2 DIABETES MELLITUS: ICD-10-CM

## 2023-03-29 DIAGNOSIS — R80.1 PERSISTENT PROTEINURIA: ICD-10-CM

## 2023-03-29 DIAGNOSIS — K86.1 OTHER CHRONIC PANCREATITIS: Primary | ICD-10-CM

## 2023-03-29 DIAGNOSIS — E78.2 MIXED HYPERLIPIDEMIA: ICD-10-CM

## 2023-03-29 LAB
BUN SERPL-MCNC: 15 MG/DL (ref 6–20)
BUN/CREAT SERPL: 15 (ref 7–25)
CALCIUM SERPL-MCNC: 9.4 MG/DL (ref 8.6–10.5)
CHLORIDE SERPL-SCNC: 99 MMOL/L (ref 98–107)
CHOLEST SERPL-MCNC: 148 MG/DL (ref 0–200)
CO2 SERPL-SCNC: 35.6 MMOL/L (ref 22–29)
CREAT SERPL-MCNC: 1 MG/DL (ref 0.57–1)
EGFRCR SERPLBLD CKD-EPI 2021: 69.2 ML/MIN/1.73
GLUCOSE SERPL-MCNC: 60 MG/DL (ref 65–99)
HBA1C MFR BLD: 7.4 % (ref 4.8–5.6)
HDLC SERPL-MCNC: 40 MG/DL (ref 40–60)
IMP & REVIEW OF LAB RESULTS: NORMAL
LDLC SERPL CALC-MCNC: 79 MG/DL (ref 0–100)
POTASSIUM SERPL-SCNC: 3.8 MMOL/L (ref 3.5–5.2)
SODIUM SERPL-SCNC: 140 MMOL/L (ref 136–145)
TRIGL SERPL-MCNC: 172 MG/DL (ref 0–150)
VLDLC SERPL CALC-MCNC: 29 MG/DL (ref 5–40)

## 2023-03-29 PROCEDURE — 3079F DIAST BP 80-89 MM HG: CPT | Performed by: NURSE PRACTITIONER

## 2023-03-29 PROCEDURE — 99214 OFFICE O/P EST MOD 30 MIN: CPT | Performed by: NURSE PRACTITIONER

## 2023-03-29 PROCEDURE — 99213 OFFICE O/P EST LOW 20 MIN: CPT | Performed by: FAMILY MEDICINE

## 2023-03-29 PROCEDURE — 3077F SYST BP >= 140 MM HG: CPT | Performed by: FAMILY MEDICINE

## 2023-03-29 PROCEDURE — 3077F SYST BP >= 140 MM HG: CPT | Performed by: NURSE PRACTITIONER

## 2023-03-29 PROCEDURE — 3080F DIAST BP >= 90 MM HG: CPT | Performed by: FAMILY MEDICINE

## 2023-03-29 RX ORDER — TRAMADOL HYDROCHLORIDE 50 MG/1
50 TABLET ORAL EVERY 6 HOURS PRN
Qty: 24 TABLET | Refills: 2 | Status: SHIPPED | OUTPATIENT
Start: 2023-03-29

## 2023-03-29 RX ORDER — INSULIN LISPRO 200 [IU]/ML
200 INJECTION, SOLUTION SUBCUTANEOUS DAILY
Qty: 90 ML | Refills: 1 | Status: SHIPPED | OUTPATIENT
Start: 2023-03-29 | End: 2023-06-27

## 2023-03-29 NOTE — PROGRESS NOTES
"Chief Complaint  Pancreatitis    Subjective        Danielle Dudley presents to [facility] PRIMARY CARE  History of Present Illness  Danielle is a really pleasant lady who has a lot of stuff going on as far as medical issues.  Fortunately the pain is still down from the pancreatitis and she took some as needed tramadol 50 mg.  She is seeing endocrinology who now feels like she has type 1 diabetes with insulin pump in place.  A1c has not been bad has been consistently less than 7.    We will continue a small dose of refill of as needed tramadol 50 mg every 6 as needed.  Pancreatitis  This is a chronic problem. The problem occurs intermittently. The problem has been waxing and waning.       Objective   Vital Signs:  /92 (BP Location: Left arm, Patient Position: Sitting, Cuff Size: Large Adult)   Pulse 69   Wt (!) 138 kg (304 lb)   SpO2 98%   BMI 44.87 kg/m²   Estimated body mass index is 44.87 kg/m² as calculated from the following:    Height as of an earlier encounter on 3/29/23: 175.3 cm (69.02\").    Weight as of this encounter: 138 kg (304 lb).             Physical Exam  Constitutional:       Appearance: She is obese.   HENT:      Head: Normocephalic.      Right Ear: Tympanic membrane normal.      Left Ear: Tympanic membrane normal.   Cardiovascular:      Rate and Rhythm: Normal rate and regular rhythm.      Heart sounds: Normal heart sounds.   Pulmonary:      Effort: Pulmonary effort is normal.      Breath sounds: Normal breath sounds.   Abdominal:      Palpations: Abdomen is soft.      Tenderness: There is no abdominal tenderness.   Neurological:      Mental Status: She is alert and oriented to person, place, and time.   Psychiatric:         Mood and Affect: Mood normal.         Behavior: Behavior normal.        Result Review :  The following data was reviewed by: Jairo Walker MD on 03/29/2023:  Common labs    Common Labs 9/21/22 9/21/22 9/21/22 9/21/22 11/8/22 12/28/22 12/28/22    0821 " 0821 0821 0821  0856 0856   Glucose   254 (A)  95     BUN   18  12     Creatinine   1.20 (A)  0.91     Sodium   140  141     Potassium   3.9  3.7     Chloride   103  101     Calcium   8.9  8.6     Total Protein   6.5       Albumin   3.60       Total Bilirubin   <0.2       Alkaline Phosphatase   58       AST (SGOT)   13       ALT (SGPT)   9       Total Cholesterol  151        Triglycerides  241 (A)        HDL Cholesterol  34 (A)        LDL Cholesterol   77        Hemoglobin A1C    7.30 (A)  6.60 (A)    Microalbumin, Urine 364.0      772.5   (A) Abnormal value       Comments are available for some flowsheets but are not being displayed.                        Assessment and Plan   Diagnoses and all orders for this visit:    1. Other chronic pancreatitis (HCC) (Primary)  Comments:  Refill small dose of tramadol to use as needed for pancreatitis pain.  Otherwise follow-up with gastric motility center.  Follow-up with endocrinology.    2. Gastroparesis    3. Long-term insulin use (HCC)    4. Flank pain  -     traMADol (ULTRAM) 50 MG tablet; Take 1 tablet by mouth Every 6 (Six) Hours As Needed for Moderate Pain.  Dispense: 24 tablet; Refill: 2             Follow Up   No follow-ups on file.  Patient was given instructions and counseling regarding her condition or for health maintenance advice. Please see specific information pulled into the AVS if appropriate.

## 2023-03-29 NOTE — PROGRESS NOTES
"Chief Complaint  Diabetes (Type 2 follow up )    Subjective        Danielle Dudley presents to [facility] ENDOCRINOLOGY  History of Present Illness     Lab Results   Component Value Date    HGBA1C 6.60 (H) 12/28/2022     Pmh: pancreatitis     Type 1 dm (+) LANDRY (6/2021)  Diagnosed in 2010  Today in clinic pt reports being on u200 humalog in her pump, medtronic using automode  Last eye exam - did not go in the winter 2022  (+) proteinuria , On ARB   Dm neuropathy -yes  on statin   CVA - denies  Episodes of hypoglycemia - none reported   + gastroparesis with stimulator      emergency kit: yes  Backup plan: syringe and vials      Objective   Vital Signs:  /82   Pulse 77   Temp 97.3 °F (36.3 °C)   Ht 175.3 cm (69.02\")   Wt (!) 138 kg (304 lb 6.4 oz)   SpO2 98%   BMI 44.93 kg/m²   Estimated body mass index is 44.93 kg/m² as calculated from the following:    Height as of this encounter: 175.3 cm (69.02\").    Weight as of this encounter: 138 kg (304 lb 6.4 oz).             Physical Exam  Vitals reviewed.   Constitutional:       General: She is not in acute distress.  HENT:      Head: Normocephalic and atraumatic.   Cardiovascular:      Rate and Rhythm: Normal rate.   Pulmonary:      Effort: Pulmonary effort is normal. No respiratory distress.   Musculoskeletal:         General: No signs of injury. Normal range of motion.      Cervical back: Normal range of motion and neck supple.   Skin:     General: Skin is warm and dry.   Neurological:      Mental Status: She is alert and oriented to person, place, and time. Mental status is at baseline.   Psychiatric:         Mood and Affect: Mood normal.         Behavior: Behavior normal.         Thought Content: Thought content normal.         Judgment: Judgment normal.        Result Review :  The following data was reviewed by: RICKI Westfall on 03/29/2023:  Common labs    Common Labs 9/21/22 9/21/22 9/21/22 9/21/22 11/8/22 12/28/22 12/28/22    0821 " 0821 0821 0821  0856 0856   Glucose   254 (A)  95     BUN   18  12     Creatinine   1.20 (A)  0.91     Sodium   140  141     Potassium   3.9  3.7     Chloride   103  101     Calcium   8.9  8.6     Total Protein   6.5       Albumin   3.60       Total Bilirubin   <0.2       Alkaline Phosphatase   58       AST (SGOT)   13       ALT (SGPT)   9       Total Cholesterol  151        Triglycerides  241 (A)        HDL Cholesterol  34 (A)        LDL Cholesterol   77        Hemoglobin A1C    7.30 (A)  6.60 (A)    Microalbumin, Urine 364.0      772.5   (A) Abnormal value       Comments are available for some flowsheets but are not being displayed.                        Assessment and Plan   Diagnoses and all orders for this visit:    1. Type 1 diabetes mellitus with hyperglycemia (HCC) (Primary)  -     Insulin Lispro (HumaLOG KwikPen) 200 UNIT/ML solution pen-injector; Inject 200 Units under the skin into the appropriate area as directed Daily for 90 days. Use as directed  Dispense: 90 mL; Refill: 1  -     Hemoglobin A1c  -     Basic Metabolic Panel  -     Lipid Panel    2. Mixed hyperlipidemia    3. Persistent proteinuria    4. Diabetic peripheral neuropathy associated with type 2 diabetes mellitus (HCC)    5. Gastroparesis             Follow Up   No follow-ups on file.     Labs today  Cgm reviewed- data on and off related to issues getting her supplies filled and also need for sensor holiday  Continue arb and statin     Patient was given instructions and counseling regarding her condition or for health maintenance advice. Please see specific information pulled into the AVS if appropriate.     Lilian Holloway, APRN

## 2023-04-03 ENCOUNTER — TELEPHONE (OUTPATIENT)
Dept: ENDOCRINOLOGY | Age: 48
End: 2023-04-03
Payer: MEDICARE

## 2023-04-03 NOTE — TELEPHONE ENCOUNTER
Okay for front to read to patient, a1c back up a bit, Will hold on additional changes since she was having problems getting her pump and sensor supplies.

## 2023-04-12 DIAGNOSIS — K31.84 GASTROPARESIS: ICD-10-CM

## 2023-04-12 RX ORDER — ROSUVASTATIN CALCIUM 40 MG/1
TABLET, COATED ORAL
Qty: 90 TABLET | Refills: 1 | Status: SHIPPED | OUTPATIENT
Start: 2023-04-12

## 2023-04-12 RX ORDER — PANTOPRAZOLE SODIUM 40 MG/1
TABLET, DELAYED RELEASE ORAL
Qty: 90 TABLET | Refills: 3 | Status: SHIPPED | OUTPATIENT
Start: 2023-04-12

## 2023-04-29 DIAGNOSIS — E10.43 DIABETIC AUTONOMIC NEUROPATHY ASSOCIATED WITH TYPE 1 DIABETES MELLITUS: ICD-10-CM

## 2023-05-01 RX ORDER — PREGABALIN 150 MG/1
CAPSULE ORAL
Qty: 60 CAPSULE | Refills: 5 | Status: SHIPPED | OUTPATIENT
Start: 2023-05-01

## 2023-05-01 NOTE — TELEPHONE ENCOUNTER
Contract 11/8/22    Rx Refill Note  Requested Prescriptions     Pending Prescriptions Disp Refills   • pregabalin (LYRICA) 150 MG capsule [Pharmacy Med Name: PREGABALIN 150 MG CAPSULE] 60 capsule 5     Sig: TAKE 1 CAPSULE BY MOUTH TWICE A DAY      Last office visit with prescribing clinician: 3/29/2023   Last telemedicine visit with prescribing clinician: 8/2/2023   Next office visit with prescribing clinician: 8/2/2023       Katina Niño MA  05/01/23, 09:21 EDT

## 2023-06-05 DIAGNOSIS — I10 ESSENTIAL HYPERTENSION: ICD-10-CM

## 2023-06-05 DIAGNOSIS — F32.0 CURRENT MILD EPISODE OF MAJOR DEPRESSIVE DISORDER WITHOUT PRIOR EPISODE: ICD-10-CM

## 2023-06-05 DIAGNOSIS — E10.43 DIABETIC AUTONOMIC NEUROPATHY ASSOCIATED WITH TYPE 1 DIABETES MELLITUS: ICD-10-CM

## 2023-06-06 RX ORDER — METOPROLOL SUCCINATE 100 MG/1
TABLET, EXTENDED RELEASE ORAL
Qty: 90 TABLET | Refills: 1 | OUTPATIENT
Start: 2023-06-06

## 2023-06-06 RX ORDER — DULOXETIN HYDROCHLORIDE 60 MG/1
CAPSULE, DELAYED RELEASE ORAL
Qty: 90 CAPSULE | Refills: 1 | OUTPATIENT
Start: 2023-06-06

## 2023-06-06 NOTE — TELEPHONE ENCOUNTER
Rx Refill Note  Requested Prescriptions     Pending Prescriptions Disp Refills    metoprolol succinate XL (TOPROL-XL) 100 MG 24 hr tablet [Pharmacy Med Name: METOPROLOL SUCC  MG TAB] 90 tablet 1     Sig: TAKE 1 TABLET BY MOUTH EVERY DAY    DULoxetine (CYMBALTA) 60 MG capsule [Pharmacy Med Name: DULOXETINE HCL DR 60 MG CAP] 90 capsule 1     Sig: TAKE 1 CAPSULE BY MOUTH EVERY DAY      Last office visit with prescribing clinician: 3/29/2023   Last telemedicine visit with prescribing clinician: Visit date not found   Next office visit with prescribing clinician: 8/2/2023                         Would you like a call back once the refill request has been completed: [] Yes [] No    If the office needs to give you a call back, can they leave a voicemail: [] Yes [] No    Becka Mccollum MA  06/06/23, 09:25 EDT

## 2023-08-02 ENCOUNTER — OFFICE VISIT (OUTPATIENT)
Dept: INTERNAL MEDICINE | Facility: CLINIC | Age: 48
End: 2023-08-02
Payer: MEDICARE

## 2023-08-02 VITALS
HEART RATE: 63 BPM | OXYGEN SATURATION: 98 % | SYSTOLIC BLOOD PRESSURE: 146 MMHG | WEIGHT: 293 LBS | BODY MASS INDEX: 43.54 KG/M2 | DIASTOLIC BLOOD PRESSURE: 82 MMHG

## 2023-08-02 DIAGNOSIS — R10.9 FLANK PAIN: ICD-10-CM

## 2023-08-02 DIAGNOSIS — E11.65 TYPE 2 DIABETES MELLITUS WITH HYPERGLYCEMIA, WITH LONG-TERM CURRENT USE OF INSULIN: ICD-10-CM

## 2023-08-02 DIAGNOSIS — K31.84 GASTROPARESIS: ICD-10-CM

## 2023-08-02 DIAGNOSIS — E78.5 DYSLIPIDEMIA: ICD-10-CM

## 2023-08-02 DIAGNOSIS — G35 MS (MULTIPLE SCLEROSIS): ICD-10-CM

## 2023-08-02 DIAGNOSIS — I10 ESSENTIAL HYPERTENSION: ICD-10-CM

## 2023-08-02 DIAGNOSIS — F32.0 CURRENT MILD EPISODE OF MAJOR DEPRESSIVE DISORDER WITHOUT PRIOR EPISODE: ICD-10-CM

## 2023-08-02 DIAGNOSIS — E10.43 DIABETIC AUTONOMIC NEUROPATHY ASSOCIATED WITH TYPE 1 DIABETES MELLITUS: ICD-10-CM

## 2023-08-02 DIAGNOSIS — I10 ESSENTIAL HYPERTENSION: Primary | ICD-10-CM

## 2023-08-02 DIAGNOSIS — K86.1 OTHER CHRONIC PANCREATITIS: ICD-10-CM

## 2023-08-02 DIAGNOSIS — Z00.00 MEDICARE ANNUAL WELLNESS VISIT, SUBSEQUENT: ICD-10-CM

## 2023-08-02 DIAGNOSIS — Z79.4 TYPE 2 DIABETES MELLITUS WITH HYPERGLYCEMIA, WITH LONG-TERM CURRENT USE OF INSULIN: ICD-10-CM

## 2023-08-02 RX ORDER — TRAMADOL HYDROCHLORIDE 50 MG/1
50 TABLET ORAL EVERY 6 HOURS PRN
Qty: 24 TABLET | Refills: 2 | Status: SHIPPED | OUTPATIENT
Start: 2023-08-02

## 2023-08-04 RX ORDER — METOPROLOL SUCCINATE 100 MG/1
TABLET, EXTENDED RELEASE ORAL
Qty: 90 TABLET | Refills: 1 | OUTPATIENT
Start: 2023-08-04

## 2023-08-04 RX ORDER — DULOXETIN HYDROCHLORIDE 60 MG/1
CAPSULE, DELAYED RELEASE ORAL
Qty: 90 CAPSULE | Refills: 1 | OUTPATIENT
Start: 2023-08-04

## 2023-08-04 NOTE — TELEPHONE ENCOUNTER
OKAY FOR HUB TO READ, PLEASE LET PATIENT KNOW THAT HER DULOXETINE AND METOPROLOL NEEDS TO COME FROM HER PCP

## 2023-08-04 NOTE — TELEPHONE ENCOUNTER
Rx Refill Note  Requested Prescriptions     Pending Prescriptions Disp Refills    DULoxetine (CYMBALTA) 60 MG capsule [Pharmacy Med Name: DULOXETINE HCL DR 60 MG CAP] 90 capsule 1     Sig: TAKE 1 CAPSULE BY MOUTH EVERY DAY    metoprolol succinate XL (TOPROL-XL) 100 MG 24 hr tablet [Pharmacy Med Name: METOPROLOL SUCC  MG TAB] 90 tablet 1     Sig: TAKE 1 TABLET BY MOUTH EVERY DAY      Last office visit with prescribing clinician: 8/2/2023   Last telemedicine visit with prescribing clinician: Visit date not found   Next office visit with prescribing clinician: 12/6/2023                         Would you like a call back once the refill request has been completed: [] Yes [] No    If the office needs to give you a call back, can they leave a voicemail: [] Yes [] No    Madiha Velazco MA  08/04/23, 08:05 EDT

## 2023-08-11 DIAGNOSIS — E10.43 DIABETIC AUTONOMIC NEUROPATHY ASSOCIATED WITH TYPE 1 DIABETES MELLITUS: ICD-10-CM

## 2023-08-11 DIAGNOSIS — I10 ESSENTIAL HYPERTENSION: ICD-10-CM

## 2023-08-11 DIAGNOSIS — F32.0 CURRENT MILD EPISODE OF MAJOR DEPRESSIVE DISORDER WITHOUT PRIOR EPISODE: ICD-10-CM

## 2023-08-15 RX ORDER — METOPROLOL SUCCINATE 100 MG/1
TABLET, EXTENDED RELEASE ORAL
Qty: 90 TABLET | Refills: 1 | OUTPATIENT
Start: 2023-08-15

## 2023-08-15 RX ORDER — METOPROLOL SUCCINATE 100 MG/1
100 TABLET, EXTENDED RELEASE ORAL DAILY
Qty: 90 TABLET | Refills: 1 | Status: SHIPPED | OUTPATIENT
Start: 2023-08-15

## 2023-08-15 RX ORDER — DULOXETIN HYDROCHLORIDE 60 MG/1
60 CAPSULE, DELAYED RELEASE ORAL DAILY
Qty: 90 CAPSULE | Refills: 1 | Status: SHIPPED | OUTPATIENT
Start: 2023-08-15

## 2023-08-15 RX ORDER — DULOXETIN HYDROCHLORIDE 60 MG/1
CAPSULE, DELAYED RELEASE ORAL
Qty: 90 CAPSULE | Refills: 1 | OUTPATIENT
Start: 2023-08-15

## 2023-08-15 NOTE — TELEPHONE ENCOUNTER
Rx Refill Note  Requested Prescriptions     Pending Prescriptions Disp Refills    metoprolol succinate XL (TOPROL-XL) 100 MG 24 hr tablet [Pharmacy Med Name: METOPROLOL SUCC  MG TAB] 90 tablet 1     Sig: TAKE 1 TABLET BY MOUTH EVERY DAY    DULoxetine (CYMBALTA) 60 MG capsule [Pharmacy Med Name: DULOXETINE HCL DR 60 MG CAP] 90 capsule 1     Sig: TAKE 1 CAPSULE BY MOUTH EVERY DAY      Last office visit with prescribing clinician: 8/2/2023   Last telemedicine visit with prescribing clinician: Visit date not found   Next office visit with prescribing clinician: 12/6/2023                         Would you like a call back once the refill request has been completed: [] Yes [] No    If the office needs to give you a call back, can they leave a voicemail: [] Yes [] No    Becka Mccollum MA  08/15/23, 10:38 EDT

## 2023-09-11 DIAGNOSIS — R10.9 FLANK PAIN: ICD-10-CM

## 2023-09-11 NOTE — TELEPHONE ENCOUNTER
Contract on file    Rx Refill Note  Requested Prescriptions     Pending Prescriptions Disp Refills    traMADol (ULTRAM) 50 MG tablet 24 tablet 2     Sig: Take 1 tablet by mouth Every 6 (Six) Hours As Needed for Moderate Pain.      Last office visit with prescribing clinician: 8/2/2023   Last telemedicine visit with prescribing clinician: Visit date not found   Next office visit with prescribing clinician: 12/6/2023       Katina Niño MA  09/11/23, 16:08 EDT

## 2023-09-12 RX ORDER — TRAMADOL HYDROCHLORIDE 50 MG/1
50 TABLET ORAL EVERY 6 HOURS PRN
Qty: 24 TABLET | Refills: 2 | Status: SHIPPED | OUTPATIENT
Start: 2023-09-12

## 2023-09-19 ENCOUNTER — APPOINTMENT (OUTPATIENT)
Dept: CT IMAGING | Facility: HOSPITAL | Age: 48
End: 2023-09-19

## 2023-09-19 ENCOUNTER — HOSPITAL ENCOUNTER (EMERGENCY)
Facility: HOSPITAL | Age: 48
Discharge: HOME | End: 2023-09-19
Attending: EMERGENCY MEDICINE | Admitting: EMERGENCY MEDICINE

## 2023-09-19 VITALS
BODY MASS INDEX: 41.47 KG/M2 | SYSTOLIC BLOOD PRESSURE: 170 MMHG | DIASTOLIC BLOOD PRESSURE: 90 MMHG | RESPIRATION RATE: 18 BRPM | WEIGHT: 280 LBS | HEART RATE: 95 BPM | TEMPERATURE: 97.8 F | HEIGHT: 69 IN | OXYGEN SATURATION: 95 %

## 2023-09-19 DIAGNOSIS — R73.9 HYPERGLYCEMIA: ICD-10-CM

## 2023-09-19 DIAGNOSIS — I10 PRIMARY HYPERTENSION: ICD-10-CM

## 2023-09-19 DIAGNOSIS — R10.84 GENERALIZED ABDOMINAL PAIN: Primary | ICD-10-CM

## 2023-09-19 DIAGNOSIS — Z91.148 NONCOMPLIANCE WITH MEDICATION REGIMEN: ICD-10-CM

## 2023-09-19 LAB
ALBUMIN SERPL-MCNC: 4.1 G/DL (ref 3.5–5.2)
ALBUMIN/GLOB SERPL: 1.1 G/DL
ALP SERPL-CCNC: 88 U/L (ref 39–117)
ALT SERPL W P-5'-P-CCNC: 9 U/L (ref 1–33)
ANION GAP SERPL CALCULATED.3IONS-SCNC: 17 MMOL/L (ref 5–15)
AST SERPL-CCNC: 16 U/L (ref 1–32)
BACTERIA UR QL AUTO: ABNORMAL /HPF
BASOPHILS # BLD AUTO: 0.03 10*3/MM3 (ref 0–0.2)
BASOPHILS NFR BLD AUTO: 0.3 % (ref 0–1.5)
BILIRUB SERPL-MCNC: 0.5 MG/DL (ref 0–1.2)
BILIRUB UR QL STRIP: NEGATIVE
BUN SERPL-MCNC: 13 MG/DL (ref 6–20)
BUN/CREAT SERPL: 11.6 (ref 7–25)
CALCIUM SPEC-SCNC: 9.4 MG/DL (ref 8.6–10.5)
CHLORIDE SERPL-SCNC: 93 MMOL/L (ref 98–107)
CLARITY UR: CLEAR
CO2 SERPL-SCNC: 26 MMOL/L (ref 22–29)
COLOR UR: YELLOW
CREAT SERPL-MCNC: 1.12 MG/DL (ref 0.57–1)
DEPRECATED RDW RBC AUTO: 41.4 FL (ref 37–54)
EGFRCR SERPLBLD CKD-EPI 2021: 60 ML/MIN/1.73
EOSINOPHIL # BLD AUTO: 0 10*3/MM3 (ref 0–0.4)
EOSINOPHIL NFR BLD AUTO: 0 % (ref 0.3–6.2)
ERYTHROCYTE [DISTWIDTH] IN BLOOD BY AUTOMATED COUNT: 12.2 % (ref 12.3–15.4)
GLOBULIN UR ELPH-MCNC: 3.8 GM/DL
GLUCOSE BLDC GLUCOMTR-MCNC: 339 MG/DL (ref 70–130)
GLUCOSE BLDC GLUCOMTR-MCNC: 359 MG/DL (ref 70–130)
GLUCOSE SERPL-MCNC: 428 MG/DL (ref 65–99)
GLUCOSE UR STRIP-MCNC: ABNORMAL MG/DL
HCT VFR BLD AUTO: 42.4 % (ref 34–46.6)
HGB BLD-MCNC: 13.3 G/DL (ref 12–15.9)
HGB UR QL STRIP.AUTO: ABNORMAL
HOLD SPECIMEN: NORMAL
HOLD SPECIMEN: NORMAL
HYALINE CASTS UR QL AUTO: ABNORMAL /LPF
IMM GRANULOCYTES # BLD AUTO: 0.04 10*3/MM3 (ref 0–0.05)
IMM GRANULOCYTES NFR BLD AUTO: 0.3 % (ref 0–0.5)
KETONES UR QL STRIP: ABNORMAL
LEUKOCYTE ESTERASE UR QL STRIP.AUTO: NEGATIVE
LIPASE SERPL-CCNC: 7 U/L (ref 13–60)
LYMPHOCYTES # BLD AUTO: 0.58 10*3/MM3 (ref 0.7–3.1)
LYMPHOCYTES NFR BLD AUTO: 5 % (ref 19.6–45.3)
MCH RBC QN AUTO: 29.1 PG (ref 26.6–33)
MCHC RBC AUTO-ENTMCNC: 31.4 G/DL (ref 31.5–35.7)
MCV RBC AUTO: 92.8 FL (ref 79–97)
MONOCYTES # BLD AUTO: 0.27 10*3/MM3 (ref 0.1–0.9)
MONOCYTES NFR BLD AUTO: 2.3 % (ref 5–12)
NEUTROPHILS NFR BLD AUTO: 10.61 10*3/MM3 (ref 1.7–7)
NEUTROPHILS NFR BLD AUTO: 92.1 % (ref 42.7–76)
NITRITE UR QL STRIP: NEGATIVE
NRBC BLD AUTO-RTO: 0 /100 WBC (ref 0–0.2)
PH UR STRIP.AUTO: 5.5 [PH] (ref 5–8)
PLATELET # BLD AUTO: 296 10*3/MM3 (ref 140–450)
PMV BLD AUTO: 11.1 FL (ref 6–12)
POTASSIUM SERPL-SCNC: 4.4 MMOL/L (ref 3.5–5.2)
PROT SERPL-MCNC: 7.9 G/DL (ref 6–8.5)
PROT UR QL STRIP: ABNORMAL
RBC # BLD AUTO: 4.57 10*6/MM3 (ref 3.77–5.28)
RBC # UR STRIP: ABNORMAL /HPF
REF LAB TEST METHOD: ABNORMAL
SODIUM SERPL-SCNC: 136 MMOL/L (ref 136–145)
SP GR UR STRIP: 1.03 (ref 1–1.03)
SQUAMOUS #/AREA URNS HPF: ABNORMAL /HPF
UROBILINOGEN UR QL STRIP: ABNORMAL
WBC # UR STRIP: ABNORMAL /HPF
WBC NRBC COR # BLD: 11.53 10*3/MM3 (ref 3.4–10.8)
WHOLE BLOOD HOLD COAG: NORMAL
WHOLE BLOOD HOLD SPECIMEN: NORMAL

## 2023-09-19 PROCEDURE — 25010000002 ONDANSETRON PER 1 MG: Performed by: EMERGENCY MEDICINE

## 2023-09-19 PROCEDURE — 81001 URINALYSIS AUTO W/SCOPE: CPT | Performed by: EMERGENCY MEDICINE

## 2023-09-19 PROCEDURE — 85025 COMPLETE CBC W/AUTO DIFF WBC: CPT | Performed by: EMERGENCY MEDICINE

## 2023-09-19 PROCEDURE — 96374 THER/PROPH/DIAG INJ IV PUSH: CPT

## 2023-09-19 PROCEDURE — 74176 CT ABD & PELVIS W/O CONTRAST: CPT

## 2023-09-19 PROCEDURE — 82948 REAGENT STRIP/BLOOD GLUCOSE: CPT

## 2023-09-19 PROCEDURE — 99284 EMERGENCY DEPT VISIT MOD MDM: CPT

## 2023-09-19 PROCEDURE — 80053 COMPREHEN METABOLIC PANEL: CPT | Performed by: EMERGENCY MEDICINE

## 2023-09-19 PROCEDURE — 63710000001 INSULIN REGULAR HUMAN PER 5 UNITS: Performed by: EMERGENCY MEDICINE

## 2023-09-19 PROCEDURE — 83690 ASSAY OF LIPASE: CPT | Performed by: EMERGENCY MEDICINE

## 2023-09-19 PROCEDURE — 36415 COLL VENOUS BLD VENIPUNCTURE: CPT

## 2023-09-19 RX ORDER — SODIUM CHLORIDE 0.9 % (FLUSH) 0.9 %
10 SYRINGE (ML) INJECTION AS NEEDED
Status: DISCONTINUED | OUTPATIENT
Start: 2023-09-19 | End: 2023-09-19 | Stop reason: HOSPADM

## 2023-09-19 RX ORDER — ONDANSETRON 2 MG/ML
4 INJECTION INTRAMUSCULAR; INTRAVENOUS ONCE
Status: COMPLETED | OUTPATIENT
Start: 2023-09-19 | End: 2023-09-19

## 2023-09-19 RX ADMIN — INSULIN HUMAN 12 UNITS: 100 INJECTION, SOLUTION PARENTERAL at 12:49

## 2023-09-19 RX ADMIN — SODIUM CHLORIDE 500 ML: 9 INJECTION, SOLUTION INTRAVENOUS at 10:36

## 2023-09-19 RX ADMIN — ONDANSETRON 4 MG: 2 INJECTION INTRAMUSCULAR; INTRAVENOUS at 10:36

## 2023-09-19 NOTE — ED NOTES
Pt arrives ambulatory to triage for abdominal pain that started a few days ago. Pt reports nausea and diarrhea.

## 2023-09-19 NOTE — ED PROVIDER NOTES
EMERGENCY DEPARTMENT ENCOUNTER    CHIEF COMPLAINT  Chief Complaint: Abdominal pain  History given by: Patient  History limited by: Nothing  Room Number: 13/13  PMD: Jairo Walker MD  [facility] GI motility clinic    HPI:  Pt is a 48 y.o. female with history of gastroparesis reports abdominal pain for 2 days.  Patient reports she has not taken her medicine including insulin pump for the past 24 hours.  Patient reports nausea without vomiting, denies fever.  Patient denies changes in urinary or bowel habits, last bowel movement 1 day ago.  Patient reports a history of WADE/BSO and gastric stimulator, denies other abdominal surgeries.  Patient reports she does not know what her blood sugars have been at home.  Patient reports a history of blood clots in her arm after IV therapy, on Eliquis.  Patient reports a history of IVIG infusions      Aggravating Factors: Lying flat, eating or drinking  Alleviating Factors: Sitting upright  Treatment before arrival: Nothing  Chronic or social conditions impacting care: Gastroparesis, diabetes    Additional sources:  Discussed/ obtained information from independent historians: Spouse at bedside    External (non-ED) record review:   Patient admitted at [facility] 8/20 through 8/22/2023 for nausea/vomiting/abdominal pain with ILEANA on chronic kidney disease, left AMA 8/22        PAST MEDICAL HISTORY  Active Ambulatory Problems     Diagnosis Date Noted    Type 2 diabetes mellitus with hyperglycemia, with long-term current use of insulin 05/23/2017    Chronic pancreatitis 05/23/2017    Gastroparesis 05/23/2017    Pancreas disorder 05/23/2017    Long-term insulin use 05/23/2017    Essential hypertension 09/06/2017    Dyslipidemia 09/06/2017    Vitamin D deficiency 09/06/2017    Premature menopause 09/06/2017    Tobacco abuse counseling 09/06/2017    Mixed hyperlipidemia 03/28/2018    Adjustment insomnia 06/20/2018    Chronic tension-type headache, intractable  2018    Migraine without aura and without status migrainosus, not intractable 2018    Encounter for smoking cessation counseling 10/10/2018    Optic neuritis 10/15/2018    Neck pain 2019    Eczema of both hands 2019    Morbidly obese 2019    Cervical radiculopathy 2020    Palpitations 2021    Anxiety and depression     Medicare annual wellness visit, subsequent 2022     Resolved Ambulatory Problems     Diagnosis Date Noted    Hypoglycemia 2017    Hyperglycemia 2017    Umbilical pain 2018     Past Medical History:   Diagnosis Date    Headache, tension-type     High blood pressure     High cholesterol     History of MRSA infection     Migraine     Obesity     Pancreatitis, chronic     Syncope     Type 2 diabetes mellitus        PAST SURGICAL HISTORY  Past Surgical History:   Procedure Laterality Date     SECTION      COLONOSCOPY N/A     UL    GASTRIC STIMULATOR IMPLANT SURGERY N/A 2018    Open placement of gastric stimulator electrodes, open placement of multi-array gastric stimulator generator, initial programming of gastric stimulator, open gastric biopsy, On-Q pump placement, wedge biopsy of liver-Dr. Jose Cantu    HYSTERECTOMY N/A     UL    MOLE REMOVAL      SKIN GRAFT         FAMILY HISTORY  Family History   Problem Relation Age of Onset    Diabetes Mother     Schizophrenia Mother     Heart disease Father     Diabetes Father     Cancer Father     Kidney disease Father     Heart disease Brother     Stroke Paternal Grandmother     Heart disease Paternal Grandfather        SOCIAL HISTORY  Social History     Socioeconomic History    Marital status:    Tobacco Use    Smoking status: Former     Packs/day: 1.00     Years: 24.00     Pack years: 24.00     Types: Cigarettes     Quit date: 2017     Years since quittin.8     Passive exposure: Never    Smokeless tobacco: Never   Vaping Use    Vaping Use: Never used    Substance and Sexual Activity    Alcohol use: No    Drug use: No    Sexual activity: Not Currently     Partners: Male     Birth control/protection: None       ALLERGIES  Contrast dye (echo or unknown ct/mr), Imitrex [sumatriptan], Iodinated contrast media, Acetaminophen, Linaclotide, Lisinopril, Codeine, Levemir [insulin detemir], and Morphine and related    REVIEW OF SYSTEMS  Review of Systems    PHYSICAL EXAM  ED Triage Vitals [09/19/23 0850]   Temp Heart Rate Resp BP SpO2   97.8 °F (36.6 °C) 95 18 -- 100 %      Temp src Heart Rate Source Patient Position BP Location FiO2 (%)   Tympanic Monitor -- -- --       Physical Exam  Vitals and nursing note reviewed.   Constitutional:       General: She is in acute distress (mild).      Appearance: She is not toxic-appearing.   HENT:      Head: Normocephalic and atraumatic.   Cardiovascular:      Rate and Rhythm: Normal rate and regular rhythm.      Pulses: Normal pulses.           Posterior tibial pulses are 2+ on the right side and 2+ on the left side.      Heart sounds: Normal heart sounds. No murmur heard.  Pulmonary:      Effort: Pulmonary effort is normal. No respiratory distress.      Breath sounds: Normal breath sounds.   Abdominal:      Palpations: Abdomen is soft. There is no mass.      Tenderness: There is abdominal tenderness (Diffuse). There is no guarding or rebound.   Musculoskeletal:         General: Normal range of motion.      Cervical back: Normal range of motion and neck supple.   Skin:     General: Skin is warm and dry.   Neurological:      Mental Status: She is alert and oriented to person, place, and time.   Psychiatric:         Mood and Affect: Mood and affect normal.       LAB RESULTS  Recent Results (from the past 24 hour(s))   Urinalysis With Microscopic If Indicated (No Culture) - Urine, Clean Catch    Collection Time: 09/19/23  9:20 AM    Specimen: Urine, Clean Catch   Result Value Ref Range    Color, UA Yellow Yellow, Straw    Appearance, UA  Clear Clear    pH, UA 5.5 5.0 - 8.0    Specific Gravity, UA 1.029 1.005 - 1.030    Glucose, UA >=1000 mg/dL (3+) (A) Negative    Ketones, UA 15 mg/dL (1+) (A) Negative    Bilirubin, UA Negative Negative    Blood, UA Small (1+) (A) Negative    Protein,  mg/dL (2+) (A) Negative    Leuk Esterase, UA Negative Negative    Nitrite, UA Negative Negative    Urobilinogen, UA 0.2 E.U./dL 0.2 - 1.0 E.U./dL   Urinalysis, Microscopic Only - Urine, Clean Catch    Collection Time: 09/19/23  9:20 AM    Specimen: Urine, Clean Catch   Result Value Ref Range    RBC, UA 3-5 (A) None Seen, 0-2 /HPF    WBC, UA 0-2 None Seen, 0-2 /HPF    Bacteria, UA None Seen None Seen /HPF    Squamous Epithelial Cells, UA 0-2 None Seen, 0-2 /HPF    Hyaline Casts, UA 0-2 None Seen /LPF    Methodology Automated Microscopy    Lipase    Collection Time: 09/19/23  9:31 AM    Specimen: Blood   Result Value Ref Range    Lipase 7 (L) 13 - 60 U/L   CBC Auto Differential    Collection Time: 09/19/23  9:31 AM    Specimen: Blood   Result Value Ref Range    WBC 11.53 (H) 3.40 - 10.80 10*3/mm3    RBC 4.57 3.77 - 5.28 10*6/mm3    Hemoglobin 13.3 12.0 - 15.9 g/dL    Hematocrit 42.4 34.0 - 46.6 %    MCV 92.8 79.0 - 97.0 fL    MCH 29.1 26.6 - 33.0 pg    MCHC 31.4 (L) 31.5 - 35.7 g/dL    RDW 12.2 (L) 12.3 - 15.4 %    RDW-SD 41.4 37.0 - 54.0 fl    MPV 11.1 6.0 - 12.0 fL    Platelets 296 140 - 450 10*3/mm3    Neutrophil % 92.1 (H) 42.7 - 76.0 %    Lymphocyte % 5.0 (L) 19.6 - 45.3 %    Monocyte % 2.3 (L) 5.0 - 12.0 %    Eosinophil % 0.0 (L) 0.3 - 6.2 %    Basophil % 0.3 0.0 - 1.5 %    Immature Grans % 0.3 0.0 - 0.5 %    Neutrophils, Absolute 10.61 (H) 1.70 - 7.00 10*3/mm3    Lymphocytes, Absolute 0.58 (L) 0.70 - 3.10 10*3/mm3    Monocytes, Absolute 0.27 0.10 - 0.90 10*3/mm3    Eosinophils, Absolute 0.00 0.00 - 0.40 10*3/mm3    Basophils, Absolute 0.03 0.00 - 0.20 10*3/mm3    Immature Grans, Absolute 0.04 0.00 - 0.05 10*3/mm3    nRBC 0.0 0.0 - 0.2 /100 WBC       I  ordered the above labs and reviewed the results    RADIOLOGY  1. Status post hysterectomy and gastric stimulator placement.  2. Fullness of the left adrenal gland which may contain a small adrenal  adenoma.  3. Mild pancreatic atrophy.  4. No acute findings are seen to explain patient's reported abdominal  pain.    I ordered the above noted radiological studies. Interpreted by radiologist. Viewed and interpreted by me in PACS -no obvious free intraperitoneal air      PROCEDURES  Procedures      MEDICATIONS GIVEN IN THE EMERGENCY DEPARTMENT  Medications   ondansetron (ZOFRAN) injection 4 mg (4 mg Intravenous Given 9/19/23 1036)   sodium chloride 0.9 % bolus 500 mL (0 mL Intravenous Stopped 9/19/23 1121)   insulin regular (humuLIN R,novoLIN R) injection 12 Units (12 Units Subcutaneous Given 9/19/23 1249)           MEDICAL DECISION MAKING  Results were reviewed/discussed with the patient and they were also made aware of online access. Pt also made aware that some labs, such as cultures, will not be resulted during ER visit and followup with PMD is necessary.   EKGs and labs independently viewed and interpreted by me.  Discussion below represents my analysis of pertinent findings related to patient's condition, differential diagnosis, treatment plan and final disposition.    Differential diagnosis for abdominal pain includes but is not limited to:  Gastritis, gastroenteritis, peptic ulcer disease, GERD, esophageal perforation, acute appendicitis, mesenteric adenitis, epiploic appendagitis, diverticulitis, colon cancer, ulcerative colitis, Crohn’s disease, intussusception, small bowel obstruction, adhesions, ischemic bowel, perforated viscus, ileus, obstipation, biliary colic, cholecystitis, cholelithiasis,  hepatitis, pancreatitis, common bile duct obstruction, cholangitis, bile leak, splenic trauma, splenic rupture, splenic infarction,  abdominal abscess, ascites, spontaneous bacterial peritonitis, hernia, UTI,  cystitis, ureterolithiasis, urinary obstruction, ovarian cyst, torsion, pregnancy, ectopic pregnancy, PID, pelvic abscess, mittelschmerz, endometriosis, AAA, myocardial infarction, pneumonia, cancer,  DKA, medications, sickle cell, viral syndrome, zoster      Independent interpretation of labs, radiology studies, and discussions with consultants:  ED Course as of 09/20/23 1359   Tue Sep 19, 2023   1223 Imaging benign, patient asking for p.o., will trial p.o. fluids.  Insulin for hyperglycemia without signs of acidosis [TO]      ED Course User Index  [TO] Marina Wooten MD         Shared decision making: Voices understanding of CT and lab results, need to use insulin pump as previously directed even though she may not be eating, hydrate well with small amounts of fluids frequently and advance diet as tolerated and follow with her GI specialist within the next week for recheck, further testing, treatment as needed.  She voices understanding of need to return to the ER immediately with persistent vomiting fevers, or worsening pain      MDM     Amount and/or Complexity of Data Reviewed  Clinical lab tests: reviewed  Tests in the radiology section of CPT®: reviewed           DIAGNOSIS  Final diagnoses:   Generalized abdominal pain   Hyperglycemia   Noncompliance with medication regimen   Primary hypertension       DISPOSITION  DISCHARGE    Patient discharged in stable condition.    Reviewed implications of results, diagnosis, meds, responsibility to follow up, warning signs and symptoms of possible worsening, potential complications and reasons to return to ER,     Patient/Family voiced understanding of above instructions.    Discussed plan for discharge, as there is no emergent indication for admission. Patient referred to primary care provider for BP management due to today's BP. Pt/family is agreeable and understands need for follow up and repeat testing.  Pt is aware that discharge does not mean that nothing is wrong  but it indicates no emergency is present that requires admission and they must continue care with follow-up as given below or physician of their choice.     FOLLOW-UP  Jairo Walker MD  [address]  [address]  [place] [address]  [phone]    Schedule an appointment as soon as possible for a visit in 3 days  EVEN IF WELL    Ashley Espinal MD  [address]  [place] [address]  [phone]    Schedule an appointment as soon as possible for a visit in 1 week  As needed         Medication List      No changes were made to your prescriptions during this visit.           Latest Documented Vital Signs:  As of 10:42 EDT  BP- 170/90 HR- 77 Temp- 97.8 °F (36.6 °C) (Tympanic) O2 sat- 97%    --      Please note that portions of this note were completed with a voice recognition program.       Note Disclaimer: At [facility], we believe that sharing information builds trust and better relationships. You are receiving this note because you are receiving care at [facility] or recently visited. It is possible you will see health information before a provider has talked with you about it. This kind of information can be easy to misunderstand. To help you fully understand what it means for your health, we urge you to discuss this note with your provider.     Marina Wooten MD  09/20/23 0982

## 2023-09-19 NOTE — DISCHARGE INSTRUCTIONS
You are advised to follow closely with Dr. Espinal at the [facility] GI motility clinic, your primary gastroenterologist and Dr. Walker in 2-3 days for recheck, final results of lab work and imaging testing, and further testing/treatment as needed.    Use your insulin as directed.  Hydrate well and advance diet as tolerated.    Please return to the emergency department immediately with chest pain different than usual for you, shortness of air, persistent or worsening abdominal pain, persistent vomiting/fever, blood in emesis or stool, lightheadedness/fainting, problems with speech, one sided weakness/numbness, new incontinence, problems with vision,  or for worsening of symptoms or other concerns.

## 2023-09-20 RX ORDER — APIXABAN 5 MG/1
TABLET, FILM COATED ORAL
Qty: 60 TABLET | Refills: 5 | Status: SHIPPED | OUTPATIENT
Start: 2023-09-20

## 2023-09-21 ENCOUNTER — HOSPITAL ENCOUNTER (OUTPATIENT)
Facility: HOSPITAL | Age: 48
Setting detail: OBSERVATION
Discharge: HOME | End: 2023-09-23
Attending: EMERGENCY MEDICINE | Admitting: INTERNAL MEDICINE
Payer: MEDICARE

## 2023-09-21 DIAGNOSIS — E16.2 HYPOGLYCEMIA: Primary | ICD-10-CM

## 2023-09-21 DIAGNOSIS — I10 HYPERTENSION, UNSPECIFIED TYPE: ICD-10-CM

## 2023-09-21 LAB
ANION GAP SERPL CALCULATED.3IONS-SCNC: 14 MMOL/L (ref 5–15)
BACTERIA UR QL AUTO: ABNORMAL /HPF
BASOPHILS # BLD AUTO: 0.05 10*3/MM3 (ref 0–0.2)
BASOPHILS NFR BLD AUTO: 0.5 % (ref 0–1.5)
BILIRUB UR QL STRIP: NEGATIVE
BUN SERPL-MCNC: 15 MG/DL (ref 6–20)
BUN/CREAT SERPL: 13.2 (ref 7–25)
CALCIUM SPEC-SCNC: 8.9 MG/DL (ref 8.6–10.5)
CHLORIDE SERPL-SCNC: 101 MMOL/L (ref 98–107)
CLARITY UR: CLEAR
CO2 SERPL-SCNC: 22 MMOL/L (ref 22–29)
COLOR UR: YELLOW
CREAT SERPL-MCNC: 1.14 MG/DL (ref 0.57–1)
DEPRECATED RDW RBC AUTO: 42 FL (ref 37–54)
EGFRCR SERPLBLD CKD-EPI 2021: 58.8 ML/MIN/1.73
EOSINOPHIL # BLD AUTO: 0.03 10*3/MM3 (ref 0–0.4)
EOSINOPHIL NFR BLD AUTO: 0.3 % (ref 0.3–6.2)
ERYTHROCYTE [DISTWIDTH] IN BLOOD BY AUTOMATED COUNT: 12.4 % (ref 12.3–15.4)
GLUCOSE BLDC GLUCOMTR-MCNC: 161 MG/DL (ref 70–130)
GLUCOSE BLDC GLUCOMTR-MCNC: 170 MG/DL (ref 70–130)
GLUCOSE BLDC GLUCOMTR-MCNC: 300 MG/DL (ref 70–130)
GLUCOSE SERPL-MCNC: 103 MG/DL (ref 65–99)
GLUCOSE UR STRIP-MCNC: ABNORMAL MG/DL
HCT VFR BLD AUTO: 41.7 % (ref 34–46.6)
HGB BLD-MCNC: 13.8 G/DL (ref 12–15.9)
HGB UR QL STRIP.AUTO: ABNORMAL
HYALINE CASTS UR QL AUTO: ABNORMAL /LPF
IMM GRANULOCYTES # BLD AUTO: 0.02 10*3/MM3 (ref 0–0.05)
IMM GRANULOCYTES NFR BLD AUTO: 0.2 % (ref 0–0.5)
KETONES UR QL STRIP: NEGATIVE
LEUKOCYTE ESTERASE UR QL STRIP.AUTO: NEGATIVE
LYMPHOCYTES # BLD AUTO: 2.3 10*3/MM3 (ref 0.7–3.1)
LYMPHOCYTES NFR BLD AUTO: 23.8 % (ref 19.6–45.3)
MAGNESIUM SERPL-MCNC: 2 MG/DL (ref 1.6–2.6)
MCH RBC QN AUTO: 30.6 PG (ref 26.6–33)
MCHC RBC AUTO-ENTMCNC: 33.1 G/DL (ref 31.5–35.7)
MCV RBC AUTO: 92.5 FL (ref 79–97)
MONOCYTES # BLD AUTO: 0.57 10*3/MM3 (ref 0.1–0.9)
MONOCYTES NFR BLD AUTO: 5.9 % (ref 5–12)
NEUTROPHILS NFR BLD AUTO: 6.68 10*3/MM3 (ref 1.7–7)
NEUTROPHILS NFR BLD AUTO: 69.3 % (ref 42.7–76)
NITRITE UR QL STRIP: NEGATIVE
NRBC BLD AUTO-RTO: 0 /100 WBC (ref 0–0.2)
PH UR STRIP.AUTO: 6 [PH] (ref 5–8)
PLATELET # BLD AUTO: 266 10*3/MM3 (ref 140–450)
PMV BLD AUTO: 10.4 FL (ref 6–12)
POTASSIUM SERPL-SCNC: 4 MMOL/L (ref 3.5–5.2)
PROT UR QL STRIP: ABNORMAL
RBC # BLD AUTO: 4.51 10*6/MM3 (ref 3.77–5.28)
RBC # UR STRIP: ABNORMAL /HPF
REF LAB TEST METHOD: ABNORMAL
SODIUM SERPL-SCNC: 137 MMOL/L (ref 136–145)
SP GR UR STRIP: 1.01 (ref 1–1.03)
SQUAMOUS #/AREA URNS HPF: ABNORMAL /HPF
UROBILINOGEN UR QL STRIP: ABNORMAL
WBC # UR STRIP: ABNORMAL /HPF
WBC NRBC COR # BLD: 9.65 10*3/MM3 (ref 3.4–10.8)

## 2023-09-21 PROCEDURE — 85025 COMPLETE CBC W/AUTO DIFF WBC: CPT | Performed by: EMERGENCY MEDICINE

## 2023-09-21 PROCEDURE — 96376 TX/PRO/DX INJ SAME DRUG ADON: CPT

## 2023-09-21 PROCEDURE — G0378 HOSPITAL OBSERVATION PER HR: HCPCS

## 2023-09-21 PROCEDURE — 80048 BASIC METABOLIC PNL TOTAL CA: CPT | Performed by: EMERGENCY MEDICINE

## 2023-09-21 PROCEDURE — 96375 TX/PRO/DX INJ NEW DRUG ADDON: CPT

## 2023-09-21 PROCEDURE — 25010000002 HYDRALAZINE PER 20 MG: Performed by: EMERGENCY MEDICINE

## 2023-09-21 PROCEDURE — 25010000002 HYDRALAZINE PER 20 MG: Performed by: INTERNAL MEDICINE

## 2023-09-21 PROCEDURE — 96374 THER/PROPH/DIAG INJ IV PUSH: CPT

## 2023-09-21 PROCEDURE — 99284 EMERGENCY DEPT VISIT MOD MDM: CPT

## 2023-09-21 PROCEDURE — 81001 URINALYSIS AUTO W/SCOPE: CPT | Performed by: EMERGENCY MEDICINE

## 2023-09-21 PROCEDURE — 82948 REAGENT STRIP/BLOOD GLUCOSE: CPT

## 2023-09-21 PROCEDURE — 83735 ASSAY OF MAGNESIUM: CPT | Performed by: EMERGENCY MEDICINE

## 2023-09-21 PROCEDURE — 25010000002 ONDANSETRON PER 1 MG: Performed by: INTERNAL MEDICINE

## 2023-09-21 PROCEDURE — 63710000001 INSULIN LISPRO (HUMAN) PER 5 UNITS: Performed by: INTERNAL MEDICINE

## 2023-09-21 RX ORDER — DULOXETIN HYDROCHLORIDE 60 MG/1
60 CAPSULE, DELAYED RELEASE ORAL DAILY
Status: DISCONTINUED | OUTPATIENT
Start: 2023-09-21 | End: 2023-09-23 | Stop reason: HOSPADM

## 2023-09-21 RX ORDER — HYDROCHLOROTHIAZIDE 12.5 MG/1
12.5 TABLET ORAL ONCE
Status: COMPLETED | OUTPATIENT
Start: 2023-09-21 | End: 2023-09-21

## 2023-09-21 RX ORDER — HYDRALAZINE HYDROCHLORIDE 20 MG/ML
10 INJECTION INTRAMUSCULAR; INTRAVENOUS EVERY 4 HOURS PRN
Status: DISCONTINUED | OUTPATIENT
Start: 2023-09-21 | End: 2023-09-23 | Stop reason: HOSPADM

## 2023-09-21 RX ORDER — ONDANSETRON 4 MG/1
4 TABLET, FILM COATED ORAL EVERY 6 HOURS PRN
Status: DISCONTINUED | OUTPATIENT
Start: 2023-09-21 | End: 2023-09-23 | Stop reason: HOSPADM

## 2023-09-21 RX ORDER — PROMETHAZINE HYDROCHLORIDE 12.5 MG/1
12.5 TABLET ORAL EVERY 6 HOURS PRN
Status: DISCONTINUED | OUTPATIENT
Start: 2023-09-21 | End: 2023-09-22 | Stop reason: SDUPTHER

## 2023-09-21 RX ORDER — HYDRALAZINE HYDROCHLORIDE 20 MG/ML
10 INJECTION INTRAMUSCULAR; INTRAVENOUS ONCE
Status: COMPLETED | OUTPATIENT
Start: 2023-09-21 | End: 2023-09-21

## 2023-09-21 RX ORDER — BISACODYL 5 MG/1
5 TABLET, DELAYED RELEASE ORAL DAILY PRN
Status: DISCONTINUED | OUTPATIENT
Start: 2023-09-21 | End: 2023-09-23 | Stop reason: HOSPADM

## 2023-09-21 RX ORDER — UREA 10 %
3 LOTION (ML) TOPICAL NIGHTLY PRN
Status: DISCONTINUED | OUTPATIENT
Start: 2023-09-21 | End: 2023-09-23 | Stop reason: HOSPADM

## 2023-09-21 RX ORDER — ACETAMINOPHEN 325 MG/1
650 TABLET ORAL EVERY 4 HOURS PRN
Status: DISCONTINUED | OUTPATIENT
Start: 2023-09-21 | End: 2023-09-23 | Stop reason: HOSPADM

## 2023-09-21 RX ORDER — SODIUM CHLORIDE 9 MG/ML
75 INJECTION, SOLUTION INTRAVENOUS CONTINUOUS
Status: DISCONTINUED | OUTPATIENT
Start: 2023-09-22 | End: 2023-09-23 | Stop reason: HOSPADM

## 2023-09-21 RX ORDER — AMOXICILLIN 250 MG
2 CAPSULE ORAL 2 TIMES DAILY
Status: DISCONTINUED | OUTPATIENT
Start: 2023-09-21 | End: 2023-09-23 | Stop reason: HOSPADM

## 2023-09-21 RX ORDER — METOPROLOL SUCCINATE 25 MG/1
100 TABLET, EXTENDED RELEASE ORAL ONCE
Status: COMPLETED | OUTPATIENT
Start: 2023-09-21 | End: 2023-09-21

## 2023-09-21 RX ORDER — POLYETHYLENE GLYCOL 3350 17 G/17G
17 POWDER, FOR SOLUTION ORAL DAILY PRN
Status: DISCONTINUED | OUTPATIENT
Start: 2023-09-21 | End: 2023-09-23 | Stop reason: HOSPADM

## 2023-09-21 RX ORDER — TRAMADOL HYDROCHLORIDE 50 MG/1
50 TABLET ORAL EVERY 6 HOURS PRN
Status: DISCONTINUED | OUTPATIENT
Start: 2023-09-21 | End: 2023-09-23 | Stop reason: HOSPADM

## 2023-09-21 RX ORDER — BISACODYL 10 MG
10 SUPPOSITORY, RECTAL RECTAL DAILY PRN
Status: DISCONTINUED | OUTPATIENT
Start: 2023-09-21 | End: 2023-09-23 | Stop reason: HOSPADM

## 2023-09-21 RX ORDER — IBUPROFEN 600 MG/1
1 TABLET ORAL
Status: DISCONTINUED | OUTPATIENT
Start: 2023-09-21 | End: 2023-09-23 | Stop reason: HOSPADM

## 2023-09-21 RX ORDER — PROMETHAZINE HYDROCHLORIDE 25 MG/1
25 SUPPOSITORY RECTAL EVERY 8 HOURS PRN
Status: DISCONTINUED | OUTPATIENT
Start: 2023-09-21 | End: 2023-09-21

## 2023-09-21 RX ORDER — NICOTINE POLACRILEX 4 MG
15 LOZENGE BUCCAL
Status: DISCONTINUED | OUTPATIENT
Start: 2023-09-21 | End: 2023-09-23 | Stop reason: HOSPADM

## 2023-09-21 RX ORDER — ONDANSETRON 2 MG/ML
4 INJECTION INTRAMUSCULAR; INTRAVENOUS EVERY 6 HOURS PRN
Status: DISCONTINUED | OUTPATIENT
Start: 2023-09-21 | End: 2023-09-23 | Stop reason: HOSPADM

## 2023-09-21 RX ORDER — LOSARTAN POTASSIUM 100 MG/1
100 TABLET ORAL
Status: DISCONTINUED | OUTPATIENT
Start: 2023-09-21 | End: 2023-09-23 | Stop reason: HOSPADM

## 2023-09-21 RX ORDER — ROSUVASTATIN CALCIUM 40 MG/1
40 TABLET, COATED ORAL DAILY
Status: DISCONTINUED | OUTPATIENT
Start: 2023-09-21 | End: 2023-09-23 | Stop reason: HOSPADM

## 2023-09-21 RX ORDER — DEXTROSE MONOHYDRATE 25 G/50ML
25 INJECTION, SOLUTION INTRAVENOUS
Status: DISCONTINUED | OUTPATIENT
Start: 2023-09-21 | End: 2023-09-23 | Stop reason: HOSPADM

## 2023-09-21 RX ORDER — INSULIN LISPRO 100 [IU]/ML
2-9 INJECTION, SOLUTION INTRAVENOUS; SUBCUTANEOUS
Status: DISCONTINUED | OUTPATIENT
Start: 2023-09-21 | End: 2023-09-23 | Stop reason: HOSPADM

## 2023-09-21 RX ORDER — METOPROLOL SUCCINATE 100 MG/1
100 TABLET, EXTENDED RELEASE ORAL DAILY
Status: DISCONTINUED | OUTPATIENT
Start: 2023-09-21 | End: 2023-09-23 | Stop reason: HOSPADM

## 2023-09-21 RX ORDER — PANTOPRAZOLE SODIUM 40 MG/1
40 TABLET, DELAYED RELEASE ORAL DAILY
Status: DISCONTINUED | OUTPATIENT
Start: 2023-09-21 | End: 2023-09-23 | Stop reason: HOSPADM

## 2023-09-21 RX ORDER — BUSPIRONE HYDROCHLORIDE 5 MG/1
5 TABLET ORAL 3 TIMES DAILY
Status: DISCONTINUED | OUTPATIENT
Start: 2023-09-21 | End: 2023-09-23 | Stop reason: HOSPADM

## 2023-09-21 RX ORDER — PROMETHAZINE HYDROCHLORIDE 12.5 MG/1
12.5 SUPPOSITORY RECTAL EVERY 6 HOURS PRN
Status: DISCONTINUED | OUTPATIENT
Start: 2023-09-21 | End: 2023-09-23 | Stop reason: HOSPADM

## 2023-09-21 RX ORDER — SODIUM CHLORIDE 0.9 % (FLUSH) 0.9 %
10 SYRINGE (ML) INJECTION AS NEEDED
Status: DISCONTINUED | OUTPATIENT
Start: 2023-09-21 | End: 2023-09-23 | Stop reason: HOSPADM

## 2023-09-21 RX ORDER — PREGABALIN 75 MG/1
150 CAPSULE ORAL 2 TIMES DAILY
Status: DISCONTINUED | OUTPATIENT
Start: 2023-09-21 | End: 2023-09-23 | Stop reason: HOSPADM

## 2023-09-21 RX ADMIN — HYDRALAZINE HYDROCHLORIDE 10 MG: 20 INJECTION INTRAMUSCULAR; INTRAVENOUS at 15:53

## 2023-09-21 RX ADMIN — METOPROLOL SUCCINATE 100 MG: 25 TABLET, EXTENDED RELEASE ORAL at 15:56

## 2023-09-21 RX ADMIN — HYDRALAZINE HYDROCHLORIDE 10 MG: 20 INJECTION, SOLUTION INTRAMUSCULAR; INTRAVENOUS at 14:54

## 2023-09-21 RX ADMIN — HYDRALAZINE HYDROCHLORIDE 10 MG: 20 INJECTION, SOLUTION INTRAMUSCULAR; INTRAVENOUS at 19:07

## 2023-09-21 RX ADMIN — INSULIN LISPRO 7 UNITS: 100 INJECTION, SOLUTION INTRAVENOUS; SUBCUTANEOUS at 21:20

## 2023-09-21 RX ADMIN — APIXABAN 5 MG: 5 TABLET, FILM COATED ORAL at 18:23

## 2023-09-21 RX ADMIN — ROSUVASTATIN CALCIUM 40 MG: 40 TABLET, FILM COATED ORAL at 19:08

## 2023-09-21 RX ADMIN — LOSARTAN POTASSIUM 100 MG: 100 TABLET, FILM COATED ORAL at 19:08

## 2023-09-21 RX ADMIN — HYDROCHLOROTHIAZIDE 12.5 MG: 12.5 TABLET ORAL at 15:57

## 2023-09-21 RX ADMIN — PANTOPRAZOLE SODIUM 40 MG: 40 TABLET, DELAYED RELEASE ORAL at 18:23

## 2023-09-21 RX ADMIN — ONDANSETRON 4 MG: 2 INJECTION INTRAMUSCULAR; INTRAVENOUS at 18:23

## 2023-09-21 RX ADMIN — DULOXETINE HYDROCHLORIDE 60 MG: 60 CAPSULE, DELAYED RELEASE ORAL at 19:08

## 2023-09-21 RX ADMIN — BUSPIRONE HYDROCHLORIDE 5 MG: 5 TABLET ORAL at 21:20

## 2023-09-21 RX ADMIN — PREGABALIN 150 MG: 75 CAPSULE ORAL at 21:20

## 2023-09-21 RX ADMIN — TRAMADOL HYDROCHLORIDE 50 MG: 50 TABLET ORAL at 18:23

## 2023-09-21 NOTE — ED NOTES
Nursing report ED to floor  Danielle Dudley  48 y.o.  female    HPI :   Chief Complaint   Patient presents with    Hypoglycemia       Admitting doctor:   Lory Boo MD    Admitting diagnosis:   The primary encounter diagnosis was Hypoglycemia. A diagnosis of Hypertension, unspecified type was also pertinent to this visit.    Code status:   Current Code Status       Date Active Code Status Order ID Comments User Context       Not on file            Allergies:   Contrast dye (echo or unknown ct/mr), Imitrex [sumatriptan], Iodinated contrast media, Acetaminophen, Linaclotide, Lisinopril, Codeine, Levemir [insulin detemir], and Morphine and related    Isolation:   No active isolations    Intake and Output  No intake or output data in the 24 hours ending 09/21/23 1548    Weight:       09/21/23  1400   Weight: 127 kg (280 lb)       Most recent vitals:   Vitals:    09/21/23 1427 09/21/23 1436 09/21/23 1454 09/21/23 1506   BP: (!) 209/119 (!) 219/114 (!) 206/115 (!) 200/94   Pulse:   82 67   Resp:       Temp:       SpO2:    99%   Weight:       Height:           Active LDAs/IV Access:   Lines, Drains & Airways       Active LDAs       Name Placement date Placement time Site Days    Peripheral IV 09/21/23 1321 Anterior;Right;Upper Arm 09/21/23  1321  Arm  less than 1                    Labs (abnormal labs have a star):   Labs Reviewed   BASIC METABOLIC PANEL - Abnormal; Notable for the following components:       Result Value    Glucose 103 (*)     Creatinine 1.14 (*)     eGFR 58.8 (*)     All other components within normal limits    Narrative:     GFR Normal >60  Chronic Kidney Disease <60  Kidney Failure <15     URINALYSIS W/ MICROSCOPIC IF INDICATED (NO CULTURE) - Abnormal; Notable for the following components:    Glucose,  mg/dL (Trace) (*)     Blood, UA Small (1+) (*)     Protein, UA Trace (*)     All other components within normal limits   URINALYSIS, MICROSCOPIC ONLY - Abnormal; Notable for the following  components:    RBC, UA 3-5 (*)     All other components within normal limits   POCT GLUCOSE FINGERSTICK - Abnormal; Notable for the following components:    Glucose 170 (*)     All other components within normal limits   MAGNESIUM - Normal   CBC WITH AUTO DIFFERENTIAL - Normal   CBC AND DIFFERENTIAL    Narrative:     The following orders were created for panel order CBC & Differential.  Procedure                               Abnormality         Status                     ---------                               -----------         ------                     CBC Auto Differential[[id number]]        Normal              Final result                 Please view results for these tests on the individual orders.       EKG:   No orders to display       Meds given in ED:   Medications   sodium chloride 0.9 % flush 10 mL (has no administration in time range)   hydrALAZINE (APRESOLINE) injection 10 mg (has no administration in time range)   metoprolol succinate XL (TOPROL-XL) 24 hr tablet 100 mg (has no administration in time range)   hydroCHLOROthiazide (HYDRODIURIL) tablet 12.5 mg (has no administration in time range)   hydrALAZINE (APRESOLINE) injection 10 mg (10 mg Intravenous Given 23 1454)       Imaging results:  No radiology results for the last day    Ambulatory status:   - assist    Social issues:   Social History     Socioeconomic History    Marital status:    Tobacco Use    Smoking status: Former     Packs/day: 1.00     Years: 24.00     Pack years: 24.00     Types: Cigarettes     Quit date: 2017     Years since quittin.8     Passive exposure: Never    Smokeless tobacco: Never   Vaping Use    Vaping Use: Never used   Substance and Sexual Activity    Alcohol use: No    Drug use: No    Sexual activity: Not Currently     Partners: Male     Birth control/protection: None       NIH Stroke Scale:       Estevan Garcia RN  23 15:48 EDT

## 2023-09-21 NOTE — ED TRIAGE NOTES
Patient to ED via EMS from home. Patient to ED for hypoglycemia. On EMS arrival, patient's BG was 45.     Patient receive oral glucose and 250mL of D10 in route with EMS.

## 2023-09-21 NOTE — ED PROVIDER NOTES
EMERGENCY DEPARTMENT ENCOUNTER    Room Number:  14/14  PCP: Jairo Walker MD  Patient Care Team:  Jairo Walker MD as PCP - General (Family Medicine)  Santino Andrade MD as Consulting Physician (Endocrinology)  Jocelin Ritchie RN as Ambulatory  (Population Health)   Independent Historians: Patient and EMS    HPI:  Chief Complaint: Hypoglycemia    A complete HPI/ROS/PMH/PSH/SH/FH are unobtainable due to: None except patient is amnestic to the events this morning    Chronic or social conditions impacting patient care (Social Determinants of Health): None  (Financial Resource Strain / Food Insecurity / Transportation Needs / Physical Activity / Stress / Social Connections / Intimate Partner Violence / Housing Stability)    Context: Danielle Dudley is a 48 y.o. female who presents to the ED c/o acute episode of hypoglycemia.  Per EMS this patient does not remember these events, patient was drowsy and altered when  was leaving for work this morning.  He left her in bed and came back home to check on her and she was even more confused and not herself.  He called EMS and upon their arrival patient's blood sugar was 45.  Patient was wearing her insulin pump and that has been disabled.  Patient received some oral glucose with improvement in her blood sugar at the EMS truck.  Patient's blood sugar went up to 90 and then on next check was down to 80 already.  So patient was given about 250 cc of D10 while in route to the hospital.  Patient denies any recent illnesses although she says she struggles with her chronic medical problems which include gastroparesis and migraines.  Patient states that she was just here in the ER for evaluation.    Review of prior external notes (non-ED) -and- Review of prior external test results outside of this encounter: Patient was just in the ER and evaluated 2 days ago for repeated vomiting and hyperglycemia.  Patient had had abdominal pain for 2 days with repeated vomiting  so had not taken any of her medication including her insulin for the 2 days prior.  So patient came in with markedly elevated blood sugar but vomiting was controlled and patient left with a plan to follow-up with her outpatient providers and reinitiate her Accu-Cheks at home and insulin therapy.  It is also noted that patient was admitted at [facility] August 20 through August 22 for nausea vomiting and abdominal pain with an ILEANA and ended up leaving A from that stay.    Prescription drug monitoring program review:         PAST MEDICAL HISTORY  Active Ambulatory Problems     Diagnosis Date Noted    Type 2 diabetes mellitus with hyperglycemia, with long-term current use of insulin 05/23/2017    Chronic pancreatitis 05/23/2017    Gastroparesis 05/23/2017    Pancreas disorder 05/23/2017    Long-term insulin use 05/23/2017    Essential hypertension 09/06/2017    Dyslipidemia 09/06/2017    Vitamin D deficiency 09/06/2017    Premature menopause 09/06/2017    Tobacco abuse counseling 09/06/2017    Mixed hyperlipidemia 03/28/2018    Adjustment insomnia 06/20/2018    Chronic tension-type headache, intractable 06/20/2018    Migraine without aura and without status migrainosus, not intractable 07/16/2018    Encounter for smoking cessation counseling 10/10/2018    Optic neuritis 10/15/2018    Neck pain 02/11/2019    Eczema of both hands 11/13/2019    Morbidly obese 11/13/2019    Cervical radiculopathy 07/22/2020    Palpitations 09/08/2021    Anxiety and depression     Medicare annual wellness visit, subsequent 07/27/2022     Resolved Ambulatory Problems     Diagnosis Date Noted    Hypoglycemia 05/23/2017    Hyperglycemia 05/23/2017    Umbilical pain 11/29/2018     Past Medical History:   Diagnosis Date    Headache, tension-type     High blood pressure     High cholesterol     History of MRSA infection     Migraine     Obesity     Pancreatitis, chronic     Syncope     Type 2 diabetes mellitus          PAST SURGICAL  HISTORY  Past Surgical History:   Procedure Laterality Date     SECTION      COLONOSCOPY N/A     UL    GASTRIC STIMULATOR IMPLANT SURGERY N/A 2018    Open placement of gastric stimulator electrodes, open placement of multi-array gastric stimulator generator, initial programming of gastric stimulator, open gastric biopsy, On-Q pump placement, wedge biopsy of liver-Dr. Jose Cantu    HYSTERECTOMY N/A     UL    MOLE REMOVAL      SKIN GRAFT           FAMILY HISTORY  Family History   Problem Relation Age of Onset    Diabetes Mother     Schizophrenia Mother     Heart disease Father     Diabetes Father     Cancer Father     Kidney disease Father     Heart disease Brother     Stroke Paternal Grandmother     Heart disease Paternal Grandfather          SOCIAL HISTORY  Social History     Socioeconomic History    Marital status:    Tobacco Use    Smoking status: Former     Packs/day: 1.00     Years: 24.00     Pack years: 24.00     Types: Cigarettes     Quit date: 2017     Years since quittin.8     Passive exposure: Never    Smokeless tobacco: Never   Vaping Use    Vaping Use: Never used   Substance and Sexual Activity    Alcohol use: No    Drug use: No    Sexual activity: Not Currently     Partners: Male     Birth control/protection: None         ALLERGIES  Contrast dye (echo or unknown ct/mr), Imitrex [sumatriptan], Iodinated contrast media, Acetaminophen, Linaclotide, Lisinopril, Codeine, Levemir [insulin detemir], and Morphine and related        REVIEW OF SYSTEMS  Review of Systems  Included in HPI  All systems reviewed and negative except for those discussed in HPI.      PHYSICAL EXAM    I have reviewed the triage vital signs and nursing notes.    ED Triage Vitals [23 1319]   Temp Heart Rate Resp BP SpO2   97.6 °F (36.4 °C) 84 16 -- 100 %      Temp src Heart Rate Source Patient Position BP Location FiO2 (%)   -- -- -- -- --       Physical Exam  GENERAL: Disheveled morbidly  obese  female, cooperative and conversant, alert, no acute distress  SKIN: Warm, dry, scattered ecchymoses to bilateral arms she states her from IV sites  HENT: Normocephalic, atraumatic  EYES: no scleral icterus  CV: regular rhythm, regular rate, no murmur  RESPIRATORY: normal effort, lungs clear, no rhonchi, no wheezing  ABDOMEN: soft, nontender, nondistended, obese  NEURO: alert, moves all extremities, follows commands                                                               LAB RESULTS  Recent Results (from the past 24 hour(s))   POC Glucose Once    Collection Time: 09/21/23  1:27 PM    Specimen: Blood   Result Value Ref Range    Glucose 170 (H) 70 - 130 mg/dL   Magnesium    Collection Time: 09/21/23  1:55 PM    Specimen: Blood   Result Value Ref Range    Magnesium 2.0 1.6 - 2.6 mg/dL   Urinalysis With Microscopic If Indicated (No Culture) - Urine, Clean Catch    Collection Time: 09/21/23  2:28 PM    Specimen: Urine, Clean Catch   Result Value Ref Range    Color, UA Yellow Yellow, Straw    Appearance, UA Clear Clear    pH, UA 6.0 5.0 - 8.0    Specific Gravity, UA 1.008 1.005 - 1.030    Glucose,  mg/dL (Trace) (A) Negative    Ketones, UA Negative Negative    Bilirubin, UA Negative Negative    Blood, UA Small (1+) (A) Negative    Protein, UA Trace (A) Negative    Leuk Esterase, UA Negative Negative    Nitrite, UA Negative Negative    Urobilinogen, UA 0.2 E.U./dL 0.2 - 1.0 E.U./dL   Urinalysis, Microscopic Only - Urine, Clean Catch    Collection Time: 09/21/23  2:28 PM    Specimen: Urine, Clean Catch   Result Value Ref Range    RBC, UA 3-5 (A) None Seen, 0-2 /HPF    WBC, UA 0-2 None Seen, 0-2 /HPF    Bacteria, UA None Seen None Seen /HPF    Squamous Epithelial Cells, UA 0-2 None Seen, 0-2 /HPF    Hyaline Casts, UA None Seen None Seen /LPF    Methodology Automated Microscopy    CBC Auto Differential    Collection Time: 09/21/23  3:14 PM    Specimen: Blood   Result Value Ref Range    WBC 9.65 3.40  - 10.80 10*3/mm3    RBC 4.51 3.77 - 5.28 10*6/mm3    Hemoglobin 13.8 12.0 - 15.9 g/dL    Hematocrit 41.7 34.0 - 46.6 %    MCV 92.5 79.0 - 97.0 fL    MCH 30.6 26.6 - 33.0 pg    MCHC 33.1 31.5 - 35.7 g/dL    RDW 12.4 12.3 - 15.4 %    RDW-SD 42.0 37.0 - 54.0 fl    MPV 10.4 6.0 - 12.0 fL    Platelets 266 140 - 450 10*3/mm3    Neutrophil % 69.3 42.7 - 76.0 %    Lymphocyte % 23.8 19.6 - 45.3 %    Monocyte % 5.9 5.0 - 12.0 %    Eosinophil % 0.3 0.3 - 6.2 %    Basophil % 0.5 0.0 - 1.5 %    Immature Grans % 0.2 0.0 - 0.5 %    Neutrophils, Absolute 6.68 1.70 - 7.00 10*3/mm3    Lymphocytes, Absolute 2.30 0.70 - 3.10 10*3/mm3    Monocytes, Absolute 0.57 0.10 - 0.90 10*3/mm3    Eosinophils, Absolute 0.03 0.00 - 0.40 10*3/mm3    Basophils, Absolute 0.05 0.00 - 0.20 10*3/mm3    Immature Grans, Absolute 0.02 0.00 - 0.05 10*3/mm3    nRBC 0.0 0.0 - 0.2 /100 WBC       I ordered the above labs and independently reviewed the results.        RADIOLOGY  No Radiology Exams Resulted Within Past 24 Hours    I ordered the above noted radiological studies. Reviewed by me. See dictation for official radiology interpretation.      PROCEDURES    Procedures      MEDICATIONS GIVEN IN ER    Medications   sodium chloride 0.9 % flush 10 mL (has no administration in time range)   hydrALAZINE (APRESOLINE) injection 10 mg (has no administration in time range)   metoprolol succinate XL (TOPROL-XL) 24 hr tablet 100 mg (has no administration in time range)   hydroCHLOROthiazide (HYDRODIURIL) tablet 12.5 mg (has no administration in time range)   hydrALAZINE (APRESOLINE) injection 10 mg (10 mg Intravenous Given 9/21/23 1454)         ORDERS PLACED DURING THIS VISIT:  Orders Placed This Encounter   Procedures    Basic Metabolic Panel    Urinalysis With Microscopic If Indicated (No Culture) - Urine, Clean Catch    Magnesium    CBC Auto Differential    Urinalysis, Microscopic Only - Urine, Clean Catch    Diet: Regular/House Diet; Texture: Regular Texture (IDDSI  7); Fluid Consistency: Thin (IDDSI 0)    POC Glucose Once    Insert Peripheral IV    Initiate Observation Status    CBC & Differential         PROGRESS, DATA ANALYSIS, CONSULTS, AND MEDICAL DECISION MAKING    All labs have been independently interpreted by me.  All radiology studies have been reviewed by me.   EKG's independently viewed and interpreted by me.  Discussion below represents my analysis of pertinent findings related to patient's condition, differential diagnosis, treatment plan and final disposition.    MDM Patient presents with hypoglycemia likely related to ongoing insulin administration despite not doing Accu-Cheks at home nor having eaten breakfast.  Patient says her blood sugars drop like this in the past.  Will obtain labs to assess for other electrolyte derangements, any signs of infection, keep on the monitor to watch for arrhythmias, feed p.o. meal and reassess for blood sugar stabilization.    ED Course as of 09/21/23 1545   Thu Sep 21, 2023   1543 Patient now more aware of what was going on earlier and admits she did not take any of her morning medication including her antihypertensives.  Plan additional dose of hydralazine as well as p.o. doses of hydrochlorothiazide and metoprolol for which patient is chronically on.  Patient is going to eat a meal as well and we will monitor her blood sugar for stabilization.  Patient and  are nervous about patient's low blood sugar episode and are amenable to an observation admission to make sure it stabilizes beyond her ER stay.    I discussed patient's case with Dr. Boo who is amenable to admission. [AR]      ED Course User Index  [AR] Jailyn Younger MD       I interpreted the cardiac monitor rhythm and my independent interpretation is: normal sinus rhythm, rate of 70-80s. The cardiac monitor was ordered to monitor for arrhythmias and there were none.    PPE: I wore and adhered to appropriate PPE per hospital protocols for specific  patient presentation. (For respiratory patients with suspected Covid-19 or other infectious etiology suspected for patient's symptoms, the patient wore a mask and I wore an N95 mask throughout the entire patient encounter.) Proper hand hygiene both before and after patient encounter was performed as well.         AS OF 15:45 EDT VITALS:    BP - (!) 200/94  HR - 67  TEMP - 97.6 °F (36.4 °C)  O2 SATS - 99%        DIAGNOSIS  Final diagnoses:   Hypoglycemia   Hypertension, unspecified type         DISPOSITION  ED Disposition       ED Disposition   Decision to Admit    Condition   --    Comment   Level of Care: Telemetry [5]   Diagnosis: Hypoglycemia [052881]   Admitting Physician: PATRICK MEHTA [[id number]]   Attending Physician: PATRICK MEHTA [[id number]]                    Note Disclaimer: At [facility], we believe that sharing information builds trust and better relationships. You are receiving this note because you recently visited [facility]. It is possible you will see health information before a provider has talked with you about it. This kind of information can be easy to misunderstand. To help you fully understand what it means for your health, we urge you to discuss this note with your provider.         Jailyn Younger MD  09/21/23 8880

## 2023-09-21 NOTE — PLAN OF CARE
Goal Outcome Evaluation:    Patient new to 6E from ED. Admission database complete. Complaints of abdominal pain, states this is chronic pain from pancreatitis. Insulin pump removed and given to  at bedside. Educated patient on adhering to a diabetic diet. Medicated per orders. Zofran given for nausea.

## 2023-09-22 PROBLEM — N18.31 CHRONIC KIDNEY DISEASE, STAGE 3A: Status: ACTIVE | Noted: 2023-09-22

## 2023-09-22 LAB
ANION GAP SERPL CALCULATED.3IONS-SCNC: 10 MMOL/L (ref 5–15)
BUN SERPL-MCNC: 13 MG/DL (ref 6–20)
BUN/CREAT SERPL: 11.2 (ref 7–25)
CALCIUM SPEC-SCNC: 9 MG/DL (ref 8.6–10.5)
CHLORIDE SERPL-SCNC: 98 MMOL/L (ref 98–107)
CO2 SERPL-SCNC: 28 MMOL/L (ref 22–29)
CREAT SERPL-MCNC: 1.16 MG/DL (ref 0.57–1)
DEPRECATED RDW RBC AUTO: 41.7 FL (ref 37–54)
EGFRCR SERPLBLD CKD-EPI 2021: 57.6 ML/MIN/1.73
ERYTHROCYTE [DISTWIDTH] IN BLOOD BY AUTOMATED COUNT: 12.3 % (ref 12.3–15.4)
GLUCOSE BLDC GLUCOMTR-MCNC: 235 MG/DL (ref 70–130)
GLUCOSE BLDC GLUCOMTR-MCNC: 265 MG/DL (ref 70–130)
GLUCOSE BLDC GLUCOMTR-MCNC: 271 MG/DL (ref 70–130)
GLUCOSE BLDC GLUCOMTR-MCNC: 325 MG/DL (ref 70–130)
GLUCOSE BLDC GLUCOMTR-MCNC: 387 MG/DL (ref 70–130)
GLUCOSE SERPL-MCNC: 341 MG/DL (ref 65–99)
HBA1C MFR BLD: 6.7 % (ref 4.8–5.6)
HCT VFR BLD AUTO: 40.6 % (ref 34–46.6)
HGB BLD-MCNC: 13 G/DL (ref 12–15.9)
MCH RBC QN AUTO: 29.7 PG (ref 26.6–33)
MCHC RBC AUTO-ENTMCNC: 32 G/DL (ref 31.5–35.7)
MCV RBC AUTO: 92.9 FL (ref 79–97)
PLATELET # BLD AUTO: 257 10*3/MM3 (ref 140–450)
PMV BLD AUTO: 10.7 FL (ref 6–12)
POTASSIUM SERPL-SCNC: 4 MMOL/L (ref 3.5–5.2)
RBC # BLD AUTO: 4.37 10*6/MM3 (ref 3.77–5.28)
SODIUM SERPL-SCNC: 136 MMOL/L (ref 136–145)
WBC NRBC COR # BLD: 8.34 10*3/MM3 (ref 3.4–10.8)

## 2023-09-22 PROCEDURE — G0378 HOSPITAL OBSERVATION PER HR: HCPCS

## 2023-09-22 PROCEDURE — 63710000001 ONDANSETRON PER 8 MG: Performed by: INTERNAL MEDICINE

## 2023-09-22 PROCEDURE — 97161 PT EVAL LOW COMPLEX 20 MIN: CPT

## 2023-09-22 PROCEDURE — 85027 COMPLETE CBC AUTOMATED: CPT | Performed by: INTERNAL MEDICINE

## 2023-09-22 PROCEDURE — 83036 HEMOGLOBIN GLYCOSYLATED A1C: CPT | Performed by: INTERNAL MEDICINE

## 2023-09-22 PROCEDURE — 63710000001 PROMETHAZINE PER 12.5 MG: Performed by: NURSE PRACTITIONER

## 2023-09-22 PROCEDURE — 63710000001 INSULIN LISPRO (HUMAN) PER 5 UNITS: Performed by: INTERNAL MEDICINE

## 2023-09-22 PROCEDURE — 36415 COLL VENOUS BLD VENIPUNCTURE: CPT | Performed by: INTERNAL MEDICINE

## 2023-09-22 PROCEDURE — 97530 THERAPEUTIC ACTIVITIES: CPT

## 2023-09-22 PROCEDURE — 82948 REAGENT STRIP/BLOOD GLUCOSE: CPT

## 2023-09-22 PROCEDURE — 80048 BASIC METABOLIC PNL TOTAL CA: CPT | Performed by: INTERNAL MEDICINE

## 2023-09-22 RX ORDER — INSULIN LISPRO 100 [IU]/ML
5 INJECTION, SOLUTION INTRAVENOUS; SUBCUTANEOUS
Status: DISCONTINUED | OUTPATIENT
Start: 2023-09-22 | End: 2023-09-23 | Stop reason: HOSPADM

## 2023-09-22 RX ORDER — PROMETHAZINE HYDROCHLORIDE 25 MG/1
25 TABLET ORAL EVERY 6 HOURS PRN
Status: DISCONTINUED | OUTPATIENT
Start: 2023-09-22 | End: 2023-09-23 | Stop reason: HOSPADM

## 2023-09-22 RX ADMIN — INSULIN LISPRO 8 UNITS: 100 INJECTION, SOLUTION INTRAVENOUS; SUBCUTANEOUS at 12:12

## 2023-09-22 RX ADMIN — PROMETHAZINE HYDROCHLORIDE 12.5 MG: 12.5 TABLET ORAL at 00:15

## 2023-09-22 RX ADMIN — PREGABALIN 150 MG: 75 CAPSULE ORAL at 08:19

## 2023-09-22 RX ADMIN — DULOXETINE HYDROCHLORIDE 60 MG: 60 CAPSULE, DELAYED RELEASE ORAL at 08:20

## 2023-09-22 RX ADMIN — TRAMADOL HYDROCHLORIDE 50 MG: 50 TABLET ORAL at 14:15

## 2023-09-22 RX ADMIN — INSULIN LISPRO 7 UNITS: 100 INJECTION, SOLUTION INTRAVENOUS; SUBCUTANEOUS at 08:20

## 2023-09-22 RX ADMIN — TRAMADOL HYDROCHLORIDE 50 MG: 50 TABLET ORAL at 00:13

## 2023-09-22 RX ADMIN — INSULIN LISPRO 4 UNITS: 100 INJECTION, SOLUTION INTRAVENOUS; SUBCUTANEOUS at 20:28

## 2023-09-22 RX ADMIN — SODIUM CHLORIDE 75 ML/HR: 9 INJECTION, SOLUTION INTRAVENOUS at 00:15

## 2023-09-22 RX ADMIN — BUSPIRONE HYDROCHLORIDE 5 MG: 5 TABLET ORAL at 08:19

## 2023-09-22 RX ADMIN — APIXABAN 5 MG: 5 TABLET, FILM COATED ORAL at 07:04

## 2023-09-22 RX ADMIN — INSULIN LISPRO 6 UNITS: 100 INJECTION, SOLUTION INTRAVENOUS; SUBCUTANEOUS at 17:06

## 2023-09-22 RX ADMIN — METOPROLOL SUCCINATE 100 MG: 100 TABLET, EXTENDED RELEASE ORAL at 20:17

## 2023-09-22 RX ADMIN — INSULIN LISPRO 5 UNITS: 100 INJECTION, SOLUTION INTRAVENOUS; SUBCUTANEOUS at 17:05

## 2023-09-22 RX ADMIN — ONDANSETRON HYDROCHLORIDE 4 MG: 4 TABLET, FILM COATED ORAL at 11:39

## 2023-09-22 RX ADMIN — PREGABALIN 150 MG: 75 CAPSULE ORAL at 20:16

## 2023-09-22 RX ADMIN — BUSPIRONE HYDROCHLORIDE 5 MG: 5 TABLET ORAL at 20:16

## 2023-09-22 RX ADMIN — TRAMADOL HYDROCHLORIDE 50 MG: 50 TABLET ORAL at 22:52

## 2023-09-22 RX ADMIN — LOSARTAN POTASSIUM 50 MG: 100 TABLET, FILM COATED ORAL at 08:20

## 2023-09-22 RX ADMIN — SODIUM CHLORIDE 75 ML/HR: 9 INJECTION, SOLUTION INTRAVENOUS at 14:15

## 2023-09-22 RX ADMIN — PANTOPRAZOLE SODIUM 40 MG: 40 TABLET, DELAYED RELEASE ORAL at 08:20

## 2023-09-22 RX ADMIN — APIXABAN 5 MG: 5 TABLET, FILM COATED ORAL at 18:15

## 2023-09-22 RX ADMIN — TRAMADOL HYDROCHLORIDE 50 MG: 50 TABLET ORAL at 07:07

## 2023-09-22 RX ADMIN — ACETAMINOPHEN 650 MG: 325 TABLET, FILM COATED ORAL at 22:53

## 2023-09-22 RX ADMIN — BUSPIRONE HYDROCHLORIDE 5 MG: 5 TABLET ORAL at 17:05

## 2023-09-22 NOTE — CASE MANAGEMENT/SOCIAL WORK
Discharge Planning Assessment  [facility]     Patient Name: Danielle Dudley  MRN: [id number]  Today's Date: 9/22/2023    Admit Date: 9/21/2023    Plan: Plan home with family.  IZAIAH Syed RN   Discharge Needs Assessment       Row Name 09/22/23 0829       Living Environment    People in Home child(lina), adult;spouse    Name(s) of People in Home Spouse  (Joe Dudley  [phone]) and daughter  (Daphney 22 y/o)    Current Living Arrangements home    Potentially Unsafe Housing Conditions none    Primary Care Provided by self    Provides Primary Care For no one    Family Caregiver if Needed child(lina), adult;spouse    Family Caregiver Names Spouse (Joe Dudley [phone]) and daughter (Daphney 22 y/o)    Quality of Family Relationships helpful;involved;supportive    Able to Return to Prior Arrangements yes    Living Arrangement Comments Pt lives with her spouse (Joe Dudley [phone]) and daughter (Daphney 22 y/o) in a two story house but stays on first floor.       Resource/Environmental Concerns    Resource/Environmental Concerns none    Transportation Concerns none       Transition Planning    Patient/Family Anticipates Transition to home with family    Patient/Family Anticipated Services at Transition none    Transportation Anticipated family or friend will provide       Discharge Needs Assessment    Equipment Currently Used at Home bp cuff;cane, straight;glucometer;medication pump;rollator;scales    Concerns to be Addressed no discharge needs identified;denies needs/concerns at this time    Anticipated Changes Related to Illness none    Equipment Needed After Discharge bp cuff;cane, straight;glucometer;medication pump;rollator;scales                   Discharge Plan       Row Name 09/22/23 0832       Plan    Plan Plan home with family.  IZAIAH Syed RN    Patient/Family in Agreement with Plan yes    Plan Comments FACE SHEET VERIFIED/ BOONE SIGNED.  Spoke with pt at bedside.  Pt's PCP is   Jairo Walker.  Pt lives with her spouse (Joe Dudley [phone]) and daughter (Daphney 22 y/o) in a two story house but stays on first floor.  Pt is independent with ADLs.  Pt has a B/P cuff, cane, Guardian 3 updating to Guardian 4, insulin pump, rollator and scales. Pt gets IvG weekly thru [facility].   Pt gets her prescriptions at [facility]  Pt denies any issues affording medications. Pt is not current with HH.  Pt has not been in SNF.  Pt denies any discharge needs.  Pt's spouse and daughter will assist pt at home and transport pt home.  Plan home with family.  IZAIAH Syed RN                  Continued Care and Services - Admitted Since 9/21/2023    Coordination has not been started for this encounter.       Selected Continued Care - Episodes Includes continued care and service providers with selected services from the active episodes listed below      Chronic Care Management Episode start date: 9/20/2023   There are no active outsourced providers for this episode.                    Demographic Summary       Row Name 09/22/23 0828       General Information    Admission Type observation    Arrived From emergency department    Required Notices Provided Observation Status Notice    Referral Source admission list    Reason for Consult discharge planning    Preferred Language English                   Functional Status       Row Name 09/22/23 0828       Functional Status    Usual Activity Tolerance good    Current Activity Tolerance good       Functional Status, IADL    Medications independent    Meal Preparation independent    Housekeeping independent    Laundry independent    Shopping independent       Mental Status    General Appearance WDL WDL                   Psychosocial    No documentation.                  Abuse/Neglect    No documentation.                  Legal    No documentation.                  Substance Abuse    No documentation.                  Patient Forms    No documentation.                      Piedad Syed, RN

## 2023-09-22 NOTE — PLAN OF CARE
Goal Outcome Evaluation:      Pt AOx4. VSS. On RA. Last BG was 271. C/o abd pain and nausea x1. On IVF. ST/SR on tele. Nursing will continue to monitor.

## 2023-09-22 NOTE — PROGRESS NOTES
Name: Danielle Dudley ADMIT: 2023   : 1975  PCP: Jairo Walker MD    MRN: [id number] LOS: 0 days   AGE/SEX: 48 y.o. female  ROOM: [facility]     Subjective   Subjective   No acute events. Patient has no new complaints. She is still having some nausea but is tolerating clears without vomiting. No CP/dyspnea/f/c/n/v/d.    Objective   Objective   Vital Signs  Temp:  [98.2 °F (36.8 °C)-98.8 °F (37.1 °C)] 98.4 °F (36.9 °C)  Heart Rate:  [69-96] 77  Resp:  [16-18] 18  BP: (138-240)/() 141/103  SpO2:  [97 %-100 %] 100 %  on   ;   Device (Oxygen Therapy): room air  Body mass index is 41.33 kg/m².  Physical Exam  Vitals and nursing note reviewed.   HENT:      Head: Normocephalic and atraumatic.      Nose: Nose normal.      Mouth/Throat:      Mouth: Mucous membranes are moist.      Pharynx: Oropharynx is clear.   Eyes:      Conjunctiva/sclera: Conjunctivae normal.      Pupils: Pupils are equal, round, and reactive to light.   Cardiovascular:      Rate and Rhythm: Normal rate and regular rhythm.      Pulses: Normal pulses.   Pulmonary:      Effort: Pulmonary effort is normal.      Breath sounds: Normal breath sounds.   Abdominal:      General: Bowel sounds are normal.      Palpations: Abdomen is soft.      Tenderness: There is no abdominal tenderness.   Musculoskeletal:         General: No swelling or tenderness.      Cervical back: Normal range of motion and neck supple.   Skin:     General: Skin is warm and dry.      Capillary Refill: Capillary refill takes less than 2 seconds.   Neurological:      General: No focal deficit present.      Mental Status: She is alert and oriented to person, place, and time.   Psychiatric:         Mood and Affect: Mood normal.         Behavior: Behavior normal.     Results Review     I reviewed the patient's new clinical results.  Results from last 7 days   Lab Units 23  0532 23  1514 23  0931   WBC 10*3/mm3 8.34 9.65 11.53*   HEMOGLOBIN g/dL 13.0 13.8 13.3    PLATELETS 10*3/mm3 257 266 296     Results from last 7 days   Lab Units 09/22/23  0532 09/21/23  1514 09/19/23  0931   SODIUM mmol/L 136 137 136   POTASSIUM mmol/L 4.0 4.0 4.4   CHLORIDE mmol/L 98 101 93*   CO2 mmol/L 28.0 22.0 26.0   BUN mg/dL 13 15 13   CREATININE mg/dL 1.16* 1.14* 1.12*   GLUCOSE mg/dL 341* 103* 428*   EGFR mL/min/1.73 57.6* 58.8* 60.0*     Results from last 7 days   Lab Units 09/19/23  0931   ALBUMIN g/dL 4.1   BILIRUBIN mg/dL 0.5   ALK PHOS U/L 88   AST (SGOT) U/L 16   ALT (SGPT) U/L 9     Results from last 7 days   Lab Units 09/22/23  0532 09/21/23  1514 09/21/23  1355 09/19/23  0931   CALCIUM mg/dL 9.0 8.9  --  9.4   ALBUMIN g/dL  --   --   --  4.1   MAGNESIUM mg/dL  --   --  2.0  --        Hemoglobin A1C   Date/Time Value Ref Range Status   09/22/2023 0532 6.70 (H) 4.80 - 5.60 % Final     Glucose   Date/Time Value Ref Range Status   09/22/2023 1607 265 (H) 70 - 130 mg/dL Final   09/22/2023 1118 387 (H) 70 - 130 mg/dL Final   09/22/2023 0609 325 (H) 70 - 130 mg/dL Final   09/22/2023 0024 271 (H) 70 - 130 mg/dL Final   09/21/2023 2038 300 (H) 70 - 130 mg/dL Final   09/21/2023 1646 161 (H) 70 - 130 mg/dL Final   09/21/2023 1327 170 (H) 70 - 130 mg/dL Final       No radiology results for the last day    I have personally reviewed all medications:  Scheduled Medications  apixaban, 5 mg, Oral, Q12H  busPIRone, 5 mg, Oral, TID  DULoxetine, 60 mg, Oral, Daily  insulin lispro, 2-9 Units, Subcutaneous, 4x Daily AC & at Bedtime  insulin lispro, 5 Units, Subcutaneous, TID With Meals  losartan, 100 mg, Oral, Q24H  metoprolol succinate XL, 100 mg, Oral, Daily  pantoprazole, 40 mg, Oral, Daily  pregabalin, 150 mg, Oral, BID  rosuvastatin, 40 mg, Oral, Daily  senna-docusate sodium, 2 tablet, Oral, BID    Infusions  sodium chloride, 75 mL/hr, Last Rate: 75 mL/hr (09/22/23 1618)    Diet  Diet: Cardiac Diets; Healthy Heart (2-3 Na+); Texture: Regular Texture (IDDSI 7); Fluid Consistency: Thin (IDDSI 0)    I  have personally reviewed:  [x]  Laboratory   []  Microbiology   [x]  Radiology   [x]  EKG/Telemetry  []  Cardiology/Vascular   []  Pathology    [x]  Records    Assessment/Plan     Active Hospital Problems    Diagnosis  POA    Chronic kidney disease, stage 3a [N18.31]  Yes    Hypoglycemia [E16.2]  Yes    Morbidly obese [E66.01]  Yes    Mixed hyperlipidemia [E78.2]  Yes    Essential hypertension [I10]  Yes    Gastroparesis [K31.84]  Yes    Type 2 diabetes mellitus with hyperglycemia, with long-term current use of insulin [E11.65, Z79.4]  Not Applicable      Resolved Hospital Problems   No resolved problems to display.   Type 2 DM with Hyperglycemia  - complications include gastroparesis, has gastric stimulator and follows with [facility]  - had hypoglycemia on admission likely due to her running her insulin pump and having nausea/vomiting  - insulin pump is off now  - hyperglycemic  - continue on ssi/hypoglycemia protocol  - add lispro 5 units TID with meals    Nausea/Vomiting  - no evidence of acute pancreatitis, pancreatic atrophy noted on CT scan  - I suspect this is related to her gastroparesis vs a viral gastroenteritis  - continue supportive care  - resume home phenergan  - continue IVF for now  - advance diet    Hx of DVT  - continue on eliquis    Eliquis (home med) for DVT prophylaxis.  Full code.  Discussed with patient and nursing staff.  Anticipate discharge home tomorrow..      Dave Sprague MD  [facility]  09/22/23  16:34 EDT

## 2023-09-22 NOTE — H&P
HISTORY AND PHYSICAL   [facility]        Date of Admission: 2023  Patient Identification:  Name: Danielle Dudley  Age: 48 y.o.  Sex: female  :  1975  MRN: [id number]                     Primary Care Physician: Jairo Walker MD    Chief Complaint:  48 year old female who was brought in with altered mental status; her  noted that she seemed confused when he left for work but he thought that it was her medication which had happened previously; when he returned a few hours later she was worse so he called ems; her blood sugar was low and she was treated on the scene and taken to the hospital; she has an insulin pump but apparently does not check her sugar and has not been eating well due to abdominal pain; no fever or chills; she has a history of chronic pancreatitis and feels like she might have a flare up; no fever or chills; has had some nausea but no vomiting; some history was obtained from the family, ed provider and nurse    History of Present Illness:   As above    Past Medical History:  Past Medical History:   Diagnosis Date    Anxiety and depression     Gastroparesis     Headache, tension-type     High blood pressure     High cholesterol     History of MRSA infection     Migraine     Obesity     Pancreatitis, chronic     Syncope     Type 2 diabetes mellitus     Vitamin D deficiency      Past Surgical History:  Past Surgical History:   Procedure Laterality Date     SECTION      COLONOSCOPY N/A     UL    GASTRIC STIMULATOR IMPLANT SURGERY N/A 2018    Open placement of gastric stimulator electrodes, open placement of multi-array gastric stimulator generator, initial programming of gastric stimulator, open gastric biopsy, On-Q pump placement, wedge biopsy of liver-Dr. Jose Cantu    HYSTERECTOMY N/A     UL    MOLE REMOVAL      SKIN GRAFT        Home Meds:  Medications Prior to Admission   Medication Sig Dispense Refill Last Dose    betamethasone valerate  (VALISONE) 0.1 % ointment APPLY TOPICALLY TO THE APPROPRIATE AREA AS DIRECTED 2 (TWO) TIMES A DAY. TO HANDS AS NEEDED 45 g 1 Past Month    busPIRone (BUSPAR) 5 MG tablet TAKE 1 TABLET BY MOUTH THREE TIMES A  tablet 2 9/20/2023    diphenhydrAMINE (BENADRYL) 50 MG/ML injection    Past Month    DULoxetine (CYMBALTA) 60 MG capsule Take 1 capsule by mouth Daily. 90 capsule 1 9/20/2023    Eliquis 5 MG tablet tablet TAKE 1 TABLET BY MOUTH EVERY 12 HOURS 60 tablet 5 9/20/2023    Gammaked 10 GM/100ML solution infusion    Past Month    Gammaked 20 GM/200ML solution infusion    Past Month    Glucagon (Gvoke HypoPen 2-Pack) 1 MG/0.2ML solution auto-injector Inject 1 mg under the skin into the appropriate area as directed As Needed (hypoglycemia). 0.4 mL 1 Past Week    glucose blood test strip Dispense based on insurance preference: Check 3 times a day Dx: E 11.9 300 each 4 Past Week    immune globulin, human, 5 GM/50ML solution infusion Inject as directed every 7 (seven) days   Past Month    Insulin Infusion Pump device MEDTRONIC INSULIN PUMP  770 G WITH GUARDIAN  SENSOR 1 each 0 9/20/2023    methylPREDNISolone sodium succinate (SOLU-Medrol) 40 MG injection Infuse 1 mL into a venous catheter Every 14 (Fourteen) Days.   Past Month    metoprolol succinate XL (Toprol XL) 100 MG 24 hr tablet Take 1 tablet by mouth Daily. 90 tablet 1 Past Month    pantoprazole (PROTONIX) 40 MG EC tablet TAKE 1 TABLET BY MOUTH EVERY DAY 90 tablet 3 9/20/2023    pregabalin (LYRICA) 150 MG capsule TAKE 1 CAPSULE BY MOUTH TWICE A DAY 60 capsule 5 9/20/2023    promethazine (PHENERGAN) 25 MG tablet Take 1 tablet by mouth As Needed.   Past Week    rosuvastatin (CRESTOR) 40 MG tablet TAKE 1 TABLET BY MOUTH EVERY DAY 90 tablet 1 Past Month    traMADol (ULTRAM) 50 MG tablet Take 1 tablet by mouth Every 6 (Six) Hours As Needed for Moderate Pain. 24 tablet 2 9/20/2023    TRANSDERM-SCOP, 1.5 MG, 1.5 MG/3DAYS patch Place 1 patch on the skin as directed by  provider Every 3 (Three) Days.   9/20/2023    EPINEPHrine (EPIPEN) 0.3 MG/0.3ML solution auto-injector injection INJECT 0.3 ML INTO THE APPROPRIATE MUSCLE AS DIRECTED BY PRESCRIBER 1 (ONE) TIME FOR 1 DOSE.   More than a month    hydroCHLOROthiazide (HYDRODIURIL) 12.5 MG tablet Take 1 tablet by mouth Daily.       Insulin Lispro (HumaLOG KwikPen) 200 UNIT/ML solution pen-injector Inject 200 Units under the skin into the appropriate area as directed Daily for 90 days. Use as directed 90 mL 1     Insulin Pen Needle (BD Pen Needle Neida U/F) 32G X 4 MM misc Using 1 pen needle daily 100 each 1     losartan (COZAAR) 100 MG tablet Take 1 tablet by mouth.       lubiprostone (Amitiza) 24 MCG capsule TAKE 1 CAPSULE BY MOUTH TWICE DAILY WITH FOOD 180 capsule 1 More than a month    sodium chloride 0.9 % solution           Allergies:  Allergies   Allergen Reactions    Contrast Dye (Echo Or Unknown Ct/Mr) Anaphylaxis     Contrast dye 3    Stopped breathing    Other reaction(s): Contrast dye 3  / Stopped breathing    Imitrex [Sumatriptan] Shortness Of Breath    Iodinated Contrast Media Anaphylaxis     Other reaction(s): Other (See Comments)   Contrast dye 3      Stopped breathing      Other reaction(s): Contrast dye 3  / Stopped breathing    Acetaminophen Other (See Comments)     No known reaction     Linaclotide GI Intolerance     constipation    Lisinopril Cough    Codeine Hives    Levemir [Insulin Detemir] Hives, Rash and Other (See Comments)     Bruising    Morphine And Related Rash and Other (See Comments)     Alopecia     Immunizations:  Immunization History   Administered Date(s) Administered    Flu Vaccine Quad PF >36MO 09/25/2019    FluMist 2-49yrs 10/25/2017    Fluad Quad 65+ 09/25/2019    Fluzone (or Fluarix & Flulaval for VFC) >6mos 10/02/2020    Fluzone Quad >6mos (Multi-dose) 09/25/2019, 10/02/2020    Influenza LAIV (Nasal) 10/25/2017    Influenza Quad Vaccine (Inpatient) 10/02/2020    Influenza, Unspecified  10/24/2018, 2019, 2019, 10/02/2020, 2022    flucelvax quad pfs =>4 YRS 10/24/2018, 2019     Social History:   Social History     Social History Narrative    Not on file     Social History     Socioeconomic History    Marital status:    Tobacco Use    Smoking status: Former     Packs/day: 1.00     Years: 24.00     Pack years: 24.00     Types: Cigarettes     Quit date: 2017     Years since quittin.8     Passive exposure: Never    Smokeless tobacco: Never   Vaping Use    Vaping Use: Never used   Substance and Sexual Activity    Alcohol use: No    Drug use: No    Sexual activity: Not Currently     Partners: Male     Birth control/protection: None       Family History:  Family History   Problem Relation Age of Onset    Diabetes Mother     Schizophrenia Mother     Heart disease Father     Diabetes Father     Cancer Father     Kidney disease Father     Heart disease Brother     Stroke Paternal Grandmother     Heart disease Paternal Grandfather         Review of Systems  See history of present illness and past medical history.  Patient denies headache, dizziness, syncope, falls, trauma, change in vision, change in hearing,   changes in weight,   focal weakness, numbness, or paresthesia.  Patient denies chest pain, palpitations, dyspnea, orthopnea, PND, cough, sinus pressure, rhinorrhea, epistaxis, hemoptysis, vomiting,hematemesis, diarrhea, constipation or hematochezia.  Denies cold or heat intolerance, polydipsia, polyuria, polyphagia. Denies hematuria, pyuria, dysuria, hesitancy, frequency or urgency. Denies consumption of raw and under cooked meats foods or change in water source.  Denies fever, chills, sweats, night sweats.       Objective:  T Max 24 hrs: Temp (24hrs), Av.3 °F (36.8 °C), Min:97.6 °F (36.4 °C), Max:98.6 °F (37 °C)    Vitals Ranges:   Temp:  [97.6 °F (36.4 °C)-98.6 °F (37 °C)] 98.6 °F (37 °C)  Heart Rate:  [65-96] 94  Resp:  [16] 16  BP: (175-240)/()  "175/90      Exam:  /90 (BP Location: Left arm, Patient Position: Sitting)   Pulse 94   Temp 98.6 °F (37 °C) (Oral)   Resp 16   Ht 175.3 cm (69.02\")   Wt 127 kg (280 lb)   SpO2 97%   BMI 41.33 kg/m²     General Appearance:    Alert, cooperative, no distress, appears stated age   Head:    Normocephalic, without obvious abnormality, atraumatic   Eyes:    PERRL, conjunctivae/corneas clear, EOM's intact, both eyes   Ears:    Normal external ear canals, both ears   Nose:   Nares normal, septum midline, mucosa normal, no drainage    or sinus tenderness   Throat:   Lips, mucosa, and tongue normal   Neck:   Supple, symmetrical, trachea midline, no adenopathy;     thyroid:  no enlargement/tenderness/nodules; no carotid    bruit or JVD   Back:     Symmetric, no curvature, ROM normal, no CVA tenderness   Lungs:     Decreased breath sounds bilaterally, respirations unlabored   Chest Wall:    No tenderness or deformity    Heart:    Regular rate and rhythm, S1 and S2 normal, no murmur, rub   or gallop   Abdomen:     Soft, nontender, bowel sounds active all four quadrants,     no masses, no hepatomegaly, no splenomegaly   Extremities:   Extremities normal, atraumatic, no cyanosis or edema                        .    Data Review:  Labs in chart were reviewed.  WBC   Date Value Ref Range Status   09/21/2023 9.65 3.40 - 10.80 10*3/mm3 Final     Hemoglobin   Date Value Ref Range Status   09/21/2023 13.8 12.0 - 15.9 g/dL Final     Hematocrit   Date Value Ref Range Status   09/21/2023 41.7 34.0 - 46.6 % Final     Platelets   Date Value Ref Range Status   09/21/2023 266 140 - 450 10*3/mm3 Final     Sodium   Date Value Ref Range Status   09/21/2023 137 136 - 145 mmol/L Final     Potassium   Date Value Ref Range Status   09/21/2023 4.0 3.5 - 5.2 mmol/L Final     Chloride   Date Value Ref Range Status   09/21/2023 101 98 - 107 mmol/L Final     CO2   Date Value Ref Range Status   09/21/2023 22.0 22.0 - 29.0 mmol/L Final     BUN "   Date Value Ref Range Status   09/21/2023 15 6 - 20 mg/dL Final     Creatinine   Date Value Ref Range Status   09/21/2023 1.14 (H) 0.57 - 1.00 mg/dL Final     Glucose   Date Value Ref Range Status   09/21/2023 103 (H) 65 - 99 mg/dL Final     Calcium   Date Value Ref Range Status   09/21/2023 8.9 8.6 - 10.5 mg/dL Final     Magnesium   Date Value Ref Range Status   09/21/2023 2.0 1.6 - 2.6 mg/dL Final                Imaging Results (All)       None              Assessment:  Active Hospital Problems    Diagnosis  POA    Hypoglycemia [E16.2]  Yes      Resolved Hospital Problems   No resolved problems to display.   Diabetes  Hypertension  Chronic pancreatitis  Ckd3  Obesity  Abdominal pain    Plan:     Continue sliding scale  Fluids  Ask the diabetes educator to see her  Monitor on telemetry  Continue home bp meds  Check hgba1c  Dw patient, family nurse and ed provider    Lory Boo MD  9/21/2023  23:00 EDT

## 2023-09-22 NOTE — CASE MANAGEMENT/SOCIAL WORK
Continued Stay Note  [facility]     Patient Name: Danielle Dudley  MRN: [id number]  Today's Date: 9/22/2023    Admit Date: 9/21/2023    Plan: Plan home with family.  IZAIAH Syed RN   Discharge Plan       Row Name 09/22/23 0832       Plan    Plan Plan home with family.  IZAIAH Syed RN    Patient/Family in Agreement with Plan yes    Plan Comments FACE SHEET VERIFIED/ BOONE SIGNED.  Spoke with pt at bedside.  Pt's PCP is Dr. Jairo Walker.  Pt lives with her spouse (Joe Dudley [phone]) and daughter (Daphney 22 y/o) in a two story house but stays on first floor.  Pt is independent with ADLs.  Pt has a B/P cuff, cane, Guardian 3 updating to Guardian 4, insulin pump, rollator and scales. Pt gets IvG weekly thru [facility].   Pt gets her prescriptions at [facility]  Pt denies any issues affording medications. Pt is not current with HH.  Pt has not been in SNF.  Pt denies any discharge needs.  Pt's spouse and daughter will assist pt at home and transport pt home.  Plan home with family.  IZAIAH Syed RN                   Discharge Codes    No documentation.                       Piedad Syed RN

## 2023-09-22 NOTE — THERAPY DISCHARGE NOTE
Patient Name: Danielle Dudley  : 1975    MRN: [id number]                              Today's Date: 2023       Admit Date: 2023    Visit Dx:     ICD-10-CM ICD-9-CM   1. Hypoglycemia  E16.2 251.2   2. Hypertension, unspecified type  I10 401.9     Patient Active Problem List   Diagnosis    Type 2 diabetes mellitus with hyperglycemia, with long-term current use of insulin    Chronic pancreatitis    Gastroparesis    Pancreas disorder    Long-term insulin use    Essential hypertension    Dyslipidemia    Vitamin D deficiency    Premature menopause    Tobacco abuse counseling    Mixed hyperlipidemia    Adjustment insomnia    Chronic tension-type headache, intractable    Migraine without aura and without status migrainosus, not intractable    Encounter for smoking cessation counseling    Optic neuritis    Neck pain    Eczema of both hands    Morbidly obese    Cervical radiculopathy    Palpitations    Anxiety and depression    Medicare annual wellness visit, subsequent    Hypoglycemia     Past Medical History:   Diagnosis Date    Anxiety and depression     Gastroparesis     Headache, tension-type     High blood pressure     High cholesterol     History of MRSA infection     Migraine     Obesity     Pancreatitis, chronic     Syncope     Type 2 diabetes mellitus     Vitamin D deficiency      Past Surgical History:   Procedure Laterality Date     SECTION      COLONOSCOPY N/A     UL    GASTRIC STIMULATOR IMPLANT SURGERY N/A 2018    Open placement of gastric stimulator electrodes, open placement of multi-array gastric stimulator generator, initial programming of gastric stimulator, open gastric biopsy, On-Q pump placement, wedge biopsy of liver-Dr. Jose Cantu    HYSTERECTOMY N/A     UL    MOLE REMOVAL      SKIN GRAFT        General Information       Row Name 23 1030          Physical Therapy Time and Intention    Document Type evaluation (P)   -LL     Mode of Treatment individual  therapy;physical therapy (P)   -LL       Row Name 09/22/23 1030          General Information    Patient Profile Reviewed yes (P)   -LL     Prior Level of Function independent: (P)   RW and crutches  -LL     Existing Precautions/Restrictions fall (P)   -LL     Barriers to Rehab medically complex (P)   -LL       Row Name 09/22/23 1030          Living Environment    People in Home spouse (P)   -LL       Row Name 09/22/23 1030          Cognition    Orientation Status (Cognition) oriented x 4 (P)   -LL       Row Name 09/22/23 1030          Safety Issues, Functional Mobility    Impairments Affecting Function (Mobility) pain (P)   -LL     Comment, Safety Issues/Impairments (Mobility) BLE foot/heel pain (P)   -LL               User Key  (r) = Recorded By, (t) = Taken By, (c) = Cosigned By      Initials Name Provider Type    LL Madelin Mckinney, PT Student PT Student                   Mobility       Row Name 09/22/23 1037          Sit-Stand Transfer    Sit-Stand [place] (Transfers) standby assist (P)   -LL     Assistive Device (Sit-Stand Transfers) walker, front-wheeled (P)   -LL       Row Name 09/22/23 1037          Gait/Stairs (Locomotion)    [place] Level (Gait) contact guard (P)   -LL     Assistive Device (Gait) walker, front-wheeled (P)   -LL     Distance in Feet (Gait) 25' (P)   -LL     Deviations/Abnormal Patterns (Gait) base of support, wide;kalina decreased;gait speed decreased;antalgic (P)   -LL     Bilateral Gait Deviations forward flexed posture (P)   -LL     Comment, (Gait/Stairs) Pain limiting ambulation distance (P)   -LL               User Key  (r) = Recorded By, (t) = Taken By, (c) = Cosigned By      Initials Name Provider Type     Madelin Mckinney, PT Student PT Student                   Obj/Interventions       Row Name 09/22/23 1039          Range of Motion Comprehensive    General Range of Motion bilateral upper extremity ROM WFL;bilateral lower extremity ROM WFL (P)   -LL       Row  Name 09/22/23 1039          Strength Comprehensive (MMT)    General Manual Muscle Testing (MMT) Assessment no strength deficits identified (P)   -LL     Comment, General Manual Muscle Testing (MMT) Assessment BLE >3/5 (P)   -LL       Row Name 09/22/23 1039          Balance    Balance Assessment sitting static balance;sitting dynamic balance;standing static balance;standing dynamic balance (P)   -LL     Static Sitting Balance standby assist (P)   -LL     Dynamic Sitting Balance standby assist (P)   -LL     Position, Sitting Balance sitting in chair (P)   -LL     Static Standing Balance contact guard (P)   -LL     Dynamic Standing Balance contact guard (P)   -LL     Position/Device Used, Standing Balance walker, front-wheeled (P)   -LL               User Key  (r) = Recorded By, (t) = Taken By, (c) = Cosigned By      Initials Name Provider Type    LL Madelin Mckinney, PT Student PT Student                   Goals/Plan    No documentation.                  Clinical Impression       Row Name 09/22/23 1040          Pain    Pretreatment Pain Rating 10/10 (P)   -LL     Posttreatment Pain Rating 10/10 (P)   -LL     Pain Location - Side/Orientation Bilateral (P)   -LL     Pain Location - foot (P)   -LL     Pre/Posttreatment Pain Comment Pain >10/10; burning, shooting (P)   -LL     Pain Intervention(s) Ambulation/increased activity;Repositioned;Nursing Notified (P)   -LL       Row Name 09/22/23 1040          Plan of Care Review    Plan of Care Reviewed With patient;spouse (P)   -LL     Outcome Evaluation Pt is a 48 y.o. female admitted for hypoglycemia. Hx includes: DMII, HTN, gastroperesis, migraines and pacreatitis. Pt lives with spouse and uses RW and crutches for ambulating within the home. She denies recent falls. Pt reports chronic pain in BL feet, affecting functional mobility. Pain was described as burning and shooting. Today, pt completed STS with sba and RW and ambulated 25' with RW cga, antalgic gait noted. Pt is  functioning near baseline and anticipate return home with assist upon d/c. PT will sign off at this time. (P)   -LL       Row Name 09/22/23 1040          Therapy Assessment/Plan (PT)    Criteria for Skilled Interventions Met (PT) no;does not meet criteria for skilled intervention (P)   -LL     Therapy Frequency (PT) evaluation only (P)   -LL       Row Name 09/22/23 1040          Vital Signs    O2 Delivery Pre Treatment room air (P)   -LL     O2 Delivery Post Treatment room air (P)   -LL       Row Name 09/22/23 1040          Positioning and Restraints    Pre-Treatment Position sitting in chair/recliner (P)   -LL     Post Treatment Position chair (P)   -LL     In Chair notified nsg;encouraged to call for assist;call light within reach;with family/caregiver;sitting (P)   -LL               User Key  (r) = Recorded By, (t) = Taken By, (c) = Cosigned By      Initials Name Provider Type    Madelin Sol, PT Student PT Student                   Outcome Measures       Row Name 09/22/23 1109          How much help from another person do you currently need...    Turning from your back to your side while in flat bed without using bedrails? 4 (P)   -LL     Moving from lying on back to sitting on the side of a flat bed without bedrails? 4 (P)   -LL     Moving to and from a bed to a chair (including a wheelchair)? 4  -CW     Standing up from a chair using your arms (e.g., wheelchair, bedside chair)? 4 (P)   -LL     Climbing 3-5 steps with a railing? 3 (P)   -LL     To walk in hospital room? 4  -CW     AM-PAC 6 Clicks Score (PT) 23  -CW     Highest level of mobility 7 --> Walked 25 feet or more (P)   -CW       Row Name 09/22/23 1109          Functional Assessment    Outcome Measure Options AM-PAC 6 Clicks Basic Mobility (PT) (P)   -LL               User Key  (r) = Recorded By, (t) = Taken By, (c) = Cosigned By      Initials Name Provider Type    Isidra Phillips, PT Physical Therapist    Madelin Sol, MALLORY  Student PT Student                  Physical Therapy Education       Title: PT OT SLP Therapies (In Progress)       Topic: Physical Therapy (In Progress)       Point: Mobility training (Done)       Learning Progress Summary             Patient Acceptance, VIDHI VU by  at 9/22/2023 1112                         Point: Home exercise program (Not Started)       Learner Progress:  Not documented in this visit.              Point: Body mechanics (Not Started)       Learner Progress:  Not documented in this visit.              Point: Precautions (Not Started)       Learner Progress:  Not documented in this visit.                              User Key       Initials Effective Dates Name Provider Type Discipline     09/12/23 -  Madelin Mckinney, PT Student PT Student PT                  PT Recommendation and Plan     Plan of Care Reviewed With: (P) patient, spouse  Outcome Evaluation: (P) Pt is a 48 y.o. female admitted for hypoglycemia. Hx includes: DMII, HTN, gastroperesis, migraines and pacreatitis. Pt lives with spouse and uses RW and crutches for ambulating within the home. She denies recent falls. Pt reports chronic pain in BL feet, affecting functional mobility. Pain was described as burning and shooting. Today, pt completed STS with sba and RW and ambulated 25' with RW cga, antalgic gait noted. Pt is functioning near baseline and anticipate return home with assist upon d/c. PT will sign off at this time.     Time Calculation:         PT Charges       Row Name 09/22/23 1113             Time Calculation    Start Time 0939 (P)   -LL      Stop Time 0955 (P)   -LL      Time Calculation (min) 16 min (P)   -LL      PT Received On 09/22/23 (P)   -LL         Time Calculation- PT    Total Timed Code Minutes- PT 11 minute(s) (P)   -LL         Timed Charges    21037 - PT Therapeutic Activity Minutes 11 (P)   -LL         Total Minutes    Timed Charges Total Minutes 11 (P)   -LL       Total Minutes 11 (P)   -LL                 User Key  (r) = Recorded By, (t) = Taken By, (c) = Cosigned By      Initials Name Provider Type     Madelin Mckinney, PT Student PT Student                  Therapy Charges for Today       Code Description Service Date Service Provider Modifiers Qty    14426500534  PT THERAPEUTIC ACT EA 15 MIN 9/22/2023 Madelin Mckinney, PT Student GP 1    [id number]  PT EVAL LOW COMPLEXITY 3 9/22/2023 Madelin Mckinney, PT Student GP 1            PT G-Codes  Outcome Measure Options: (P) AM-PAC 6 Clicks Basic Mobility (PT)  AM-PAC 6 Clicks Score (PT): 23    PT Discharge Summary  Anticipated Discharge Disposition (PT): (P) home, home with assist    Madelin Mckinney PT Student  9/22/2023

## 2023-09-22 NOTE — CONSULTS
Diabetes Education    Patient Name:  Danielle Dudley  YOB: 1975  MRN: [id number]  Admit Date:  9/21/2023        Discussed with pt her recent low BG.Pt states she is on an insulin pump,but currently does not have a sensor.She is working with her Endo, Lilian Wynne to get one back on.Pt expresses no other education needs.      Electronically signed by:  Meghann Iglesias RN  09/22/23 14:13 EDT

## 2023-09-22 NOTE — PLAN OF CARE
Goal Outcome Evaluation:  Plan of Care Reviewed With: (P) patient, spouse           Outcome Evaluation: (P) Pt is a 48 y.o. female admitted for hypoglycemia. Hx includes: DMII, HTN, gastroperesis, migraines and pacreatitis. Pt lives with spouse and uses RW and crutches for ambulating within the home. She denies recent falls. Pt reports chronic pain in BL feet, affecting functional mobility. Pain was described as burning and shooting. Today, pt completed STS with sba and RW and ambulated 25' with RW cga, antalgic gait noted. Pt is functioning near baseline and anticipate return home with assist upon d/c. PT will sign off at this time.      Anticipated Discharge Disposition (PT): (P) home, home with assist

## 2023-09-23 ENCOUNTER — READMISSION MANAGEMENT (OUTPATIENT)
Dept: CALL CENTER | Facility: HOSPITAL | Age: 48
End: 2023-09-23
Payer: MEDICARE

## 2023-09-23 VITALS
SYSTOLIC BLOOD PRESSURE: 152 MMHG | HEIGHT: 69 IN | TEMPERATURE: 98.1 F | HEART RATE: 65 BPM | DIASTOLIC BLOOD PRESSURE: 81 MMHG | BODY MASS INDEX: 41.63 KG/M2 | OXYGEN SATURATION: 95 % | RESPIRATION RATE: 18 BRPM | WEIGHT: 281.09 LBS

## 2023-09-23 LAB
ANION GAP SERPL CALCULATED.3IONS-SCNC: 8.3 MMOL/L (ref 5–15)
BASOPHILS # BLD AUTO: 0.05 10*3/MM3 (ref 0–0.2)
BASOPHILS NFR BLD AUTO: 0.8 % (ref 0–1.5)
BUN SERPL-MCNC: 19 MG/DL (ref 6–20)
BUN/CREAT SERPL: 17.3 (ref 7–25)
CALCIUM SPEC-SCNC: 8.7 MG/DL (ref 8.6–10.5)
CHLORIDE SERPL-SCNC: 102 MMOL/L (ref 98–107)
CO2 SERPL-SCNC: 24.7 MMOL/L (ref 22–29)
CREAT SERPL-MCNC: 1.1 MG/DL (ref 0.57–1)
DEPRECATED RDW RBC AUTO: 40.7 FL (ref 37–54)
EGFRCR SERPLBLD CKD-EPI 2021: 61.3 ML/MIN/1.73
EOSINOPHIL # BLD AUTO: 0.16 10*3/MM3 (ref 0–0.4)
EOSINOPHIL NFR BLD AUTO: 2.5 % (ref 0.3–6.2)
ERYTHROCYTE [DISTWIDTH] IN BLOOD BY AUTOMATED COUNT: 12 % (ref 12.3–15.4)
GLUCOSE BLDC GLUCOMTR-MCNC: 257 MG/DL (ref 70–130)
GLUCOSE BLDC GLUCOMTR-MCNC: 277 MG/DL (ref 70–130)
GLUCOSE SERPL-MCNC: 266 MG/DL (ref 65–99)
HCT VFR BLD AUTO: 35.5 % (ref 34–46.6)
HGB BLD-MCNC: 11.3 G/DL (ref 12–15.9)
IMM GRANULOCYTES # BLD AUTO: 0.01 10*3/MM3 (ref 0–0.05)
IMM GRANULOCYTES NFR BLD AUTO: 0.2 % (ref 0–0.5)
LYMPHOCYTES # BLD AUTO: 3.16 10*3/MM3 (ref 0.7–3.1)
LYMPHOCYTES NFR BLD AUTO: 50.2 % (ref 19.6–45.3)
MCH RBC QN AUTO: 29.6 PG (ref 26.6–33)
MCHC RBC AUTO-ENTMCNC: 31.8 G/DL (ref 31.5–35.7)
MCV RBC AUTO: 92.9 FL (ref 79–97)
MONOCYTES # BLD AUTO: 0.51 10*3/MM3 (ref 0.1–0.9)
MONOCYTES NFR BLD AUTO: 8.1 % (ref 5–12)
NEUTROPHILS NFR BLD AUTO: 2.4 10*3/MM3 (ref 1.7–7)
NEUTROPHILS NFR BLD AUTO: 38.2 % (ref 42.7–76)
NRBC BLD AUTO-RTO: 0 /100 WBC (ref 0–0.2)
PLATELET # BLD AUTO: 219 10*3/MM3 (ref 140–450)
PMV BLD AUTO: 11.2 FL (ref 6–12)
POTASSIUM SERPL-SCNC: 4.3 MMOL/L (ref 3.5–5.2)
RBC # BLD AUTO: 3.82 10*6/MM3 (ref 3.77–5.28)
SODIUM SERPL-SCNC: 135 MMOL/L (ref 136–145)
WBC NRBC COR # BLD: 6.29 10*3/MM3 (ref 3.4–10.8)

## 2023-09-23 PROCEDURE — 85025 COMPLETE CBC W/AUTO DIFF WBC: CPT | Performed by: INTERNAL MEDICINE

## 2023-09-23 PROCEDURE — 82948 REAGENT STRIP/BLOOD GLUCOSE: CPT

## 2023-09-23 PROCEDURE — 63710000001 INSULIN LISPRO (HUMAN) PER 5 UNITS: Performed by: INTERNAL MEDICINE

## 2023-09-23 PROCEDURE — 80048 BASIC METABOLIC PNL TOTAL CA: CPT | Performed by: INTERNAL MEDICINE

## 2023-09-23 PROCEDURE — 63710000001 PROMETHAZINE PER 25 MG: Performed by: INTERNAL MEDICINE

## 2023-09-23 PROCEDURE — G0378 HOSPITAL OBSERVATION PER HR: HCPCS

## 2023-09-23 RX ADMIN — PANTOPRAZOLE SODIUM 40 MG: 40 TABLET, DELAYED RELEASE ORAL at 08:18

## 2023-09-23 RX ADMIN — INSULIN LISPRO 5 UNITS: 100 INJECTION, SOLUTION INTRAVENOUS; SUBCUTANEOUS at 08:18

## 2023-09-23 RX ADMIN — LOSARTAN POTASSIUM 50 MG: 100 TABLET, FILM COATED ORAL at 08:18

## 2023-09-23 RX ADMIN — ACETAMINOPHEN 650 MG: 325 TABLET, FILM COATED ORAL at 06:37

## 2023-09-23 RX ADMIN — DULOXETINE HYDROCHLORIDE 60 MG: 60 CAPSULE, DELAYED RELEASE ORAL at 08:18

## 2023-09-23 RX ADMIN — PREGABALIN 150 MG: 75 CAPSULE ORAL at 08:18

## 2023-09-23 RX ADMIN — INSULIN LISPRO 6 UNITS: 100 INJECTION, SOLUTION INTRAVENOUS; SUBCUTANEOUS at 08:29

## 2023-09-23 RX ADMIN — PROMETHAZINE HYDROCHLORIDE 25 MG: 25 TABLET ORAL at 06:37

## 2023-09-23 RX ADMIN — INSULIN LISPRO 6 UNITS: 100 INJECTION, SOLUTION INTRAVENOUS; SUBCUTANEOUS at 12:34

## 2023-09-23 RX ADMIN — TRAMADOL HYDROCHLORIDE 50 MG: 50 TABLET ORAL at 08:29

## 2023-09-23 RX ADMIN — INSULIN LISPRO 5 UNITS: 100 INJECTION, SOLUTION INTRAVENOUS; SUBCUTANEOUS at 12:33

## 2023-09-23 RX ADMIN — BUSPIRONE HYDROCHLORIDE 5 MG: 5 TABLET ORAL at 11:11

## 2023-09-23 RX ADMIN — APIXABAN 5 MG: 5 TABLET, FILM COATED ORAL at 06:37

## 2023-09-23 NOTE — OUTREACH NOTE
Prep Survey      Flowsheet Row Responses   [facility] facility patient discharged from? [place]   Is LACE score < 7 ? Yes   Eligibility [facility]   Date of Admission 09/21/23   Date of Discharge 09/23/23   Discharge Disposition Home or Self Care   Discharge diagnosis Hypoglycemia/Hypertension   Does the patient have one of the following disease processes/diagnoses(primary or secondary)? Other   Does the patient have Home health ordered? No   Is there a DME ordered? No   Prep survey completed? Yes            TUAN RIDDLE - Registered Nurse

## 2023-09-23 NOTE — DISCHARGE SUMMARY
Date of Admission: 9/21/2023  Date of Discharge:  9/23/2023  Primary Care Physician: Jairo Walker MD     Discharge Diagnosis:  Active Hospital Problems    Diagnosis  POA    Chronic kidney disease, stage 3a [N18.31]  Yes    Hypoglycemia [E16.2]  Yes    Morbidly obese [E66.01]  Yes    Mixed hyperlipidemia [E78.2]  Yes    Essential hypertension [I10]  Yes    Gastroparesis [K31.84]  Yes    Type 2 diabetes mellitus with hyperglycemia, with long-term current use of insulin [E11.65, Z79.4]  Not Applicable      Resolved Hospital Problems   No resolved problems to display.       Presenting Problem/History of Present Illness:  Hypoglycemia [E16.2]  Hypertension, unspecified type [I10]     Hospital Course:  The patient is a 48 y.o. female with a history of HTN, HLD, and Type 2 DM with CKD stage 3a and gastroparesis s/p gastric stimulator followed at the [facility] motility clinic who presented with a hypoglycemic episode. This had been preceded by abdominal pain and nausea with resulting reduced oral intake. She had continued to use her insulin pump. On admission her insulin pump was discontinued and she became hyperglycemic. She was covered with short acting insulin. CT of the abdomen and pelvis showed pancreas atrophy which was previously known but nothing acute. Her lipase was not elevated. Her pain is suspected to have been from her gastroparesis or a viral gastroenteritis. In either case this has improved and she is now taking good PO. She tells me today that she would very much like to go home and I think this is reasonable. I spoke with the patient and she is going to resume her insulin pump at home and monitor her blood glucose levels closely and she will carry candy in case she gets hypoglycemic. She confirms she has plenty of insulin supply at home and a close follow up with her endocrinologist early next month. She is medically stable and will be discharged home. She should also keep close PCP follow up.    Exam  "Today:  Blood pressure 152/81, pulse 65, temperature 98.1 °F (36.7 °C), temperature source Oral, resp. rate 18, height 175.3 cm (69.02\"), weight 127 kg (281 lb 1.4 oz), SpO2 95 %.  Vitals and nursing note reviewed.   HENT:      Head: Normocephalic and atraumatic.      Nose: Nose normal.      Mouth/Throat:      Mouth: Mucous membranes are moist.      Pharynx: Oropharynx is clear.   Eyes:      Conjunctiva/sclera: Conjunctivae normal.      Pupils: Pupils are equal, round, and reactive to light.   Cardiovascular:      Rate and Rhythm: Normal rate and regular rhythm.      Pulses: Normal pulses.   Pulmonary:      Effort: Pulmonary effort is normal.      Breath sounds: Normal breath sounds.   Abdominal:      General: Bowel sounds are normal.      Palpations: Abdomen is soft.      Tenderness: There is no abdominal tenderness.   Musculoskeletal:         General: No swelling or tenderness.      Cervical back: Normal range of motion and neck supple.   Skin:     General: Skin is warm and dry.      Capillary Refill: Capillary refill takes less than 2 seconds.   Neurological:      General: No focal deficit present.      Mental Status: She is alert and oriented to person, place, and time.   Psychiatric:         Mood and Affect: Mood normal.         Behavior: Behavior normal.     Procedures Performed:  CT OF THE ABDOMEN AND PELVIS WITHOUT CONTRAST 09/19/2023   HISTORY: Abdominal pain.     Spiral images were obtained from the lung bases to the symphysis pubis.  No intravenous contrast was given. There is some high-attenuation  material in the stomach and small bowel which may represent a small  amount of oral contrast.     Gastric stimulator device is seen.     The liver, gallbladder, spleen, and right adrenal gland appear normal.  There is mild fullness of the left adrenal gland which probably contains  a small nodule, likely a small adrenal adenoma. Bilateral kidneys appear  unremarkable. Pancreas appears slightly atrophic but " otherwise  unremarkable.     No bowel wall thickening or bowel dilatation is seen. Uterus has been  removed. Urinary bladder is unremarkable.     IMPRESSION:  1. Status post hysterectomy and gastric stimulator placement.  2. Fullness of the left adrenal gland which may contain a small adrenal  adenoma.  3. Mild pancreatic atrophy.  4. No acute findings are seen to explain patient's reported abdominal  pain.      Consults:   Consults       No orders found from 8/23/2023 to 9/22/2023.             Discharge Disposition:  Home or Self Care    Discharge Medications:     Discharge Medications        Continue These Medications        Instructions Start Date   BD Pen Needle Neida U/F 32G X 4 MM misc  Generic drug: Insulin Pen Needle   Using 1 pen needle daily      betamethasone valerate 0.1 % ointment  Commonly known as: VALISONE   Topical, 2 Times Daily, To hands as needed      busPIRone 5 MG tablet  Commonly known as: BUSPAR   TAKE 1 TABLET BY MOUTH THREE TIMES A DAY      diphenhydrAMINE 50 MG/ML injection  Commonly known as: BENADRYL   No dose, route, or frequency recorded.      DULoxetine 60 MG capsule  Commonly known as: CYMBALTA   60 mg, Oral, Daily      Eliquis 5 MG tablet tablet  Generic drug: apixaban   TAKE 1 TABLET BY MOUTH EVERY 12 HOURS      EPINEPHrine 0.3 MG/0.3ML solution auto-injector injection  Commonly known as: EPIPEN   INJECT 0.3 ML INTO THE APPROPRIATE MUSCLE AS DIRECTED BY PRESCRIBER 1 (ONE) TIME FOR 1 DOSE.      immune globulin (human) 5 GM/50ML solution infusion   Inject as directed every 7 (seven) days      Gammaked 10 GM/100ML solution infusion  Generic drug: immune globulin (human)   No dose, route, or frequency recorded.      Gammaked 20 GM/200ML solution infusion  Generic drug: immune globulin (human)   No dose, route, or frequency recorded.      glucose blood test strip   Dispense based on insurance preference: Check 3 times a day Dx: E 11.9      Gvoke HypoPen 2-Pack 1 MG/0.2ML solution  auto-injector  Generic drug: Glucagon   1 mg, Subcutaneous, As Needed      HumaLOG KwikPen 200 UNIT/ML solution pen-injector  Generic drug: Insulin Lispro   200 Units, Subcutaneous, Daily, Use as directed      hydroCHLOROthiazide 12.5 MG tablet  Commonly known as: HYDRODIURIL   12.5 mg, Oral, Daily      Insulin Infusion Pump device   MEDTRONIC INSULIN PUMP  770 G WITH GUARDIAN  SENSOR      losartan 100 MG tablet  Commonly known as: COZAAR   100 mg, Oral      lubiprostone 24 MCG capsule  Commonly known as: Amitiza   TAKE 1 CAPSULE BY MOUTH TWICE DAILY WITH FOOD      methylPREDNISolone sodium succinate 40 MG injection  Commonly known as: SOLU-Medrol   40 mg, Intravenous, Every 14 Days      metoprolol succinate  MG 24 hr tablet  Commonly known as: Toprol XL   100 mg, Oral, Daily      pantoprazole 40 MG EC tablet  Commonly known as: PROTONIX   TAKE 1 TABLET BY MOUTH EVERY DAY      pregabalin 150 MG capsule  Commonly known as: LYRICA   TAKE 1 CAPSULE BY MOUTH TWICE A DAY      promethazine 25 MG tablet  Commonly known as: PHENERGAN   25 mg, Oral, As Needed      rosuvastatin 40 MG tablet  Commonly known as: CRESTOR   TAKE 1 TABLET BY MOUTH EVERY DAY      sodium chloride 0.9 % solution   No dose, route, or frequency recorded.      traMADol 50 MG tablet  Commonly known as: ULTRAM   50 mg, Oral, Every 6 Hours PRN      Transderm-Scop (1.5 MG) 1 MG/3DAYS patch  Generic drug: Scopolamine   1 patch, Transdermal, Every 3 Days               Discharge Diet:   Diet Instructions       Diet: Cardiac Diets, Diabetic Diets; Healthy Heart (2-3 Na+); Regular Texture (IDDSI 7); Thin (IDDSI 0); Consistent Carbohydrate      Discharge Diet:  Cardiac Diets  Diabetic Diets       Cardiac Diet: Healthy Heart (2-3 Na+)    Texture: Regular Texture (IDDSI 7)    Fluid Consistency: Thin (IDDSI 0)    Diabetic Diet: Consistent Carbohydrate            Activity at Discharge:   Activity Instructions       Activity as Tolerated              Follow-up  Appointments:  Future Appointments   Date Time Provider Department Center   10/4/2023  9:00 AM Lilian Holloway APRN [facility]   12/6/2023  8:30 AM Jairo Walker MD [facility]     Additional Instructions for the Follow-ups that You Need to Schedule       Discharge Follow-up with PCP   As directed       Currently Documented PCP:    Jairo Walker MD    PCP Phone Number:    [phone]     Follow Up Details: 1 week              Dave Sprague MD  09/23/23  11:18 EDT    Time Spent on Discharge Activities: Greater than 30 minutes.

## 2023-09-23 NOTE — PLAN OF CARE
"Goal Outcome Evaluation:     Patient is resting well tonight with some c/o abdominal discomfort but given pain medication for a \"bad headache\". VSS, she is alert and up ad renaldo to her BSC. Patient continues with chronic neuropathy to bilateral feet and scheduled medicine has been administered. Patient with an infiltrated IV site this am. Prior site D/C'd for now with plans to replace her site later this am. as she is a difficult stick.                   "

## 2023-09-23 NOTE — PLAN OF CARE
Goal Outcome Evaluation:                 Patient with discharge orders. Patient will be picked up at 1600 by  after work. Patient alert and oriented. No acute distress noted at this time, will continue to monitor until discharge complete.

## 2023-09-25 ENCOUNTER — PATIENT OUTREACH (OUTPATIENT)
Dept: CASE MANAGEMENT | Facility: OTHER | Age: 48
End: 2023-09-25

## 2023-09-25 ENCOUNTER — TRANSITIONAL CARE MANAGEMENT TELEPHONE ENCOUNTER (OUTPATIENT)
Dept: CALL CENTER | Facility: HOSPITAL | Age: 48
End: 2023-09-25

## 2023-09-25 DIAGNOSIS — F32.A ANXIETY AND DEPRESSION: ICD-10-CM

## 2023-09-25 DIAGNOSIS — E11.65 TYPE 2 DIABETES MELLITUS WITH HYPERGLYCEMIA, WITH LONG-TERM CURRENT USE OF INSULIN: Primary | ICD-10-CM

## 2023-09-25 DIAGNOSIS — F41.9 ANXIETY AND DEPRESSION: ICD-10-CM

## 2023-09-25 DIAGNOSIS — N18.31 CHRONIC KIDNEY DISEASE, STAGE 3A: ICD-10-CM

## 2023-09-25 DIAGNOSIS — Z79.4 TYPE 2 DIABETES MELLITUS WITH HYPERGLYCEMIA, WITH LONG-TERM CURRENT USE OF INSULIN: Primary | ICD-10-CM

## 2023-09-25 NOTE — OUTREACH NOTE
AMBULATORY CASE MANAGEMENT NOTE    Name and Relationship of Patient/Support Person: Danielle Dudley - Self    CCM Interim Update    Spoke with patient at this time regarding recent hospitalization, identified self and role.  Patient states she had an episode of hypoglycemia and urinary incontinence while at home and had to be taken to the hospital.  Patient agreeable to making hospital f/u appt with PCP, agreed to appt w/APRN 9/27 at 8AM.  Discussed assistance with chronic disease management through CCM, patient declines at this time.  No further needs noted, will close program.          Jocelin FERNANDES  Ambulatory Case Management    9/25/2023, 13:58 EDT

## 2023-09-25 NOTE — OUTREACH NOTE
Call Center TCM Note      Flowsheet Row Responses   [facility] patient discharged from? [facility]   Does the patient have one of the following disease processes/diagnoses(primary or secondary)? Other   TCM attempt successful? Yes   Call start time 1442   Call end time 1445   Discharge diagnosis Hypoglycemia/Hypertension   Is the patient taking all medications as directed (includes completed medication regime)? Yes   Medication comments No changes   Comments PCP on 9/27 @ 8:00.  Endo appt 10/4 @9:00   Does the patient have an appointment with their PCP within 7-14 days of discharge? Yes   Psychosocial issues? No   Did the patient receive a copy of their discharge instructions? Yes   Nursing interventions Reviewed instructions with patient   What is the patient's perception of their health status since discharge? Improving   Is the patient/caregiver able to teach back signs and symptoms related to disease process for when to call PCP? Yes   Is the patient/caregiver able to teach back signs and symptoms related to disease process for when to call 911? Yes   Is the patient/caregiver able to teach back the hierarchy of who to call/visit for symptoms/problems? PCP, Specialist, Home health nurse, Urgent Care, ED, 911 Yes   If the patient is a current smoker, are they able to teach back resources for cessation? Not a smoker   Additional teach back comments Blood sugars was 92 and was shakey so she drank some orange juice and was 145 and felt better.   TCM call completed? Yes   Wrap up additional comments Denies questions or needs at this time.   Call end time 1445            Lilian Scott LPN    9/25/2023, 14:47 EDT

## 2023-09-25 NOTE — CASE MANAGEMENT/SOCIAL WORK
Case Management Discharge Note      Final Note: Pt discharged home on 9/23.   IZAIAH Syed RN         Selected Continued Care - Discharged on 9/23/2023 Admission date: 9/21/2023 - Discharge disposition: Home or Self Care      Destination    No services have been selected for the patient.                Durable Medical Equipment    No services have been selected for the patient.                Dialysis/Infusion    No services have been selected for the patient.                Home Medical Care    No services have been selected for the patient.                Therapy    No services have been selected for the patient.                Community Resources    No services have been selected for the patient.                Community & DME    No services have been selected for the patient.                    Selected Continued Care - Episodes Includes continued care and service providers with selected services from the active episodes listed below      Chronic Care Management Episode start date: 9/20/2023   There are no active outsourced providers for this episode.                 Transportation Services  Private: Car    Final Discharge Disposition Code: 01 - home or self-care

## 2023-10-04 ENCOUNTER — OFFICE VISIT (OUTPATIENT)
Dept: ENDOCRINOLOGY | Age: 48
End: 2023-10-04
Payer: MEDICARE

## 2023-10-04 VITALS
HEIGHT: 69 IN | SYSTOLIC BLOOD PRESSURE: 130 MMHG | TEMPERATURE: 96.4 F | HEART RATE: 75 BPM | DIASTOLIC BLOOD PRESSURE: 90 MMHG | OXYGEN SATURATION: 99 % | BODY MASS INDEX: 41.35 KG/M2 | WEIGHT: 279.2 LBS

## 2023-10-04 DIAGNOSIS — K31.84 GASTROPARESIS: ICD-10-CM

## 2023-10-04 DIAGNOSIS — E10.42 DIABETIC PERIPHERAL NEUROPATHY ASSOCIATED WITH TYPE 1 DIABETES MELLITUS: ICD-10-CM

## 2023-10-04 DIAGNOSIS — N18.31 STAGE 3A CHRONIC KIDNEY DISEASE: ICD-10-CM

## 2023-10-04 DIAGNOSIS — E10.65 TYPE 1 DIABETES MELLITUS WITH HYPERGLYCEMIA: Primary | ICD-10-CM

## 2023-10-04 RX ORDER — ATORVASTATIN CALCIUM 80 MG/1
1 TABLET, FILM COATED ORAL
COMMUNITY

## 2023-10-04 NOTE — PROGRESS NOTES
"Chief Complaint  Diabetes (Pump attached)    Subjective        Danielle Dudley presents to [facility] ENDOCRINOLOGY  History of Present Illness    Recent hospitalizations, most recent 9/21/2023: hypoglycemia, abdominal pain     Not on sensor currently- waiting for a new one in the mail   She is going to be upgrading to 780g  When she has decreased PO intake, she is using a 65% temp basal rate     Unknown causes of hyperglycemia, but her po intake has improved and she is back to her normal diet and taking her bolus before she eat      Type 1 dm (+) LANDRY (6/2021)  Diagnosed in 2010  Today in clinic pt reports being on u200 humalog in her pump, medtronic 770g, not currently using automode  Last eye exam - n/a  Dm neuropathy -yes  +CKD- following with a routine basis with recent hospitalizations, (+) proteinuria , On ARB    on statin   Episodes of hypoglycemia - yes, has had EMS called by   + gastroparesis with stimulator  Emergency glucagon kit: yes,  knows how to use it but did not use it when he found her confused and decreased LOC  Backup plan: u200 pen with pen needles     Objective   Vital Signs:  /90   Pulse 75   Temp 96.4 °F (35.8 °C) (Temporal)   Ht 175.3 cm (69\")   Wt 127 kg (279 lb 3.2 oz)   SpO2 99%   BMI 41.23 kg/m²   Estimated body mass index is 41.23 kg/m² as calculated from the following:    Height as of this encounter: 175.3 cm (69\").    Weight as of this encounter: 127 kg (279 lb 3.2 oz).          Physical Exam  Vitals reviewed.   Constitutional:       General: She is not in acute distress.  HENT:      Head: Normocephalic and atraumatic.   Cardiovascular:      Rate and Rhythm: Normal rate.   Pulmonary:      Effort: Pulmonary effort is normal. No respiratory distress.   Musculoskeletal:         General: No signs of injury. Normal range of motion.      Cervical back: Normal range of motion and neck supple.   Skin:     General: Skin is warm and dry. "   Neurological:      Mental Status: She is alert and oriented to person, place, and time. Mental status is at baseline.   Psychiatric:         Mood and Affect: Mood normal.         Behavior: Behavior normal.         Thought Content: Thought content normal.         Judgment: Judgment normal.          Result Review :  The following data was reviewed by: RICKI Westfall on 10/04/2023:  Common labs          9/21/2023    15:14 9/22/2023    05:32 9/23/2023    06:16   Common Labs   Glucose 103  341  266    BUN 15  13  19    Creatinine 1.14  1.16  1.10    Sodium 137  136  135    Potassium 4.0  4.0  4.3    Chloride 101  98  102    Calcium 8.9  9.0  8.7    WBC 9.65  8.34  6.29    Hemoglobin 13.8  13.0  11.3    Hematocrit 41.7  40.6  35.5    Platelets 266  257  219    Hemoglobin A1C  6.70                    Assessment and Plan   Diagnoses and all orders for this visit:    1. Type 1 diabetes mellitus with hyperglycemia (Primary)    2. Diabetic peripheral neuropathy associated with type 1 diabetes mellitus    3. Gastroparesis    4. Stage 3a chronic kidney disease             Follow Up   Return in about 6 weeks (around 11/15/2023).    A1c at goal complicated with hyper and hypoglycemia   She wants to continue with her insulin pump, resume sensor and upgrade to 780g  Basal rate decrease to 1.5u/hr  Sick day plan: check BS every 2 days and bolus for BS > 150  High risk of complications     Patient was given instructions and counseling regarding her condition or for health maintenance advice. Please see specific information pulled into the AVS if appropriate.     RICKI Westfall

## 2023-11-24 DIAGNOSIS — E10.43 DIABETIC AUTONOMIC NEUROPATHY ASSOCIATED WITH TYPE 1 DIABETES MELLITUS: ICD-10-CM

## 2023-11-24 DIAGNOSIS — F41.9 ANXIETY: ICD-10-CM

## 2023-11-27 ENCOUNTER — APPOINTMENT (OUTPATIENT)
Dept: CT IMAGING | Facility: HOSPITAL | Age: 48
End: 2023-11-27
Payer: MEDICARE

## 2023-11-27 ENCOUNTER — HOSPITAL ENCOUNTER (EMERGENCY)
Facility: HOSPITAL | Age: 48
Discharge: HOME | End: 2023-11-27
Attending: EMERGENCY MEDICINE | Admitting: EMERGENCY MEDICINE
Payer: MEDICARE

## 2023-11-27 VITALS
HEART RATE: 74 BPM | OXYGEN SATURATION: 95 % | TEMPERATURE: 97.4 F | BODY MASS INDEX: 41.47 KG/M2 | SYSTOLIC BLOOD PRESSURE: 143 MMHG | DIASTOLIC BLOOD PRESSURE: 82 MMHG | WEIGHT: 280 LBS | RESPIRATION RATE: 16 BRPM | HEIGHT: 69 IN

## 2023-11-27 DIAGNOSIS — K31.84 GASTROPARESIS: ICD-10-CM

## 2023-11-27 DIAGNOSIS — K86.1 CHRONIC PANCREATITIS, UNSPECIFIED PANCREATITIS TYPE: Primary | ICD-10-CM

## 2023-11-27 LAB
ALBUMIN SERPL-MCNC: 4.1 G/DL (ref 3.5–5.2)
ALBUMIN/GLOB SERPL: 1.1 G/DL
ALP SERPL-CCNC: 70 U/L (ref 39–117)
ALT SERPL W P-5'-P-CCNC: 13 U/L (ref 1–33)
ANION GAP SERPL CALCULATED.3IONS-SCNC: 12.5 MMOL/L (ref 5–15)
AST SERPL-CCNC: 13 U/L (ref 1–32)
BACTERIA UR QL AUTO: ABNORMAL /HPF
BASOPHILS # BLD AUTO: 0.04 10*3/MM3 (ref 0–0.2)
BASOPHILS NFR BLD AUTO: 0.4 % (ref 0–1.5)
BILIRUB SERPL-MCNC: 0.6 MG/DL (ref 0–1.2)
BILIRUB UR QL STRIP: NEGATIVE
BUN SERPL-MCNC: 11 MG/DL (ref 6–20)
BUN/CREAT SERPL: 12 (ref 7–25)
CALCIUM SPEC-SCNC: 9.5 MG/DL (ref 8.6–10.5)
CHLORIDE SERPL-SCNC: 99 MMOL/L (ref 98–107)
CLARITY UR: ABNORMAL
CO2 SERPL-SCNC: 25.5 MMOL/L (ref 22–29)
COLOR UR: ABNORMAL
CREAT SERPL-MCNC: 0.92 MG/DL (ref 0.57–1)
D-LACTATE SERPL-SCNC: 1.2 MMOL/L (ref 0.5–2)
DEPRECATED RDW RBC AUTO: 45 FL (ref 37–54)
EGFRCR SERPLBLD CKD-EPI 2021: 76 ML/MIN/1.73
EOSINOPHIL # BLD AUTO: 0.02 10*3/MM3 (ref 0–0.4)
EOSINOPHIL NFR BLD AUTO: 0.2 % (ref 0.3–6.2)
ERYTHROCYTE [DISTWIDTH] IN BLOOD BY AUTOMATED COUNT: 14.3 % (ref 12.3–15.4)
GLOBULIN UR ELPH-MCNC: 3.7 GM/DL
GLUCOSE SERPL-MCNC: 164 MG/DL (ref 65–99)
GLUCOSE UR STRIP-MCNC: NEGATIVE MG/DL
HCT VFR BLD AUTO: 41 % (ref 34–46.6)
HGB BLD-MCNC: 13.2 G/DL (ref 12–15.9)
HGB UR QL STRIP.AUTO: ABNORMAL
HOLD SPECIMEN: NORMAL
HOLD SPECIMEN: NORMAL
HYALINE CASTS UR QL AUTO: ABNORMAL /LPF
IMM GRANULOCYTES # BLD AUTO: 0.05 10*3/MM3 (ref 0–0.05)
IMM GRANULOCYTES NFR BLD AUTO: 0.5 % (ref 0–0.5)
KETONES UR QL STRIP: ABNORMAL
LEUKOCYTE ESTERASE UR QL STRIP.AUTO: NEGATIVE
LIPASE SERPL-CCNC: 10 U/L (ref 13–60)
LYMPHOCYTES # BLD AUTO: 2.94 10*3/MM3 (ref 0.7–3.1)
LYMPHOCYTES NFR BLD AUTO: 29.8 % (ref 19.6–45.3)
MCH RBC QN AUTO: 28.3 PG (ref 26.6–33)
MCHC RBC AUTO-ENTMCNC: 32.2 G/DL (ref 31.5–35.7)
MCV RBC AUTO: 88 FL (ref 79–97)
MONOCYTES # BLD AUTO: 0.65 10*3/MM3 (ref 0.1–0.9)
MONOCYTES NFR BLD AUTO: 6.6 % (ref 5–12)
NEUTROPHILS NFR BLD AUTO: 6.18 10*3/MM3 (ref 1.7–7)
NEUTROPHILS NFR BLD AUTO: 62.5 % (ref 42.7–76)
NITRITE UR QL STRIP: NEGATIVE
NRBC BLD AUTO-RTO: 0 /100 WBC (ref 0–0.2)
PH UR STRIP.AUTO: 5.5 [PH] (ref 5–8)
PLATELET # BLD AUTO: 291 10*3/MM3 (ref 140–450)
PMV BLD AUTO: 10.5 FL (ref 6–12)
POTASSIUM SERPL-SCNC: 3.4 MMOL/L (ref 3.5–5.2)
PROT SERPL-MCNC: 7.8 G/DL (ref 6–8.5)
PROT UR QL STRIP: ABNORMAL
RBC # BLD AUTO: 4.66 10*6/MM3 (ref 3.77–5.28)
RBC # UR STRIP: ABNORMAL /HPF
REF LAB TEST METHOD: ABNORMAL
SODIUM SERPL-SCNC: 137 MMOL/L (ref 136–145)
SP GR UR STRIP: >=1.03 (ref 1–1.03)
SQUAMOUS #/AREA URNS HPF: ABNORMAL /HPF
UROBILINOGEN UR QL STRIP: ABNORMAL
WBC # UR STRIP: ABNORMAL /HPF
WBC NRBC COR # BLD AUTO: 9.88 10*3/MM3 (ref 3.4–10.8)
WHOLE BLOOD HOLD COAG: NORMAL
WHOLE BLOOD HOLD SPECIMEN: NORMAL

## 2023-11-27 PROCEDURE — 83690 ASSAY OF LIPASE: CPT

## 2023-11-27 PROCEDURE — 36415 COLL VENOUS BLD VENIPUNCTURE: CPT

## 2023-11-27 PROCEDURE — 96361 HYDRATE IV INFUSION ADD-ON: CPT

## 2023-11-27 PROCEDURE — 74176 CT ABD & PELVIS W/O CONTRAST: CPT

## 2023-11-27 PROCEDURE — 99284 EMERGENCY DEPT VISIT MOD MDM: CPT

## 2023-11-27 PROCEDURE — 81001 URINALYSIS AUTO W/SCOPE: CPT | Performed by: EMERGENCY MEDICINE

## 2023-11-27 PROCEDURE — 85025 COMPLETE CBC W/AUTO DIFF WBC: CPT

## 2023-11-27 PROCEDURE — 83605 ASSAY OF LACTIC ACID: CPT

## 2023-11-27 PROCEDURE — 25010000002 HYDROMORPHONE PER 4 MG: Performed by: EMERGENCY MEDICINE

## 2023-11-27 PROCEDURE — 80053 COMPREHEN METABOLIC PANEL: CPT

## 2023-11-27 PROCEDURE — 25810000003 SODIUM CHLORIDE 0.9 % SOLUTION: Performed by: EMERGENCY MEDICINE

## 2023-11-27 PROCEDURE — 96375 TX/PRO/DX INJ NEW DRUG ADDON: CPT

## 2023-11-27 PROCEDURE — 96374 THER/PROPH/DIAG INJ IV PUSH: CPT

## 2023-11-27 PROCEDURE — 25010000002 ONDANSETRON PER 1 MG: Performed by: EMERGENCY MEDICINE

## 2023-11-27 PROCEDURE — 96376 TX/PRO/DX INJ SAME DRUG ADON: CPT

## 2023-11-27 RX ORDER — ONDANSETRON 4 MG/1
4 TABLET, ORALLY DISINTEGRATING ORAL EVERY 8 HOURS PRN
Qty: 10 TABLET | Refills: 0 | Status: SHIPPED | OUTPATIENT
Start: 2023-11-27

## 2023-11-27 RX ORDER — HYDROMORPHONE HYDROCHLORIDE 1 MG/ML
0.5 INJECTION, SOLUTION INTRAMUSCULAR; INTRAVENOUS; SUBCUTANEOUS ONCE
Status: COMPLETED | OUTPATIENT
Start: 2023-11-27 | End: 2023-11-27

## 2023-11-27 RX ORDER — NALOXONE HYDROCHLORIDE 4 MG/.1ML
SPRAY NASAL
Qty: 2 EACH | Refills: 0 | Status: SHIPPED | OUTPATIENT
Start: 2023-11-27

## 2023-11-27 RX ORDER — OXYCODONE HYDROCHLORIDE AND ACETAMINOPHEN 5; 325 MG/1; MG/1
2 TABLET ORAL ONCE
Status: COMPLETED | OUTPATIENT
Start: 2023-11-27 | End: 2023-11-27

## 2023-11-27 RX ORDER — BUSPIRONE HYDROCHLORIDE 5 MG/1
TABLET ORAL
Qty: 270 TABLET | Refills: 2 | Status: SHIPPED | OUTPATIENT
Start: 2023-11-27

## 2023-11-27 RX ORDER — SODIUM CHLORIDE 0.9 % (FLUSH) 0.9 %
10 SYRINGE (ML) INJECTION AS NEEDED
Status: DISCONTINUED | OUTPATIENT
Start: 2023-11-27 | End: 2023-11-27 | Stop reason: HOSPADM

## 2023-11-27 RX ORDER — PREGABALIN 150 MG/1
CAPSULE ORAL
Qty: 60 CAPSULE | Refills: 5 | Status: SHIPPED | OUTPATIENT
Start: 2023-11-27

## 2023-11-27 RX ORDER — PANTOPRAZOLE SODIUM 40 MG/10ML
40 INJECTION, POWDER, LYOPHILIZED, FOR SOLUTION INTRAVENOUS ONCE
Status: COMPLETED | OUTPATIENT
Start: 2023-11-27 | End: 2023-11-27

## 2023-11-27 RX ORDER — ONDANSETRON 2 MG/ML
4 INJECTION INTRAMUSCULAR; INTRAVENOUS ONCE
Status: COMPLETED | OUTPATIENT
Start: 2023-11-27 | End: 2023-11-27

## 2023-11-27 RX ORDER — OXYCODONE HYDROCHLORIDE AND ACETAMINOPHEN 5; 325 MG/1; MG/1
1 TABLET ORAL EVERY 6 HOURS PRN
Qty: 8 TABLET | Refills: 0 | Status: SHIPPED | OUTPATIENT
Start: 2023-11-27

## 2023-11-27 RX ORDER — SODIUM CHLORIDE 9 MG/ML
125 INJECTION, SOLUTION INTRAVENOUS CONTINUOUS
Status: DISCONTINUED | OUTPATIENT
Start: 2023-11-27 | End: 2023-11-27 | Stop reason: HOSPADM

## 2023-11-27 RX ADMIN — OXYCODONE HYDROCHLORIDE AND ACETAMINOPHEN 2 TABLET: 5; 325 TABLET ORAL at 19:10

## 2023-11-27 RX ADMIN — SODIUM CHLORIDE 125 ML/HR: 9 INJECTION, SOLUTION INTRAVENOUS at 16:35

## 2023-11-27 RX ADMIN — ONDANSETRON 4 MG: 2 INJECTION INTRAMUSCULAR; INTRAVENOUS at 18:14

## 2023-11-27 RX ADMIN — HYDROMORPHONE HYDROCHLORIDE 0.5 MG: 1 INJECTION, SOLUTION INTRAMUSCULAR; INTRAVENOUS; SUBCUTANEOUS at 16:33

## 2023-11-27 RX ADMIN — PANTOPRAZOLE SODIUM 40 MG: 40 INJECTION, POWDER, FOR SOLUTION INTRAVENOUS at 18:14

## 2023-11-27 RX ADMIN — HYDROMORPHONE HYDROCHLORIDE 0.5 MG: 1 INJECTION, SOLUTION INTRAMUSCULAR; INTRAVENOUS; SUBCUTANEOUS at 18:14

## 2023-11-27 RX ADMIN — SODIUM CHLORIDE 1000 ML: 9 INJECTION, SOLUTION INTRAVENOUS at 16:39

## 2023-11-27 RX ADMIN — ONDANSETRON 4 MG: 2 INJECTION INTRAMUSCULAR; INTRAVENOUS at 16:33

## 2023-11-27 NOTE — TELEPHONE ENCOUNTER
Rx Refill Note  Requested Prescriptions     Pending Prescriptions Disp Refills    busPIRone (BUSPAR) 5 MG tablet [Pharmacy Med Name: BUSPIRONE HCL 5 MG TABLET] 270 tablet 2     Sig: TAKE 1 TABLET BY MOUTH THREE TIMES A DAY    pregabalin (LYRICA) 150 MG capsule [Pharmacy Med Name: PREGABALIN 150 MG CAPSULE] 60 capsule      Sig: TAKE 1 CAPSULE BY MOUTH TWICE A DAY      Last office visit with prescribing clinician: 8/2/2023   Last telemedicine visit with prescribing clinician: Visit date not found   Next office visit with prescribing clinician: 12/6/2023                         Would you like a call back once the refill request has been completed: [] Yes [] No    If the office needs to give you a call back, can they leave a voicemail: [] Yes [] No    Lilian Walker  11/27/23, 09:41 EST

## 2023-11-27 NOTE — ED PROVIDER NOTES
EMERGENCY DEPARTMENT ENCOUNTER    Room Number:  09/09  PCP: Jairo Walker MD  Patient Care Team:  Jairo Walker MD as PCP - General (Family Medicine)  Santino Andrade MD as Consulting Physician (Endocrinology)   Independent Historians: Patient and significant other    HPI:  Chief Complaint: Abdominal pain and vomiting    A complete HPI/ROS/PMH/PSH/SH/FH are unobtainable due to: None    Chronic or social conditions impacting patient care (Social Determinants of Health): None  (Financial Resource Strain / Food Insecurity / Transportation Needs / Physical Activity / Stress / Social Connections / Intimate Partner Violence / Housing Stability)    Context: Danielle Dudley is a 48 y.o. female with history of diabetes, gastroparesis, pancreatitis, hypertension, and migraines who presents to the ED c/o acute abdominal pain and vomiting for the past 3 days.  Patient has a history of chronic pancreatitis and she believes an [employer] sandwich set it off this time.  She has had typical epigastric pain radiating straight through to her back with nausea and vomiting.  She describes 4-5 episodes of nonbloody nonbilious emesis in the last 24 hours with loose stools as well.  Patient denies any fevers, cough, rashes or any new issues other than feeling miserable from her pancreatitis.  Patient attempted to take her home medication this morning but vomited shortly afterward.    Review of prior external notes (non-ED) -and- Review of prior external test results outside of this encounter: I reviewed patient's last discharge summary which was from September 23.  Patient was admitted at that time for hypoglycemic episodes and hypertension.  Patient was noted during that hospitalization to have gastroparesis status post gastric stimulator placement followed by [facility] motility clinic who presented with hypoglycemic episodes.  These episodes have been preceded by abdominal pain with nausea which reduced her oral intake while she continued  using her insulin pump.    Prescription drug monitoring program review: [facility] reviewed by Jailyn Younger MD       PAST MEDICAL HISTORY  Active Ambulatory Problems     Diagnosis Date Noted    Type 2 diabetes mellitus with hyperglycemia, with long-term current use of insulin 2017    Chronic pancreatitis 2017    Gastroparesis 2017    Pancreas disorder 2017    Long-term insulin use 2017    Essential hypertension 2017    Dyslipidemia 2017    Vitamin D deficiency 2017    Premature menopause 2017    Tobacco abuse counseling 2017    Mixed hyperlipidemia 2018    Adjustment insomnia 2018    Chronic tension-type headache, intractable 2018    Migraine without aura and without status migrainosus, not intractable 2018    Encounter for smoking cessation counseling 10/10/2018    Optic neuritis 10/15/2018    Neck pain 2019    Eczema of both hands 2019    Morbidly obese 2019    Cervical radiculopathy 2020    Palpitations 2021    Anxiety and depression     Medicare annual wellness visit, subsequent 2022    Hypoglycemia 2023    Chronic kidney disease, stage 3a 2023     Resolved Ambulatory Problems     Diagnosis Date Noted    Hypoglycemia 2017    Hyperglycemia 2017    Umbilical pain 2018     Past Medical History:   Diagnosis Date    Headache, tension-type     High blood pressure     High cholesterol     History of MRSA infection     Migraine     Obesity     Pancreatitis, chronic     Syncope     Type 2 diabetes mellitus          PAST SURGICAL HISTORY  Past Surgical History:   Procedure Laterality Date     SECTION      COLONOSCOPY N/A     UL    GASTRIC STIMULATOR IMPLANT SURGERY N/A 2018    Open placement of gastric stimulator electrodes, open placement of multi-array gastric stimulator generator, initial programming of gastric stimulator, open gastric biopsy, On-Q  pump placement, wedge biopsy of liver-Dr. Jose Cantu    HYSTERECTOMY N/A 2016    UL    MOLE REMOVAL      SKIN GRAFT           FAMILY HISTORY  Family History   Problem Relation Age of Onset    Diabetes Mother     Schizophrenia Mother     Heart disease Father     Diabetes Father     Cancer Father     Kidney disease Father     Heart disease Brother     Stroke Paternal Grandmother     Heart disease Paternal Grandfather          SOCIAL HISTORY  Social History     Socioeconomic History    Marital status:    Tobacco Use    Smoking status: Former     Packs/day: 1.00     Years: 24.00     Additional pack years: 0.00     Total pack years: 24.00     Types: Cigarettes     Quit date: 2017     Years since quittin.0     Passive exposure: Never    Smokeless tobacco: Never   Vaping Use    Vaping Use: Never used   Substance and Sexual Activity    Alcohol use: No    Drug use: No    Sexual activity: Not Currently     Partners: Male     Birth control/protection: None         ALLERGIES  Contrast dye (echo or unknown ct/mr), Imitrex [sumatriptan], Iodinated contrast media, Acetaminophen, Linaclotide, Lisinopril, Codeine, Levemir [insulin detemir], and Morphine and related        REVIEW OF SYSTEMS  Review of Systems  Included in HPI  All systems reviewed and negative except for those discussed in HPI.      PHYSICAL EXAM    I have reviewed the triage vital signs and nursing notes.    ED Triage Vitals   Temp Heart Rate Resp BP SpO2   23 1433 23 1433 23 1433 23 1435 23 1433   97.4 °F (36.3 °C) (!) 124 18 (!) 204/114 97 %      Temp src Heart Rate Source Patient Position BP Location FiO2 (%)   23 1433 23 1433 -- -- --   Tympanic Monitor          Physical Exam  GENERAL: Diaphoretic obese  female, cooperative and conversant but tearful, alert  HENT: Normocephalic, atraumatic, dry mucous membranes  EYES: no scleral icterus, EOMI, no conjunctivitis  CV: regular rhythm,  accelerated rate  during my evaluation, no murmur  RESPIRATORY: normal effort, lungs clear, no wheezing, no rhonchi  ABDOMEN: soft, obese, nondistended, tenderness to palpation in epigastrium without any rebound or guarding  MUSCULOSKELETAL: no deformity, no lower extremity edema  NEURO: alert, moves all extremities, follows commands                                                               LAB RESULTS  Recent Results (from the past 24 hour(s))   Comprehensive Metabolic Panel    Collection Time: 11/27/23  2:55 PM    Specimen: Hand, Left; Blood   Result Value Ref Range    Glucose 164 (H) 65 - 99 mg/dL    BUN 11 6 - 20 mg/dL    Creatinine 0.92 0.57 - 1.00 mg/dL    Sodium 137 136 - 145 mmol/L    Potassium 3.4 (L) 3.5 - 5.2 mmol/L    Chloride 99 98 - 107 mmol/L    CO2 25.5 22.0 - 29.0 mmol/L    Calcium 9.5 8.6 - 10.5 mg/dL    Total Protein 7.8 6.0 - 8.5 g/dL    Albumin 4.1 3.5 - 5.2 g/dL    ALT (SGPT) 13 1 - 33 U/L    AST (SGOT) 13 1 - 32 U/L    Alkaline Phosphatase 70 39 - 117 U/L    Total Bilirubin 0.6 0.0 - 1.2 mg/dL    Globulin 3.7 gm/dL    A/G Ratio 1.1 g/dL    BUN/Creatinine Ratio 12.0 7.0 - 25.0    Anion Gap 12.5 5.0 - 15.0 mmol/L    eGFR 76.0 >60.0 mL/min/1.73   Lipase    Collection Time: 11/27/23  2:55 PM    Specimen: Hand, Left; Blood   Result Value Ref Range    Lipase 10 (L) 13 - 60 U/L   Lactic Acid, Plasma    Collection Time: 11/27/23  2:55 PM    Specimen: Hand, Left; Blood   Result Value Ref Range    Lactate 1.2 0.5 - 2.0 mmol/L   Green Top (Gel)    Collection Time: 11/27/23  2:55 PM   Result Value Ref Range    Extra Tube Hold for add-ons.    Lavender Top    Collection Time: 11/27/23  2:55 PM   Result Value Ref Range    Extra Tube hold for add-on    Gold Top - SST    Collection Time: 11/27/23  2:55 PM   Result Value Ref Range    Extra Tube Hold for add-ons.    Light Blue Top    Collection Time: 11/27/23  2:55 PM   Result Value Ref Range    Extra Tube Hold for add-ons.    CBC Auto Differential     Collection Time: 11/27/23  2:55 PM    Specimen: Hand, Left; Blood   Result Value Ref Range    WBC 9.88 3.40 - 10.80 10*3/mm3    RBC 4.66 3.77 - 5.28 10*6/mm3    Hemoglobin 13.2 12.0 - 15.9 g/dL    Hematocrit 41.0 34.0 - 46.6 %    MCV 88.0 79.0 - 97.0 fL    MCH 28.3 26.6 - 33.0 pg    MCHC 32.2 31.5 - 35.7 g/dL    RDW 14.3 12.3 - 15.4 %    RDW-SD 45.0 37.0 - 54.0 fl    MPV 10.5 6.0 - 12.0 fL    Platelets 291 140 - 450 10*3/mm3    Neutrophil % 62.5 42.7 - 76.0 %    Lymphocyte % 29.8 19.6 - 45.3 %    Monocyte % 6.6 5.0 - 12.0 %    Eosinophil % 0.2 (L) 0.3 - 6.2 %    Basophil % 0.4 0.0 - 1.5 %    Immature Grans % 0.5 0.0 - 0.5 %    Neutrophils, Absolute 6.18 1.70 - 7.00 10*3/mm3    Lymphocytes, Absolute 2.94 0.70 - 3.10 10*3/mm3    Monocytes, Absolute 0.65 0.10 - 0.90 10*3/mm3    Eosinophils, Absolute 0.02 0.00 - 0.40 10*3/mm3    Basophils, Absolute 0.04 0.00 - 0.20 10*3/mm3    Immature Grans, Absolute 0.05 0.00 - 0.05 10*3/mm3    nRBC 0.0 0.0 - 0.2 /100 WBC   Urinalysis With Microscopic If Indicated (No Culture) - Urine, Clean Catch    Collection Time: 11/27/23  3:58 PM    Specimen: Urine, Clean Catch   Result Value Ref Range    Color, UA Dark Yellow (A) Yellow, Straw    Appearance, UA Cloudy (A) Clear    pH, UA 5.5 5.0 - 8.0    Specific Gravity, UA >=1.030 1.005 - 1.030    Glucose, UA Negative Negative    Ketones, UA 15 mg/dL (1+) (A) Negative    Bilirubin, UA Negative Negative    Blood, UA Small (1+) (A) Negative    Protein, UA >=300 mg/dL (3+) (A) Negative    Leuk Esterase, UA Negative Negative    Nitrite, UA Negative Negative    Urobilinogen, UA 1.0 E.U./dL 0.2 - 1.0 E.U./dL   Urinalysis, Microscopic Only - Urine, Clean Catch    Collection Time: 11/27/23  3:58 PM    Specimen: Urine, Clean Catch   Result Value Ref Range    RBC, UA None Seen None Seen, 0-2 /HPF    WBC, UA 0-2 None Seen, 0-2 /HPF    Bacteria, UA 2+ (A) None Seen /HPF    Squamous Epithelial Cells, UA 7-12 (A) None Seen, 0-2 /HPF    Hyaline Casts, UA  None Seen None Seen /LPF    Methodology Manual Light Microscopy        I ordered the above labs and independently reviewed the results.        RADIOLOGY  CT Abdomen Pelvis Without Contrast    Result Date: 11/27/2023  Examination: CT of the abdomen and pelvis without contrast  Technique: CT of the abdomen and pelvis without contrast per protocol. Radiation dose reduction techniques were utilized, including automated exposure control and exposure modulation based on body size.   History: Suspected pancreatitis  Comparison: 9/19/2023  Findings: Limited evaluation of the inferior thorax demonstrates no consolidation, pleural effusion, or pneumothorax. The heart is normal in size, without pericardial effusion. There is an incompletely imaged pacemaker lead.  There is a gastric antral stimulator device. A left adrenal adenoma is seen. The pancreas is atrophic, without inflammatory changes identified. There is an upper abdominal wall fat-containing hernia.  The liver, gallbladder, spleen, right adrenal gland, kidneys, small bowel, large bowel, urinary bladder, and abdominal vasculature are normal as evaluated on this noncontrast examination. No intraperitoneal fluid collection or free gas are seen. No enlarged lymph nodes are demonstrated.  Bone windows demonstrate degenerative changes, without suspicious osseous lesion seen.      Incidental findings as above, without acute or suspicious intraperitoneal process seen.  This report was finalized on 11/27/2023 5:38 PM by Dr. Suleiman Suero M.D on Workstation: [id number]       I ordered the above noted radiological studies. Reviewed by me. See dictation for official radiology interpretation.      PROCEDURES    Procedures      MEDICATIONS GIVEN IN ER    Medications   sodium chloride 0.9 % flush 10 mL (has no administration in time range)   sodium chloride 0.9 % infusion (125 mL/hr Intravenous New Bag 11/27/23 9106)   sodium chloride 0.9 % bolus 1,000 mL (1,000 mL Intravenous  New Bag 11/27/23 1639)   ondansetron (ZOFRAN) injection 4 mg (4 mg Intravenous Given 11/27/23 1633)   HYDROmorphone (DILAUDID) injection 0.5 mg (0.5 mg Intravenous Given 11/27/23 1633)   HYDROmorphone (DILAUDID) injection 0.5 mg (0.5 mg Intravenous Given 11/27/23 1814)   ondansetron (ZOFRAN) injection 4 mg (4 mg Intravenous Given 11/27/23 1814)   pantoprazole (PROTONIX) injection 40 mg (40 mg Intravenous Given 11/27/23 1814)   oxyCODONE-acetaminophen (PERCOCET) 5-325 MG per tablet 2 tablet (2 tablets Oral Given 11/27/23 1910)         ORDERS PLACED DURING THIS VISIT:  Orders Placed This Encounter   Procedures    CT Abdomen Pelvis Without Contrast    Taylor Draw    Comprehensive Metabolic Panel    Lipase    Urinalysis With Microscopic If Indicated (No Culture) - Urine, Clean Catch    Lactic Acid, Plasma    CBC Auto Differential    Urinalysis, Microscopic Only - Urine, Clean Catch    NPO Diet NPO Type: Strict NPO    Undress & Gown    Insert Peripheral IV    CBC & Differential    Green Top (Gel)    Lavender Top    Gold Top - SST    Light Blue Top         PROGRESS, DATA ANALYSIS, CONSULTS, AND MEDICAL DECISION MAKING    All labs have been independently interpreted by me.  All radiology studies have been reviewed by me.   EKG's independently viewed and interpreted by me.  Discussion below represents my analysis of pertinent findings related to patient's condition, differential diagnosis, treatment plan and final disposition.    MDM the differential diagnosis for this patient includes:  pancreatitis, cholecystitis/biliary colic, PUD, gastritis, pneumonia, ureteral stone, sbo, hernia, colitis, diverticulitis, AAA, malignancy, gastroenteritis, food intolerances, or gastroparesis flare. Abdominal exam is without peritoneal signs. There is no evidence of acute abdomen at this time.  I have a low suspicion for vascular catastrophe, bowel obstruction or viscus perforation. This patient´s presentation is most consistent with  recurrent pancreatitis versus gastroparesis flare versus gastroenteritis since she does report diarrhea as well.  Plan serum labs, urinalysis, pain control, will consider imaging options, and serial reassessment..    ED Course as of 11/27/23 1949 Mon Nov 27, 2023 1802 Patient feeling some better.  She is no longer diaphoretic nor retching or vomiting.  She complains of some vomiting and some pain remaining.  She is optimistic with the possibility that she could go home given that her labs and CAT scan look good.  Plan for additional IV medications then reassess for toleration of p.o. meds. [AR]   1804 Patient's vital signs are much improved with a heart rate of 72 and a blood pressure 152/86 [AR]   1943 Still doing well and tolerating p.o.  She still amenable to going home and we discussed small supply of pain medication for home that I will send to the 24-hour pharmacy [AR]      ED Course User Index  [AR] Jailyn Younger MD       I interpreted the cardiac monitor rhythm and my independent interpretation is: normal sinus rhythm, rate of 70-90s. The cardiac monitor was ordered to monitor for arrhythmias.    PPE: I wore and adhered to appropriate PPE per hospital protocols for specific patient presentation. (For respiratory patients with suspected Covid-19 or other infectious etiology suspected for patient's symptoms, the patient wore a mask and I wore an N95 mask throughout the entire patient encounter.) Proper hand hygiene both before and after patient encounter was performed as well.         AS OF 19:49 EST VITALS:    BP - 143/82  HR - 74  TEMP - 97.4 °F (36.3 °C) (Tympanic)  O2 SATS - 95%        DIAGNOSIS  Final diagnoses:   Chronic pancreatitis, unspecified pancreatitis type   Gastroparesis         DISPOSITION  ED Disposition       ED Disposition   Discharge    Condition   Stable    Comment   --                  Note Disclaimer: At [facility], we believe that sharing information builds trust and  better relationships. You are receiving this note because you recently visited [facility]. It is possible you will see health information before a provider has talked with you about it. This kind of information can be easy to misunderstand. To help you fully understand what it means for your health, we urge you to discuss this note with your provider.         Jailyn Younger MD  11/27/23 1949

## 2023-11-28 NOTE — DISCHARGE INSTRUCTIONS
Rest at home avoiding any particularly strenuous activity today and tomorrow.     Clear liquid diet for the next 24-48 hours depending on how your symptoms improve or progress.  Take any medication prescribed as instructed.       Monitor for any signs of recurrent symptoms or worsening and see your primary doctor to discuss your ER visit while returning to the ER if any concerns as we discussed.

## 2023-11-29 ENCOUNTER — HOSPITAL ENCOUNTER (EMERGENCY)
Facility: HOSPITAL | Age: 48
Discharge: HOME | End: 2023-11-29
Attending: EMERGENCY MEDICINE
Payer: MEDICARE

## 2023-11-29 VITALS
BODY MASS INDEX: 41.47 KG/M2 | SYSTOLIC BLOOD PRESSURE: 140 MMHG | HEART RATE: 87 BPM | OXYGEN SATURATION: 99 % | TEMPERATURE: 98.5 F | HEIGHT: 69 IN | DIASTOLIC BLOOD PRESSURE: 75 MMHG | WEIGHT: 280 LBS | RESPIRATION RATE: 16 BRPM

## 2023-11-29 DIAGNOSIS — R10.9 CHRONIC ABDOMINAL PAIN: ICD-10-CM

## 2023-11-29 DIAGNOSIS — K31.84 GASTROPARESIS: ICD-10-CM

## 2023-11-29 DIAGNOSIS — G89.29 CHRONIC ABDOMINAL PAIN: ICD-10-CM

## 2023-11-29 DIAGNOSIS — R11.2 NAUSEA AND VOMITING, UNSPECIFIED VOMITING TYPE: Primary | ICD-10-CM

## 2023-11-29 DIAGNOSIS — E86.9 VOLUME DEPLETION, GASTROINTESTINAL LOSS: ICD-10-CM

## 2023-11-29 LAB
ALBUMIN SERPL-MCNC: 4.5 G/DL (ref 3.5–5.2)
ALBUMIN/GLOB SERPL: 1.2 G/DL
ALP SERPL-CCNC: 67 U/L (ref 39–117)
ALT SERPL W P-5'-P-CCNC: 16 U/L (ref 1–33)
ANION GAP SERPL CALCULATED.3IONS-SCNC: 11 MMOL/L (ref 5–15)
AST SERPL-CCNC: 20 U/L (ref 1–32)
ATMOSPHERIC PRESS: 749.9 MMHG
BACTERIA UR QL AUTO: ABNORMAL /HPF
BASE EXCESS BLDV CALC-SCNC: 4 MMOL/L (ref -2–2)
BASOPHILS # BLD AUTO: 0.06 10*3/MM3 (ref 0–0.2)
BASOPHILS NFR BLD AUTO: 0.6 % (ref 0–1.5)
BILIRUB SERPL-MCNC: 0.4 MG/DL (ref 0–1.2)
BILIRUB UR QL STRIP: NEGATIVE
BUN SERPL-MCNC: 12 MG/DL (ref 6–20)
BUN/CREAT SERPL: 9.8 (ref 7–25)
CALCIUM SPEC-SCNC: 9.5 MG/DL (ref 8.6–10.5)
CHLORIDE SERPL-SCNC: 99 MMOL/L (ref 98–107)
CLARITY UR: ABNORMAL
CO2 BLDA-SCNC: 31.5 MMOL/L (ref 23–27)
CO2 SERPL-SCNC: 29 MMOL/L (ref 22–29)
COLOR UR: ABNORMAL
CREAT SERPL-MCNC: 1.22 MG/DL (ref 0.57–1)
D-LACTATE SERPL-SCNC: 1.7 MMOL/L (ref 0.5–2)
DEPRECATED RDW RBC AUTO: 49.6 FL (ref 37–54)
EGFRCR SERPLBLD CKD-EPI 2021: 54.2 ML/MIN/1.73
EOSINOPHIL # BLD AUTO: 0.05 10*3/MM3 (ref 0–0.4)
EOSINOPHIL NFR BLD AUTO: 0.5 % (ref 0.3–6.2)
ERYTHROCYTE [DISTWIDTH] IN BLOOD BY AUTOMATED COUNT: 14.6 % (ref 12.3–15.4)
GLOBULIN UR ELPH-MCNC: 3.7 GM/DL
GLUCOSE SERPL-MCNC: 67 MG/DL (ref 65–99)
GLUCOSE UR STRIP-MCNC: NEGATIVE MG/DL
HCO3 BLDV-SCNC: 30 MMOL/L (ref 22–28)
HCT VFR BLD AUTO: 41.3 % (ref 34–46.6)
HGB BLD-MCNC: 13.7 G/DL (ref 12–15.9)
HGB UR QL STRIP.AUTO: ABNORMAL
HYALINE CASTS UR QL AUTO: ABNORMAL /LPF
IMM GRANULOCYTES # BLD AUTO: 0.02 10*3/MM3 (ref 0–0.05)
IMM GRANULOCYTES NFR BLD AUTO: 0.2 % (ref 0–0.5)
KETONES UR QL STRIP: ABNORMAL
LEUKOCYTE ESTERASE UR QL STRIP.AUTO: NEGATIVE
LIPASE SERPL-CCNC: 7 U/L (ref 13–60)
LYMPHOCYTES # BLD AUTO: 3.81 10*3/MM3 (ref 0.7–3.1)
LYMPHOCYTES NFR BLD AUTO: 35.2 % (ref 19.6–45.3)
MCH RBC QN AUTO: 30.6 PG (ref 26.6–33)
MCHC RBC AUTO-ENTMCNC: 33.2 G/DL (ref 31.5–35.7)
MCV RBC AUTO: 92.2 FL (ref 79–97)
MODALITY: ABNORMAL
MONOCYTES # BLD AUTO: 0.7 10*3/MM3 (ref 0.1–0.9)
MONOCYTES NFR BLD AUTO: 6.5 % (ref 5–12)
NEUTROPHILS NFR BLD AUTO: 57 % (ref 42.7–76)
NEUTROPHILS NFR BLD AUTO: 6.19 10*3/MM3 (ref 1.7–7)
NITRITE UR QL STRIP: NEGATIVE
NRBC BLD AUTO-RTO: 0 /100 WBC (ref 0–0.2)
PCO2 BLDV: 48.7 MM HG (ref 41–51)
PH BLDV: 7.4 PH UNITS (ref 7.31–7.41)
PH UR STRIP.AUTO: <=5 [PH] (ref 5–8)
PLATELET # BLD AUTO: 312 10*3/MM3 (ref 140–450)
PMV BLD AUTO: 10.4 FL (ref 6–12)
PO2 BLDV: 32.6 MM HG (ref 35–45)
POTASSIUM SERPL-SCNC: 3.1 MMOL/L (ref 3.5–5.2)
PROT SERPL-MCNC: 8.2 G/DL (ref 6–8.5)
PROT UR QL STRIP: ABNORMAL
QT INTERVAL: 394 MS
QTC INTERVAL: 444 MS
RBC # BLD AUTO: 4.48 10*6/MM3 (ref 3.77–5.28)
RBC # UR STRIP: ABNORMAL /HPF
REF LAB TEST METHOD: ABNORMAL
SAO2 % BLDCOV: 61.8 % (ref 45–75)
SODIUM SERPL-SCNC: 139 MMOL/L (ref 136–145)
SP GR UR STRIP: >=1.03 (ref 1–1.03)
SQUAMOUS #/AREA URNS HPF: ABNORMAL /HPF
TOTAL RATE: 18 BREATHS/MINUTE
TROPONIN T SERPL HS-MCNC: 8 NG/L
UROBILINOGEN UR QL STRIP: ABNORMAL
WBC # UR STRIP: ABNORMAL /HPF
WBC NRBC COR # BLD AUTO: 10.83 10*3/MM3 (ref 3.4–10.8)

## 2023-11-29 PROCEDURE — 96375 TX/PRO/DX INJ NEW DRUG ADDON: CPT

## 2023-11-29 PROCEDURE — 93010 ELECTROCARDIOGRAM REPORT: CPT | Performed by: INTERNAL MEDICINE

## 2023-11-29 PROCEDURE — 25010000002 DROPERIDOL PER 5 MG: Performed by: EMERGENCY MEDICINE

## 2023-11-29 PROCEDURE — 80053 COMPREHEN METABOLIC PANEL: CPT | Performed by: EMERGENCY MEDICINE

## 2023-11-29 PROCEDURE — 99283 EMERGENCY DEPT VISIT LOW MDM: CPT

## 2023-11-29 PROCEDURE — 83690 ASSAY OF LIPASE: CPT | Performed by: EMERGENCY MEDICINE

## 2023-11-29 PROCEDURE — 96374 THER/PROPH/DIAG INJ IV PUSH: CPT

## 2023-11-29 PROCEDURE — 85025 COMPLETE CBC W/AUTO DIFF WBC: CPT | Performed by: EMERGENCY MEDICINE

## 2023-11-29 PROCEDURE — 81001 URINALYSIS AUTO W/SCOPE: CPT | Performed by: EMERGENCY MEDICINE

## 2023-11-29 PROCEDURE — 84484 ASSAY OF TROPONIN QUANT: CPT | Performed by: EMERGENCY MEDICINE

## 2023-11-29 PROCEDURE — 87086 URINE CULTURE/COLONY COUNT: CPT | Performed by: EMERGENCY MEDICINE

## 2023-11-29 PROCEDURE — 82803 BLOOD GASES ANY COMBINATION: CPT

## 2023-11-29 PROCEDURE — 83605 ASSAY OF LACTIC ACID: CPT | Performed by: EMERGENCY MEDICINE

## 2023-11-29 PROCEDURE — 25810000003 SODIUM CHLORIDE 0.9 % SOLUTION: Performed by: EMERGENCY MEDICINE

## 2023-11-29 PROCEDURE — 93005 ELECTROCARDIOGRAM TRACING: CPT | Performed by: EMERGENCY MEDICINE

## 2023-11-29 RX ORDER — DROPERIDOL 2.5 MG/ML
2.5 INJECTION, SOLUTION INTRAMUSCULAR; INTRAVENOUS ONCE
Status: COMPLETED | OUTPATIENT
Start: 2023-11-29 | End: 2023-11-29

## 2023-11-29 RX ORDER — KETAMINE HCL IN NACL, ISO-OSM 100MG/10ML
0.1 SYRINGE (ML) INJECTION ONCE
Status: COMPLETED | OUTPATIENT
Start: 2023-11-29 | End: 2023-11-29

## 2023-11-29 RX ORDER — THIAMINE HYDROCHLORIDE 100 MG/ML
200 INJECTION, SOLUTION INTRAMUSCULAR; INTRAVENOUS 3 TIMES DAILY
Status: DISCONTINUED | OUTPATIENT
Start: 2023-11-29 | End: 2023-11-29

## 2023-11-29 RX ADMIN — Medication 12.7 MG: at 16:29

## 2023-11-29 RX ADMIN — DROPERIDOL 2.5 MG: 2.5 INJECTION, SOLUTION INTRAMUSCULAR; INTRAVENOUS at 15:09

## 2023-11-29 RX ADMIN — SODIUM CHLORIDE 1000 ML: 9 INJECTION, SOLUTION INTRAVENOUS at 16:26

## 2023-11-29 NOTE — ED PROVIDER NOTES
EMERGENCY DEPARTMENT ENCOUNTER    Room Number:  26/26  Date of encounter:  11/29/2023  PCP: Jairo Walker MD  Historian: Patient    Patient was placed in face mask during triage process. Patient was wearing facemask when I entered the room and throughout our encounter. I wore full protective equipment throughout this patient encounter including a face mask, eye protection, and gloves. Hand hygiene was performed before donning protective equipment and again following doffing of PPE after leaving the room.    HPI:  Chief Complaint: Nausea vomiting and abdominal bloating  A complete HPI/ROS/PMH/PSH/SH/FH are unobtainable due to: N/A   Context: Danielle Dudley is a 48 y.o. female who presents to the ED c/o ongoing nausea vomiting abdominal discomfort.  Patient has a history of gastroparesis and has a gastric stimulator in place.  She follows with GI motility clinic at [facility].  She notes the last 6 days she has had recurrent nausea and vomiting though her diarrhea is improved after Zofran.  This is similar to prior episodes.  She presents today requesting some relief of her discomfort.  Patient transiently felt better following her ED evaluation 2 days ago.  No reported fevers, black or bloody stools, hematemesis, dysuria, chest pain or shortness of breath reported.      MEDICAL HISTORY REVIEW  Additional sources:  - Discussed/ obtained information from independent historians: Patient's spouse who is at bedside    - External (non-ED) record review:   Hospital discharge summary 9/23/2023 reviewed: Patient with history of gastroparesis was admitted for hypoglycemic episode.  Contributing to her illness has history of essential hypertension, mixed hyperlipidemia, morbid obesity, chronic kidney disease, type 2 diabetes and gastroparesis.  ===========================  ED visit facility 11/27/2023 reviewed: CT abdomen pelvis unrevealing for acute pathology.    - Chronic or social conditions impacting care:  Gastroparesis; obesity      PAST MEDICAL HISTORY  Active Ambulatory Problems     Diagnosis Date Noted    Type 2 diabetes mellitus with hyperglycemia, with long-term current use of insulin 2017    Chronic pancreatitis 2017    Gastroparesis 2017    Pancreas disorder 2017    Long-term insulin use 2017    Essential hypertension 2017    Dyslipidemia 2017    Vitamin D deficiency 2017    Premature menopause 2017    Tobacco abuse counseling 2017    Mixed hyperlipidemia 2018    Adjustment insomnia 2018    Chronic tension-type headache, intractable 2018    Migraine without aura and without status migrainosus, not intractable 2018    Encounter for smoking cessation counseling 10/10/2018    Optic neuritis 10/15/2018    Neck pain 2019    Eczema of both hands 2019    Morbidly obese 2019    Cervical radiculopathy 2020    Palpitations 2021    Anxiety and depression     Medicare annual wellness visit, subsequent 2022    Hypoglycemia 2023    Chronic kidney disease, stage 3a 2023     Resolved Ambulatory Problems     Diagnosis Date Noted    Hypoglycemia 2017    Hyperglycemia 2017    Umbilical pain 2018     Past Medical History:   Diagnosis Date    Headache, tension-type     High blood pressure     High cholesterol     History of MRSA infection     Migraine     Obesity     Pancreatitis, chronic     Syncope     Type 2 diabetes mellitus          PAST SURGICAL HISTORY  Past Surgical History:   Procedure Laterality Date     SECTION      COLONOSCOPY N/A     UL    GASTRIC STIMULATOR IMPLANT SURGERY N/A 2018    Open placement of gastric stimulator electrodes, open placement of multi-array gastric stimulator generator, initial programming of gastric stimulator, open gastric biopsy, On-Q pump placement, wedge biopsy of liver-Dr. Jose Cantu    HYSTERECTOMY N/A     UL     MOLE REMOVAL      SKIN GRAFT           FAMILY HISTORY  Family History   Problem Relation Age of Onset    Diabetes Mother     Schizophrenia Mother     Heart disease Father     Diabetes Father     Cancer Father     Kidney disease Father     Heart disease Brother     Stroke Paternal Grandmother     Heart disease Paternal Grandfather          SOCIAL HISTORY  Social History     Socioeconomic History    Marital status:    Tobacco Use    Smoking status: Former     Packs/day: 1.00     Years: 24.00     Additional pack years: 0.00     Total pack years: 24.00     Types: Cigarettes     Quit date: 2017     Years since quittin.0     Passive exposure: Never    Smokeless tobacco: Never   Vaping Use    Vaping Use: Never used   Substance and Sexual Activity    Alcohol use: No    Drug use: No    Sexual activity: Not Currently     Partners: Male     Birth control/protection: None         ALLERGIES  Contrast dye (echo or unknown ct/mr), Imitrex [sumatriptan], Iodinated contrast media, Acetaminophen, Linaclotide, Lisinopril, Codeine, Levemir [insulin detemir], and Morphine and related        REVIEW OF SYSTEMS  Review of Systems     All systems reviewed and negative except for those discussed in HPI.       PHYSICAL EXAM    I have reviewed the triage vital signs and nursing notes.    ED Triage Vitals   Temp Heart Rate Resp BP SpO2   23 1322 23 1322 23 1322 23 1348 23 1322   98.5 °F (36.9 °C) (!) 121 17 (!) 185/90 99 %      Temp src Heart Rate Source Patient Position BP Location FiO2 (%)   23 1322 23 1348 23 1348 23 1348 --   Tympanic Monitor Lying Right arm        Physical Exam    Physical Exam   Constitutional: No distress.  Active and spry.  Nontoxic.  HENT:  Head: Normocephalic and atraumatic.   Oropharynx: Mucous membranes are moist.   Eyes: No scleral icterus.   Neck: Neck supple.   Cardiovascular: Pink, warm and well-perfused throughout.  Regular rate and  rhythm.  Pulmonary/Chest: No respiratory distress.  No tachypnea or increased work of breathing  Abdominal: Soft.  No rebound or rigidity.  Mild diffuse discomfort palpation without focal tenderness or guarding.  No peritoneal findings.  Musculoskeletal: Moves all extremities equally.   Neurological: Alert.  Speech fluent and easily intelligible  Skin: Skin is pink, warm, and dry. No pallor.   Psychiatric: Mood and affect normal.   Nursing note and vitals reviewed.    LAB RESULTS  Recent Results (from the past 24 hour(s))   Urinalysis With Culture If Indicated - Urine, Clean Catch    Collection Time: 11/29/23  2:32 PM    Specimen: Urine, Clean Catch   Result Value Ref Range    Color, UA Dark Yellow (A) Yellow, Straw    Appearance, UA Cloudy (A) Clear    pH, UA <=5.0 5.0 - 8.0    Specific Gravity, UA >=1.030 1.005 - 1.030    Glucose, UA Negative Negative    Ketones, UA 15 mg/dL (1+) (A) Negative    Bilirubin, UA Negative Negative    Blood, UA Small (1+) (A) Negative    Protein, UA >=300 mg/dL (3+) (A) Negative    Leuk Esterase, UA Negative Negative    Nitrite, UA Negative Negative    Urobilinogen, UA 1.0 E.U./dL 0.2 - 1.0 E.U./dL   Urinalysis, Microscopic Only - Urine, Clean Catch    Collection Time: 11/29/23  2:32 PM    Specimen: Urine, Clean Catch   Result Value Ref Range    RBC, UA 0-2 None Seen, 0-2 /HPF    WBC, UA 6-10 (A) None Seen, 0-2 /HPF    Bacteria, UA None Seen None Seen /HPF    Squamous Epithelial Cells, UA 7-12 (A) None Seen, 0-2 /HPF    Hyaline Casts, UA 3-6 None Seen /LPF    Methodology Manual Light Microscopy    Comprehensive Metabolic Panel    Collection Time: 11/29/23  2:48 PM    Specimen: Blood   Result Value Ref Range    Glucose 67 65 - 99 mg/dL    BUN 12 6 - 20 mg/dL    Creatinine 1.22 (H) 0.57 - 1.00 mg/dL    Sodium 139 136 - 145 mmol/L    Potassium 3.1 (L) 3.5 - 5.2 mmol/L    Chloride 99 98 - 107 mmol/L    CO2 29.0 22.0 - 29.0 mmol/L    Calcium 9.5 8.6 - 10.5 mg/dL    Total Protein 8.2 6.0 -  8.5 g/dL    Albumin 4.5 3.5 - 5.2 g/dL    ALT (SGPT) 16 1 - 33 U/L    AST (SGOT) 20 1 - 32 U/L    Alkaline Phosphatase 67 39 - 117 U/L    Total Bilirubin 0.4 0.0 - 1.2 mg/dL    Globulin 3.7 gm/dL    A/G Ratio 1.2 g/dL    BUN/Creatinine Ratio 9.8 7.0 - 25.0    Anion Gap 11.0 5.0 - 15.0 mmol/L    eGFR 54.2 (L) >60.0 mL/min/1.73   Lipase    Collection Time: 11/29/23  2:48 PM    Specimen: Blood   Result Value Ref Range    Lipase 7 (L) 13 - 60 U/L   Single High Sensitivity Troponin T    Collection Time: 11/29/23  2:48 PM    Specimen: Blood   Result Value Ref Range    HS Troponin T 8 <14 ng/L   Lactic Acid, Plasma    Collection Time: 11/29/23  2:48 PM    Specimen: Blood   Result Value Ref Range    Lactate 1.7 0.5 - 2.0 mmol/L   CBC Auto Differential    Collection Time: 11/29/23  2:48 PM    Specimen: Blood   Result Value Ref Range    WBC 10.83 (H) 3.40 - 10.80 10*3/mm3    RBC 4.48 3.77 - 5.28 10*6/mm3    Hemoglobin 13.7 12.0 - 15.9 g/dL    Hematocrit 41.3 34.0 - 46.6 %    MCV 92.2 79.0 - 97.0 fL    MCH 30.6 26.6 - 33.0 pg    MCHC 33.2 31.5 - 35.7 g/dL    RDW 14.6 12.3 - 15.4 %    RDW-SD 49.6 37.0 - 54.0 fl    MPV 10.4 6.0 - 12.0 fL    Platelets 312 140 - 450 10*3/mm3    Neutrophil % 57.0 42.7 - 76.0 %    Lymphocyte % 35.2 19.6 - 45.3 %    Monocyte % 6.5 5.0 - 12.0 %    Eosinophil % 0.5 0.3 - 6.2 %    Basophil % 0.6 0.0 - 1.5 %    Immature Grans % 0.2 0.0 - 0.5 %    Neutrophils, Absolute 6.19 1.70 - 7.00 10*3/mm3    Lymphocytes, Absolute 3.81 (H) 0.70 - 3.10 10*3/mm3    Monocytes, Absolute 0.70 0.10 - 0.90 10*3/mm3    Eosinophils, Absolute 0.05 0.00 - 0.40 10*3/mm3    Basophils, Absolute 0.06 0.00 - 0.20 10*3/mm3    Immature Grans, Absolute 0.02 0.00 - 0.05 10*3/mm3    nRBC 0.0 0.0 - 0.2 /100 WBC   ECG 12 Lead Electrolyte Imbalance    Collection Time: 11/29/23  2:48 PM   Result Value Ref Range    QT Interval 394 ms    QTC Interval 444 ms   Blood Gas, Venous -    Collection Time: 11/29/23  4:19 PM    Specimen: Venous Blood    Result Value Ref Range    pH, Venous 7.398 7.310 - 7.410 pH Units    pCO2, Venous 48.7 41.0 - 51.0 mm Hg    pO2, Venous 32.6 (L) 35.0 - 45.0 mm Hg    HCO3, Venous 30.0 (H) 22.0 - 28.0 mmol/L    Base Excess, Venous 4.0 (H) -2.0 - 2.0 mmol/L    O2 Saturation, Venous 61.8 45.0 - 75.0 %    CO2 Content 31.5 (H) 23 - 27 mmol/L    Barometric Pressure for Blood Gas 749.9000 mmHg    Modality Room Air     Rate 18 Breaths/minute       Ordered the above labs and independently reviewed the results.        RADIOLOGY  No Radiology Exams Resulted Within Past 24 Hours    I ordered the above noted radiological studies. Reviewed by me and discussed with radiologist.  See dictation for official radiology interpretation.      PROCEDURES    Procedures        MEDICATIONS GIVEN IN ER    Medications   droperidol (INAPSINE) injection 2.5 mg (2.5 mg Intravenous Given 11/29/23 1509)   sodium chloride 0.9 % bolus 1,000 mL (0 mL Intravenous Stopped 11/29/23 1725)   ketamine hcl syringe solution prefilled syringe 12.7 mg (12.7 mg Intravenous Given 11/29/23 1629)         PROGRESS, DATA ANALYSIS, CONSULTS, AND MEDICAL DECISION MAKING    My differential diagnosis for abdominal pain includes but is not limited to:  Gastritis, gastroenteritis, peptic ulcer disease, GERD, esophageal perforation, acute appendicitis, mesenteric adenitis, Meckel’s diverticulum, epiploic appendagitis, diverticulitis, colon cancer, ulcerative colitis, Crohn’s disease, intussusception, small bowel obstruction, adhesions, ischemic bowel, perforated viscus, ileus, obstipation, biliary colic, cholecystitis, cholelithiasis, Raj-Edmundo Jefferson, hepatitis, pancreatitis, common bile duct obstruction, cholangitis, bile leak, splenic trauma, splenic rupture, splenic infarction, splenic abscess, abdominal abscess, ascites, spontaneous bacterial peritonitis, hernia, UTI, cystitis,ureterolithiasis, urinary obstruction, ovarian cyst, torsion, pregnancy, ectopic pregnancy, PID, pelvic  abscess, mittelschmerz, endometriosis, AAA, myocardial infarction, pneumonia, cancer, porphyria, DKA, medications, sickle cell, viral syndrome, zoster      All labs have been independently reviewed by me.  All radiology studies have been reviewed by me and discussed with radiologist dictating the report.   EKG's independently viewed and interpreted by me.  Discussion below represents my analysis of pertinent findings related to patient's condition, differential diagnosis, treatment plan and final disposition.      ED Course as of 11/29/23 1733   Wed Nov 29, 2023   1450 EKG           EKG time: 1448  Rhythm/Rate: Sinus, 75  P waves and IN: GREG within normal limits  QRS, axis: Narrow complex with slow R wave progression  ST and T waves: No STEMI; QTc within normal limits    Interpreted Contemporaneously by me, independently viewed  Comparison: Unavailable   [RS]   1616 Lipase(!): 7 [RS]   1616 Lactate: 1.7 [RS]   1616 HS Troponin T: 8 [RS]   1616 Glucose: 67 [RS]   1616 BUN: 12 [RS]   1616 Creatinine(!): 1.22 [RS]   1616 Sodium: 139 [RS]   1616 WBC(!): 10.83 [RS]   1616 Hemoglobin: 13.7 [RS]   1616 Specific Gravity, UA: >=1.030 [RS]   1616 Ketones, UA(!): 15 mg/dL (1+) [RS]   1616 Leukocytes, UA: Negative [RS]   1616 Nitrite, UA: Negative [RS]   1616 Bacteria, UA: None Seen [RS]   1617 Monitor for improvement symptomatically and dispo thereafter. [RS]   1629 pH, Venous: 7.398 [RS]   1629 pCO2, Venous: 48.7 [RS]   1641 Given the patient's chronic abdominal discomfort and nature of her disease, I am attempting to avoid opiates as they may worsen symptoms.  Droperidol with improved nausea but still with discomfort.  Ketamine ordered. [RS]   1733 Patient feeling improved and requesting discharge. [RS]      ED Course User Index  [RS] Panda Shook MD       AS OF 17:33 EST VITALS:    BP - 139/76  HR - 95  TEMP - 98.5 °F (36.9 °C) (Tympanic)  O2 SATS - 94%        DIAGNOSIS  Final diagnoses:   Nausea and vomiting, unspecified  vomiting type   Volume depletion, gastrointestinal loss   Chronic abdominal pain   Gastroparesis         DISPOSITION  DISCHARGE    Patient discharged in stable condition.    Reviewed implications of results, diagnosis, meds, responsibility to follow up, warning signs and symptoms of possible worsening, potential complications and reasons to return to ER.    Patient/Family voiced understanding of above instructions.    Discussed plan for discharge, as there is no emergent indication for admission. Patient referred to primary care provider for regular health maintenance. Pt/family is agreeable and understands need for follow up and possible repeat testing.  Pt is aware that discharge does not mean that nothing is wrong but it indicates no emergency is present that requires admission and they must continue care with follow-up as given below or physician of their choice.     FOLLOW-UP  Jairo Walker MD  [address] [facility]  [address]  [place] [id number] [phone]    Schedule an appointment as soon as possible for a visit   As needed    Your gastroenterologist at [facility] in the GI motility clinic  [facility], [address]   [place] [address] [phone] (Work)   [phone] (Fax)  Call in 1 day           Medication List      No changes were made to your prescriptions during this visit.           Please note that portions of this were completed with a voice recognition program.       Note Disclaimer: At [facility], we believe that sharing information builds trust and better relationships. You are receiving this note because you are receiving care at [facility] or recently visited. It is possible you will see health information before a provider has talked with you about it. This kind of information can be easy to misunderstand. To help you fully understand what it means for your health, we urge you to discuss this note with your provider.      Panda Shook MD  11/29/23 1658       Panda Shook MD  11/29/23 1737

## 2023-11-30 LAB — BACTERIA SPEC AEROBE CULT: NO GROWTH

## 2023-12-01 DIAGNOSIS — I10 ESSENTIAL HYPERTENSION: ICD-10-CM

## 2023-12-01 DIAGNOSIS — R10.9 FLANK PAIN: ICD-10-CM

## 2023-12-01 RX ORDER — TRAMADOL HYDROCHLORIDE 50 MG/1
50 TABLET ORAL EVERY 6 HOURS PRN
Qty: 24 TABLET | Refills: 2 | Status: SHIPPED | OUTPATIENT
Start: 2023-12-01

## 2023-12-01 RX ORDER — METOPROLOL SUCCINATE 100 MG/1
100 TABLET, EXTENDED RELEASE ORAL DAILY
Qty: 90 TABLET | Refills: 1 | Status: SHIPPED | OUTPATIENT
Start: 2023-12-01

## 2023-12-01 NOTE — TELEPHONE ENCOUNTER
Contract 11/8/22    Rx Refill Note  Requested Prescriptions     Pending Prescriptions Disp Refills    traMADol (ULTRAM) 50 MG tablet [Pharmacy Med Name: TRAMADOL HCL 50 MG TABLET] 24 tablet 2     Sig: TAKE 1 TABLET BY MOUTH EVERY 6 HOURS AS NEEDED FOR MODERATE PAIN     Signed Prescriptions Disp Refills    metoprolol succinate XL (TOPROL-XL) 100 MG 24 hr tablet 90 tablet 1     Sig: TAKE 1 TABLET BY MOUTH EVERY DAY     Authorizing Provider: ANJALI CARRANZA     Ordering User: KATINA HAMMOND      Last office visit with prescribing clinician: 8/2/2023   Last telemedicine visit with prescribing clinician: Visit date not found   Next office visit with prescribing clinician: 12/6/2023       Katina Hammond MA  12/01/23, 09:32 EST

## 2023-12-06 ENCOUNTER — OFFICE VISIT (OUTPATIENT)
Dept: INTERNAL MEDICINE | Facility: CLINIC | Age: 48
End: 2023-12-06
Payer: MEDICARE

## 2023-12-06 VITALS
DIASTOLIC BLOOD PRESSURE: 94 MMHG | OXYGEN SATURATION: 100 % | SYSTOLIC BLOOD PRESSURE: 156 MMHG | HEART RATE: 97 BPM | BODY MASS INDEX: 41.5 KG/M2 | WEIGHT: 281 LBS

## 2023-12-06 DIAGNOSIS — I10 ESSENTIAL HYPERTENSION: Primary | ICD-10-CM

## 2023-12-06 DIAGNOSIS — K82.8 ENLARGED GALLBLADDER: ICD-10-CM

## 2023-12-06 DIAGNOSIS — K31.84 GASTROPARESIS: ICD-10-CM

## 2023-12-06 DIAGNOSIS — Z79.4 TYPE 2 DIABETES MELLITUS WITH HYPERGLYCEMIA, WITH LONG-TERM CURRENT USE OF INSULIN: ICD-10-CM

## 2023-12-06 DIAGNOSIS — K86.1 OTHER CHRONIC PANCREATITIS: ICD-10-CM

## 2023-12-06 DIAGNOSIS — R10.11 COLICKY RIGHT UPPER QUADRANT PAIN: ICD-10-CM

## 2023-12-06 DIAGNOSIS — E11.65 TYPE 2 DIABETES MELLITUS WITH HYPERGLYCEMIA, WITH LONG-TERM CURRENT USE OF INSULIN: ICD-10-CM

## 2023-12-06 DIAGNOSIS — K86.89 ATROPHIC PANCREAS: ICD-10-CM

## 2023-12-06 PROCEDURE — 99214 OFFICE O/P EST MOD 30 MIN: CPT | Performed by: FAMILY MEDICINE

## 2023-12-06 PROCEDURE — 3044F HG A1C LEVEL LT 7.0%: CPT | Performed by: FAMILY MEDICINE

## 2023-12-06 PROCEDURE — 3080F DIAST BP >= 90 MM HG: CPT | Performed by: FAMILY MEDICINE

## 2023-12-06 PROCEDURE — 3077F SYST BP >= 140 MM HG: CPT | Performed by: FAMILY MEDICINE

## 2023-12-27 ENCOUNTER — HOSPITAL ENCOUNTER (OUTPATIENT)
Dept: NUCLEAR MEDICINE | Facility: HOSPITAL | Age: 48
Discharge: HOME | End: 2023-12-27
Payer: MEDICARE

## 2023-12-27 DIAGNOSIS — R10.11 COLICKY RIGHT UPPER QUADRANT PAIN: ICD-10-CM

## 2023-12-27 DIAGNOSIS — K82.8 ENLARGED GALLBLADDER: ICD-10-CM

## 2023-12-27 PROCEDURE — 78227 HEPATOBIL SYST IMAGE W/DRUG: CPT

## 2023-12-27 PROCEDURE — 25010000002 SINCALIDE PER 5 MCG: Performed by: FAMILY MEDICINE

## 2023-12-27 PROCEDURE — 0 TECHNETIUM TC 99M MEBROFENIN KIT: Performed by: FAMILY MEDICINE

## 2023-12-27 PROCEDURE — A9537 TC99M MEBROFENIN: HCPCS | Performed by: FAMILY MEDICINE

## 2023-12-27 RX ORDER — KIT FOR THE PREPARATION OF TECHNETIUM TC 99M MEBROFENIN 45 MG/10ML
1 INJECTION, POWDER, LYOPHILIZED, FOR SOLUTION INTRAVENOUS
Status: COMPLETED | OUTPATIENT
Start: 2023-12-27 | End: 2023-12-27

## 2023-12-27 RX ADMIN — SINCALIDE 2.5 MCG: 5 INJECTION, POWDER, LYOPHILIZED, FOR SOLUTION INTRAVENOUS at 08:40

## 2023-12-27 RX ADMIN — MEBROFENIN 1 DOSE: 45 INJECTION, POWDER, LYOPHILIZED, FOR SOLUTION INTRAVENOUS at 07:32

## 2023-12-30 DIAGNOSIS — E10.65 TYPE 1 DIABETES MELLITUS WITH HYPERGLYCEMIA: ICD-10-CM

## 2024-01-02 RX ORDER — INSULIN LISPRO 200 [IU]/ML
INJECTION, SOLUTION SUBCUTANEOUS
Qty: 180 ML | Refills: 0 | Status: SHIPPED | OUTPATIENT
Start: 2024-01-02

## 2024-02-07 ENCOUNTER — OFFICE VISIT (OUTPATIENT)
Dept: ENDOCRINOLOGY | Age: 49
End: 2024-02-07
Payer: MEDICARE

## 2024-02-07 VITALS
HEIGHT: 69 IN | TEMPERATURE: 96.8 F | WEIGHT: 283.4 LBS | DIASTOLIC BLOOD PRESSURE: 90 MMHG | OXYGEN SATURATION: 98 % | BODY MASS INDEX: 41.98 KG/M2 | HEART RATE: 76 BPM | SYSTOLIC BLOOD PRESSURE: 130 MMHG

## 2024-02-07 DIAGNOSIS — E66.01 CLASS 3 SEVERE OBESITY DUE TO EXCESS CALORIES WITH SERIOUS COMORBIDITY AND BODY MASS INDEX (BMI) OF 40.0 TO 44.9 IN ADULT: ICD-10-CM

## 2024-02-07 DIAGNOSIS — E78.2 MIXED HYPERLIPIDEMIA: ICD-10-CM

## 2024-02-07 DIAGNOSIS — K31.84 GASTROPARESIS: ICD-10-CM

## 2024-02-07 DIAGNOSIS — E10.65 TYPE 1 DIABETES MELLITUS WITH HYPERGLYCEMIA: Primary | ICD-10-CM

## 2024-02-07 DIAGNOSIS — E10.42 DIABETIC PERIPHERAL NEUROPATHY ASSOCIATED WITH TYPE 1 DIABETES MELLITUS: ICD-10-CM

## 2024-02-07 DIAGNOSIS — N18.31 STAGE 3A CHRONIC KIDNEY DISEASE: ICD-10-CM

## 2024-02-07 RX ORDER — GLUCAGON INJECTION, SOLUTION 1 MG/.2ML
1 INJECTION, SOLUTION SUBCUTANEOUS AS NEEDED
Qty: 0.4 ML | Refills: 1 | Status: SHIPPED | OUTPATIENT
Start: 2024-02-07

## 2024-02-07 NOTE — PROGRESS NOTES
"Chief Complaint  Diabetes (Pump attached)    Subjective        Danielle Dudley presents to [facility] ENDOCRINOLOGY  History of Present Illness    Type 1 dm (+) LANDRY (2021)  DM regimen: u200 humalog in her pump, medtronic 780g  Last eye exam - n/a  Dm neuropathy -yes  +CKD: saw renal, On ARB renal stopped statin   Episodes of hypoglycemia - resolved   + gastroparesis with stimulator  Emergency glucagon kit:    Backup plan: u200 pen with pen needles     Cgm review  1% low  76% time in range  23% high  Smart guard 84%  Gmi 6.8%  TDD 52u       Objective   Vital Signs:  /90   Pulse 76   Temp 96.8 °F (36 °C) (Temporal)   Ht 175.3 cm (69\")   Wt 129 kg (283 lb 6.4 oz)   SpO2 98%   BMI 41.85 kg/m²   Estimated body mass index is 41.85 kg/m² as calculated from the following:    Height as of this encounter: 175.3 cm (69\").    Weight as of this encounter: 129 kg (283 lb 6.4 oz).            Physical Exam  Vitals reviewed.   Constitutional:       General: She is not in acute distress.  HENT:      Head: Normocephalic and atraumatic.   Cardiovascular:      Rate and Rhythm: Normal rate.   Pulmonary:      Effort: Pulmonary effort is normal. No respiratory distress.   Musculoskeletal:         General: No signs of injury. Normal range of motion.      Cervical back: Normal range of motion and neck supple.   Skin:     General: Skin is warm and dry.   Neurological:      Mental Status: She is alert and oriented to person, place, and time. Mental status is at baseline.   Psychiatric:         Mood and Affect: Mood normal.         Behavior: Behavior normal.         Thought Content: Thought content normal.         Judgment: Judgment normal.              Result Review :    The following data was reviewed by: RICKI Westfall on 2024:  Common labs          2023    06:16 2023    14:55 2023    14:48   Common Labs   Glucose 266  164  67    BUN 19  11  12    Creatinine 1.10  0.92  1.22  "   Sodium 135  137  139    Potassium 4.3  3.4  3.1    Chloride 102  99  99    Calcium 8.7  9.5  9.5    Albumin  4.1  4.5    Total Bilirubin  0.6  0.4    Alkaline Phosphatase  70  67    AST (SGOT)  13  20    ALT (SGPT)  13  16    WBC 6.29  9.88  10.83    Hemoglobin 11.3  13.2  13.7    Hematocrit 35.5  41.0  41.3    Platelets 219  291  312                   Assessment and Plan     Diagnoses and all orders for this visit:    1. Type 1 diabetes mellitus with hyperglycemia (Primary)  -     Hemoglobin A1c  -     Lipid Panel  -     TSH    2. Diabetic peripheral neuropathy associated with type 1 diabetes mellitus    3. Gastroparesis    4. Stage 3a chronic kidney disease    5. Mixed hyperlipidemia    6. Class 3 severe obesity due to excess calories with serious comorbidity and body mass index (BMI) of 40.0 to 44.9 in adult    Other orders  -     Glucagon (Gvoke HypoPen 2-Pack) 1 MG/0.2ML solution auto-injector; Inject 1 mg under the skin into the appropriate area as directed As Needed (hypoglycemia).  Dispense: 0.4 mL; Refill: 1             Follow Up     Return in about 3 months (around 5/7/2024).    Cgm reviewed, at goal  Labs today  Will adjust regimen as needed based on results   Reviewed hypoglycemia plan in place    Patient was given instructions and counseling regarding her condition or for health maintenance advice. Please see specific information pulled into the AVS if appropriate.     RICKI Westfall

## 2024-02-08 ENCOUNTER — TELEPHONE (OUTPATIENT)
Dept: ENDOCRINOLOGY | Age: 49
End: 2024-02-08
Payer: MEDICARE

## 2024-02-08 LAB
CHOLEST SERPL-MCNC: 297 MG/DL (ref 100–199)
HBA1C MFR BLD: 6.7 % (ref 4.8–5.6)
HDLC SERPL-MCNC: 42 MG/DL
IMP & REVIEW OF LAB RESULTS: NORMAL
LABORATORY COMMENT REPORT: ABNORMAL
LDLC SERPL CALC-MCNC: 198 MG/DL (ref 0–99)
TRIGL SERPL-MCNC: 289 MG/DL (ref 0–149)
TSH SERPL DL<=0.005 MIU/L-ACNC: 2.59 UIU/ML (ref 0.45–4.5)
VLDLC SERPL CALC-MCNC: 57 MG/DL (ref 5–40)

## 2024-02-08 NOTE — TELEPHONE ENCOUNTER
Hub staff attempted to follow warm transfer process and was unsuccessful     Caller: ALYSA    Relationship to patient:     Best call back number:     [phone]       Patient is needing: ALYSA FROM THE [facility] OFFICE CALLED TO GET THE [facility] MOST RECENT LABS FAXED OVER THE FAX NUMBER  [phone]

## 2024-02-24 DIAGNOSIS — E10.43 DIABETIC AUTONOMIC NEUROPATHY ASSOCIATED WITH TYPE 1 DIABETES MELLITUS: ICD-10-CM

## 2024-02-24 DIAGNOSIS — F32.0 CURRENT MILD EPISODE OF MAJOR DEPRESSIVE DISORDER WITHOUT PRIOR EPISODE: ICD-10-CM

## 2024-02-26 RX ORDER — DULOXETIN HYDROCHLORIDE 60 MG/1
60 CAPSULE, DELAYED RELEASE ORAL DAILY
Qty: 90 CAPSULE | Refills: 1 | OUTPATIENT
Start: 2024-02-26

## 2024-02-26 NOTE — TELEPHONE ENCOUNTER
Rx Refill Note  Requested Prescriptions     Pending Prescriptions Disp Refills    DULoxetine (CYMBALTA) 60 MG capsule [Pharmacy Med Name: DULOXETINE HCL DR 60 MG CAP] 90 capsule 1     Sig: TAKE 1 CAPSULE BY MOUTH EVERY DAY      Last office visit with prescribing clinician: 12/6/2023   Last telemedicine visit with prescribing clinician: Visit date not found   Next office visit with prescribing clinician: 6/12/2024                         Would you like a call back once the refill request has been completed: [] Yes [] No    If the office needs to give you a call back, can they leave a voicemail: [] Yes [] No    Katina Starks MA  02/26/24, 07:59 EST

## 2024-03-12 DIAGNOSIS — E10.43 DIABETIC AUTONOMIC NEUROPATHY ASSOCIATED WITH TYPE 1 DIABETES MELLITUS: ICD-10-CM

## 2024-03-12 DIAGNOSIS — F32.0 CURRENT MILD EPISODE OF MAJOR DEPRESSIVE DISORDER WITHOUT PRIOR EPISODE: ICD-10-CM

## 2024-03-12 RX ORDER — DULOXETIN HYDROCHLORIDE 60 MG/1
60 CAPSULE, DELAYED RELEASE ORAL DAILY
Qty: 90 CAPSULE | Refills: 1 | Status: SHIPPED | OUTPATIENT
Start: 2024-03-12

## 2024-03-27 NOTE — PROGRESS NOTES
"Chief Complaint  Hyperlipidemia, Hypertension, Diabetes, Chronic Kidney Disease, and gastroparesis    Subjective        Danielle Dudley presents to [facility] PRIMARY CARE  History of Present Illness  Danielle is a really pleasant lady with gastroparesis gastric stimulator and insulin requiring diabetes related to atrophic pancreas and chronic pancreatitis.  She has been in the ER twice in the last part of November.  On review of CT scan and by history she has chronic right upper quadrant pain which may be related more to reduced ejection fraction of gallbladder with gallbladder dysfunction and a pretty large gallbladder on CT scan.  Given postprandial pain we will get HIDA scan with pharmacologic intervention to screen for reduced ejection fraction the gallbladder as etiology of repetitive pain despite history of atrophic pancreas and chronic pancreatitis history.  Lipase was not elevated although her pancreas is pretty atrophic on x-ray.  Hyperlipidemia  This is a chronic problem.   Hypertension    Diabetes    Chronic Kidney Disease        Objective   Vital Signs:  /94 (BP Location: Left arm, Patient Position: Sitting, Cuff Size: Large Adult)   Pulse 97   Wt 127 kg (281 lb)   SpO2 100%   BMI 41.50 kg/m²   Estimated body mass index is 41.5 kg/m² as calculated from the following:    Height as of 11/29/23: 175.3 cm (69\").    Weight as of this encounter: 127 kg (281 lb).               Physical Exam  Vitals reviewed.   Constitutional:       Appearance: She is well-developed. She is obese.   HENT:      Head: Normocephalic and atraumatic.      Right Ear: Tympanic membrane and external ear normal.      Left Ear: Tympanic membrane and external ear normal.      Nose: Nose normal.   Eyes:      Conjunctiva/sclera: Conjunctivae normal.      Pupils: Pupils are equal, round, and reactive to light.   Neck:      Thyroid: No thyromegaly.      Vascular: No JVD.   Cardiovascular:      Rate and Rhythm: Normal " -2 rate and regular rhythm.      Heart sounds: Normal heart sounds.   Pulmonary:      Effort: Pulmonary effort is normal.      Breath sounds: Normal breath sounds.   Abdominal:      General: Bowel sounds are normal.      Palpations: Abdomen is soft. There is no hepatomegaly or splenomegaly.      Tenderness: There is abdominal tenderness in the right upper quadrant. There is guarding. Positive signs include Barreto's sign.   Musculoskeletal:         General: Normal range of motion.      Cervical back: Normal range of motion and neck supple.   Lymphadenopathy:      Cervical: No cervical adenopathy.   Skin:     General: Skin is warm and dry.      Findings: No rash.   Neurological:      Mental Status: She is alert and oriented to person, place, and time.      Cranial Nerves: No cranial nerve deficit.      Coordination: Coordination normal.   Psychiatric:         Behavior: Behavior normal.         Thought Content: Thought content normal.         Judgment: Judgment normal.        Result Review :  The following data was reviewed by: Jairo Walker MD on 12/06/2023:  Common labs          9/23/2023    06:16 11/27/2023    14:55 11/29/2023    14:48   Common Labs   Glucose 266  164  67    BUN 19  11  12    Creatinine 1.10  0.92  1.22    Sodium 135  137  139    Potassium 4.3  3.4  3.1    Chloride 102  99  99    Calcium 8.7  9.5  9.5    Albumin  4.1  4.5    Total Bilirubin  0.6  0.4    Alkaline Phosphatase  70  67    AST (SGOT)  13  20    ALT (SGPT)  13  16    WBC 6.29  9.88  10.83    Hemoglobin 11.3  13.2  13.7    Hematocrit 35.5  41.0  41.3    Platelets 219  291  312                   Assessment and Plan   Diagnoses and all orders for this visit:    1. Essential hypertension (Primary)  Comments:  Toprol- mg daily hydrochlorothiazide 12.5 mg daily    2. Type 2 diabetes mellitus with hyperglycemia, with long-term current use of insulin  Comments:  Insulin pump    3. Other chronic pancreatitis    4.  Gastroparesis  Comments:  Gastric stimulator    5. Atrophic pancreas    6. Colicky right upper quadrant pain  Comments:  Consider gallbladder dysfunction above and beyond pancreatitis  Orders:  -     NM HIDA SCAN WITH PHARMACOLOGICAL INTERVENTION; Future    7. Enlarged gallbladder  Comments:  Check HIDA scan with pharmacologic intervention for possible reduced ejection fraction  Orders:  -     NM HIDA SCAN WITH PHARMACOLOGICAL INTERVENTION; Future             Follow Up   No follow-ups on file.  Patient was given instructions and counseling regarding her condition or for health maintenance advice. Please see specific information pulled into the AVS if appropriate.

## 2024-04-30 RX ORDER — APIXABAN 5 MG/1
TABLET, FILM COATED ORAL
Qty: 60 TABLET | Refills: 1 | Status: SHIPPED | OUTPATIENT
Start: 2024-04-30

## 2024-05-03 DIAGNOSIS — E10.65 TYPE 1 DIABETES MELLITUS WITH HYPERGLYCEMIA: ICD-10-CM

## 2024-05-03 RX ORDER — INSULIN LISPRO 200 [IU]/ML
INJECTION, SOLUTION SUBCUTANEOUS
Qty: 180 ML | Refills: 1 | Status: SHIPPED | OUTPATIENT
Start: 2024-05-03

## 2024-05-03 NOTE — TELEPHONE ENCOUNTER
Rx Refill Note  Requested Prescriptions     Pending Prescriptions Disp Refills    HumaLOG KwikPen 200 UNIT/ML solution pen-injector [Pharmacy Med Name: HUMALOG 200 UNIT/ML KWIKPEN] 180 mL 1     Sig: INJECT 400 UNITS UNDER THE SKIN INTO THE APPROPRIATE AREA AS DIRECTED DAILY. USE AS DIRECTED      Last office visit with prescribing clinician: 2/7/2024   Last telemedicine visit with prescribing clinician: Visit date not found   Next office visit with prescribing clinician: 5/20/2024                         Would you like a call back once the refill request has been completed: [] Yes [] No    If the office needs to give you a call back, can they leave a voicemail: [] Yes [] No    Katina Starks MA  05/03/24, 09:33 EDT

## 2024-05-16 ENCOUNTER — OFFICE VISIT (OUTPATIENT)
Dept: ENDOCRINOLOGY | Age: 49
End: 2024-05-16
Payer: MEDICARE

## 2024-05-16 VITALS
BODY MASS INDEX: 43.4 KG/M2 | OXYGEN SATURATION: 97 % | SYSTOLIC BLOOD PRESSURE: 152 MMHG | DIASTOLIC BLOOD PRESSURE: 86 MMHG | HEART RATE: 83 BPM | WEIGHT: 293 LBS | HEIGHT: 69 IN

## 2024-05-16 DIAGNOSIS — E10.65 TYPE 1 DIABETES MELLITUS WITH HYPERGLYCEMIA: Primary | ICD-10-CM

## 2024-05-16 DIAGNOSIS — E78.2 MIXED HYPERLIPIDEMIA: ICD-10-CM

## 2024-05-16 DIAGNOSIS — E66.01 CLASS 3 SEVERE OBESITY DUE TO EXCESS CALORIES WITH SERIOUS COMORBIDITY AND BODY MASS INDEX (BMI) OF 40.0 TO 44.9 IN ADULT: ICD-10-CM

## 2024-05-16 DIAGNOSIS — K31.84 GASTROPARESIS: ICD-10-CM

## 2024-05-16 DIAGNOSIS — E10.42 DIABETIC PERIPHERAL NEUROPATHY ASSOCIATED WITH TYPE 1 DIABETES MELLITUS: ICD-10-CM

## 2024-05-16 DIAGNOSIS — N18.31 STAGE 3A CHRONIC KIDNEY DISEASE: ICD-10-CM

## 2024-05-16 RX ORDER — GLUCAGON INJECTION, SOLUTION 1 MG/.2ML
1 INJECTION, SOLUTION SUBCUTANEOUS AS NEEDED
Qty: 0.4 ML | Refills: 1 | Status: SHIPPED | OUTPATIENT
Start: 2024-05-16

## 2024-05-16 RX ORDER — PANCRELIPASE LIPASE, PANCRELIPASE PROTEASE, PANCRELIPASE AMYLASE 25000; 79000; 105000 [USP'U]/1; [USP'U]/1; [USP'U]/1
1 CAPSULE, DELAYED RELEASE ORAL
COMMUNITY
Start: 2024-03-13 | End: 2024-09-09

## 2024-05-16 NOTE — PROGRESS NOTES
"Chief Complaint  Diabetes (Type 1)    Subjective        Danielle Dudley presents to [facility] ENDOCRINOLOGY  History of Present Illness    Type 1 dm (+) LANDRY (6/2021)  DM regimen: u200 humalog in her pump, medtronic 780g  Last diabetic eye exam: 12/2023  Known diabetic complications: neuropathy, CKD, gastroparesis with stimulator,   Emergency glucagon kit: never picked up new rx  Backup plan: u200 pen with pen needles      Cgm review  0% low  75% time in range  25% high  Smart guard 90%  Gmi 7.0%  TDD 60u      Objective   Vital Signs:  /86 (BP Location: Left arm, Patient Position: Sitting)   Pulse 83   Ht 175.3 cm (69.02\")   Wt 135 kg (297 lb)   SpO2 97%   BMI 43.84 kg/m²   Estimated body mass index is 43.84 kg/m² as calculated from the following:    Height as of this encounter: 175.3 cm (69.02\").    Weight as of this encounter: 135 kg (297 lb).               Physical Exam  Vitals reviewed.   Constitutional:       General: She is not in acute distress.  HENT:      Head: Normocephalic and atraumatic.   Cardiovascular:      Rate and Rhythm: Normal rate.   Pulmonary:      Effort: Pulmonary effort is normal. No respiratory distress.   Musculoskeletal:         General: No signs of injury. Normal range of motion.      Cervical back: Normal range of motion and neck supple.   Skin:     General: Skin is warm and dry.   Neurological:      Mental Status: She is alert and oriented to person, place, and time. Mental status is at baseline.   Psychiatric:         Mood and Affect: Mood normal.         Behavior: Behavior normal.         Thought Content: Thought content normal.         Judgment: Judgment normal.        Result Review :    The following data was reviewed by: RICKI Westfall on 05/16/2024:  Common labs          11/27/2023    14:55 11/29/2023    14:48 2/7/2024    12:55   Common Labs   Glucose 164  67     BUN 11  12     Creatinine 0.92  1.22     Sodium 137  139     Potassium 3.4  3.1   "   Chloride 99  99     Calcium 9.5  9.5     Albumin 4.1  4.5     Total Bilirubin 0.6  0.4     Alkaline Phosphatase 70  67     AST (SGOT) 13  20     ALT (SGPT) 13  16     WBC 9.88  10.83     Hemoglobin 13.2  13.7     Hematocrit 41.0  41.3     Platelets 291  312     Total Cholesterol   297    Triglycerides   289    HDL Cholesterol   42    LDL Cholesterol    198    Hemoglobin A1C   6.7                   Assessment and Plan     Diagnoses and all orders for this visit:    1. Type 1 diabetes mellitus with hyperglycemia (Primary)  -     Ambulatory Referral to Podiatry    2. Diabetic peripheral neuropathy associated with type 1 diabetes mellitus    3. Gastroparesis    4. Stage 3a chronic kidney disease    5. Mixed hyperlipidemia    6. Class 3 severe obesity due to excess calories with serious comorbidity and body mass index (BMI) of 40.0 to 44.9 in adult    Other orders  -     Glucagon (Gvoke HypoPen 2-Pack) 1 MG/0.2ML solution auto-injector; Inject 1 mg under the skin into the appropriate area as directed As Needed (hypoglycemia).  Dispense: 0.4 mL; Refill: 1             Follow Up     Return in about 3 months (around 8/16/2024).    Labs before next visit   Cgm reviewed, favorable, no additional settings changes made today   Statin per renal    glucagon RX and keep on hand      Patient was given instructions and counseling regarding her condition or for health maintenance advice. Please see specific information pulled into the AVS if appropriate.     RICKI Westfall

## 2024-06-03 DIAGNOSIS — E10.43 DIABETIC AUTONOMIC NEUROPATHY ASSOCIATED WITH TYPE 1 DIABETES MELLITUS: ICD-10-CM

## 2024-06-03 NOTE — TELEPHONE ENCOUNTER
Contract on file    Rx Refill Note  Requested Prescriptions     Pending Prescriptions Disp Refills    pregabalin (LYRICA) 150 MG capsule [Pharmacy Med Name: PREGABALIN 150 MG CAPSULE] 60 capsule      Sig: TAKE 1 CAPSULE BY MOUTH TWICE A DAY      Last office visit with prescribing clinician: 12/6/2023   Last telemedicine visit with prescribing clinician: Visit date not found   Next office visit with prescribing clinician: 6/3/2024       Katina Niño MA  06/03/24, 16:54 EDT

## 2024-06-04 RX ORDER — PREGABALIN 150 MG/1
CAPSULE ORAL
Qty: 60 CAPSULE | Refills: 5 | Status: SHIPPED | OUTPATIENT
Start: 2024-06-04

## 2024-06-12 ENCOUNTER — OFFICE VISIT (OUTPATIENT)
Dept: INTERNAL MEDICINE | Facility: CLINIC | Age: 49
End: 2024-06-12
Payer: MEDICARE

## 2024-06-12 VITALS
BODY MASS INDEX: 43.69 KG/M2 | WEIGHT: 293 LBS | HEART RATE: 105 BPM | DIASTOLIC BLOOD PRESSURE: 98 MMHG | OXYGEN SATURATION: 95 % | SYSTOLIC BLOOD PRESSURE: 164 MMHG

## 2024-06-12 DIAGNOSIS — E78.2 MIXED HYPERLIPIDEMIA: ICD-10-CM

## 2024-06-12 DIAGNOSIS — N18.31 CHRONIC KIDNEY DISEASE, STAGE 3A: ICD-10-CM

## 2024-06-12 DIAGNOSIS — E11.65 TYPE 2 DIABETES MELLITUS WITH HYPERGLYCEMIA, WITH LONG-TERM CURRENT USE OF INSULIN: Primary | ICD-10-CM

## 2024-06-12 DIAGNOSIS — Z79.4 TYPE 2 DIABETES MELLITUS WITH HYPERGLYCEMIA, WITH LONG-TERM CURRENT USE OF INSULIN: Primary | ICD-10-CM

## 2024-06-12 DIAGNOSIS — I10 ESSENTIAL HYPERTENSION: ICD-10-CM

## 2024-06-12 PROCEDURE — 3080F DIAST BP >= 90 MM HG: CPT | Performed by: FAMILY MEDICINE

## 2024-06-12 PROCEDURE — 3044F HG A1C LEVEL LT 7.0%: CPT | Performed by: FAMILY MEDICINE

## 2024-06-12 PROCEDURE — 1160F RVW MEDS BY RX/DR IN RCRD: CPT | Performed by: FAMILY MEDICINE

## 2024-06-12 PROCEDURE — 1159F MED LIST DOCD IN RCRD: CPT | Performed by: FAMILY MEDICINE

## 2024-06-12 PROCEDURE — 3077F SYST BP >= 140 MM HG: CPT | Performed by: FAMILY MEDICINE

## 2024-06-12 PROCEDURE — 1125F AMNT PAIN NOTED PAIN PRSNT: CPT | Performed by: FAMILY MEDICINE

## 2024-06-12 PROCEDURE — G2211 COMPLEX E/M VISIT ADD ON: HCPCS | Performed by: FAMILY MEDICINE

## 2024-06-12 PROCEDURE — 99214 OFFICE O/P EST MOD 30 MIN: CPT | Performed by: FAMILY MEDICINE

## 2024-06-12 RX ORDER — ROSUVASTATIN CALCIUM 40 MG/1
40 TABLET, COATED ORAL DAILY
Qty: 90 TABLET | Refills: 3 | Status: SHIPPED | OUTPATIENT
Start: 2024-06-12

## 2024-06-12 RX ORDER — AMLODIPINE BESYLATE 2.5 MG/1
2.5 TABLET ORAL DAILY
Qty: 90 TABLET | Refills: 3 | Status: SHIPPED | OUTPATIENT
Start: 2024-06-12

## 2024-06-12 NOTE — PROGRESS NOTES
"Chief Complaint  Hypertension, Hyperlipidemia, Diabetes, Anxiety, Depression, Chronic Kidney Disease, and Obesity    Subjective        Danielle Dudley presents to [facility] PRIMARY CARE  History of Present Illness  Chinmay is a very pleasant lady with a lot of medical issues going on.  She was in the hospital in September of last year which point some of her medicines were discontinued including Crestor 40.  Her cholesterol is relatively out-of-control as a cardiovascular risk factor.  She has had a prior stress test with cardiology which was normal.  Otherwise she has close follow-up with nephrology and also gastroenterology with a history of pancreatitis.  Endocrinology is seeing her for the insulin pump.  Blood pressure is elevated and she is off metoprolol hydrochlorothiazide but is still on losartan.  I would like to start low-dose amlodipine 2.5 mg daily and see if he has any impact on blood pressure.  Otherwise restart Crestor 40 mg to help with risk management.  Hypertension  Associated symptoms include anxiety.   Hyperlipidemia  Exacerbating diseases include obesity.   Diabetes    Anxiety  Her past medical history is significant for depression.   Depression  Her past medical history is significant for depression.   Chronic Kidney Disease    Obesity        Objective   Vital Signs:  /98 (BP Location: Right arm, Patient Position: Sitting, Cuff Size: Large Adult)   Pulse 105   Wt 134 kg (296 lb)   SpO2 95%   BMI 43.69 kg/m²   Estimated body mass index is 43.69 kg/m² as calculated from the following:    Height as of 5/16/24: 175.3 cm (69.02\").    Weight as of this encounter: 134 kg (296 lb).               Physical Exam  Vitals reviewed.   Constitutional:       Appearance: She is well-developed. She is obese.   HENT:      Head: Normocephalic and atraumatic.      Right Ear: Tympanic membrane and external ear normal.      Left Ear: Tympanic membrane and external ear normal.      Nose: Nose " normal.   Eyes:      Conjunctiva/sclera: Conjunctivae normal.      Pupils: Pupils are equal, round, and reactive to light.   Neck:      Thyroid: No thyromegaly.      Vascular: No JVD.   Cardiovascular:      Rate and Rhythm: Normal rate and regular rhythm.      Heart sounds: Normal heart sounds.   Pulmonary:      Effort: Pulmonary effort is normal.      Breath sounds: Normal breath sounds.   Abdominal:      General: Bowel sounds are normal.      Palpations: Abdomen is soft.   Musculoskeletal:         General: Normal range of motion.      Cervical back: Normal range of motion and neck supple.   Lymphadenopathy:      Cervical: No cervical adenopathy.   Skin:     General: Skin is warm and dry.      Findings: No rash.   Neurological:      Mental Status: She is alert and oriented to person, place, and time.      Cranial Nerves: No cranial nerve deficit.      Coordination: Coordination normal.   Psychiatric:         Behavior: Behavior normal.         Thought Content: Thought content normal.         Judgment: Judgment normal.        Result Review :    The following data was reviewed by: Jairo Walker MD on 06/12/2024:  Common labs          11/29/2023    14:48 2/7/2024    12:55 5/29/2024    08:40   Common Labs   Glucose 67      BUN 12      Creatinine 1.22      Sodium 139      Potassium 3.1      Chloride 99      Calcium 9.5      Albumin 4.5      Total Bilirubin 0.4      Alkaline Phosphatase 67      AST (SGOT) 20      ALT (SGPT) 16      WBC 10.83      Hemoglobin 13.7      Hematocrit 41.3      Platelets 312      Total Cholesterol  297     Triglycerides  289     HDL Cholesterol  42     LDL Cholesterol   198     Hemoglobin A1C  6.7  6.7          Details          This result is from an external source.                          Assessment and Plan     Assessment & Plan  Type 2 diabetes mellitus with hyperglycemia, with long-term current use of insulin  insulin pump reviewed  Mixed hyperlipidemia    restart Crestor 40 mg  daily  Essential hypertension   she is on losartan 100 will add amlodipine 2.5 mg daily monitor blood pressure.  Chronic kidney disease, stage 3a   follow-up with nephrology.     New Medications Ordered This Visit   Medications    rosuvastatin (Crestor) 40 MG tablet     Sig: Take 1 tablet by mouth Daily.     Dispense:  90 tablet     Refill:  3    amLODIPine (NORVASC) 2.5 MG tablet     Sig: Take 1 tablet by mouth Daily.     Dispense:  90 tablet     Refill:  3               Follow Up     No follow-ups on file.  Patient was given instructions and counseling regarding her condition or for health maintenance advice. Please see specific information pulled into the AVS if appropriate.

## 2024-07-02 RX ORDER — APIXABAN 5 MG/1
TABLET, FILM COATED ORAL
Qty: 60 TABLET | Refills: 1 | Status: SHIPPED | OUTPATIENT
Start: 2024-07-02

## 2024-07-03 DIAGNOSIS — E10.43 DIABETIC AUTONOMIC NEUROPATHY ASSOCIATED WITH TYPE 1 DIABETES MELLITUS: ICD-10-CM

## 2024-07-03 DIAGNOSIS — F32.0 CURRENT MILD EPISODE OF MAJOR DEPRESSIVE DISORDER WITHOUT PRIOR EPISODE: ICD-10-CM

## 2024-07-05 RX ORDER — DULOXETIN HYDROCHLORIDE 60 MG/1
60 CAPSULE, DELAYED RELEASE ORAL DAILY
Qty: 90 CAPSULE | Refills: 1 | Status: SHIPPED | OUTPATIENT
Start: 2024-07-05

## 2024-07-05 NOTE — TELEPHONE ENCOUNTER
Rx Refill Note  Requested Prescriptions     Pending Prescriptions Disp Refills    DULoxetine (CYMBALTA) 60 MG capsule [Pharmacy Med Name: DULOXETINE HCL DR 60 MG CAP] 90 capsule 1     Sig: TAKE 1 CAPSULE BY MOUTH EVERY DAY      Last office visit with prescribing clinician: 6/12/2024   Last telemedicine visit with prescribing clinician: Visit date not found   Next office visit with prescribing clinician: 9/11/2024                         Would you like a call back once the refill request has been completed: [] Yes [] No    If the office needs to give you a call back, can they leave a voicemail: [] Yes [] No    Nicky Starks  07/05/24, 07:58 EDT

## 2024-07-21 RX ORDER — METHOCARBAMOL 500 MG/1
500 TABLET, FILM COATED ORAL 3 TIMES DAILY PRN
Qty: 60 TABLET | Refills: 0 | Status: SHIPPED | OUTPATIENT
Start: 2024-07-21

## 2024-07-24 DIAGNOSIS — R10.9 FLANK PAIN: ICD-10-CM

## 2024-07-24 RX ORDER — TRAMADOL HYDROCHLORIDE 50 MG/1
50 TABLET ORAL EVERY 6 HOURS PRN
Qty: 24 TABLET | Refills: 0 | Status: SHIPPED | OUTPATIENT
Start: 2024-07-24

## 2024-07-24 NOTE — TELEPHONE ENCOUNTER
Contract on file    Rx Refill Note  Requested Prescriptions     Pending Prescriptions Disp Refills    traMADol (ULTRAM) 50 MG tablet [Pharmacy Med Name: TRAMADOL HCL 50 MG TABLET] 24 tablet      Sig: TAKE 1 TABLET BY MOUTH EVERY 6 HOURS AS NEEDED FOR PAIN (MODERATE PAIN)      Last office visit with prescribing clinician: 6/12/2024   Last telemedicine visit with prescribing clinician: Visit date not found   Next office visit with prescribing clinician: 9/11/2024       Katina Niño MA  07/24/24, 11:16 EDT

## 2024-08-14 ENCOUNTER — OFFICE VISIT (OUTPATIENT)
Dept: ENDOCRINOLOGY | Age: 49
End: 2024-08-14
Payer: MEDICARE

## 2024-08-14 VITALS
TEMPERATURE: 99.1 F | SYSTOLIC BLOOD PRESSURE: 134 MMHG | WEIGHT: 293 LBS | HEIGHT: 69 IN | BODY MASS INDEX: 43.4 KG/M2 | OXYGEN SATURATION: 94 % | DIASTOLIC BLOOD PRESSURE: 80 MMHG | HEART RATE: 85 BPM

## 2024-08-14 DIAGNOSIS — E10.65 TYPE 1 DIABETES MELLITUS WITH HYPERGLYCEMIA: Primary | ICD-10-CM

## 2024-08-14 DIAGNOSIS — E10.42 DIABETIC PERIPHERAL NEUROPATHY ASSOCIATED WITH TYPE 1 DIABETES MELLITUS: ICD-10-CM

## 2024-08-14 DIAGNOSIS — N18.31 STAGE 3A CHRONIC KIDNEY DISEASE: ICD-10-CM

## 2024-08-14 DIAGNOSIS — E66.01 CLASS 3 SEVERE OBESITY DUE TO EXCESS CALORIES WITH SERIOUS COMORBIDITY AND BODY MASS INDEX (BMI) OF 40.0 TO 44.9 IN ADULT: ICD-10-CM

## 2024-08-14 DIAGNOSIS — K31.84 GASTROPARESIS: ICD-10-CM

## 2024-08-14 DIAGNOSIS — E78.2 MIXED HYPERLIPIDEMIA: ICD-10-CM

## 2024-08-14 NOTE — PROGRESS NOTES
"Chief Complaint  Diabetes    Subjective        Danielle Dudley presents to [facility] ENDOCRINOLOGY  History of Present Illness    S/p port replacement as well as stimulator replaced so her sensor was off for these procedures     Type 1 dm (+) LANDRY (6/2021)  DM regimen: u200 humalog in her pump, medtronic 780g with smart guard technology  Last diabetic eye exam: 12/2023  Known diabetic complications: neuropathy, CKD, gastroparesis with stimulator   Emergency glucagon kit: gvoke  Backup plan: u200 pen with pen needles   CV: pcp started her on crestor      Cgm review 7/31/24-8/13/24  0% low  71% time in range  29% high  Smart guard 26%, sensor wear 25%  Gmi n/a  TDD 53u- 25% bolus      Objective   Vital Signs:  /80   Pulse 85   Temp 99.1 °F (37.3 °C) (Oral)   Ht 175.3 cm (69.02\")   Wt (!) 136 kg (300 lb 12.8 oz)   SpO2 94%   BMI 44.40 kg/m²   Estimated body mass index is 44.4 kg/m² as calculated from the following:    Height as of this encounter: 175.3 cm (69.02\").    Weight as of this encounter: 136 kg (300 lb 12.8 oz).          Physical Exam  Vitals reviewed.   Constitutional:       General: She is not in acute distress.  HENT:      Head: Normocephalic and atraumatic.   Cardiovascular:      Rate and Rhythm: Normal rate.   Pulmonary:      Effort: Pulmonary effort is normal. No respiratory distress.   Musculoskeletal:         General: No signs of injury. Normal range of motion.      Cervical back: Normal range of motion and neck supple.   Skin:     General: Skin is warm and dry.   Neurological:      Mental Status: She is alert and oriented to person, place, and time. Mental status is at baseline.   Psychiatric:         Mood and Affect: Mood normal.         Behavior: Behavior normal.         Thought Content: Thought content normal.         Judgment: Judgment normal.          Result Review :  The following data was reviewed by: RICKI Westfall on 08/14/2024:  Common labs          " 11/29/2023    14:48 2/7/2024    12:55 5/29/2024    08:40   Common Labs   Glucose 67      BUN 12      Creatinine 1.22      Sodium 139      Potassium 3.1      Chloride 99      Calcium 9.5      Albumin 4.5      Total Bilirubin 0.4      Alkaline Phosphatase 67      AST (SGOT) 20      ALT (SGPT) 16      WBC 10.83      Hemoglobin 13.7      Hematocrit 41.3      Platelets 312      Total Cholesterol  297     Triglycerides  289     HDL Cholesterol  42     LDL Cholesterol   198     Hemoglobin A1C  6.7  6.7          Details          This result is from an external source.                       Assessment and Plan   Diagnoses and all orders for this visit:    1. Type 1 diabetes mellitus with hyperglycemia (Primary)  -     Lipid Panel  -     Hemoglobin A1c  -     Comprehensive Metabolic Panel    2. Diabetic peripheral neuropathy associated with type 1 diabetes mellitus    3. Gastroparesis    4. Stage 3a chronic kidney disease    5. Mixed hyperlipidemia    6. Class 3 severe obesity due to excess calories with serious comorbidity and body mass index (BMI) of 40.0 to 44.9 in adult             Follow Up   Return in about 3 months (around 11/14/2024).    Labs today  Cgm reviewed, at goal  Continued hypoglycemia prevention  No additional changes made at today's visit  Further recommendations as needed based on results     Patient was given instructions and counseling regarding her condition or for health maintenance advice. Please see specific information pulled into the AVS if appropriate.     RICKI Westfall

## 2024-08-15 LAB
ALBUMIN SERPL-MCNC: 3.5 G/DL (ref 3.5–5.2)
ALBUMIN/GLOB SERPL: 0.9 G/DL
ALP SERPL-CCNC: 65 U/L (ref 39–117)
ALT SERPL-CCNC: 9 U/L (ref 1–33)
AST SERPL-CCNC: 14 U/L (ref 1–32)
BILIRUB SERPL-MCNC: 0.3 MG/DL (ref 0–1.2)
BUN SERPL-MCNC: 14 MG/DL (ref 6–20)
BUN/CREAT SERPL: 10.4 (ref 7–25)
CALCIUM SERPL-MCNC: 9.2 MG/DL (ref 8.6–10.5)
CHLORIDE SERPL-SCNC: 98 MMOL/L (ref 98–107)
CHOLEST SERPL-MCNC: 178 MG/DL (ref 0–200)
CO2 SERPL-SCNC: 31 MMOL/L (ref 22–29)
CREAT SERPL-MCNC: 1.34 MG/DL (ref 0.57–1)
EGFRCR SERPLBLD CKD-EPI 2021: 48.1 ML/MIN/1.73
GLOBULIN SER CALC-MCNC: 4 GM/DL
GLUCOSE SERPL-MCNC: 296 MG/DL (ref 65–99)
HBA1C MFR BLD: 7.4 % (ref 4.8–5.6)
HDLC SERPL-MCNC: 47 MG/DL (ref 40–60)
IMP & REVIEW OF LAB RESULTS: NORMAL
LDLC SERPL CALC-MCNC: 92 MG/DL (ref 0–100)
POTASSIUM SERPL-SCNC: 4.4 MMOL/L (ref 3.5–5.2)
PROT SERPL-MCNC: 7.5 G/DL (ref 6–8.5)
SODIUM SERPL-SCNC: 138 MMOL/L (ref 136–145)
TRIGL SERPL-MCNC: 229 MG/DL (ref 0–150)
VLDLC SERPL CALC-MCNC: 39 MG/DL (ref 5–40)

## 2024-08-29 RX ORDER — APIXABAN 5 MG/1
TABLET, FILM COATED ORAL
Qty: 60 TABLET | Refills: 1 | Status: SHIPPED | OUTPATIENT
Start: 2024-08-29

## 2024-08-30 ENCOUNTER — TELEPHONE (OUTPATIENT)
Dept: INTERNAL MEDICINE | Facility: CLINIC | Age: 49
End: 2024-08-30
Payer: MEDICARE

## 2024-08-30 NOTE — TELEPHONE ENCOUNTER
Asking for V/O for nursing/ wound care after admission to  [facility].  Post op gastro stimulator removal- infected wound.     Asked that they advise patient to make a hosp f/u    V/O given

## 2024-09-01 DIAGNOSIS — R10.9 FLANK PAIN: ICD-10-CM

## 2024-09-03 RX ORDER — TRAMADOL HYDROCHLORIDE 50 MG/1
50 TABLET ORAL EVERY 6 HOURS PRN
Qty: 24 TABLET | Refills: 0 | Status: SHIPPED | OUTPATIENT
Start: 2024-09-03

## 2024-09-03 NOTE — TELEPHONE ENCOUNTER
Rx Refill Note  Requested Prescriptions     Pending Prescriptions Disp Refills    traMADol (ULTRAM) 50 MG tablet [Pharmacy Med Name: TRAMADOL HCL 50 MG TABLET] 24 tablet 0     Sig: TAKE 1 TABLET BY MOUTH EVERY 6 HOURS AS NEEDED FOR MODERATE PAIN      Last office visit with prescribing clinician: 6/12/2024   Last telemedicine visit with prescribing clinician: Visit date not found   Next office visit with prescribing clinician: 9/5/2024                         Would you like a call back once the refill request has been completed: [] Yes [] No    If the office needs to give you a call back, can they leave a voicemail: [] Yes [] No    Ganesh Davila MA  09/03/24, 08:43 EDT

## 2024-09-05 ENCOUNTER — OFFICE VISIT (OUTPATIENT)
Dept: INTERNAL MEDICINE | Facility: CLINIC | Age: 49
End: 2024-09-05
Payer: MEDICARE

## 2024-09-05 VITALS
BODY MASS INDEX: 43.54 KG/M2 | OXYGEN SATURATION: 99 % | WEIGHT: 293 LBS | HEART RATE: 103 BPM | SYSTOLIC BLOOD PRESSURE: 132 MMHG | DIASTOLIC BLOOD PRESSURE: 66 MMHG

## 2024-09-05 DIAGNOSIS — D50.8 OTHER IRON DEFICIENCY ANEMIA: Primary | ICD-10-CM

## 2024-09-05 DIAGNOSIS — Z79.4 TYPE 2 DIABETES MELLITUS WITH HYPERGLYCEMIA, WITH LONG-TERM CURRENT USE OF INSULIN: ICD-10-CM

## 2024-09-05 DIAGNOSIS — K86.1 OTHER CHRONIC PANCREATITIS: ICD-10-CM

## 2024-09-05 DIAGNOSIS — B37.31 YEAST VAGINITIS: ICD-10-CM

## 2024-09-05 DIAGNOSIS — K31.84 GASTROPARESIS: ICD-10-CM

## 2024-09-05 DIAGNOSIS — E11.65 TYPE 2 DIABETES MELLITUS WITH HYPERGLYCEMIA, WITH LONG-TERM CURRENT USE OF INSULIN: ICD-10-CM

## 2024-09-05 DIAGNOSIS — I10 ESSENTIAL HYPERTENSION: ICD-10-CM

## 2024-09-05 DIAGNOSIS — Z09 FOLLOW-UP EXAMINATION AFTER ABDOMINAL SURGERY: ICD-10-CM

## 2024-09-05 PROBLEM — D64.9 ABSOLUTE ANEMIA: Status: ACTIVE | Noted: 2024-09-05

## 2024-09-05 PROCEDURE — 3075F SYST BP GE 130 - 139MM HG: CPT | Performed by: FAMILY MEDICINE

## 2024-09-05 PROCEDURE — 99214 OFFICE O/P EST MOD 30 MIN: CPT | Performed by: FAMILY MEDICINE

## 2024-09-05 PROCEDURE — 3078F DIAST BP <80 MM HG: CPT | Performed by: FAMILY MEDICINE

## 2024-09-05 PROCEDURE — 1159F MED LIST DOCD IN RCRD: CPT | Performed by: FAMILY MEDICINE

## 2024-09-05 PROCEDURE — G2211 COMPLEX E/M VISIT ADD ON: HCPCS | Performed by: FAMILY MEDICINE

## 2024-09-05 PROCEDURE — 1160F RVW MEDS BY RX/DR IN RCRD: CPT | Performed by: FAMILY MEDICINE

## 2024-09-05 PROCEDURE — 3051F HG A1C>EQUAL 7.0%<8.0%: CPT | Performed by: FAMILY MEDICINE

## 2024-09-05 PROCEDURE — 1125F AMNT PAIN NOTED PAIN PRSNT: CPT | Performed by: FAMILY MEDICINE

## 2024-09-05 RX ORDER — PEDI MV NO.227/FERROUS SULFATE 10 MG
1 TABLET,CHEWABLE ORAL 2 TIMES DAILY
Qty: 60 TABLET | Refills: 5 | Status: SHIPPED | OUTPATIENT
Start: 2024-09-05

## 2024-09-05 RX ORDER — FLUCONAZOLE 100 MG/1
100 TABLET ORAL DAILY
Qty: 10 TABLET | Refills: 0 | Status: SHIPPED | OUTPATIENT
Start: 2024-09-05

## 2024-09-05 RX ORDER — AMLODIPINE BESYLATE 5 MG/1
5 TABLET ORAL
COMMUNITY
Start: 2024-08-30

## 2024-09-05 RX ORDER — DOXYCYCLINE 100 MG/1
100 CAPSULE ORAL
COMMUNITY
Start: 2024-08-30 | End: 2024-09-06

## 2024-09-05 NOTE — PROGRESS NOTES
"Chief Complaint  Hypertension, Hyperlipidemia, Diabetes, Anxiety, and Depression    Subjective        Danielle Dudley presents to [facility] PRIMARY CARE  History of Present Illness  Danielle has had a rough few weeks here with admission to the hospital 8 25 through 8 30 at [facility] with Dr. Germain and removal of gastric stimulator with subsequent return to the emergency room 9/2/2024 for abdominal wound recheck.  She is on doxycycline postoperative.  1 more day of doxycycline.  Admission to [facility] was August 25, 2024 through August 30, 2024.  Labs pertinent on discharge include potassium 3.1 creatinine 1.28 BUN 13 hemoglobin 9.9 hematocrit 29.0 from emergency room visit on 9/2/2024 will get Flintstones vitamins with iron twice daily for iron supplementation.  Hopefully she can tolerate this with her gastroparesis.    Otherwise she has developed yeast vaginitis after having had antibiotics in the hospital and also doxycycline we will give her Diflucan 100 mg daily for 10 days.  Refill tramadol already will consider refill intermittently based on pain control.  Hypertension  Associated symptoms include anxiety.   Hyperlipidemia    Diabetes    Anxiety  Her past medical history is significant for depression.   Depression  Her past medical history is significant for depression.       Objective   Vital Signs:  /66 (BP Location: Left arm, Patient Position: Sitting, Cuff Size: Large Adult)   Pulse 103   Wt 134 kg (295 lb)   SpO2 99%   BMI 43.54 kg/m²   Estimated body mass index is 43.54 kg/m² as calculated from the following:    Height as of 8/14/24: 175.3 cm (69.02\").    Weight as of this encounter: 134 kg (295 lb).             Physical Exam   Result Review :    The following data was reviewed by: Jairo Walker MD on 09/05/2024:  Common labs          2/7/2024    12:55 5/29/2024    08:40 8/14/2024    15:03   Common Labs   Glucose   296    BUN   14    Creatinine   " 1.34    Sodium   138    Potassium   4.4    Chloride   98    Calcium   9.2    Total Protein   7.5    Albumin   3.5    Total Bilirubin   0.3    Alkaline Phosphatase   65    AST (SGOT)   14    ALT (SGPT)   9    Total Cholesterol 297   178    Triglycerides 289   229    HDL Cholesterol 42   47    LDL Cholesterol  198   92    Hemoglobin A1C 6.7  6.7     7.40       Details          This result is from an external source.                          Assessment and Plan     Assessment & Plan  Other iron deficiency anemia/postoperative  Try Flintstones vitamin with iron twice daily  Yeast vaginitis  Diflucan 100 mg daily for 10 days  Follow-up examination after abdominal surgery  Bruising over abdomen noted with postoperative anemia  Essential hypertension   continue amlodipine 5 mg daily plus losartan 100 mg daily  Type 2 diabetes mellitus with hyperglycemia, with long-term current use of insulin   continue insulin pump  Other chronic pancreatitis    Gastroparesis  Recent gastric stimulator removed secondary to infection     New Medications Ordered This Visit   Medications    Pediatric Multivitamins-Iron (Flintstones Plus Extra Iron) 18 MG chewable tablet     Sig: Chew 1 tablet 2 (Two) Times a Day.     Dispense:  60 tablet     Refill:  5    fluconazole (Diflucan) 100 MG tablet     Sig: Take 1 tablet by mouth Daily.     Dispense:  10 tablet     Refill:  0               Follow Up     No follow-ups on file.  Patient was given instructions and counseling regarding her condition or for health maintenance advice. Please see specific information pulled into the AVS if appropriate.

## 2024-09-17 RX ORDER — METHOCARBAMOL 500 MG/1
500 TABLET, FILM COATED ORAL 3 TIMES DAILY PRN
Qty: 60 TABLET | Refills: 0 | Status: SHIPPED | OUTPATIENT
Start: 2024-09-17

## 2024-09-30 RX ORDER — METHOCARBAMOL 500 MG/1
TABLET, FILM COATED ORAL
Qty: 60 TABLET | Refills: 0 | OUTPATIENT
Start: 2024-09-30

## 2024-10-02 ENCOUNTER — TELEPHONE (OUTPATIENT)
Dept: INTERNAL MEDICINE | Facility: CLINIC | Age: 49
End: 2024-10-02
Payer: MEDICARE

## 2024-10-02 NOTE — TELEPHONE ENCOUNTER
Migdalia called for verbals for dry dressing for her post surgical wound. Pt had stimulator removal 10/1 with surgery. Please call if this is ok to do

## 2024-10-06 DIAGNOSIS — E10.65 TYPE 1 DIABETES MELLITUS WITH HYPERGLYCEMIA: ICD-10-CM

## 2024-10-06 DIAGNOSIS — R10.9 FLANK PAIN: ICD-10-CM

## 2024-10-06 DIAGNOSIS — F41.9 ANXIETY: ICD-10-CM

## 2024-10-07 RX ORDER — INSULIN LISPRO 200 [IU]/ML
INJECTION, SOLUTION SUBCUTANEOUS
Qty: 180 ML | Refills: 0 | Status: SHIPPED | OUTPATIENT
Start: 2024-10-07

## 2024-10-07 RX ORDER — TRAMADOL HYDROCHLORIDE 50 MG/1
50 TABLET ORAL EVERY 6 HOURS PRN
Qty: 24 TABLET | Refills: 0 | Status: SHIPPED | OUTPATIENT
Start: 2024-10-07

## 2024-10-07 RX ORDER — BUSPIRONE HYDROCHLORIDE 5 MG/1
TABLET ORAL
Qty: 270 TABLET | Refills: 2 | Status: SHIPPED | OUTPATIENT
Start: 2024-10-07

## 2024-10-07 NOTE — TELEPHONE ENCOUNTER
Addended by: TONY KELSEY on: 12/6/2023 12:18 PM     Modules accepted: Orders     Rx Refill Note  Requested Prescriptions     Pending Prescriptions Disp Refills    HumaLOG KwikPen 200 UNIT/ML solution pen-injector [Pharmacy Med Name: HUMALOG 200 UNIT/ML KWIKPEN]  1     Sig: INJECT 400 UNITS UNDER THE SKIN INTO THE APPROPRIATE AREA AS DIRECTED DAILY. USE AS DIRECTED      Last office visit with prescribing clinician: Visit date not found   Last telemedicine visit with prescribing clinician: Visit date not found   Next office visit with prescribing clinician: Visit date not found                         Would you like a call back once the refill request has been completed: [] Yes [] No    If the office needs to give you a call back, can they leave a voicemail: [] Yes [] No    Katina Starks MA  10/07/24, 07:59 EDT

## 2024-10-07 NOTE — TELEPHONE ENCOUNTER
Pt needs to sign a new contract-contract on file is for Lyrica    Rx Refill Note  Requested Prescriptions     Pending Prescriptions Disp Refills    busPIRone (BUSPAR) 5 MG tablet [Pharmacy Med Name: BUSPIRONE HCL 5 MG TABLET] 270 tablet 2     Sig: TAKE 1 TABLET BY MOUTH THREE TIMES A DAY    traMADol (ULTRAM) 50 MG tablet [Pharmacy Med Name: TRAMADOL HCL 50 MG TABLET] 24 tablet 0     Sig: TAKE 1 TABLET BY MOUTH EVERY 6 HOURS AS NEEDED FOR PAIN (MODERATE PAIN)      Last office visit with prescribing clinician: 9/5/2024   Last telemedicine visit with prescribing clinician: Visit date not found   Next office visit with prescribing clinician: 12/11/2024       Katina Niño MA  10/07/24, 07:54 EDT

## 2024-10-31 RX ORDER — APIXABAN 5 MG/1
TABLET, FILM COATED ORAL
Qty: 60 TABLET | Refills: 1 | Status: SHIPPED | OUTPATIENT
Start: 2024-10-31

## 2024-11-13 ENCOUNTER — OFFICE VISIT (OUTPATIENT)
Dept: ENDOCRINOLOGY | Age: 49
End: 2024-11-13
Payer: MEDICARE

## 2024-11-13 VITALS
BODY MASS INDEX: 43.4 KG/M2 | DIASTOLIC BLOOD PRESSURE: 90 MMHG | HEART RATE: 75 BPM | TEMPERATURE: 99.1 F | WEIGHT: 293 LBS | OXYGEN SATURATION: 98 % | HEIGHT: 69 IN | SYSTOLIC BLOOD PRESSURE: 150 MMHG

## 2024-11-13 DIAGNOSIS — E66.01 CLASS 3 SEVERE OBESITY DUE TO EXCESS CALORIES WITH SERIOUS COMORBIDITY AND BODY MASS INDEX (BMI) OF 40.0 TO 44.9 IN ADULT: ICD-10-CM

## 2024-11-13 DIAGNOSIS — E10.65 TYPE 1 DIABETES MELLITUS WITH HYPERGLYCEMIA: Primary | ICD-10-CM

## 2024-11-13 DIAGNOSIS — N18.31 STAGE 3A CHRONIC KIDNEY DISEASE: ICD-10-CM

## 2024-11-13 DIAGNOSIS — E66.813 CLASS 3 SEVERE OBESITY DUE TO EXCESS CALORIES WITH SERIOUS COMORBIDITY AND BODY MASS INDEX (BMI) OF 40.0 TO 44.9 IN ADULT: ICD-10-CM

## 2024-11-13 DIAGNOSIS — K31.84 GASTROPARESIS: ICD-10-CM

## 2024-11-13 DIAGNOSIS — E78.2 MIXED HYPERLIPIDEMIA: ICD-10-CM

## 2024-11-13 DIAGNOSIS — E10.42 DIABETIC PERIPHERAL NEUROPATHY ASSOCIATED WITH TYPE 1 DIABETES MELLITUS: ICD-10-CM

## 2024-11-13 NOTE — PROGRESS NOTES
"Chief Complaint  Diabetes and Hyperglycemia    Subjective        Danielle Dudley presents to [facility] ENDOCRINOLOGY  History of Present Illness    Type 1 dm (+) LANDRY (6/2021)  DM regimen: u200 humalog in her pump, medtronic 780g with smart guard technology  \"Atrophic pancrease\" noted on exam, chronic pancreatitis   Last diabetic eye exam: 12/2023  Known diabetic complications: neuropathy- lyrica, CKD, gastroparesis with stimulator in place  glucagon: gvoke  Backup plan: u200 pen with pen needles   CV: rosuvastatin 40mg QD      Cgm review 10/31/24-11/13/24  1% low  87% time in range  12% high  Smart guard 85%, sensor wear 83%  Gmi 6.7%  TDD 59u- 37% bolus     Objective   Vital Signs:  /90 (BP Location: Left arm, Patient Position: Sitting, Cuff Size: Adult)   Pulse 75   Temp 99.1 °F (37.3 °C) (Oral)   Ht 175.3 cm (69.02\")   Wt (!) 138 kg (303 lb 3.2 oz)   SpO2 98%   BMI 44.75 kg/m²   Estimated body mass index is 44.75 kg/m² as calculated from the following:    Height as of this encounter: 175.3 cm (69.02\").    Weight as of this encounter: 138 kg (303 lb 3.2 oz).          Physical Exam  Vitals reviewed.   Constitutional:       General: She is not in acute distress.  HENT:      Head: Normocephalic and atraumatic.   Cardiovascular:      Rate and Rhythm: Normal rate.   Pulmonary:      Effort: Pulmonary effort is normal. No respiratory distress.   Musculoskeletal:         General: No signs of injury. Normal range of motion.      Cervical back: Normal range of motion and neck supple.   Skin:     General: Skin is warm and dry.   Neurological:      Mental Status: She is alert and oriented to person, place, and time. Mental status is at baseline.   Psychiatric:         Mood and Affect: Mood normal.         Behavior: Behavior normal.         Thought Content: Thought content normal.         Judgment: Judgment normal.        Result Review :  The following data was reviewed by: RICKI Westfall on " 11/13/2024:  Common labs          5/29/2024    08:40 8/14/2024    15:03 10/9/2024    08:41   Common Labs   Glucose  296     BUN  14     Creatinine  1.34     Sodium  138     Potassium  4.4     Chloride  98     Calcium  9.2     Total Protein  7.5     Albumin  3.5     Total Bilirubin  0.3     Alkaline Phosphatase  65     AST (SGOT)  14     ALT (SGPT)  9     Total Cholesterol  178     Triglycerides  229     HDL Cholesterol  47     LDL Cholesterol   92     Hemoglobin A1C 6.7     7.40  7.2          Details          This result is from an external source.                       Assessment and Plan   Diagnoses and all orders for this visit:    1. Type 1 diabetes mellitus with hyperglycemia (Primary)    2. Diabetic peripheral neuropathy associated with type 1 diabetes mellitus    3. Gastroparesis    4. Stage 3a chronic kidney disease    5. Mixed hyperlipidemia    6. Class 3 severe obesity due to excess calories with serious comorbidity and body mass index (BMI) of 40.0 to 44.9 in adult             Follow Up   Return in about 3 months (around 2/13/2025).    A1c favorable  Cgm reviewed, at goal   Continue statin     Patient was given instructions and counseling regarding her condition or for health maintenance advice. Please see specific information pulled into the AVS if appropriate.     RICKI Westfall

## 2024-12-11 ENCOUNTER — OFFICE VISIT (OUTPATIENT)
Dept: INTERNAL MEDICINE | Facility: CLINIC | Age: 49
End: 2024-12-11
Payer: MEDICARE

## 2024-12-11 VITALS
BODY MASS INDEX: 45.17 KG/M2 | DIASTOLIC BLOOD PRESSURE: 86 MMHG | OXYGEN SATURATION: 98 % | HEART RATE: 77 BPM | SYSTOLIC BLOOD PRESSURE: 152 MMHG | WEIGHT: 293 LBS

## 2024-12-11 DIAGNOSIS — E11.65 TYPE 2 DIABETES MELLITUS WITH HYPERGLYCEMIA, WITH LONG-TERM CURRENT USE OF INSULIN: ICD-10-CM

## 2024-12-11 DIAGNOSIS — Z79.4 TYPE 2 DIABETES MELLITUS WITH HYPERGLYCEMIA, WITH LONG-TERM CURRENT USE OF INSULIN: ICD-10-CM

## 2024-12-11 DIAGNOSIS — R10.9 FLANK PAIN: ICD-10-CM

## 2024-12-11 DIAGNOSIS — E83.42 HYPOMAGNESEMIA: ICD-10-CM

## 2024-12-11 DIAGNOSIS — E78.5 DYSLIPIDEMIA: Primary | ICD-10-CM

## 2024-12-11 DIAGNOSIS — I10 ESSENTIAL HYPERTENSION: ICD-10-CM

## 2024-12-11 DIAGNOSIS — K86.89 PANCREATIC INSUFFICIENCY: ICD-10-CM

## 2024-12-11 PROCEDURE — G2211 COMPLEX E/M VISIT ADD ON: HCPCS | Performed by: FAMILY MEDICINE

## 2024-12-11 PROCEDURE — 3077F SYST BP >= 140 MM HG: CPT | Performed by: FAMILY MEDICINE

## 2024-12-11 PROCEDURE — 99214 OFFICE O/P EST MOD 30 MIN: CPT | Performed by: FAMILY MEDICINE

## 2024-12-11 PROCEDURE — 1125F AMNT PAIN NOTED PAIN PRSNT: CPT | Performed by: FAMILY MEDICINE

## 2024-12-11 PROCEDURE — 3079F DIAST BP 80-89 MM HG: CPT | Performed by: FAMILY MEDICINE

## 2024-12-11 PROCEDURE — 3051F HG A1C>EQUAL 7.0%<8.0%: CPT | Performed by: FAMILY MEDICINE

## 2024-12-11 RX ORDER — TRAMADOL HYDROCHLORIDE 50 MG/1
50 TABLET ORAL EVERY 6 HOURS PRN
Qty: 30 TABLET | Refills: 5 | Status: SHIPPED | OUTPATIENT
Start: 2024-12-11

## 2024-12-11 RX ORDER — MULTIVIT-MIN/FOLIC/VIT K/LYCOP 400-300MCG
1 TABLET ORAL DAILY
Qty: 30 TABLET | Refills: 2 | Status: SHIPPED | OUTPATIENT
Start: 2024-12-11

## 2024-12-11 RX ORDER — AMLODIPINE BESYLATE 5 MG/1
5 TABLET ORAL DAILY
Qty: 90 TABLET | Refills: 3 | Status: SHIPPED | OUTPATIENT
Start: 2024-12-11

## 2024-12-11 NOTE — PROGRESS NOTES
"Chief Complaint  Hypertension, Hyperlipidemia, Diabetes, Chronic Kidney Disease, Anxiety, Depression, and Vitamin D Deficiency    Subjective        Danielle Dudley presents to [facility] PRIMARY [facility]ARE  History of Present Illness  Very pleasant lady who is seen in [facility] for chronic pancreatitis and pancreatic insufficiency as well as gastric motility disorders with with gastroparesis and gastric stimulator in place.    Otherwise treated for hypertension hyperlipidemia diabetes type 2 are reviewed with increase amlodipine to 5 mg daily continue losartan 100 mg daily.    Chronic pancreatitis is treated also with sparing doses of tramadol 50 mg daily or up to 4 times daily as needed.    She has evidence of hypomagnesemia from the emergency room will give a tiny dose of Slow-Mag once daily.      Objective   Vital Signs:  /86 (BP Location: Left arm, Patient Position: Sitting, Cuff Size: Large Adult)   Pulse 77   Wt (!) 139 kg (306 lb)   SpO2 98%   BMI 45.17 kg/m²   Estimated body mass index is 45.17 kg/m² as calculated from the following:    Height as of 11/13/24: 175.3 cm (69.02\").    Weight as of this encounter: 139 kg (306 lb).             Physical Exam  Vitals reviewed.   Constitutional:       Appearance: She is well-developed. She is obese.   HENT:      Head: Normocephalic and atraumatic.      Right Ear: Tympanic membrane and external ear normal.      Left Ear: Tympanic membrane and external ear normal.      Nose: Nose normal.   Eyes:      Conjunctiva/sclera: Conjunctivae normal.      Pupils: Pupils are equal, round, and reactive to light.   Neck:      Thyroid: No thyromegaly.      Vascular: No JVD.   Cardiovascular:      Rate and Rhythm: Normal rate and regular rhythm.      Heart sounds: Normal heart sounds.   Pulmonary:      Effort: Pulmonary effort is normal.      Breath sounds: Normal breath sounds.   Abdominal:      General: Bowel sounds are normal.      Palpations: " Abdomen is soft.   Musculoskeletal:         General: Normal range of motion.      Cervical back: Normal range of motion and neck supple.   Lymphadenopathy:      Cervical: No cervical adenopathy.   Skin:     General: Skin is warm and dry.      Findings: No rash.   Neurological:      Mental Status: She is alert and oriented to person, place, and time.      Cranial Nerves: No cranial nerve deficit.      Coordination: Coordination normal.   Psychiatric:         Behavior: Behavior normal.         Thought Content: Thought content normal.         Judgment: Judgment normal.        Result Review :    The following data was reviewed by: Jairo Walker MD on 12/11/2024:  Common labs          5/29/2024    08:40 8/14/2024    15:03 10/9/2024    08:41   Common Labs   Glucose  296     BUN  14     Creatinine  1.34     Sodium  138     Potassium  4.4     Chloride  98     Calcium  9.2     Total Protein  7.5     Albumin  3.5     Total Bilirubin  0.3     Alkaline Phosphatase  65     AST (SGOT)  14     ALT (SGPT)  9     Total Cholesterol  178     Triglycerides  229     HDL Cholesterol  47     LDL Cholesterol   92     Hemoglobin A1C 6.7     7.40  7.2          Details          This result is from an external source.             Data reviewed : Recent hospitalization notes emergency room records reviewed             Assessment and Plan     Assessment & Plan  Dyslipidemia  No change in current treatment       Essential hypertension   amlodipine increased to 5 mg daily continue losartan 100 mg daily.         Type 2 diabetes mellitus with hyperglycemia, with long-term current use of insulin   needed regimen as per endocrinology         Flank pain  Refill as needed tramadol 50 mg every 6 as needed used about once a day  Orders:    traMADol (ULTRAM) 50 MG tablet; Take 1 tablet by mouth Every 6 (Six) Hours As Needed for Moderate Pain.    Hypomagnesemia  She has muscle spasms in the thorax we will add a small dose of Slow-Mag over-the-counter 1  tablet daily       Pancreatic insufficiency  Follow-up with [facility] gastro today for chronic pancreatitis                 Follow Up     No follow-ups on file.  Patient was given instructions and counseling regarding her condition or for health maintenance advice. Please see specific information pulled into the AVS if appropriate.

## 2024-12-19 DIAGNOSIS — E10.43 DIABETIC AUTONOMIC NEUROPATHY ASSOCIATED WITH TYPE 1 DIABETES MELLITUS: ICD-10-CM

## 2024-12-20 RX ORDER — APIXABAN 5 MG/1
TABLET, FILM COATED ORAL
Qty: 60 TABLET | Refills: 1 | Status: SHIPPED | OUTPATIENT
Start: 2024-12-20

## 2024-12-20 RX ORDER — PREGABALIN 150 MG/1
CAPSULE ORAL
Qty: 60 CAPSULE | Refills: 5 | Status: SHIPPED | OUTPATIENT
Start: 2024-12-20

## 2024-12-20 NOTE — TELEPHONE ENCOUNTER
Rx Refill Note  Requested Prescriptions     Pending Prescriptions Disp Refills    pregabalin (LYRICA) 150 MG capsule [Pharmacy Med Name: PREGABALIN 150 MG CAPSULE] 60 capsule      Sig: TAKE 1 CAPSULE BY MOUTH TWICE A DAY    Eliquis 5 MG tablet tablet [Pharmacy Med Name: ELIQUIS 5 MG TABLET] 60 tablet 1     Sig: TAKE 1 TABLET BY MOUTH EVERY 12 HOURS      Last office visit with prescribing clinician: 12/11/2024   Last telemedicine visit with prescribing clinician: Visit date not found   Next office visit with prescribing clinician: 3/26/2025                         Would you like a call back once the refill request has been completed: [] Yes [] No    If the office needs to give you a call back, can they leave a voicemail: [] Yes [] No    Lilian Walker  12/20/24, 09:06 EST

## 2025-01-01 ENCOUNTER — READMISSION MANAGEMENT (OUTPATIENT)
Dept: CALL CENTER | Facility: HOSPITAL | Age: 50
End: 2025-01-01
Payer: MEDICARE

## 2025-01-01 NOTE — OUTREACH NOTE
Prep Survey      Flowsheet Row Responses   [facility] [place] facility patient discharged from? Non-BH   Is LACE score < 7 ? Non-BH Discharge   Eligibility Not Eligible   What are the reasons patient is not eligible? Other  [no recent admit noted.]   Does the patient have one of the following disease processes/diagnoses(primary or secondary)? Other   Prep survey completed? Yes            RICK DURANT - Registered Nurse

## 2025-01-21 RX ORDER — METHOCARBAMOL 500 MG/1
TABLET, FILM COATED ORAL
Qty: 60 TABLET | Refills: 2 | Status: SHIPPED | OUTPATIENT
Start: 2025-01-21

## 2025-01-21 NOTE — TELEPHONE ENCOUNTER
Rx Refill Note  Requested Prescriptions     Pending Prescriptions Disp Refills    methocarbamol (ROBAXIN) 500 MG tablet [Pharmacy Med Name: METHOCARBAMOL 500 MG TABLET] 60 tablet 2     Sig: TAKE 1 TABLET BY MOUTH 3 TIMES A DAY AS NEEDED FOR MUSCLE SPASMS.**NOT ON FORMULARY      Last office visit with prescribing clinician: 12/11/2024   Last telemedicine visit with prescribing clinician: Visit date not found   Next office visit with prescribing clinician: 3/26/2025       Katina Niño MA  01/21/25, 16:17 EST

## 2025-02-07 RX ORDER — APIXABAN 5 MG/1
TABLET, FILM COATED ORAL
Qty: 60 TABLET | Refills: 5 | Status: SHIPPED | OUTPATIENT
Start: 2025-02-07

## 2025-02-07 NOTE — TELEPHONE ENCOUNTER
Rx Refill Note  Requested Prescriptions     Pending Prescriptions Disp Refills    Eliquis 5 MG tablet tablet [Pharmacy Med Name: ELIQUIS 5 MG TABLET] 60 tablet 5     Sig: TAKE 1 TABLET BY MOUTH EVERY 12 HOURS      Last office visit with prescribing clinician: 12/11/2024   Last telemedicine visit with prescribing clinician: Visit date not found   Next office visit with prescribing clinician: 3/26/2025       Katina Niño MA  02/07/25, 15:57 EST

## 2025-03-05 ENCOUNTER — OFFICE VISIT (OUTPATIENT)
Dept: ENDOCRINOLOGY | Age: 50
End: 2025-03-05
Payer: MEDICARE

## 2025-03-05 VITALS
BODY MASS INDEX: 43.4 KG/M2 | SYSTOLIC BLOOD PRESSURE: 124 MMHG | HEIGHT: 69 IN | OXYGEN SATURATION: 97 % | TEMPERATURE: 98.5 F | HEART RATE: 85 BPM | DIASTOLIC BLOOD PRESSURE: 70 MMHG | WEIGHT: 293 LBS

## 2025-03-05 DIAGNOSIS — K31.84 GASTROPARESIS: ICD-10-CM

## 2025-03-05 DIAGNOSIS — E10.42 DIABETIC PERIPHERAL NEUROPATHY ASSOCIATED WITH TYPE 1 DIABETES MELLITUS: ICD-10-CM

## 2025-03-05 DIAGNOSIS — E66.813 CLASS 3 SEVERE OBESITY DUE TO EXCESS CALORIES WITH SERIOUS COMORBIDITY AND BODY MASS INDEX (BMI) OF 40.0 TO 44.9 IN ADULT: ICD-10-CM

## 2025-03-05 DIAGNOSIS — E10.65 TYPE 1 DIABETES MELLITUS WITH HYPERGLYCEMIA: Primary | ICD-10-CM

## 2025-03-05 DIAGNOSIS — E66.01 CLASS 3 SEVERE OBESITY DUE TO EXCESS CALORIES WITH SERIOUS COMORBIDITY AND BODY MASS INDEX (BMI) OF 40.0 TO 44.9 IN ADULT: ICD-10-CM

## 2025-03-05 DIAGNOSIS — N18.31 STAGE 3A CHRONIC KIDNEY DISEASE: ICD-10-CM

## 2025-03-05 DIAGNOSIS — E78.2 MIXED HYPERLIPIDEMIA: ICD-10-CM

## 2025-03-05 RX ORDER — INSULIN LISPRO 200 [IU]/ML
200 INJECTION, SOLUTION SUBCUTANEOUS DAILY
Qty: 90 ML | Refills: 0 | Status: SHIPPED | OUTPATIENT
Start: 2025-03-05

## 2025-03-05 RX ORDER — GLUCAGON INJECTION, SOLUTION 1 MG/.2ML
1 INJECTION, SOLUTION SUBCUTANEOUS AS NEEDED
Qty: 0.4 ML | Refills: 1 | Status: SHIPPED | OUTPATIENT
Start: 2025-03-05

## 2025-03-05 RX ORDER — INSULIN LISPRO 200 [IU]/ML
400 INJECTION, SOLUTION SUBCUTANEOUS DAILY
Qty: 180 ML | Refills: 0 | Status: SHIPPED | OUTPATIENT
Start: 2025-03-05 | End: 2025-03-05

## 2025-03-05 NOTE — PROGRESS NOTES
"Chief Complaint  Diabetes    Subjective        Danielle Dudley presents to [facility] ENDOCRINOLOGY  History of Present Illness    Was in the hospital last week for muscle spasms   Currently on ABX r/t \"staph infection on her skin\"- patient doesn't think it is a skin infection   Had steroids last week     Type 1 dm (+) LANDRY (6/2021)  DM regimen: medtronic 780g, using u200 insulin   Has been out of CGM r/t cost and LOV > 3m ago, feels this may be why her BS are elevated, had also been drinking a lot of Gatorade   Has been checking her BS \"not often\"- reports sleeping a lot and not eating much   Has not been bolusing with BS readings since she hasn't been checking her glucose levels, carbs only: 35 +/-33g carbs/ day  Pmh s/f chronic pancreatitis   Last diabetic eye exam: 12/2024 (+) glaucoma  Known diabetic complications: neuropathy: lyrica, CKD, gastroparesis: stimulator in place  glucagon: gvoke, never picked up RX  Backup plan: u200 pen with pen needles   CV: rosuvastatin 40mg QD   Renal: losartan 100mg QD  Cgm review n/a      Objective   Vital Signs:  /70   Pulse 85   Temp 98.5 °F (36.9 °C) (Oral)   Ht 175.3 cm (69.02\")   Wt (!) 136 kg (300 lb 3.2 oz)   SpO2 97%   BMI 44.31 kg/m²   Estimated body mass index is 44.31 kg/m² as calculated from the following:    Height as of this encounter: 175.3 cm (69.02\").    Weight as of this encounter: 136 kg (300 lb 3.2 oz).          Physical Exam  Vitals reviewed.   Constitutional:       General: She is not in acute distress.  HENT:      Head: Normocephalic and atraumatic.   Cardiovascular:      Rate and Rhythm: Normal rate.   Pulmonary:      Effort: Pulmonary effort is normal. No respiratory distress.   Musculoskeletal:         General: No signs of injury. Normal range of motion.      Cervical back: Normal range of motion and neck supple.   Skin:     General: Skin is warm and dry.   Neurological:      Mental Status: She is alert and oriented to " person, place, and time. Mental status is at baseline.   Psychiatric:         Mood and Affect: Mood normal.         Behavior: Behavior normal.         Thought Content: Thought content normal.         Judgment: Judgment normal.        Result Review :  The following data was reviewed by: RICKI Westfall on 03/05/2025:  Common labs          8/14/2024    15:03 10/9/2024    08:41 2/12/2025    13:41   Common Labs   Glucose 296      BUN 14      Creatinine 1.34      Sodium 138      Potassium 4.4      Chloride 98      Calcium 9.2      Albumin 3.5      Total Bilirubin 0.3      Alkaline Phosphatase 65      AST (SGOT) 14      ALT (SGPT) 9      Total Cholesterol 178      Triglycerides 229      HDL Cholesterol 47      LDL Cholesterol  92      Hemoglobin A1C 7.40  7.2     9.2          Details          This result is from an external source.                       Assessment and Plan   Diagnoses and all orders for this visit:    1. Type 1 diabetes mellitus with hyperglycemia (Primary)  -     Discontinue: Insulin Lispro (HumaLOG KwikPen) 200 UNIT/ML solution pen-injector; Inject 400 Units under the skin into the appropriate area as directed Daily.  Dispense: 180 mL; Refill: 0  -     Insulin Lispro (HumaLOG KwikPen) 200 UNIT/ML solution pen-injector; Inject 200 Units under the skin into the appropriate area as directed Daily.  Dispense: 90 mL; Refill: 0  -     Glucagon (Gvoke HypoPen 2-Pack) 1 MG/0.2ML solution auto-injector; Inject 1 mg under the skin into the appropriate area as directed As Needed (hypoglycemia).  Dispense: 0.4 mL; Refill: 1    2. Diabetic peripheral neuropathy associated with type 1 diabetes mellitus    3. Gastroparesis    4. Stage 3a chronic kidney disease    5. Mixed hyperlipidemia    6. Class 3 severe obesity due to excess calories with serious comorbidity and body mass index (BMI) of 40.0 to 44.9 in adult             Follow Up   Return in about 3 months (around 6/5/2025).    Resume CGM and automated  insulin delivery, routine BGM until CGM reviewed   Ensure bolusing for all food intake and glucose values 3-4x/day   Fill GVOKE  A1c not at goal   Reviewed high risk of hypoglycemia complications when not checking BS and utilizing insulin delivery device  Reviewed high risk of complications from elevated glucose levels and not using the pump as prescribed- bolusing before food intake and for hyperglycemia  Patient verbalizes that she will follow the above plan by resuming CGM and BGM along with bolusing before food intake and for hyperglycemia     Patient was given instructions and counseling regarding her condition or for health maintenance advice. Please see specific information pulled into the AVS if appropriate.     Lilian Holloway APRN

## 2025-03-15 DIAGNOSIS — F32.0 CURRENT MILD EPISODE OF MAJOR DEPRESSIVE DISORDER WITHOUT PRIOR EPISODE: ICD-10-CM

## 2025-03-15 DIAGNOSIS — E10.43 DIABETIC AUTONOMIC NEUROPATHY ASSOCIATED WITH TYPE 1 DIABETES MELLITUS: ICD-10-CM

## 2025-03-18 DIAGNOSIS — E10.43 DIABETIC AUTONOMIC NEUROPATHY ASSOCIATED WITH TYPE 1 DIABETES MELLITUS: ICD-10-CM

## 2025-03-18 DIAGNOSIS — E78.2 MIXED HYPERLIPIDEMIA: Primary | ICD-10-CM

## 2025-03-18 DIAGNOSIS — F32.0 CURRENT MILD EPISODE OF MAJOR DEPRESSIVE DISORDER WITHOUT PRIOR EPISODE: ICD-10-CM

## 2025-03-18 RX ORDER — ROSUVASTATIN CALCIUM 40 MG/1
40 TABLET, COATED ORAL DAILY
Qty: 90 TABLET | Refills: 3 | Status: SHIPPED | OUTPATIENT
Start: 2025-03-18

## 2025-03-18 RX ORDER — DULOXETIN HYDROCHLORIDE 60 MG/1
60 CAPSULE, DELAYED RELEASE ORAL DAILY
Qty: 90 CAPSULE | Refills: 1 | Status: SHIPPED | OUTPATIENT
Start: 2025-03-18

## 2025-03-18 RX ORDER — AMLODIPINE BESYLATE 2.5 MG/1
2.5 TABLET ORAL DAILY
Qty: 90 TABLET | Refills: 3 | OUTPATIENT
Start: 2025-03-18

## 2025-03-26 ENCOUNTER — OFFICE VISIT (OUTPATIENT)
Dept: INTERNAL MEDICINE | Facility: CLINIC | Age: 50
End: 2025-03-26
Payer: MEDICARE

## 2025-03-26 VITALS
DIASTOLIC BLOOD PRESSURE: 90 MMHG | SYSTOLIC BLOOD PRESSURE: 146 MMHG | BODY MASS INDEX: 44.72 KG/M2 | HEART RATE: 90 BPM | WEIGHT: 293 LBS | OXYGEN SATURATION: 98 %

## 2025-03-26 DIAGNOSIS — Z79.4 TYPE 2 DIABETES MELLITUS WITH HYPERGLYCEMIA, WITH LONG-TERM CURRENT USE OF INSULIN: ICD-10-CM

## 2025-03-26 DIAGNOSIS — N18.31 CHRONIC KIDNEY DISEASE, STAGE 3A: ICD-10-CM

## 2025-03-26 DIAGNOSIS — E11.65 TYPE 2 DIABETES MELLITUS WITH HYPERGLYCEMIA, WITH LONG-TERM CURRENT USE OF INSULIN: ICD-10-CM

## 2025-03-26 DIAGNOSIS — E78.2 MIXED HYPERLIPIDEMIA: ICD-10-CM

## 2025-03-26 DIAGNOSIS — K31.84 GASTROPARESIS: ICD-10-CM

## 2025-03-26 DIAGNOSIS — K86.1 OTHER CHRONIC PANCREATITIS: ICD-10-CM

## 2025-03-26 DIAGNOSIS — R91.1 RIGHT UPPER LOBE PULMONARY NODULE: Primary | ICD-10-CM

## 2025-03-26 DIAGNOSIS — M54.12 CERVICAL RADICULOPATHY: ICD-10-CM

## 2025-03-26 DIAGNOSIS — I10 ESSENTIAL HYPERTENSION: ICD-10-CM

## 2025-03-26 RX ORDER — PANCRELIPASE LIPASE, PANCRELIPASE PROTEASE, PANCRELIPASE AMYLASE 25000; 79000; 105000 [USP'U]/1; [USP'U]/1; [USP'U]/1
CAPSULE, DELAYED RELEASE ORAL
COMMUNITY
Start: 2025-03-18

## 2025-03-26 RX ORDER — HYOSCYAMINE SULFATE 0.12 MG/1
0.12 TABLET ORAL EVERY 6 HOURS PRN
COMMUNITY
Start: 2025-03-18 | End: 2026-03-18

## 2025-03-26 RX ORDER — LUBIPROSTONE 24 UG/1
CAPSULE ORAL
COMMUNITY
Start: 2025-03-21

## 2025-03-26 NOTE — PROGRESS NOTES
Chief Complaint  Depression, Hypertension, Anxiety, Chronic Kidney Disease, and Diabetes    Subjective        Danielle Dudley presents to [facility] PRIMARY CARE  History of Present Illness    Current Outpatient Medications:   ·  amLODIPine (NORVASC) 5 MG tablet, Take 1 tablet by mouth Daily., Disp: 90 tablet, Rfl: 3  ·  betamethasone valerate (VALISONE) 0.1 % ointment, APPLY TOPICALLY TO THE APPROPRIATE AREA AS DIRECTED 2 (TWO) TIMES A DAY. TO HANDS AS NEEDED, Disp: 45 g, Rfl: 1  ·  busPIRone (BUSPAR) 5 MG tablet, TAKE 1 TABLET BY MOUTH THREE TIMES A DAY, Disp: 270 tablet, Rfl: 2  ·  diphenhydrAMINE (BENADRYL) 50 MG/ML injection, , Disp: , Rfl:   ·  DULoxetine (CYMBALTA) 60 MG capsule, TAKE 1 CAPSULE BY MOUTH EVERY DAY, Disp: 90 capsule, Rfl: 1  ·  DULoxetine (CYMBALTA) 60 MG capsule, Take 1 capsule by mouth Daily., Disp: 90 capsule, Rfl: 1  ·  Eliquis 5 MG tablet tablet, TAKE 1 TABLET BY MOUTH EVERY 12 HOURS, Disp: 60 tablet, Rfl: 5  ·  EPINEPHrine (EPIPEN) 0.3 MG/0.3ML solution auto-injector injection, , Disp: , Rfl:   ·  Gammaked 10 GM/100ML solution infusion, , Disp: , Rfl:   ·  Gammaked 20 GM/200ML solution infusion, , Disp: , Rfl:   ·  Glucagon (Gvoke HypoPen 2-Pack) 1 MG/0.2ML solution auto-injector, Inject 1 mg under the skin into the appropriate area as directed As Needed (hypoglycemia)., Disp: 0.4 mL, Rfl: 1  ·  glucose blood test strip, Dispense based on insurance preference: Check 3 times a day Dx: E 11.9, Disp: 300 each, Rfl: 4  ·  hyoscyamine (ANASPAZ,LEVSIN) 0.125 MG tablet, Take 1 tablet by mouth Every 6 (Six) Hours As Needed., Disp: , Rfl:   ·  immune globulin, human, 5 GM/50ML solution infusion, Inject as directed every 7 (seven) days, Disp: , Rfl:   ·  Insulin Infusion Pump device, MEDTRONIC INSULIN PUMP  770 G WITH GUARDIAN  SENSOR, Disp: 1 each, Rfl: 0  ·  Insulin Lispro (HumaLOG KwikPen) 200 UNIT/ML solution pen-injector, Inject 200 Units under the skin into the appropriate  area as directed Daily., Disp: 90 mL, Rfl: 0  ·  Insulin Pen Needle (BD Pen Needle Neida U/F) 32G X 4 MM misc, Using 1 pen needle daily, Disp: 100 each, Rfl: 1  ·  losartan (COZAAR) 100 MG tablet, Take 1 tablet by mouth., Disp: , Rfl:   ·  lubiprostone (AMITIZA) 24 MCG capsule, , Disp: , Rfl:   ·  Magnesium Cl-Calcium Carbonate (Slow Magnesium/Calcium)  MG tablet delayed-release, Take 1 tablet by mouth Daily., Disp: 30 tablet, Rfl: 2  ·  methocarbamol (ROBAXIN) 500 MG tablet, TAKE 1 TABLET BY MOUTH 3 TIMES A DAY AS NEEDED FOR MUSCLE SPASMS.**NOT ON FORMULARY, Disp: 60 tablet, Rfl: 2  ·  mupirocin (BACTROBAN) 2 % ointment, Apply 1 Application topically to the appropriate area as directed., Disp: , Rfl:   ·  naloxone (NARCAN) 4 MG/0.1ML nasal spray, Call 911. Don't prime. [place] in 1 nostril for overdose. Repeat in 2-3 minutes in other nostril if no or minimal breathing/responsiveness., Disp: 2 each, Rfl: 0  ·  pantoprazole (PROTONIX) 40 MG EC tablet, TAKE 1 TABLET BY MOUTH EVERY DAY (Patient taking differently: Take 1 tablet by mouth 2 (Two) Times a Day.), Disp: 90 tablet, Rfl: 3  ·  Pediatric Multivitamins-Iron (Flintstones Plus Extra Iron) 18 MG chewable tablet, Chew 1 tablet 2 (Two) Times a Day., Disp: 60 tablet, Rfl: 5  ·  pregabalin (LYRICA) 150 MG capsule, TAKE 1 CAPSULE BY MOUTH TWICE A DAY, Disp: 60 capsule, Rfl: 5  ·  promethazine (PHENERGAN) 25 MG tablet, Take 1 tablet by mouth As Needed., Disp: , Rfl:   ·  rosuvastatin (CRESTOR) 40 MG tablet, TAKE 1 TABLET BY MOUTH EVERY DAY, Disp: 90 tablet, Rfl: 3  ·  sodium chloride 0.9 % solution, , Disp: , Rfl:   ·  traMADol (ULTRAM) 50 MG tablet, Take 1 tablet by mouth Every 6 (Six) Hours As Needed for Moderate Pain., Disp: 30 tablet, Rfl: 5  ·  Zenpep 14774-96587 units capsule delayed-release particles, TAKE 1 CAPSULE BY MOUTH 3 TIMES A DAY WITH MEALS., Disp: , Rfl:    Depression  Her past medical history is significant for depression.   Hypertension  Associated  symptoms include anxiety.   Anxiety  Her past medical history is significant for depression.   Chronic Kidney Disease  Diabetes      History of Present Illness  The patient is a 49-year-old female who presents for evaluation of a pulmonary nodule.    She has been abstaining from smoking for the past 7 years. A recent CT scan of her chest, conducted at [facility] approximately 1 month ago, revealed a 6 mm indeterminate pulmonary nodule in the right upper lobe. She was advised to consult with us regarding this finding. She does not currently have a pulmonologist. She reports experiencing pain upon turning over the past 2 days.    Supplemental Information  She was hospitalized for several days due to severe muscle spasms, which necessitated a 911 call as she was unable to rise from the floor. Upon the ambulance's arrival, her blood pressure was recorded at 207/109, leading to her hospital admission. She was treated with Levsin, which alleviated her spasms and allowed her to sleep. During her hospital stay, she reported chest pain, prompting a CT scan of her lungs and abdomen. She also experienced pain, burning, and swelling at the site of her port, leading to the cessation of her treatment and a visit to the ER. She was prescribed 2 or 3 different medications for an allergic reaction. She has been attempting to avoid further hospitalizations since then. She recently underwent lab tests for her nephrologist and is scheduled for a follow-up appointment today. She reports that despite adequate hydration, she experiences urinary low volume.    SOCIAL HISTORY  The patient quit smoking 7 years ago.    ALLERGIES  The patient has an allergic reaction to TEGADERM.    MEDICATIONS  Current: Benadryl    Objective   Vital Signs:  /90 (BP Location: Left arm, Patient Position: Sitting, Cuff Size: Large Adult)   Pulse 90   Wt (!) 137 kg (303 lb)   SpO2 98%   BMI 44.72 kg/m²   Estimated body mass index is 44.72 kg/m² as  "calculated from the following:    Height as of 3/5/25: 175.3 cm (69.02\").    Weight as of this encounter: 137 kg (303 lb).          Physical Exam  Vitals reviewed.   Constitutional:       Appearance: She is well-developed. She is obese.   HENT:      Head: Normocephalic and atraumatic.      Right Ear: Tympanic membrane and external ear normal.      Left Ear: Tympanic membrane and external ear normal.      Nose: Nose normal.   Eyes:      Conjunctiva/sclera: Conjunctivae normal.      Pupils: Pupils are equal, round, and reactive to light.   Neck:      Thyroid: No thyromegaly.      Vascular: No JVD.   Cardiovascular:      Rate and Rhythm: Normal rate and regular rhythm.      Heart sounds: Normal heart sounds.   Pulmonary:      Effort: Pulmonary effort is normal.      Breath sounds: Normal breath sounds.   Abdominal:      General: Bowel sounds are normal.      Palpations: Abdomen is soft.   Musculoskeletal:         General: Normal range of motion.      Cervical back: Normal range of motion and neck supple.   Lymphadenopathy:      Cervical: No cervical adenopathy.   Skin:     General: Skin is warm and dry.      Findings: No rash.   Neurological:      Mental Status: She is alert and oriented to person, place, and time.      Cranial Nerves: No cranial nerve deficit.      Coordination: Coordination normal.   Psychiatric:         Behavior: Behavior normal.         Thought Content: Thought content normal.         Judgment: Judgment normal.        Physical Exam  There is a little wax coming out of the ears.    Result Review :  The following data was reviewed by: Jairo Walker MD on 03/26/2025:  Common labs          8/14/2024    15:03 10/9/2024    08:41 2/12/2025    13:41   Common Labs   Glucose 296      BUN 14      Creatinine 1.34      Sodium 138      Potassium 4.4      Chloride 98      Calcium 9.2      Albumin 3.5      Total Bilirubin 0.3      Alkaline Phosphatase 65      AST (SGOT) 14      ALT (SGPT) 9      Total Cholesterol " 178      Triglycerides 229      HDL Cholesterol 47      LDL Cholesterol  92      Hemoglobin A1C 7.40  7.2     9.2          Details          This result is from an external source.             Data reviewed : Recent hospitalization notes reviewed emergency room visit to [facility] plus lab work plus CT chest and abdomen    Results  Imaging  CT of the chest showed a 6 mm indeterminate pulmonary nodule in the right upper lobe.             Assessment and Plan   Diagnoses and all orders for this visit:    1. Right upper lobe pulmonary nodule (Primary)  -     CT Chest Without Contrast; Future    2. Essential hypertension    3. Mixed hyperlipidemia      Current Outpatient Medications:     amLODIPine (NORVASC) 5 MG tablet, Take 1 tablet by mouth Daily., Disp: 90 tablet, Rfl: 3    betamethasone valerate (VALISONE) 0.1 % ointment, APPLY TOPICALLY TO THE APPROPRIATE AREA AS DIRECTED 2 (TWO) TIMES A DAY. TO HANDS AS NEEDED, Disp: 45 g, Rfl: 1    busPIRone (BUSPAR) 5 MG tablet, TAKE 1 TABLET BY MOUTH THREE TIMES A DAY, Disp: 270 tablet, Rfl: 2    diphenhydrAMINE (BENADRYL) 50 MG/ML injection, , Disp: , Rfl:     DULoxetine (CYMBALTA) 60 MG capsule, TAKE 1 CAPSULE BY MOUTH EVERY DAY, Disp: 90 capsule, Rfl: 1    DULoxetine (CYMBALTA) 60 MG capsule, Take 1 capsule by mouth Daily., Disp: 90 capsule, Rfl: 1    Eliquis 5 MG tablet tablet, TAKE 1 TABLET BY MOUTH EVERY 12 HOURS, Disp: 60 tablet, Rfl: 5    EPINEPHrine (EPIPEN) 0.3 MG/0.3ML solution auto-injector injection, , Disp: , Rfl:     Gammaked 10 GM/100ML solution infusion, , Disp: , Rfl:     Gammaked 20 GM/200ML solution infusion, , Disp: , Rfl:     Glucagon (Gvoke HypoPen 2-Pack) 1 MG/0.2ML solution auto-injector, Inject 1 mg under the skin into the appropriate area as directed As Needed (hypoglycemia)., Disp: 0.4 mL, Rfl: 1    glucose blood test strip, Dispense based on insurance preference: Check 3 times a day Dx: E 11.9, Disp: 300 each, Rfl: 4    hyoscyamine  (ANASPAZ,LEVSIN) 0.125 MG tablet, Take 1 tablet by mouth Every 6 (Six) Hours As Needed., Disp: , Rfl:     immune globulin, human, 5 GM/50ML solution infusion, Inject as directed every 7 (seven) days, Disp: , Rfl:     Insulin Infusion Pump device, MEDTRONIC INSULIN PUMP  770 G WITH GUARDIAN  SENSOR, Disp: 1 each, Rfl: 0    Insulin Lispro (HumaLOG KwikPen) 200 UNIT/ML solution pen-injector, Inject 200 Units under the skin into the appropriate area as directed Daily., Disp: 90 mL, Rfl: 0    Insulin Pen Needle (BD Pen Needle Neida U/F) 32G X 4 MM misc, Using 1 pen needle daily, Disp: 100 each, Rfl: 1    losartan (COZAAR) 100 MG tablet, Take 1 tablet by mouth., Disp: , Rfl:     lubiprostone (AMITIZA) 24 MCG capsule, , Disp: , Rfl:     Magnesium Cl-Calcium Carbonate (Slow Magnesium/Calcium)  MG tablet delayed-release, Take 1 tablet by mouth Daily., Disp: 30 tablet, Rfl: 2    methocarbamol (ROBAXIN) 500 MG tablet, TAKE 1 TABLET BY MOUTH 3 TIMES A DAY AS NEEDED FOR MUSCLE SPASMS.**NOT ON FORMULARY, Disp: 60 tablet, Rfl: 2    mupirocin (BACTROBAN) 2 % ointment, Apply 1 Application topically to the appropriate area as directed., Disp: , Rfl:     naloxone (NARCAN) 4 MG/0.1ML nasal spray, Call 911. Don't prime. [place] in 1 nostril for overdose. Repeat in 2-3 minutes in other nostril if no or minimal breathing/responsiveness., Disp: 2 each, Rfl: 0    pantoprazole (PROTONIX) 40 MG EC tablet, TAKE 1 TABLET BY MOUTH EVERY DAY (Patient taking differently: Take 1 tablet by mouth 2 (Two) Times a Day.), Disp: 90 tablet, Rfl: 3    Pediatric Multivitamins-Iron (Flintstones Plus Extra Iron) 18 MG chewable tablet, Chew 1 tablet 2 (Two) Times a Day., Disp: 60 tablet, Rfl: 5    pregabalin (LYRICA) 150 MG capsule, TAKE 1 CAPSULE BY MOUTH TWICE A DAY, Disp: 60 capsule, Rfl: 5    promethazine (PHENERGAN) 25 MG tablet, Take 1 tablet by mouth As Needed., Disp: , Rfl:     rosuvastatin (CRESTOR) 40 MG tablet, TAKE 1 TABLET BY MOUTH EVERY DAY,  Disp: 90 tablet, Rfl: 3    sodium chloride 0.9 % solution, , Disp: , Rfl:     traMADol (ULTRAM) 50 MG tablet, Take 1 tablet by mouth Every 6 (Six) Hours As Needed for Moderate Pain., Disp: 30 tablet, Rfl: 5    Zenpep 98003-99669 units capsule delayed-release particles, TAKE 1 CAPSULE BY MOUTH 3 TIMES A DAY WITH MEALS., Disp: , Rfl:    Assessment & Plan  1. Pulmonary nodule.  A 6 mm indeterminate pulmonary nodule in the right upper lobe was identified on a CT scan done on 02/27/2025. The nodule is likely reactive and not related to smoking. It may be due to environmental exposure during the winter. A follow-up CT scan without contrast will be ordered for the end of May 2025 or early June 2025 to monitor the nodule. If the nodule persists, further evaluation at a pulmonary nodule clinic will be considered.         Follow Up   No follow-ups on file.    Patient or patient representative verbalized consent for the use of Ambient Listening during the visit with  Jairo Walker MD for chart documentation. 3/26/2025  09:22 EDT    Patient was given instructions and counseling regarding her condition or for health maintenance advice. Please see specific information pulled into the AVS if appropriate.

## 2025-04-08 ENCOUNTER — TELEPHONE (OUTPATIENT)
Dept: CASE MANAGEMENT | Facility: OTHER | Age: 50
End: 2025-04-08
Payer: MEDICARE

## 2025-04-09 ENCOUNTER — TELEPHONE (OUTPATIENT)
Dept: CASE MANAGEMENT | Facility: OTHER | Age: 50
End: 2025-04-09
Payer: MEDICARE

## 2025-04-09 NOTE — TELEPHONE ENCOUNTER
Second and third attempt to call patient, no answer. Left message to call back. Will also send a message via [employer]. Unable to reach patient, will close program per ACM algorithm.

## 2025-04-10 RX ORDER — METHOCARBAMOL 500 MG/1
TABLET, FILM COATED ORAL
Qty: 60 TABLET | Refills: 2 | Status: SHIPPED | OUTPATIENT
Start: 2025-04-10

## 2025-04-10 NOTE — TELEPHONE ENCOUNTER
Rx Refill Note  Requested Prescriptions     Pending Prescriptions Disp Refills    methocarbamol (ROBAXIN) 500 MG tablet [Pharmacy Med Name: METHOCARBAMOL 500 MG TABLET] 60 tablet 2     Sig: TAKE 1 TABLET BY MOUTH 3 TIMES A DAY AS NEEDED FOR MUSCLE SPASMS.**NOT ON FORMULARY      Last office visit with prescribing clinician: 3/26/2025   Last telemedicine visit with prescribing clinician: Visit date not found   Next office visit with prescribing clinician: 6/25/2025       Katina Niño MA  04/10/25, 11:26 EDT

## 2025-04-15 RX ORDER — AMLODIPINE BESYLATE 2.5 MG/1
2.5 TABLET ORAL DAILY
Qty: 90 TABLET | Refills: 3 | OUTPATIENT
Start: 2025-04-15

## 2025-04-27 NOTE — Clinical Note
Please update medtronic orders for her infusion sets ( steel cannula) to rustam every 48h, not 72h So 45 sets per 90 days  Reviewed discharge instructions with the patient, verbalized understanding, written instruction and education provided.     Patient denies any further questions or needs at this time.  No distress noted on DC, Pt is Alert, GCS 15.   Pt ambulated without difficulty out of ED.

## 2025-05-28 ENCOUNTER — HOSPITAL ENCOUNTER (OUTPATIENT)
Dept: CT IMAGING | Facility: HOSPITAL | Age: 50
Discharge: HOME | End: 2025-05-28
Admitting: FAMILY MEDICINE
Payer: MEDICARE

## 2025-05-28 DIAGNOSIS — R91.1 RIGHT UPPER LOBE PULMONARY NODULE: ICD-10-CM

## 2025-05-28 PROCEDURE — 71250 CT THORAX DX C-: CPT

## 2025-06-11 ENCOUNTER — OFFICE VISIT (OUTPATIENT)
Dept: ENDOCRINOLOGY | Age: 50
End: 2025-06-11
Payer: MEDICARE

## 2025-06-11 VITALS
DIASTOLIC BLOOD PRESSURE: 80 MMHG | WEIGHT: 293 LBS | OXYGEN SATURATION: 96 % | TEMPERATURE: 98.3 F | HEART RATE: 85 BPM | SYSTOLIC BLOOD PRESSURE: 130 MMHG | BODY MASS INDEX: 43.4 KG/M2 | HEIGHT: 69 IN

## 2025-06-11 DIAGNOSIS — N18.31 STAGE 3A CHRONIC KIDNEY DISEASE: ICD-10-CM

## 2025-06-11 DIAGNOSIS — E78.2 MIXED HYPERLIPIDEMIA: ICD-10-CM

## 2025-06-11 DIAGNOSIS — E66.813 CLASS 3 SEVERE OBESITY DUE TO EXCESS CALORIES WITH SERIOUS COMORBIDITY AND BODY MASS INDEX (BMI) OF 45.0 TO 49.9 IN ADULT: ICD-10-CM

## 2025-06-11 DIAGNOSIS — E10.42 DIABETIC PERIPHERAL NEUROPATHY ASSOCIATED WITH TYPE 1 DIABETES MELLITUS: ICD-10-CM

## 2025-06-11 DIAGNOSIS — E66.01 CLASS 3 SEVERE OBESITY DUE TO EXCESS CALORIES WITH SERIOUS COMORBIDITY AND BODY MASS INDEX (BMI) OF 45.0 TO 49.9 IN ADULT: ICD-10-CM

## 2025-06-11 DIAGNOSIS — E10.65 TYPE 1 DIABETES MELLITUS WITH HYPERGLYCEMIA: Primary | ICD-10-CM

## 2025-06-11 DIAGNOSIS — K31.84 GASTROPARESIS: ICD-10-CM

## 2025-06-11 DIAGNOSIS — R80.1 PERSISTENT PROTEINURIA: ICD-10-CM

## 2025-06-11 LAB
CHOLEST SERPL-MCNC: 170 MG/DL (ref 0–200)
HBA1C MFR BLD: 7 % (ref 4.8–5.6)
HDLC SERPL-MCNC: 41 MG/DL (ref 40–60)
IMP & REVIEW OF LAB RESULTS: NORMAL
LDLC SERPL CALC-MCNC: 90 MG/DL (ref 0–100)
TRIGL SERPL-MCNC: 230 MG/DL (ref 0–150)
TSH SERPL DL<=0.005 MIU/L-ACNC: 4.27 UIU/ML (ref 0.27–4.2)
UNABLE TO VOID: NORMAL
VLDLC SERPL CALC-MCNC: 39 MG/DL (ref 5–40)

## 2025-06-11 NOTE — PROGRESS NOTES
"Chief Complaint  Diabetes    Subjective        Danielle Dudley presents to [facility] ENDOCRINOLOGY  History of Present Illness    Type 1 dm (+) LANDRY (6/2021)  DM regimen: medtronic 780g, using u200 insulin   Pmh s/f chronic pancreatitis   Last diabetic eye exam: 12/2024 (+) glaucoma  Known diabetic complications: neuropathy, CKD, gastroparesis: stimulator in place  Backup plan: u200 pen with pen needles   CV: rosuvastatin 40mg QD   Renal: losartan 100mg QD  Has to use steroid courses at time for skin irritation which causes her glucose levels to be harder to control     Objective   Vital Signs:  /80   Pulse 85   Temp 98.3 °F (36.8 °C) (Oral)   Ht 175.3 cm (69.02\")   Wt (!) 140 kg (308 lb 6.4 oz)   SpO2 96%   BMI 45.52 kg/m²   Estimated body mass index is 45.52 kg/m² as calculated from the following:    Height as of this encounter: 175.3 cm (69.02\").    Weight as of this encounter: 140 kg (308 lb 6.4 oz).          Physical Exam  Vitals reviewed.   Constitutional:       General: She is not in acute distress.  HENT:      Head: Normocephalic and atraumatic.   Cardiovascular:      Rate and Rhythm: Normal rate.   Pulmonary:      Effort: Pulmonary effort is normal. No respiratory distress.   Musculoskeletal:         General: No signs of injury. Normal range of motion.      Cervical back: Normal range of motion and neck supple.   Skin:     General: Skin is warm and dry.   Neurological:      Mental Status: She is alert and oriented to person, place, and time. Mental status is at baseline.   Psychiatric:         Mood and Affect: Mood normal.         Behavior: Behavior normal.         Thought Content: Thought content normal.         Judgment: Judgment normal.        Result Review :  The following data was reviewed by: RICKI Westfall on 06/11/2025:  Common labs          8/14/2024    15:03 10/9/2024    08:41 2/12/2025    13:41   Common Labs   Glucose 296      BUN 14      Creatinine 1.34    "   Sodium 138      Potassium 4.4      Chloride 98      Calcium 9.2      Albumin 3.5      Total Bilirubin 0.3      Alkaline Phosphatase 65      AST (SGOT) 14      ALT (SGPT) 9      Total Cholesterol 178      Triglycerides 229      HDL Cholesterol 47      LDL Cholesterol  92      Hemoglobin A1C 7.40  7.2     9.2          Details          This result is from an external source.                       Assessment and Plan   Diagnoses and all orders for this visit:    1. Type 1 diabetes mellitus with hyperglycemia (Primary)  -     Lipid Panel  -     Hemoglobin A1c  -     Microalbumin / Creatinine Urine Ratio - Urine, Clean Catch  -     TSH    2. Diabetic peripheral neuropathy associated with type 1 diabetes mellitus    3. Gastroparesis    4. Mixed hyperlipidemia    5. Stage 3a chronic kidney disease    6. Class 3 severe obesity due to excess calories with serious comorbidity and body mass index (BMI) of 45.0 to 49.9 in adult    7. Persistent proteinuria             Follow Up   Return in about 3 months (around 9/11/2025).    Glucose control better now with Cgm and automated mode back in use however is complicated by steroid courses at times   Continued hypoglycemia prevention with CGM and early intervention  Use temp target instead of suspending insulin when worried about hypoglycemia   Continue arb and statin  Labs today  Additional recommendations to follow as needed   BMI not at goal, not improving     Patient was given instructions and counseling regarding her condition or for health maintenance advice. Please see specific information pulled into the AVS if appropriate.       RICKI Westfall

## 2025-06-16 ENCOUNTER — TREATMENT (OUTPATIENT)
Dept: ENDOCRINOLOGY | Age: 50
End: 2025-06-16
Payer: MEDICARE

## 2025-06-16 DIAGNOSIS — E10.65 TYPE 1 DIABETES MELLITUS WITH HYPERGLYCEMIA: Primary | ICD-10-CM

## 2025-06-16 PROCEDURE — 95251 CONT GLUC MNTR ANALYSIS I&R: CPT | Performed by: NURSE PRACTITIONER

## 2025-06-16 NOTE — PROGRESS NOTES
Cgm review 5/16/25-5/29/25  Time in range 72%  21% high  7% very high  Smart guard 75%  Gmi 7.2%  [id number]  Bolus 39%    Time in range at goal  No additional changes needed at this time

## 2025-06-21 DIAGNOSIS — R10.9 FLANK PAIN: ICD-10-CM

## 2025-06-23 RX ORDER — TRAMADOL HYDROCHLORIDE 50 MG/1
50 TABLET ORAL
Qty: 30 TABLET | Refills: 2 | Status: SHIPPED | OUTPATIENT
Start: 2025-06-23

## 2025-06-25 ENCOUNTER — OFFICE VISIT (OUTPATIENT)
Dept: INTERNAL MEDICINE | Facility: CLINIC | Age: 50
End: 2025-06-25
Payer: MEDICARE

## 2025-06-25 VITALS
WEIGHT: 293 LBS | OXYGEN SATURATION: 91 % | DIASTOLIC BLOOD PRESSURE: 68 MMHG | BODY MASS INDEX: 45.76 KG/M2 | SYSTOLIC BLOOD PRESSURE: 126 MMHG | HEART RATE: 88 BPM

## 2025-06-25 DIAGNOSIS — E10.43 DIABETIC AUTONOMIC NEUROPATHY ASSOCIATED WITH TYPE 1 DIABETES MELLITUS: ICD-10-CM

## 2025-06-25 DIAGNOSIS — I10 ESSENTIAL HYPERTENSION: ICD-10-CM

## 2025-06-25 DIAGNOSIS — F32.0 CURRENT MILD EPISODE OF MAJOR DEPRESSIVE DISORDER WITHOUT PRIOR EPISODE: ICD-10-CM

## 2025-06-25 DIAGNOSIS — E11.65 TYPE 2 DIABETES MELLITUS WITH HYPERGLYCEMIA, WITH LONG-TERM CURRENT USE OF INSULIN: ICD-10-CM

## 2025-06-25 DIAGNOSIS — Z79.4 TYPE 2 DIABETES MELLITUS WITH HYPERGLYCEMIA, WITH LONG-TERM CURRENT USE OF INSULIN: ICD-10-CM

## 2025-06-25 DIAGNOSIS — Z00.00 MEDICARE ANNUAL WELLNESS VISIT, SUBSEQUENT: Primary | ICD-10-CM

## 2025-06-25 DIAGNOSIS — K86.1 OTHER CHRONIC PANCREATITIS: ICD-10-CM

## 2025-06-25 DIAGNOSIS — E78.2 MIXED HYPERLIPIDEMIA: ICD-10-CM

## 2025-06-25 DIAGNOSIS — N18.31 CHRONIC KIDNEY DISEASE, STAGE 3A: ICD-10-CM

## 2025-06-25 DIAGNOSIS — E83.42 HYPOMAGNESEMIA: ICD-10-CM

## 2025-06-25 DIAGNOSIS — K31.84 GASTROPARESIS: ICD-10-CM

## 2025-06-25 NOTE — PATIENT INSTRUCTIONS
Medicare Wellness  Personal Prevention Plan of Service     Date of Office Visit:    Encounter Provider:  Jairo Walker MD  Place of Service:  [facility] PRIMARY CARE  Patient Name: Danielle Dudley  :  1975    As part of the Medicare Wellness portion of your visit today, we are providing you with this personalized preventive plan of services (PPPS). This plan is based upon recommendations of the [place] Preventive Services Task Force (USPSTF) and the Advisory Committee on Immunization Practices (ACIP).    This lists the preventive care services that should be considered, and provides dates of when you are due. Items listed as completed are up-to-date and do not require any further intervention.    Health Maintenance   Topic Date Due    Pneumococcal Vaccine 50+ (1 of 2 - PCV) Never done    MAMMOGRAM  2017    DIABETIC FOOT EXAM  03/10/2022    DIABETIC EYE EXAM  2023    COVID-19 Vaccine (1 -  season) Never done    ZOSTER VACCINE (2 of 2) 2025    COLORECTAL CANCER SCREENING  2025    INFLUENZA VACCINE  2025    HEMOGLOBIN A1C  2025    LUNG CANCER SCREENING  2026    LIPID PANEL  2026    URINE MICROALBUMIN-CREATININE RATIO (uACR)  2026    ANNUAL WELLNESS VISIT  2026    HEPATITIS C SCREENING  Completed    Hepatitis B  Discontinued    TDAP/TD VACCINES  Discontinued       No orders of the defined types were placed in this encounter.      No follow-ups on file.

## 2025-06-25 NOTE — PROGRESS NOTES
Subjective   The ABCs of the Annual Wellness Visit  Medicare Wellness Visit      Danielle Dudley is a 50 y.o. patient who presents for a Medicare Wellness Visit.    The following portions of the patient's history were reviewed and   updated as appropriate: allergies, current medications, past family history, past medical history, past social history, past surgical history, and problem list.    Compared to one year ago, the patient's physical   health is the same.  Compared to one year ago, the patient's mental   health is the same.    Recent Hospitalizations:  She was not admitted to the hospital during the last year.     Current Medical Providers:  Patient Care Team:  Jairo Walker MD as PCP - General (Family Medicine)  Leo Haskins MD as Consulting Physician (Nephrology)  Akila Rebolledo APRN (Nurse Practitioner)  Jovita Miller MD as Consulting Physician (Gastroenterology)  Lilian Holloway APRN as Nurse Practitioner (Endocrinology)  Bharti Sommers MD as Consulting Physician (Cardiology)    Outpatient Medications Prior to Visit   Medication Sig Dispense Refill    amLODIPine (NORVASC) 5 MG tablet Take 1 tablet by mouth Daily. 90 tablet 3    betamethasone valerate (VALISONE) 0.1 % ointment APPLY TOPICALLY TO THE APPROPRIATE AREA AS DIRECTED 2 (TWO) TIMES A DAY. TO HANDS AS NEEDED 45 g 1    busPIRone (BUSPAR) 5 MG tablet TAKE 1 TABLET BY MOUTH THREE TIMES A  tablet 2    diphenhydrAMINE (BENADRYL) 50 MG/ML injection  (Patient taking differently: Infuse 1 mL into a venous catheter 2 (Two) Times a Week.)      DULoxetine (CYMBALTA) 60 MG capsule TAKE 1 CAPSULE BY MOUTH EVERY DAY 90 capsule 1    DULoxetine (CYMBALTA) 60 MG capsule Take 1 capsule by mouth Daily. 90 capsule 1    Eliquis 5 MG tablet tablet TAKE 1 TABLET BY MOUTH EVERY 12 HOURS 60 tablet 5    EPINEPHrine (EPIPEN) 0.3 MG/0.3ML solution auto-injector injection       Gammaked 10 GM/100ML solution infusion  (Patient taking  differently: Infuse  into a venous catheter 1 (One) Time Per Week.)      Gammaked 20 GM/200ML solution infusion  (Patient taking differently: Infuse  into a venous catheter 1 (One) Time Per Week.)      Glucagon (Gvoke HypoPen 2-Pack) 1 MG/0.2ML solution auto-injector Inject 1 mg under the skin into the appropriate area as directed As Needed (hypoglycemia). 0.4 mL 1    glucose blood test strip Dispense based on insurance preference: Check 3 times a day Dx: E 11.9 300 each 4    hyoscyamine (ANASPAZ,LEVSIN) 0.125 MG tablet Take 1 tablet by mouth Every 6 (Six) Hours As Needed.      immune globulin, human, 5 GM/50ML solution infusion Inject as directed every 7 (seven) days      Insulin Infusion Pump device [employer] INSULIN PUMP  770 G WITH GUARDIAN  SENSOR 1 each 0    Insulin Lispro (HumaLOG KwikPen) 200 UNIT/ML solution pen-injector Inject 200 Units under the skin into the appropriate area as directed Daily. 90 mL 0    Insulin Pen Needle (BD Pen Needle Neida U/F) 32G X 4 MM misc Using 1 pen needle daily 100 each 1    lubiprostone (AMITIZA) 24 MCG capsule Take 1 capsule by mouth As Needed.      Magnesium Cl-Calcium Carbonate (Slow Magnesium/Calcium)  MG tablet delayed-release Take 1 tablet by mouth Daily. 30 tablet 2    methocarbamol (ROBAXIN) 500 MG tablet TAKE 1 TABLET BY MOUTH 3 TIMES A DAY AS NEEDED FOR MUSCLE SPASMS.**NOT ON FORMULARY 60 tablet 2    mupirocin (BACTROBAN) 2 % ointment Apply 1 Application topically to the appropriate area as directed.      naloxone (NARCAN) 4 MG/0.1ML nasal spray Call 911. Don't prime. [place] in 1 nostril for overdose. Repeat in 2-3 minutes in other nostril if no or minimal breathing/responsiveness. 2 each 0    pantoprazole (PROTONIX) 40 MG EC tablet TAKE 1 TABLET BY MOUTH EVERY DAY (Patient taking differently: Take 1 tablet by mouth 2 (Two) Times a Day.) 90 tablet 3    Pediatric Multivitamins-Iron (Flintstones Plus Extra Iron) 18 MG chewable tablet Chew 1 tablet 2 (Two) Times a  Day. 60 tablet 5    pregabalin (LYRICA) 150 MG capsule TAKE 1 CAPSULE BY MOUTH TWICE A DAY 60 capsule 5    promethazine (PHENERGAN) 25 MG tablet Take 1 tablet by mouth As Needed.      rosuvastatin (CRESTOR) 40 MG tablet TAKE 1 TABLET BY MOUTH EVERY DAY 90 tablet 3    sodium chloride 0.9 % solution  (Patient taking differently: As Needed.)      traMADol (ULTRAM) 50 MG tablet TAKE 1 TABLET BY MOUTH EVERY 6 HOURS AS NEEDED FOR MODERATE PAIN 30 tablet 2    Zenpep 65304-19105 units capsule delayed-release particles TAKE 1 CAPSULE BY MOUTH 3 TIMES A DAY WITH MEALS.      losartan (COZAAR) 100 MG tablet Take 1 tablet by mouth.       No facility-administered medications prior to visit.     Opioid medication/s are on active medication list.  and I have evaluated her active treatment plan and pain score trends (see table).  Vitals:    06/25/25 0900   PainSc: 0-No pain     I have reviewed the chart for potential of high risk medication and harmful drug interactions in the elderly.        Aspirin is not on active medication list.  Aspirin use is contraindicated for this patient due to: current use of Eliquis.  .    Patient Active Problem List   Diagnosis    Type 2 diabetes mellitus with hyperglycemia, with long-term current use of insulin    Chronic pancreatitis    Gastroparesis    Pancreas disorder    Long-term insulin use    Essential hypertension    Dyslipidemia    Vitamin D deficiency    Premature menopause    Tobacco abuse counseling    Mixed hyperlipidemia    Adjustment insomnia    Chronic tension-type headache, intractable    Migraine without aura and without status migrainosus, not intractable    Encounter for smoking cessation counseling    Optic neuritis    Neck pain    Eczema of both hands    Morbidly obese    Cervical radiculopathy    Palpitations    Anxiety and depression    Medicare annual wellness visit, subsequent    Hypoglycemia    Chronic kidney disease, stage 3a    Follow-up examination after abdominal surgery  "   Yeast vaginitis    Absolute anemia     Advance Care Planning Advance Directive is not on file.  ACP discussion was held with the patient during this visit. Patient does not have an advance directive, information provided.            Objective   Vitals:    25 0900   BP: 126/68   BP Location: Left arm   Patient Position: Sitting   Cuff Size: Large Adult   Pulse: 88   SpO2: 91%   Weight: (!) 141 kg (310 lb)   PainSc: 0-No pain       Estimated body mass index is 45.76 kg/m² as calculated from the following:    Height as of 25: 175.3 cm (69.02\").    Weight as of this encounter: 141 kg (310 lb).    Class 3 Severe Obesity (BMI >=40). Obesity-related health conditions include the following: diabetes mellitus. Obesity is unchanged. BMI is is above average; BMI management plan is completed. We discussed portion control and increasing exercise.           Does the patient have evidence of cognitive impairment? No  Lab Results   Component Value Date    CHLPL 170 2025    TRIG 230 (H) 2025    HDL 41 2025    LDL 90 2025    VLDL 39 2025    HGBA1C 6.9 (H) 2025                                                                                                Health  Risk Assessment    Smoking Status:  Social History     Tobacco Use   Smoking Status Former    Current packs/day: 0.00    Average packs/day: 1 pack/day for 26.9 years (26.9 ttl pk-yrs)    Types: Cigarettes    Start date:     Quit date: 2017    Years since quittin.6    Passive exposure: Never   Smokeless Tobacco Never     Alcohol Consumption:  Social History     Substance and Sexual Activity   Alcohol Use No       Fall Risk Screen  STEADI Fall Risk Assessment was completed, and patient is at LOW risk for falls.Assessment completed on:2025    Depression Screening   Little interest or pleasure in doing things? Not at all   Feeling down, depressed, or hopeless? Not at all   PHQ-2 Total Score 0      Health Habits " and Functional and Cognitive Screenin/25/2025     9:03 AM   Functional & Cognitive Status   Do you have difficulty preparing food and eating? No   Do you have difficulty bathing yourself, getting dressed or grooming yourself? No   Do you have difficulty using the toilet? No   Do you have difficulty moving around from place to place? No   Do you have trouble with steps or getting out of a bed or a chair? No   Current Diet Diabetic Diet   Dental Exam Up to date   Eye Exam Up to date   Exercise (times per week) 0 times per week   Current Exercises Include No Regular Exercise   Do you need help using the phone?  No   Are you deaf or do you have serious difficulty hearing?  No   Do you need help to go to places out of walking distance? Yes   Do you need help shopping? No   Do you need help preparing meals?  No   Do you need help with housework?  No   Do you need help with laundry? No   Do you need help taking your medications? No   Do you need help managing money? No   Do you ever drive or ride in a car without wearing a seat belt? No   Have you felt unusual fatigue (could be tiredness), stress, anger or loneliness in the last month? No   Who do you live with? Spouse   If you need help, do you have trouble finding someone available to you? No   Have you been bothered in the last four weeks by sexual problems? No   Do you have difficulty concentrating, remembering or making decisions? No           Age-appropriate Screening Schedule:  Refer to the list below for future screening recommendations based on patient's age, sex and/or medical conditions. Orders for these recommended tests are listed in the plan section. The patient has been provided with a written plan.    Health Maintenance List  Health Maintenance   Topic Date Due    Pneumococcal Vaccine 50+ (1 of 2 - PCV) Never done    MAMMOGRAM  2017    DIABETIC FOOT EXAM  03/10/2022    DIABETIC EYE EXAM  2023    COVID-19 Vaccine ( season)  Never done    ZOSTER VACCINE (2 of 2) 01/08/2025    COLORECTAL CANCER SCREENING  12/04/2025    INFLUENZA VACCINE  07/01/2025    HEMOGLOBIN A1C  12/18/2025    LUNG CANCER SCREENING  05/28/2026    LIPID PANEL  06/11/2026    URINE MICROALBUMIN-CREATININE RATIO (uACR)  06/19/2026    ANNUAL WELLNESS VISIT  06/25/2026    HEPATITIS C SCREENING  Completed    Hepatitis B  Discontinued    TDAP/TD VACCINES  Discontinued                                                                                                                                                CMS Preventative Services Quick Reference  Risk Factors Identified During Encounter  Chronic Pain: Natural history and expected course discussed. Questions answered.    The above risks/problems have been discussed with the patient.  Pertinent information has been shared with the patient in the After Visit Summary.  An After Visit Summary and PPPS were made available to the patient.    Follow Up:   Next Medicare Wellness visit to be scheduled in 1 year.         Additional E&M Note during same encounter follows:  Patient has additional, significant, and separately identifiable condition(s)/problem(s) that require work above and beyond the Medicare Wellness Visit     Chief Complaint  Medicare Wellness-subsequent    Subjective      Current Outpatient Medications:   ·  amLODIPine (NORVASC) 5 MG tablet, Take 1 tablet by mouth Daily., Disp: 90 tablet, Rfl: 3  ·  betamethasone valerate (VALISONE) 0.1 % ointment, APPLY TOPICALLY TO THE APPROPRIATE AREA AS DIRECTED 2 (TWO) TIMES A DAY. TO HANDS AS NEEDED, Disp: 45 g, Rfl: 1  ·  busPIRone (BUSPAR) 5 MG tablet, TAKE 1 TABLET BY MOUTH THREE TIMES A DAY, Disp: 270 tablet, Rfl: 2  ·  diphenhydrAMINE (BENADRYL) 50 MG/ML injection, , Disp: , Rfl:   ·  DULoxetine (CYMBALTA) 60 MG capsule, TAKE 1 CAPSULE BY MOUTH EVERY DAY, Disp: 90 capsule, Rfl: 1  ·  DULoxetine (CYMBALTA) 60 MG capsule, Take 1 capsule by mouth Daily., Disp: 90 capsule,  Rfl: 1  ·  Eliquis 5 MG tablet tablet, TAKE 1 TABLET BY MOUTH EVERY 12 HOURS, Disp: 60 tablet, Rfl: 5  ·  EPINEPHrine (EPIPEN) 0.3 MG/0.3ML solution auto-injector injection, , Disp: , Rfl:   ·  Gammaked 10 GM/100ML solution infusion, , Disp: , Rfl:   ·  Gammaked 20 GM/200ML solution infusion, , Disp: , Rfl:   ·  Glucagon (Gvoke HypoPen 2-Pack) 1 MG/0.2ML solution auto-injector, Inject 1 mg under the skin into the appropriate area as directed As Needed (hypoglycemia)., Disp: 0.4 mL, Rfl: 1  ·  glucose blood test strip, Dispense based on insurance preference: Check 3 times a day Dx: E 11.9, Disp: 300 each, Rfl: 4  ·  hyoscyamine (ANASPAZ,LEVSIN) 0.125 MG tablet, Take 1 tablet by mouth Every 6 (Six) Hours As Needed., Disp: , Rfl:   ·  immune globulin, human, 5 GM/50ML solution infusion, Inject as directed every 7 (seven) days, Disp: , Rfl:   ·  Insulin Infusion Pump device, MEDTRONIC INSULIN PUMP  770 G WITH GUARDIAN  SENSOR, Disp: 1 each, Rfl: 0  ·  Insulin Lispro (HumaLOG KwikPen) 200 UNIT/ML solution pen-injector, Inject 200 Units under the skin into the appropriate area as directed Daily., Disp: 90 mL, Rfl: 0  ·  Insulin Pen Needle (BD Pen Needle Neida U/F) 32G X 4 MM misc, Using 1 pen needle daily, Disp: 100 each, Rfl: 1  ·  lubiprostone (AMITIZA) 24 MCG capsule, Take 1 capsule by mouth As Needed., Disp: , Rfl:   ·  Magnesium Cl-Calcium Carbonate (Slow Magnesium/Calcium)  MG tablet delayed-release, Take 1 tablet by mouth Daily., Disp: 30 tablet, Rfl: 2  ·  methocarbamol (ROBAXIN) 500 MG tablet, TAKE 1 TABLET BY MOUTH 3 TIMES A DAY AS NEEDED FOR MUSCLE SPASMS.**NOT ON FORMULARY, Disp: 60 tablet, Rfl: 2  ·  mupirocin (BACTROBAN) 2 % ointment, Apply 1 Application topically to the appropriate area as directed., Disp: , Rfl:   ·  naloxone (NARCAN) 4 MG/0.1ML nasal spray, Call 911. Don't prime. [place] in 1 nostril for overdose. Repeat in 2-3 minutes in other nostril if no or minimal breathing/responsiveness., Disp:  2 each, Rfl: 0  ·  pantoprazole (PROTONIX) 40 MG EC tablet, TAKE 1 TABLET BY MOUTH EVERY DAY (Patient taking differently: Take 1 tablet by mouth 2 (Two) Times a Day.), Disp: 90 tablet, Rfl: 3  ·  Pediatric Multivitamins-Iron (Flintstones Plus Extra Iron) 18 MG chewable tablet, Chew 1 tablet 2 (Two) Times a Day., Disp: 60 tablet, Rfl: 5  ·  pregabalin (LYRICA) 150 MG capsule, TAKE 1 CAPSULE BY MOUTH TWICE A DAY, Disp: 60 capsule, Rfl: 5  ·  promethazine (PHENERGAN) 25 MG tablet, Take 1 tablet by mouth As Needed., Disp: , Rfl:   ·  rosuvastatin (CRESTOR) 40 MG tablet, TAKE 1 TABLET BY MOUTH EVERY DAY, Disp: 90 tablet, Rfl: 3  ·  sodium chloride 0.9 % solution, , Disp: , Rfl:   ·  traMADol (ULTRAM) 50 MG tablet, TAKE 1 TABLET BY MOUTH EVERY 6 HOURS AS NEEDED FOR MODERATE PAIN, Disp: 30 tablet, Rfl: 2  ·  Zenpep 14558-68261 units capsule delayed-release particles, TAKE 1 CAPSULE BY MOUTH 3 TIMES A DAY WITH MEALS., Disp: , Rfl:   ·  losartan (COZAAR) 100 MG tablet, Take 1 tablet by mouth., Disp: , Rfl:        Danielle is also being seen today for an annual adult preventative physical exam.  and Danielle is also being seen today for additional medical problem/s.       The patient presents for a Medicare wellness visit.    She has not required any hospitalizations recently and reports no falls. She is currently on Eliquis but does not take aspirin. She has a living will in place. She is under the care of Dr. Bridget Parada, a cardiologist, whom she visits annually. She has undergone a stress test and has seen him twice.    She is also under the care of Dr. Miller, a gastroenterologist specializing in pancreatic and liver diseases, who has ordered a scan of her pancreas. She is scheduled to see her nephrologist today and has an upcoming appointment with her gastroenterologist next month. She is scheduled to receive IVIG tomorrow, which aids in managing her gastroparesis. Despite this, she continues to struggle with nutrient  absorption. She has a gastric stimulator implanted.    She is under the care of an endocrinologist, Dr. Lilian Holloway, for her diabetes management. Her current weight is 101 pounds, and her A1c level is 6.9. She uses an insulin pump and a CGM for glucose monitoring.    She experiences difficulty in losing weight due to her insulin pump and is seeking assistance in weight management. She has been advised by her gastroenterologist to lose weight but finds it challenging. She experiences knee and back pain due to her weight and also suffers from cramps. She takes a 500 mg magnesium pill daily, which provides intermittent relief. She can go several weeks without spasms, but they can occur suddenly. Recently, she experienced a spasm that started in her ribs and spread across her body.    She has been informed by [employer] that she has early-stage glaucoma, likely due to her diabetes. She experiences occasional blurry vision, which persists even after cleaning her glasses and eyes. She also reports seeing shadows. She is not currently on any eye drops and is supposed to have annual eye exams.    She has a history of severe migraines but has not experienced any recently.    She uses scopolamine for nausea when Phenergan is ineffective.    PAST SURGICAL HISTORY:  - Gastric stimulator implantation    SOCIAL HISTORY  She quit smoking 8 years ago.    FAMILY HISTORY  Heart problems run on the men's side of her family.  Review of Systems   All other systems reviewed and are negative.         Objective   Vital Signs:  /68 (BP Location: Left arm, Patient Position: Sitting, Cuff Size: Large Adult)   Pulse 88   Wt (!) 141 kg (310 lb)   SpO2 91%   BMI 45.76 kg/m²   Physical Exam  Vitals reviewed.   Constitutional:       Appearance: She is well-developed. She is obese.   HENT:      Head: Normocephalic and atraumatic.      Right Ear: Tympanic membrane and external ear normal.      Left Ear: Tympanic membrane and external  ear normal.      Nose: Nose normal.   Eyes:      Conjunctiva/sclera: Conjunctivae normal.      Pupils: Pupils are equal, round, and reactive to light.   Neck:      Thyroid: No thyromegaly.      Vascular: No JVD.   Cardiovascular:      Rate and Rhythm: Normal rate and regular rhythm.      Heart sounds: Normal heart sounds.   Pulmonary:      Effort: Pulmonary effort is normal.      Breath sounds: Normal breath sounds.   Abdominal:      General: Bowel sounds are normal.      Palpations: Abdomen is soft.   Musculoskeletal:         General: Normal range of motion.      Cervical back: Normal range of motion and neck supple.   Lymphadenopathy:      Cervical: No cervical adenopathy.   Skin:     General: Skin is warm and dry.      Findings: No rash.   Neurological:      Mental Status: She is alert and oriented to person, place, and time.      Cranial Nerves: No cranial nerve deficit.      Coordination: Coordination normal.   Psychiatric:         Behavior: Behavior normal.         Thought Content: Thought content normal.         Judgment: Judgment normal.           Respiratory: Clear to auscultation, no wheezing, rales or rhonchi  Cardiovascular: Regular rate and rhythm, no murmurs, rubs, or gallops    The following data was reviewed by: Jairo Walker MD on 06/25/2025:  Data reviewed : Consultant notes gastro motility and nephrology  Common labs          2/12/2025    13:41 6/11/2025    09:46 6/18/2025    09:55   Common Labs   Total Cholesterol  170     Triglycerides  230     HDL Cholesterol  41     LDL Cholesterol   90     Hemoglobin A1C 9.2     7.00  6.9          Details          This result is from an external source.               Results  Labs   - Creatinine: 1.47   - BUN: 22   - A1c: 6.9   - Urinalysis: Trace blood, 1+ protein           Assessment and Plan Additional age appropriate preventative wellness advice topics were discussed during today's preventative wellness exam(some topics already addressed during AWV portion  of the note above):   Nutrition: Discussed nutrition plan with patient. Information shared in after visit summary. Goal is for a well balanced diet to enhance overall health.     Healthy Weight: Discussed current and goal BMI with patient. Steps to attain this goal discussed. Information shared in after visit summary.       1. Medicare wellness visit.  - Creatinine level recorded 7 days ago was 1.47, and BUN was 22.  - A1c level is currently 6.9, improved from 9.2 in 02/2025. Kidney protein levels are within normal range. Urinalysis revealed trace blood and 1+ protein.  - Due for a colonoscopy this year, as it has been a decade since her last one.  - No falls reported. No recent hospitalizations.    2. Diabetes Mellitus.  - A1c is currently 6.9, improved from 9.2 in 02/2025. On insulin pump.  - Creatinine level recorded 7 days ago was 1.47, and BUN was 22.  - Discussed Mounjaro for diabetes and weight loss but given gastroparesis treatment at motility clinic, it may not be suitable unless approved by gastroenterologist.  - Advised to consult with gastroenterologist regarding Mounjaro and Ozempic.    3. Gastroparesis.  - Currently treated at motility clinic. Has a gastric stimulator.  - IVIG helps with gastroparesis but absorption issues persist.  - Creatinine level recorded 7 days ago was 1.47, and BUN was 22.  - Advised to consult with gastroenterologist regarding weight loss medications.    4. Weight management.  - Struggling to lose weight due to insulin pump and gastroparesis.  - Mounjaro and Ozempic discussed but may worsen gastroparesis by slowing gastric emptying.  - Creatinine level recorded 7 days ago was 1.47, and BUN was 22.  - Advised to consult with gastroenterologist regarding weight loss medications.    5. Glaucoma.  - Diagnosed with early-stage glaucoma likely secondary to diabetes by [facility].  - Not currently on any eye drops.  - Vision issues reported, including blurriness and shadows.  -  Advised to follow up with eye doctor.    6. Migraines.  - History of severe migraines but none recently.  - No current treatment needed.  - No recent hospitalizations.  - No falls reported.    7. Nausea.  - Uses scopolamine for nausea when Phenergan is ineffective.  - No recent hospitalizations.  - No falls reported.  - Advised to avoid VR goggles due to nausea.    Follow-up  - Follow up in 4 months.  - Colonoscopy due this year.                     P                           1.   Essential hypertension        ICD-10-CM: H76QYB-[id number]-JP: 401.9              2.   Mixed hyperlipidemia        ICD-10-CM: E78.2ICD-9-CM: 272.2              3.   Type 2 diabetes mellitus with hyperglycemia, with long-term current use of insulin        ICD-10-CM: E11.65, Z79.4ICD-9-CM: 250.00, 790.29, V58.67              4.   Other chronic pancreatitis        ICD-10-CM: K86.1ICD-9-CM: 577.1              5.   Gastroparesis        ICD-10-CM: K31.84ICD-9-CM: 536.3              6.   Chronic kidney disease, stage 3a        ICD-10-CM: N18.31ICD-9-CM: 585.3              7.   Diabetic autonomic neuropathy associated with type 1 diabetes mellitus        ICD-10-CM: E10.43ICD-9-CM: 250.61, 337.1              8.   Current mild episode of major depressive disorder without prior episode        ICD-10-CM: F32.0ICD-9-CM: 296.21              9.   Hypomagnesemia        ICD-10-CM: E83.42ICD-9-CM: 275.2            10.   Medicare annual wellness visit, subsequent        ICD-10-CM: Z00.00ICD-9-CM: V70.0            Follow Up   No follow-ups on file.  Patient was given instructions and counseling regarding her condition or for health maintenance advice. Please see specific information pulled into the AVS if appropriate.  Patient or patient representative verbalized consent for the use of Ambient Listening during the visit with  Jairo Walker MD for chart documentation. 6/25/2025  09:48 EDT

## 2025-07-05 DIAGNOSIS — F32.0 CURRENT MILD EPISODE OF MAJOR DEPRESSIVE DISORDER WITHOUT PRIOR EPISODE: ICD-10-CM

## 2025-07-05 DIAGNOSIS — E10.43 DIABETIC AUTONOMIC NEUROPATHY ASSOCIATED WITH TYPE 1 DIABETES MELLITUS: ICD-10-CM

## 2025-07-05 DIAGNOSIS — F41.9 ANXIETY: ICD-10-CM

## 2025-07-07 RX ORDER — BUSPIRONE HYDROCHLORIDE 5 MG/1
5 TABLET ORAL 3 TIMES DAILY
Qty: 270 TABLET | Refills: 1 | Status: SHIPPED | OUTPATIENT
Start: 2025-07-07

## 2025-07-07 RX ORDER — PREGABALIN 150 MG/1
150 CAPSULE ORAL EVERY 12 HOURS SCHEDULED
Qty: 180 CAPSULE | Refills: 1 | Status: SHIPPED | OUTPATIENT
Start: 2025-07-07

## 2025-07-07 RX ORDER — DULOXETIN HYDROCHLORIDE 60 MG/1
60 CAPSULE, DELAYED RELEASE ORAL DAILY
Qty: 90 CAPSULE | Refills: 1 | Status: SHIPPED | OUTPATIENT
Start: 2025-07-07

## 2025-07-07 RX ORDER — AMLODIPINE BESYLATE 2.5 MG/1
2.5 TABLET ORAL DAILY
Qty: 90 TABLET | Refills: 3 | OUTPATIENT
Start: 2025-07-07

## 2025-07-07 NOTE — TELEPHONE ENCOUNTER
Contract on file    Rx Refill Note  Requested Prescriptions     Pending Prescriptions Disp Refills    busPIRone (BUSPAR) 5 MG tablet [Pharmacy Med Name: BUSPIRONE HCL 5 MG TABLET] 270 tablet 1     Sig: TAKE 1 TABLET BY MOUTH THREE TIMES A DAY    DULoxetine (CYMBALTA) 60 MG capsule [Pharmacy Med Name: DULOXETINE HCL DR 60 MG CAP] 90 capsule 1     Sig: TAKE 1 CAPSULE BY MOUTH EVERY DAY    pregabalin (LYRICA) 150 MG capsule [Pharmacy Med Name: PREGABALIN 150 MG CAPSULE] 180 capsule 1     Sig: TAKE 1 CAPSULE BY MOUTH TWICE A DAY     Refused Prescriptions Disp Refills    amLODIPine (NORVASC) 2.5 MG tablet [Pharmacy Med Name: AMLODIPINE BESYLATE 2.5 MG TAB] 90 tablet 3     Sig: TAKE 1 TABLET BY MOUTH EVERY DAY      Last office visit with prescribing clinician: 6/25/2025   Last telemedicine visit with prescribing clinician: Visit date not found   Next office visit with prescribing clinician: 7/23/2025       Katina Niño MA  07/07/25, 10:11 EDT

## 2025-07-23 ENCOUNTER — OFFICE VISIT (OUTPATIENT)
Dept: INTERNAL MEDICINE | Facility: CLINIC | Age: 50
End: 2025-07-23
Payer: MEDICARE

## 2025-07-23 VITALS
HEART RATE: 85 BPM | DIASTOLIC BLOOD PRESSURE: 90 MMHG | SYSTOLIC BLOOD PRESSURE: 160 MMHG | BODY MASS INDEX: 43.99 KG/M2 | WEIGHT: 293 LBS | OXYGEN SATURATION: 96 %

## 2025-07-23 DIAGNOSIS — E11.65 TYPE 2 DIABETES MELLITUS WITH HYPERGLYCEMIA, WITH LONG-TERM CURRENT USE OF INSULIN: ICD-10-CM

## 2025-07-23 DIAGNOSIS — I10 ESSENTIAL HYPERTENSION: Primary | ICD-10-CM

## 2025-07-23 DIAGNOSIS — R10.9 FLANK PAIN: ICD-10-CM

## 2025-07-23 DIAGNOSIS — N18.31 CHRONIC KIDNEY DISEASE, STAGE 3A: ICD-10-CM

## 2025-07-23 DIAGNOSIS — Z79.4 TYPE 2 DIABETES MELLITUS WITH HYPERGLYCEMIA, WITH LONG-TERM CURRENT USE OF INSULIN: ICD-10-CM

## 2025-07-23 DIAGNOSIS — E11.40 TYPE 2 DIABETES MELLITUS WITH DIABETIC NEUROPATHY, WITH LONG-TERM CURRENT USE OF INSULIN: ICD-10-CM

## 2025-07-23 DIAGNOSIS — F51.02 ADJUSTMENT INSOMNIA: ICD-10-CM

## 2025-07-23 DIAGNOSIS — E10.43 DIABETIC AUTONOMIC NEUROPATHY ASSOCIATED WITH TYPE 1 DIABETES MELLITUS: ICD-10-CM

## 2025-07-23 DIAGNOSIS — K31.84 GASTROPARESIS: ICD-10-CM

## 2025-07-23 DIAGNOSIS — F41.9 ANXIETY: ICD-10-CM

## 2025-07-23 DIAGNOSIS — F41.9 ANXIETY AND DEPRESSION: ICD-10-CM

## 2025-07-23 DIAGNOSIS — K86.1 OTHER CHRONIC PANCREATITIS: ICD-10-CM

## 2025-07-23 DIAGNOSIS — E78.2 MIXED HYPERLIPIDEMIA: ICD-10-CM

## 2025-07-23 DIAGNOSIS — F32.0 CURRENT MILD EPISODE OF MAJOR DEPRESSIVE DISORDER WITHOUT PRIOR EPISODE: ICD-10-CM

## 2025-07-23 DIAGNOSIS — F32.A ANXIETY AND DEPRESSION: ICD-10-CM

## 2025-07-23 DIAGNOSIS — Z79.4 TYPE 2 DIABETES MELLITUS WITH DIABETIC NEUROPATHY, WITH LONG-TERM CURRENT USE OF INSULIN: ICD-10-CM

## 2025-07-23 PROCEDURE — 1159F MED LIST DOCD IN RCRD: CPT | Performed by: FAMILY MEDICINE

## 2025-07-23 PROCEDURE — 99214 OFFICE O/P EST MOD 30 MIN: CPT | Performed by: FAMILY MEDICINE

## 2025-07-23 PROCEDURE — 3051F HG A1C>EQUAL 7.0%<8.0%: CPT | Performed by: FAMILY MEDICINE

## 2025-07-23 PROCEDURE — 1160F RVW MEDS BY RX/DR IN RCRD: CPT | Performed by: FAMILY MEDICINE

## 2025-07-23 PROCEDURE — 1126F AMNT PAIN NOTED NONE PRSNT: CPT | Performed by: FAMILY MEDICINE

## 2025-07-23 PROCEDURE — 3077F SYST BP >= 140 MM HG: CPT | Performed by: FAMILY MEDICINE

## 2025-07-23 PROCEDURE — 3080F DIAST BP >= 90 MM HG: CPT | Performed by: FAMILY MEDICINE

## 2025-07-23 RX ORDER — BUSPIRONE HYDROCHLORIDE 5 MG/1
5 TABLET ORAL 3 TIMES DAILY
Qty: 270 TABLET | Refills: 1 | Status: SHIPPED | OUTPATIENT
Start: 2025-07-23

## 2025-07-23 RX ORDER — SCOPOLAMINE 1 MG/3D
PATCH, EXTENDED RELEASE TRANSDERMAL
COMMUNITY
Start: 2025-06-23

## 2025-07-23 RX ORDER — AMLODIPINE BESYLATE 5 MG/1
5 TABLET ORAL DAILY
Qty: 90 TABLET | Refills: 3 | Status: SHIPPED | OUTPATIENT
Start: 2025-07-23

## 2025-07-23 RX ORDER — LOSARTAN POTASSIUM 100 MG/1
100 TABLET ORAL DAILY
Qty: 90 TABLET | Refills: 3 | Status: SHIPPED | OUTPATIENT
Start: 2025-07-23 | End: 2027-07-01

## 2025-07-23 RX ORDER — TRAMADOL HYDROCHLORIDE 50 MG/1
50 TABLET ORAL
Qty: 30 TABLET | Refills: 5 | Status: SHIPPED | OUTPATIENT
Start: 2025-07-23

## 2025-07-23 RX ORDER — ROSUVASTATIN CALCIUM 40 MG/1
40 TABLET, COATED ORAL DAILY
Qty: 90 TABLET | Refills: 3 | Status: SHIPPED | OUTPATIENT
Start: 2025-07-23

## 2025-07-23 RX ORDER — DULOXETIN HYDROCHLORIDE 60 MG/1
60 CAPSULE, DELAYED RELEASE ORAL DAILY
Qty: 90 CAPSULE | Refills: 1 | Status: SHIPPED | OUTPATIENT
Start: 2025-07-23

## 2025-07-23 RX ORDER — PREGABALIN 150 MG/1
150 CAPSULE ORAL EVERY 12 HOURS SCHEDULED
Qty: 180 CAPSULE | Refills: 1 | Status: SHIPPED | OUTPATIENT
Start: 2025-07-23

## 2025-07-23 NOTE — PROGRESS NOTES
Chief Complaint  Hypertension, Hyperlipidemia, Diabetes, Anxiety, Depression, and Chronic Kidney Disease    Subjective        Danielle Dudley presents to [facility]  Hypertension  Associated symptoms: anxiety and headaches    Associated symptoms: no blurred vision, no chest pain, no palpitations, no shortness of breath and no dizziness    Hyperlipidemia  Pertinent negatives include no chest pain or shortness of breath.   Diabetes  Associated symptoms:     fatigue      no blurred vision, no chest pain and no weight loss    Hypoglycemia symptoms:     headaches      no confusion and no dizziness    Anxiety  Initial visit:     PMH includes: depression      Symptoms: dry mouth, insomnia and nausea      Symptoms: no chest pain, no confusion, no dizziness, no palpitations and no shortness of breath    Depression    Symptoms: dry mouth, insomnia, nausea and weight gain      Symptoms: no chest pain, no confusion, no dizziness, no palpitations, no shortness of breath and no weight loss      Nighttime awakenings:     PMH Includes: depression    Chronic Kidney Disease  Symptoms: fatigue, headaches and nausea    Symptoms: no chest pain    Primary Care Follow-Up  Conditions present: anxiety, depression and hyperlipidemia    Current symptoms: dry mouth, insomnia, nausea, fatigue, weight gain and headaches      Current symptoms: no chest pain, no confusion, no dizziness, no palpitations, no shortness of breath, no weight loss and no blurred vision    Sleep quality:  Poor  Sleep per night:  2 hours  Side effects:  Urinary  Treatment compliance:  All of the time  Treatment barriers:  No complaince problems  Exercise:  Intermittently    History of Present Illness  The patient presents for evaluation of diabetic neuropathy, diabetes mellitus, and medication management.    She reports experiencing restless legs and pain, particularly when lying down to sleep. Lyrica has been used for her diabetic neuropathy  "in the legs, but it has been unavailable for 3 weeks due to insurance issues. She plans to start with [employer] for her diabetes and heart conditions and is currently taking tramadol for leg pain.    She has been managing her diabetes with Lyrica, which has not been available for 3 weeks due to insurance issues. She plans to start with [employer] for her diabetes and heart conditions.    She is currently taking losartan 100 mg daily and rosuvastatin 40 mg. She also takes buspirone and Cymbalta.    She has been to the ER with pancreas and stomach issues. In the hospital, she was given medication that helps with nausea and pain together. The nausea subsided, but the pain persisted. She assumed dehydration might be a factor but continues to feel the need to urinate with minimal output. She reports a good urine output first thing in the morning if she does not get up at night. She has been drinking a lot of Gatorade and water and staying off Cokes.    Social History:  Marital Status:   Diet: Drinks a lot of Gatorade and water, staying off Cokes  Sleep: Reports restless legs and pain when lying down to sleep    Objective   Vital Signs:  /90 (BP Location: Left arm, Patient Position: Sitting, Cuff Size: Large Adult)   Pulse 85   Wt 135 kg (298 lb)   SpO2 96%   BMI 43.99 kg/m²   Estimated body mass index is 43.99 kg/m² as calculated from the following:    Height as of 6/11/25: 175.3 cm (69.02\").    Weight as of this encounter: 135 kg (298 lb).            Physical Exam  Vitals reviewed.   Constitutional:       Appearance: She is well-developed. She is obese.   HENT:      Head: Normocephalic and atraumatic.      Right Ear: Tympanic membrane and external ear normal.      Left Ear: Tympanic membrane and external ear normal.      Nose: Nose normal.   Eyes:      Conjunctiva/sclera: Conjunctivae normal.      Pupils: Pupils are equal, round, and reactive to light.   Neck:      Thyroid: No thyromegaly. "      Vascular: No JVD.   Cardiovascular:      Rate and Rhythm: Normal rate and regular rhythm.      Heart sounds: Normal heart sounds.   Pulmonary:      Effort: Pulmonary effort is normal.      Breath sounds: Normal breath sounds.   Abdominal:      General: Bowel sounds are normal.      Palpations: Abdomen is soft.   Musculoskeletal:         General: Normal range of motion.      Cervical back: Normal range of motion and neck supple.   Lymphadenopathy:      Cervical: No cervical adenopathy.   Skin:     General: Skin is warm and dry.      Findings: No rash.   Neurological:      Mental Status: She is alert and oriented to person, place, and time.      Cranial Nerves: No cranial nerve deficit.      Coordination: Coordination normal.   Psychiatric:         Behavior: Behavior normal.         Thought Content: Thought content normal.         Judgment: Judgment normal.        Physical Exam      Result Review :  The following data was reviewed by: Jairo Walker MD on 07/23/2025:  Common labs          2/12/2025    13:41 6/11/2025    09:46 6/18/2025    09:55   Common Labs   Total Cholesterol  170     Triglycerides  230     HDL Cholesterol  41     LDL Cholesterol   90     Hemoglobin A1C 9.2     7.00  6.9          Details          This result is from an external source.             Data reviewed : [facility]   Results  Labs   - LANDRY 65 neurologic syndrome antibody testing: Negative   - IgM: Borderline low 43   - Sed rate: 22   - CRP: Normal   - Metabolic panel - glucose: 178   - Metabolic panel - creatinine: 1.39   - Metabolic panel - BUN: 18   - Electrophoresis: Normal             Assessment and Plan   Diagnoses and all orders for this visit:    1. Essential hypertension (Primary)    2. Mixed hyperlipidemia    3. Type 2 diabetes mellitus with hyperglycemia, with long-term current use of insulin    4. Anxiety and depression    5. Adjustment insomnia    6. Gastroparesis      Assessment & Plan  1.  Diabetic neuropathy.  - Reports experiencing pain and discomfort in her legs, particularly when lying down to sleep.  - Currently taking tramadol for leg pain; Lyrica has been unavailable for 3 weeks due to insurance issues.  - Discussed the use of Lyrica primarily for restless legs and diabetic neuropathy.  - Advised to wait until August 1 for insurance coverage to resume.    2. Diabetes mellitus.  - Recent lab results show glucose level of 178.  - Metabolic panel results: creatinine 1.39, BUN 18.  - CRP and sed rate are within acceptable ranges.  - Will start with [employer] for diabetes and heart conditions on 08/01/2025.    3. Medication management.  - Currently taking losartan 100 mg daily and rosuvastatin 40 mg.  - Also takes buspirone and Cymbalta.  - Refills for medications have been sent to the pharmacy; will  losartan and tramadol today.    Follow-up: October 2025.         Follow Up   No follow-ups on file.    Patient or patient representative verbalized consent for the use of Ambient Listening during the visit with  Jairo Walker MD for chart documentation. 7/23/2025  08:46 EDT    Patient was given instructions and counseling regarding her condition or for health maintenance advice. Please see specific information pulled into the AVS if appropriate.

## 2025-08-02 DIAGNOSIS — R10.9 FLANK PAIN: ICD-10-CM

## 2025-08-04 RX ORDER — TRAMADOL HYDROCHLORIDE 50 MG/1
50 TABLET ORAL
Qty: 30 TABLET | Refills: 5 | Status: SHIPPED | OUTPATIENT
Start: 2025-08-04 | End: 2025-08-06 | Stop reason: SDUPTHER

## 2025-08-06 DIAGNOSIS — R10.9 FLANK PAIN: ICD-10-CM

## 2025-08-07 RX ORDER — TRAMADOL HYDROCHLORIDE 50 MG/1
50 TABLET ORAL
Qty: 28 TABLET | Refills: 0 | Status: SHIPPED | OUTPATIENT
Start: 2025-08-07

## 2025-08-27 RX ORDER — APIXABAN 5 MG/1
5 TABLET, FILM COATED ORAL EVERY 12 HOURS SCHEDULED
Qty: 60 TABLET | Refills: 5 | Status: SHIPPED | OUTPATIENT
Start: 2025-08-27

## 2025-08-28 DIAGNOSIS — R10.9 FLANK PAIN: ICD-10-CM

## 2025-08-29 RX ORDER — AMLODIPINE BESYLATE 2.5 MG/1
2.5 TABLET ORAL DAILY
Qty: 90 TABLET | Refills: 3 | Status: SHIPPED | OUTPATIENT
Start: 2025-08-29

## 2025-08-29 RX ORDER — TRAMADOL HYDROCHLORIDE 50 MG/1
50 TABLET ORAL
Qty: 28 TABLET | Refills: 0 | Status: SHIPPED | OUTPATIENT
Start: 2025-08-29